# Patient Record
Sex: MALE | Race: WHITE | NOT HISPANIC OR LATINO | Employment: OTHER | URBAN - METROPOLITAN AREA
[De-identification: names, ages, dates, MRNs, and addresses within clinical notes are randomized per-mention and may not be internally consistent; named-entity substitution may affect disease eponyms.]

---

## 2017-10-02 ENCOUNTER — HOSPITAL ENCOUNTER (OUTPATIENT)
Dept: FAMILY MEDICINE CLINIC | Facility: CLINIC | Age: 72
Setting detail: SPECIMEN
Discharge: HOME | End: 2017-10-02
Attending: PREVENTIVE MEDICINE | Admitting: PREVENTIVE MEDICINE

## 2017-10-02 LAB
ALBUMIN SERPL-MCNC: 3.9 G/DL (ref 3.5–4.8)
ALBUMIN/GLOB SERPL: 1.5 {RATIO} (ref 1–1.7)
ALP SERPL-CCNC: 61 IU/L (ref 32–91)
ALT SERPL-CCNC: 25 IU/L (ref 17–63)
ANION GAP SERPL CALC-SCNC: 11.8 MMOL/L (ref 10–20)
AST SERPL-CCNC: 21 IU/L (ref 15–41)
BASOPHILS # BLD AUTO: 0 10*3/UL (ref 0–0.2)
BASOPHILS NFR BLD AUTO: 1 % (ref 0–2)
BILIRUB SERPL-MCNC: 0.7 MG/DL (ref 0.3–1.2)
BUN SERPL-MCNC: 11 MG/DL (ref 8–20)
BUN/CREAT SERPL: 12.2 (ref 6.2–20.3)
CALCIUM SERPL-MCNC: 9.1 MG/DL (ref 8.9–10.3)
CHLORIDE SERPL-SCNC: 105 MMOL/L (ref 101–111)
CHOLEST SERPL-MCNC: 135 MG/DL
CHOLEST/HDLC SERPL: 3.4 {RATIO}
CONV CO2: 26 MMOL/L (ref 22–32)
CONV LDL CHOLESTEROL DIRECT: 79 MG/DL (ref 0–100)
CONV TOTAL PROTEIN: 6.5 G/DL (ref 6.1–7.9)
CREAT UR-MCNC: 0.9 MG/DL (ref 0.7–1.2)
DIFFERENTIAL METHOD BLD: (no result)
EOSINOPHIL # BLD AUTO: 0.2 10*3/UL (ref 0–0.3)
EOSINOPHIL # BLD AUTO: 4 % (ref 0–3)
ERYTHROCYTE [DISTWIDTH] IN BLOOD BY AUTOMATED COUNT: 13 % (ref 11.5–14.5)
GLOBULIN UR ELPH-MCNC: 2.6 G/DL (ref 2.5–3.8)
GLUCOSE SERPL-MCNC: 106 MG/DL (ref 65–99)
HCT VFR BLD AUTO: 41 % (ref 40–54)
HDLC SERPL-MCNC: 40 MG/DL
HGB BLD-MCNC: 14.2 G/DL (ref 14–18)
LDLC/HDLC SERPL: 2 {RATIO}
LIPID INTERPRETATION: NORMAL
LYMPHOCYTES # BLD AUTO: 1.5 10*3/UL (ref 0.8–4.8)
LYMPHOCYTES NFR BLD AUTO: 32 % (ref 18–42)
MCH RBC QN AUTO: 30.5 PG (ref 26–32)
MCHC RBC AUTO-ENTMCNC: 34.6 G/DL (ref 32–36)
MCV RBC AUTO: 88.1 FL (ref 80–94)
MONOCYTES # BLD AUTO: 0.4 10*3/UL (ref 0.1–1.3)
MONOCYTES NFR BLD AUTO: 9 % (ref 2–11)
NEUTROPHILS # BLD AUTO: 2.6 10*3/UL (ref 2.3–8.6)
NEUTROPHILS NFR BLD AUTO: 54 % (ref 50–75)
NRBC BLD AUTO-RTO: 0 /100{WBCS}
NRBC/RBC NFR BLD MANUAL: 0 10*3/UL
PLATELET # BLD AUTO: 159 10*3/UL (ref 150–450)
PMV BLD AUTO: 7 FL (ref 7.4–10.4)
POTASSIUM SERPL-SCNC: 4.8 MMOL/L (ref 3.6–5.1)
RBC # BLD AUTO: 4.65 10*6/UL (ref 4.6–6)
SODIUM SERPL-SCNC: 138 MMOL/L (ref 136–144)
TRIGL SERPL-MCNC: 135 MG/DL
VLDLC SERPL CALC-MCNC: 16.2 MG/DL
WBC # BLD AUTO: 4.7 10*3/UL (ref 4.5–11.5)

## 2017-12-07 ENCOUNTER — HOSPITAL ENCOUNTER (OUTPATIENT)
Dept: OTHER | Facility: HOSPITAL | Age: 72
Setting detail: SPECIMEN
Discharge: HOME | End: 2017-12-07
Attending: INTERNAL MEDICINE | Admitting: INTERNAL MEDICINE

## 2017-12-07 ENCOUNTER — OFFICE (AMBULATORY)
Dept: URBAN - METROPOLITAN AREA CLINIC 64 | Facility: CLINIC | Age: 72
End: 2017-12-07

## 2017-12-07 ENCOUNTER — ON CAMPUS - OUTPATIENT (AMBULATORY)
Dept: URBAN - METROPOLITAN AREA HOSPITAL 2 | Facility: HOSPITAL | Age: 72
End: 2017-12-07

## 2017-12-07 VITALS
RESPIRATION RATE: 15 BRPM | HEART RATE: 79 BPM | HEART RATE: 78 BPM | SYSTOLIC BLOOD PRESSURE: 121 MMHG | DIASTOLIC BLOOD PRESSURE: 78 MMHG | DIASTOLIC BLOOD PRESSURE: 82 MMHG | HEART RATE: 65 BPM | DIASTOLIC BLOOD PRESSURE: 72 MMHG | WEIGHT: 213.2 LBS | OXYGEN SATURATION: 97 % | HEART RATE: 72 BPM | OXYGEN SATURATION: 98 % | HEIGHT: 70 IN | RESPIRATION RATE: 18 BRPM | DIASTOLIC BLOOD PRESSURE: 73 MMHG | RESPIRATION RATE: 20 BRPM | DIASTOLIC BLOOD PRESSURE: 68 MMHG | HEART RATE: 71 BPM | OXYGEN SATURATION: 100 % | SYSTOLIC BLOOD PRESSURE: 134 MMHG | SYSTOLIC BLOOD PRESSURE: 122 MMHG | SYSTOLIC BLOOD PRESSURE: 117 MMHG | DIASTOLIC BLOOD PRESSURE: 87 MMHG | HEART RATE: 68 BPM | TEMPERATURE: 98.1 F | RESPIRATION RATE: 17 BRPM | DIASTOLIC BLOOD PRESSURE: 71 MMHG | HEART RATE: 70 BPM | OXYGEN SATURATION: 96 % | SYSTOLIC BLOOD PRESSURE: 123 MMHG | DIASTOLIC BLOOD PRESSURE: 77 MMHG | DIASTOLIC BLOOD PRESSURE: 75 MMHG | SYSTOLIC BLOOD PRESSURE: 112 MMHG | HEART RATE: 66 BPM | RESPIRATION RATE: 16 BRPM | SYSTOLIC BLOOD PRESSURE: 119 MMHG | OXYGEN SATURATION: 99 %

## 2017-12-07 DIAGNOSIS — K64.1 SECOND DEGREE HEMORRHOIDS: ICD-10-CM

## 2017-12-07 DIAGNOSIS — K57.30 DIVERTICULOSIS OF LARGE INTESTINE WITHOUT PERFORATION OR ABS: ICD-10-CM

## 2017-12-07 DIAGNOSIS — Z86.010 PERSONAL HISTORY OF COLONIC POLYPS: ICD-10-CM

## 2017-12-07 DIAGNOSIS — D12.3 BENIGN NEOPLASM OF TRANSVERSE COLON: ICD-10-CM

## 2017-12-07 LAB
GI HISTOLOGY: A. UNSPECIFIED: (no result)
GI HISTOLOGY: PDF REPORT: (no result)

## 2017-12-07 PROCEDURE — 45385 COLONOSCOPY W/LESION REMOVAL: CPT | Mod: PT | Performed by: INTERNAL MEDICINE

## 2017-12-07 PROCEDURE — 88305 TISSUE EXAM BY PATHOLOGIST: CPT | Mod: 26 | Performed by: INTERNAL MEDICINE

## 2017-12-07 RX ADMIN — PROPOFOL: 10 INJECTION, EMULSION INTRAVENOUS at 08:37

## 2018-02-01 ENCOUNTER — HOSPITAL ENCOUNTER (OUTPATIENT)
Dept: ORTHOPEDIC SURGERY | Facility: CLINIC | Age: 73
Discharge: HOME | End: 2018-02-01
Attending: PODIATRIST | Admitting: PODIATRIST

## 2018-10-29 ENCOUNTER — HOSPITAL ENCOUNTER (OUTPATIENT)
Dept: FAMILY MEDICINE CLINIC | Facility: CLINIC | Age: 73
Setting detail: SPECIMEN
Discharge: HOME | End: 2018-10-29
Attending: PREVENTIVE MEDICINE | Admitting: PREVENTIVE MEDICINE

## 2018-10-29 LAB
ALBUMIN SERPL-MCNC: 3.9 G/DL (ref 3.5–4.8)
ALBUMIN/GLOB SERPL: 1.6 {RATIO} (ref 1–1.7)
ALP SERPL-CCNC: 65 IU/L (ref 32–91)
ALT SERPL-CCNC: 31 IU/L (ref 17–63)
ANION GAP SERPL CALC-SCNC: 13.2 MMOL/L (ref 10–20)
AST SERPL-CCNC: 24 IU/L (ref 15–41)
BASOPHILS # BLD AUTO: 0 10*3/UL (ref 0–0.2)
BASOPHILS NFR BLD AUTO: 1 % (ref 0–2)
BILIRUB SERPL-MCNC: 0.5 MG/DL (ref 0.3–1.2)
BUN SERPL-MCNC: 13 MG/DL (ref 8–20)
BUN/CREAT SERPL: 13 (ref 6.2–20.3)
CALCIUM SERPL-MCNC: 8.9 MG/DL (ref 8.9–10.3)
CHLORIDE SERPL-SCNC: 104 MMOL/L (ref 101–111)
CHOLEST SERPL-MCNC: 124 MG/DL
CHOLEST/HDLC SERPL: 3.3 {RATIO}
CONV CO2: 25 MMOL/L (ref 22–32)
CONV LDL CHOLESTEROL DIRECT: 71 MG/DL (ref 0–100)
CONV TOTAL PROTEIN: 6.4 G/DL (ref 6.1–7.9)
CREAT UR-MCNC: 1 MG/DL (ref 0.7–1.2)
DIFFERENTIAL METHOD BLD: (no result)
EOSINOPHIL # BLD AUTO: 0.2 10*3/UL (ref 0–0.3)
EOSINOPHIL # BLD AUTO: 4 % (ref 0–3)
ERYTHROCYTE [DISTWIDTH] IN BLOOD BY AUTOMATED COUNT: 12.6 % (ref 11.5–14.5)
GLOBULIN UR ELPH-MCNC: 2.5 G/DL (ref 2.5–3.8)
GLUCOSE SERPL-MCNC: 114 MG/DL (ref 65–99)
HCT VFR BLD AUTO: 40.7 % (ref 40–54)
HDLC SERPL-MCNC: 38 MG/DL
HGB BLD-MCNC: 14 G/DL (ref 14–18)
LDLC/HDLC SERPL: 1.9 {RATIO}
LIPID INTERPRETATION: ABNORMAL
LYMPHOCYTES # BLD AUTO: 1.6 10*3/UL (ref 0.8–4.8)
LYMPHOCYTES NFR BLD AUTO: 33 % (ref 18–42)
MAGNESIUM SERPL-MCNC: 1.8 MG/DL (ref 1.8–2.5)
MCH RBC QN AUTO: 30.4 PG (ref 26–32)
MCHC RBC AUTO-ENTMCNC: 34.4 G/DL (ref 32–36)
MCV RBC AUTO: 88.5 FL (ref 80–94)
MONOCYTES # BLD AUTO: 0.4 10*3/UL (ref 0.1–1.3)
MONOCYTES NFR BLD AUTO: 9 % (ref 2–11)
NEUTROPHILS # BLD AUTO: 2.6 10*3/UL (ref 2.3–8.6)
NEUTROPHILS NFR BLD AUTO: 53 % (ref 50–75)
NRBC BLD AUTO-RTO: 0 /100{WBCS}
NRBC/RBC NFR BLD MANUAL: 0 10*3/UL
PLATELET # BLD AUTO: 165 10*3/UL (ref 150–450)
PMV BLD AUTO: 7.1 FL (ref 7.4–10.4)
POTASSIUM SERPL-SCNC: 4.2 MMOL/L (ref 3.6–5.1)
RBC # BLD AUTO: 4.6 10*6/UL (ref 4.6–6)
SODIUM SERPL-SCNC: 138 MMOL/L (ref 136–144)
TRIGL SERPL-MCNC: 154 MG/DL
VLDLC SERPL CALC-MCNC: 15.4 MG/DL
WBC # BLD AUTO: 4.8 10*3/UL (ref 4.5–11.5)

## 2018-10-30 LAB — 25(OH)D3 SERPL-MCNC: 90 NG/ML (ref 30–100)

## 2018-12-18 ENCOUNTER — HOSPITAL ENCOUNTER (OUTPATIENT)
Dept: FAMILY MEDICINE CLINIC | Facility: CLINIC | Age: 73
Setting detail: SPECIMEN
Discharge: HOME | End: 2018-12-18
Attending: PREVENTIVE MEDICINE | Admitting: PREVENTIVE MEDICINE

## 2018-12-18 LAB
ALBUMIN SERPL-MCNC: 3.9 G/DL (ref 3.5–4.8)
ALBUMIN/GLOB SERPL: 1.5 {RATIO} (ref 1–1.7)
ALP SERPL-CCNC: 57 IU/L (ref 32–91)
ALT SERPL-CCNC: 26 IU/L (ref 17–63)
ANION GAP SERPL CALC-SCNC: 14.9 MMOL/L (ref 10–20)
AST SERPL-CCNC: 22 IU/L (ref 15–41)
BILIRUB SERPL-MCNC: 0.7 MG/DL (ref 0.3–1.2)
BUN SERPL-MCNC: 11 MG/DL (ref 8–20)
BUN/CREAT SERPL: 11 (ref 6.2–20.3)
CALCIUM SERPL-MCNC: 8.9 MG/DL (ref 8.9–10.3)
CHLORIDE SERPL-SCNC: 100 MMOL/L (ref 101–111)
CHOLEST SERPL-MCNC: 202 MG/DL
CHOLEST/HDLC SERPL: 5.5 {RATIO}
CONV CO2: 26 MMOL/L (ref 22–32)
CONV LDL CHOLESTEROL DIRECT: 140 MG/DL (ref 0–100)
CONV TOTAL PROTEIN: 6.5 G/DL (ref 6.1–7.9)
CREAT UR-MCNC: 1 MG/DL (ref 0.7–1.2)
GLOBULIN UR ELPH-MCNC: 2.6 G/DL (ref 2.5–3.8)
GLUCOSE SERPL-MCNC: 109 MG/DL (ref 65–99)
HDLC SERPL-MCNC: 37 MG/DL
LDLC/HDLC SERPL: 3.8 {RATIO}
LIPID INTERPRETATION: ABNORMAL
POTASSIUM SERPL-SCNC: 3.9 MMOL/L (ref 3.6–5.1)
SODIUM SERPL-SCNC: 137 MMOL/L (ref 136–144)
TRIGL SERPL-MCNC: 204 MG/DL
VLDLC SERPL CALC-MCNC: 25 MG/DL

## 2019-03-13 ENCOUNTER — HOSPITAL ENCOUNTER (OUTPATIENT)
Dept: FAMILY MEDICINE CLINIC | Facility: CLINIC | Age: 74
Setting detail: SPECIMEN
Discharge: HOME | End: 2019-03-13
Attending: PREVENTIVE MEDICINE | Admitting: PREVENTIVE MEDICINE

## 2019-03-13 LAB
ALBUMIN SERPL-MCNC: 3.9 G/DL (ref 3.5–4.8)
ALBUMIN/GLOB SERPL: 1.4 {RATIO} (ref 1–1.7)
ALP SERPL-CCNC: 58 IU/L (ref 32–91)
ALT SERPL-CCNC: 24 IU/L (ref 17–63)
ANION GAP SERPL CALC-SCNC: 13 MMOL/L (ref 10–20)
AST SERPL-CCNC: 23 IU/L (ref 15–41)
BASOPHILS # BLD AUTO: 0 10*3/UL (ref 0–0.2)
BASOPHILS NFR BLD AUTO: 1 % (ref 0–2)
BILIRUB SERPL-MCNC: 0.8 MG/DL (ref 0.3–1.2)
BUN SERPL-MCNC: 10 MG/DL (ref 8–20)
BUN/CREAT SERPL: 10 (ref 6.2–20.3)
CALCIUM SERPL-MCNC: 8.9 MG/DL (ref 8.9–10.3)
CHLORIDE SERPL-SCNC: 102 MMOL/L (ref 101–111)
CONV CO2: 25 MMOL/L (ref 22–32)
CONV TOTAL PROTEIN: 6.6 G/DL (ref 6.1–7.9)
CREAT UR-MCNC: 1 MG/DL (ref 0.7–1.2)
DIFFERENTIAL METHOD BLD: (no result)
EOSINOPHIL # BLD AUTO: 0.2 10*3/UL (ref 0–0.3)
EOSINOPHIL # BLD AUTO: 3 % (ref 0–3)
ERYTHROCYTE [DISTWIDTH] IN BLOOD BY AUTOMATED COUNT: 12.8 % (ref 11.5–14.5)
GLOBULIN UR ELPH-MCNC: 2.7 G/DL (ref 2.5–3.8)
GLUCOSE SERPL-MCNC: 107 MG/DL (ref 65–99)
HCT VFR BLD AUTO: 41.7 % (ref 40–54)
HGB BLD-MCNC: 14.2 G/DL (ref 14–18)
LYMPHOCYTES # BLD AUTO: 1.6 10*3/UL (ref 0.8–4.8)
LYMPHOCYTES NFR BLD AUTO: 32 % (ref 18–42)
MAGNESIUM SERPL-MCNC: 1.8 MG/DL (ref 1.8–2.5)
MCH RBC QN AUTO: 30.5 PG (ref 26–32)
MCHC RBC AUTO-ENTMCNC: 34.1 G/DL (ref 32–36)
MCV RBC AUTO: 89.4 FL (ref 80–94)
MONOCYTES # BLD AUTO: 0.5 10*3/UL (ref 0.1–1.3)
MONOCYTES NFR BLD AUTO: 10 % (ref 2–11)
NEUTROPHILS # BLD AUTO: 2.6 10*3/UL (ref 2.3–8.6)
NEUTROPHILS NFR BLD AUTO: 54 % (ref 50–75)
NRBC BLD AUTO-RTO: 0 /100{WBCS}
NRBC/RBC NFR BLD MANUAL: 0 10*3/UL
PLATELET # BLD AUTO: 166 10*3/UL (ref 150–450)
PMV BLD AUTO: 6.9 FL (ref 7.4–10.4)
POTASSIUM SERPL-SCNC: 4 MMOL/L (ref 3.6–5.1)
RBC # BLD AUTO: 4.66 10*6/UL (ref 4.6–6)
SODIUM SERPL-SCNC: 136 MMOL/L (ref 136–144)
WBC # BLD AUTO: 4.8 10*3/UL (ref 4.5–11.5)

## 2019-03-20 ENCOUNTER — HOSPITAL ENCOUNTER (OUTPATIENT)
Dept: OTHER | Facility: HOSPITAL | Age: 74
Discharge: HOME | End: 2019-03-20
Attending: PREVENTIVE MEDICINE | Admitting: PREVENTIVE MEDICINE

## 2019-04-02 ENCOUNTER — OFFICE VISIT (OUTPATIENT)
Dept: NEUROSURGERY | Facility: CLINIC | Age: 74
End: 2019-04-02

## 2019-04-02 VITALS
RESPIRATION RATE: 14 BRPM | SYSTOLIC BLOOD PRESSURE: 120 MMHG | DIASTOLIC BLOOD PRESSURE: 70 MMHG | BODY MASS INDEX: 31.78 KG/M2 | WEIGHT: 222 LBS | HEIGHT: 70 IN | HEART RATE: 68 BPM

## 2019-04-02 DIAGNOSIS — E78.5 HYPERLIPIDEMIA, UNSPECIFIED HYPERLIPIDEMIA TYPE: ICD-10-CM

## 2019-04-02 DIAGNOSIS — I10 HYPERTENSION, UNSPECIFIED TYPE: ICD-10-CM

## 2019-04-02 DIAGNOSIS — N40.1 BENIGN PROSTATIC HYPERPLASIA WITH LOWER URINARY TRACT SYMPTOMS, SYMPTOM DETAILS UNSPECIFIED: ICD-10-CM

## 2019-04-02 DIAGNOSIS — M48.062 SPINAL STENOSIS, LUMBAR REGION, WITH NEUROGENIC CLAUDICATION: Primary | ICD-10-CM

## 2019-04-02 DIAGNOSIS — M48.02 SPINAL STENOSIS IN CERVICAL REGION: ICD-10-CM

## 2019-04-02 PROBLEM — N40.0 BPH (BENIGN PROSTATIC HYPERPLASIA): Status: ACTIVE | Noted: 2019-04-02

## 2019-04-02 PROCEDURE — 99203 OFFICE O/P NEW LOW 30 MIN: CPT | Performed by: NEUROLOGICAL SURGERY

## 2019-04-02 RX ORDER — MULTIVITAMIN WITH IRON
250 TABLET ORAL DAILY
COMMUNITY

## 2019-04-02 RX ORDER — CLINDAMYCIN PHOSPHATE 900 MG/50ML
900 INJECTION INTRAVENOUS ONCE
Status: CANCELLED | OUTPATIENT
Start: 2019-04-22 | End: 2019-04-02

## 2019-04-02 RX ORDER — ERGOCALCIFEROL 1.25 MG/1
50000 CAPSULE ORAL WEEKLY
COMMUNITY
End: 2020-05-04

## 2019-04-02 RX ORDER — ZOSTER VACCINE RECOMBINANT, ADJUVANTED 50 MCG/0.5
KIT INTRAMUSCULAR
COMMUNITY
Start: 2019-02-15 | End: 2019-04-12

## 2019-04-02 RX ORDER — ATOVAQUONE AND PROGUANIL HYDROCHLORIDE 250; 100 MG/1; MG/1
1 TABLET, FILM COATED ORAL
COMMUNITY
Start: 2019-01-29 | End: 2019-04-12

## 2019-04-02 RX ORDER — PHENOL 1.4 %
600 AEROSOL, SPRAY (ML) MUCOUS MEMBRANE DAILY
COMMUNITY

## 2019-04-02 RX ORDER — TAMSULOSIN HYDROCHLORIDE 0.4 MG/1
1 CAPSULE ORAL EVERY EVENING
COMMUNITY
Start: 2019-03-08 | End: 2022-06-17

## 2019-04-02 RX ORDER — LISINOPRIL 40 MG/1
40 TABLET ORAL EVERY EVENING
COMMUNITY
Start: 2019-01-25 | End: 2020-02-11

## 2019-04-02 RX ORDER — FINASTERIDE 5 MG/1
5 TABLET, FILM COATED ORAL
COMMUNITY
Start: 2019-02-26 | End: 2022-06-17

## 2019-04-02 RX ORDER — ATORVASTATIN CALCIUM 20 MG/1
20 TABLET, FILM COATED ORAL NIGHTLY
Refills: 3 | COMMUNITY
Start: 2019-03-21 | End: 2020-06-29

## 2019-04-02 RX ORDER — ESCITALOPRAM OXALATE 10 MG/1
10 TABLET ORAL EVERY EVENING
Refills: 3 | COMMUNITY
Start: 2019-03-25 | End: 2020-03-30

## 2019-04-02 RX ORDER — AZITHROMYCIN 500 MG/1
TABLET, FILM COATED ORAL
COMMUNITY
Start: 2019-01-29 | End: 2019-04-12

## 2019-04-02 RX ORDER — DEXAMETHASONE SODIUM PHOSPHATE 4 MG/ML
4 INJECTION, SOLUTION INTRA-ARTICULAR; INTRALESIONAL; INTRAMUSCULAR; INTRAVENOUS; SOFT TISSUE EVERY 6 HOURS
Status: CANCELLED | OUTPATIENT
Start: 2019-04-22 | End: 2019-04-22

## 2019-04-07 ENCOUNTER — RESULTS ENCOUNTER (OUTPATIENT)
Dept: NEUROSURGERY | Facility: CLINIC | Age: 74
End: 2019-04-07

## 2019-04-07 DIAGNOSIS — M48.062 SPINAL STENOSIS, LUMBAR REGION, WITH NEUROGENIC CLAUDICATION: ICD-10-CM

## 2019-04-12 ENCOUNTER — APPOINTMENT (OUTPATIENT)
Dept: PREADMISSION TESTING | Facility: HOSPITAL | Age: 74
End: 2019-04-12

## 2019-04-12 VITALS
TEMPERATURE: 97.4 F | WEIGHT: 227 LBS | SYSTOLIC BLOOD PRESSURE: 142 MMHG | DIASTOLIC BLOOD PRESSURE: 84 MMHG | HEIGHT: 70 IN | RESPIRATION RATE: 18 BRPM | OXYGEN SATURATION: 97 % | BODY MASS INDEX: 32.5 KG/M2 | HEART RATE: 101 BPM

## 2019-04-12 DIAGNOSIS — M48.062 SPINAL STENOSIS, LUMBAR REGION, WITH NEUROGENIC CLAUDICATION: ICD-10-CM

## 2019-04-12 LAB
ANION GAP SERPL CALCULATED.3IONS-SCNC: 12 MMOL/L
BUN BLD-MCNC: 14 MG/DL (ref 8–23)
BUN/CREAT SERPL: 15.9 (ref 7–25)
CALCIUM SPEC-SCNC: 8.8 MG/DL (ref 8.6–10.5)
CHLORIDE SERPL-SCNC: 102 MMOL/L (ref 98–107)
CO2 SERPL-SCNC: 26 MMOL/L (ref 22–29)
CREAT BLD-MCNC: 0.88 MG/DL (ref 0.76–1.27)
DEPRECATED RDW RBC AUTO: 39.5 FL (ref 37–54)
ERYTHROCYTE [DISTWIDTH] IN BLOOD BY AUTOMATED COUNT: 12.1 % (ref 12.3–15.4)
GFR SERPL CREATININE-BSD FRML MDRD: 85 ML/MIN/1.73
GLUCOSE BLD-MCNC: 166 MG/DL (ref 65–99)
HCT VFR BLD AUTO: 40.1 % (ref 37.5–51)
HGB BLD-MCNC: 13.4 G/DL (ref 13–17.7)
MCH RBC QN AUTO: 30 PG (ref 26.6–33)
MCHC RBC AUTO-ENTMCNC: 33.4 G/DL (ref 31.5–35.7)
MCV RBC AUTO: 89.7 FL (ref 79–97)
PLATELET # BLD AUTO: 159 10*3/MM3 (ref 140–450)
PMV BLD AUTO: 8.8 FL (ref 6–12)
POTASSIUM BLD-SCNC: 4.1 MMOL/L (ref 3.5–5.2)
RBC # BLD AUTO: 4.47 10*6/MM3 (ref 4.14–5.8)
SODIUM BLD-SCNC: 140 MMOL/L (ref 136–145)
WBC NRBC COR # BLD: 5.08 10*3/MM3 (ref 3.4–10.8)

## 2019-04-12 PROCEDURE — 93010 ELECTROCARDIOGRAM REPORT: CPT | Performed by: INTERNAL MEDICINE

## 2019-04-12 PROCEDURE — 93005 ELECTROCARDIOGRAM TRACING: CPT

## 2019-04-12 PROCEDURE — 80048 BASIC METABOLIC PNL TOTAL CA: CPT | Performed by: NEUROLOGICAL SURGERY

## 2019-04-12 PROCEDURE — 36415 COLL VENOUS BLD VENIPUNCTURE: CPT | Performed by: NEUROLOGICAL SURGERY

## 2019-04-12 PROCEDURE — 85027 COMPLETE CBC AUTOMATED: CPT | Performed by: NEUROLOGICAL SURGERY

## 2019-04-12 NOTE — DISCHARGE INSTRUCTIONS
Take the following medications the morning of surgery with a small sip of water: NONE    ARRIVE AT 7AM    General Instructions:  • Do not eat solid food after midnight the night before surgery.  • You may drink clear liquids day of surgery but must stop at least one hour before your hospital arrival time.  • It is beneficial for you to have a clear drink that contains carbohydrates the day of surgery.  We suggest a 12 to 20 ounce bottle of Gatorade or Powerade for non-diabetic patients or a 12 to 20 ounce bottle of G2 or Powerade Zero for diabetic patients. (Pediatric patients, are not advised to drink a 12 to 20 ounce carbohydrate drink)    Clear liquids are liquids you can see through.  Nothing red in color.     Plain water                               Sports drinks  Sodas                                   Gelatin (Jell-O)  Fruit juices without pulp such as white grape juice and apple juice  Popsicles that contain no fruit or yogurt  Tea or coffee (no cream or milk added)  Gatorade / Powerade  G2 / Powerade Zero    • Infants may have breast milk up to four hours before surgery.  • Infants drinking formula may drink formula up to six hours before surgery.   • Patients who avoid smoking, chewing tobacco and alcohol for 4 weeks prior to surgery have a reduced risk of post-operative complications.  Quit smoking as many days before surgery as you can.  • Do not smoke, use chewing tobacco or drink alcohol the day of surgery.   • If applicable bring your C-PAP/ BI-PAP machine.  • Bring any papers given to you in the doctor’s office.  • Wear clean comfortable clothes and socks.  • Do not wear contact lenses, false eyelashes or make-up.  Bring a case for your glasses.   • Bring crutches or walker if applicable.  • Remove all piercings.  Leave jewelry and any other valuables at home.  • Hair extensions with metal clips must be removed prior to surgery.  • The Pre-Admission Testing nurse will instruct you to bring  medications if unable to obtain an accurate list in Pre-Admission Testing.        .    Preventing a Surgical Site Infection:  • For 2 to 3 days before surgery, avoid shaving with a razor because the razor can irritate skin and make it easier to develop an infection.    • Any areas of open skin can increase the risk of a post-operative wound infection by allowing bacteria to enter and travel throughout the body.  Notify your surgeon if you have any skin wounds / rashes even if it is not near the expected surgical site.  The area will need assessed to determine if surgery should be delayed until it is healed.  • The night prior to surgery sleep in a clean bed with clean clothing.  Do not allow pets to sleep with you.  • Shower on the morning of surgery using a fresh bar of anti-bacterial soap (such as Dial) and clean washcloth.  Dry with a clean towel and dress in clean clothing.  • Ask your surgeon if you will be receiving antibiotics prior to surgery.  • Make sure you, your family, and all healthcare providers clean their hands with soap and water or an alcohol based hand  before caring for you or your wound.    Day of surgery:  Upon arrival, a Pre-op nurse and Anesthesiologist will review your health history, obtain vital signs, and answer questions you may have.  The only belongings needed at this time will be your home medications and if applicable your C-PAP/BI-PAP machine.  If you are staying overnight your family can leave the rest of your belongings in the car and bring them to your room later.  A Pre-op nurse will start an IV and you may receive medication in preparation for surgery, including something to help you relax.  Your family will be able to see you in the Pre-op area.  While you are in surgery your family should notify the waiting room  if they leave the waiting room area and provide a contact phone number.    Please be aware that surgery does come with discomfort.  We want to  make every effort to control your discomfort so please discuss any uncontrolled symptoms with your nurse.   Your doctor will most likely have prescribed pain medications.      If you are going home after surgery you will receive individualized written care instructions before being discharged.  A responsible adult must drive you to and from the hospital on the day of your surgery and stay with you for 24 hours.    If you are staying overnight following surgery, you will be transported to your hospital room following the recovery period.  [facility] has all private rooms.    You have received a list of surgical assistants for your reference.  If you have any questions please call Pre-Admission Testing at [phone].  Deductibles and co-payments are collected on the day of service. Please be prepared to pay the required co-pay, deductible or deposit on the day of service as defined by your plan.

## 2019-04-22 ENCOUNTER — APPOINTMENT (OUTPATIENT)
Dept: GENERAL RADIOLOGY | Facility: HOSPITAL | Age: 74
End: 2019-04-22

## 2019-04-22 ENCOUNTER — ANESTHESIA (OUTPATIENT)
Dept: PERIOP | Facility: HOSPITAL | Age: 74
End: 2019-04-22

## 2019-04-22 ENCOUNTER — ANESTHESIA EVENT (OUTPATIENT)
Dept: PERIOP | Facility: HOSPITAL | Age: 74
End: 2019-04-22

## 2019-04-22 ENCOUNTER — HOSPITAL ENCOUNTER (OUTPATIENT)
Facility: HOSPITAL | Age: 74
Setting detail: HOSPITAL OUTPATIENT SURGERY
Discharge: HOME | End: 2019-04-22
Attending: NEUROLOGICAL SURGERY | Admitting: NEUROLOGICAL SURGERY

## 2019-04-22 ENCOUNTER — TELEPHONE (OUTPATIENT)
Dept: NEUROSURGERY | Facility: CLINIC | Age: 74
End: 2019-04-22

## 2019-04-22 VITALS
HEART RATE: 86 BPM | BODY MASS INDEX: 31.62 KG/M2 | OXYGEN SATURATION: 94 % | SYSTOLIC BLOOD PRESSURE: 100 MMHG | WEIGHT: 220.9 LBS | TEMPERATURE: 98.1 F | HEIGHT: 70 IN | DIASTOLIC BLOOD PRESSURE: 74 MMHG | RESPIRATION RATE: 16 BRPM

## 2019-04-22 DIAGNOSIS — M48.062 SPINAL STENOSIS, LUMBAR REGION, WITH NEUROGENIC CLAUDICATION: ICD-10-CM

## 2019-04-22 PROCEDURE — 25010000002 PROPOFOL 10 MG/ML EMULSION: Performed by: NURSE ANESTHETIST, CERTIFIED REGISTERED

## 2019-04-22 PROCEDURE — 76000 FLUOROSCOPY <1 HR PHYS/QHP: CPT

## 2019-04-22 PROCEDURE — 25010000002 ONDANSETRON PER 1 MG: Performed by: NURSE ANESTHETIST, CERTIFIED REGISTERED

## 2019-04-22 PROCEDURE — 25010000002 VANCOMYCIN 1 G RECONSTITUTED SOLUTION 1 EACH VIAL: Performed by: NEUROLOGICAL SURGERY

## 2019-04-22 PROCEDURE — 25010000002 MIDAZOLAM PER 1 MG: Performed by: ANESTHESIOLOGY

## 2019-04-22 PROCEDURE — 63047 LAM FACETEC & FORAMOT LUMBAR: CPT | Performed by: NEUROLOGICAL SURGERY

## 2019-04-22 PROCEDURE — 25010000002 SUCCINYLCHOLINE PER 20 MG: Performed by: NURSE ANESTHETIST, CERTIFIED REGISTERED

## 2019-04-22 PROCEDURE — 25010000002 DEXAMETHASONE PER 1 MG: Performed by: NEUROLOGICAL SURGERY

## 2019-04-22 PROCEDURE — 25010000002 DEXAMETHASONE PER 1 MG: Performed by: NURSE ANESTHETIST, CERTIFIED REGISTERED

## 2019-04-22 PROCEDURE — 72020 X-RAY EXAM OF SPINE 1 VIEW: CPT

## 2019-04-22 PROCEDURE — 25010000002 FENTANYL CITRATE (PF) 100 MCG/2ML SOLUTION: Performed by: ANESTHESIOLOGY

## 2019-04-22 PROCEDURE — 25010000002 PHENYLEPHRINE PER 1 ML: Performed by: NURSE ANESTHETIST, CERTIFIED REGISTERED

## 2019-04-22 DEVICE — SEALANT WND FIBRIN TISSEEL VAPOR/HEAT/PREFIL/SYR 10ML: Type: IMPLANTABLE DEVICE | Site: SPINE LUMBAR | Status: FUNCTIONAL

## 2019-04-22 RX ORDER — NALOXONE HCL 0.4 MG/ML
0.2 VIAL (ML) INJECTION AS NEEDED
Status: DISCONTINUED | OUTPATIENT
Start: 2019-04-22 | End: 2019-04-22 | Stop reason: HOSPADM

## 2019-04-22 RX ORDER — SODIUM CHLORIDE 0.9 % (FLUSH) 0.9 %
1-10 SYRINGE (ML) INJECTION AS NEEDED
Status: DISCONTINUED | OUTPATIENT
Start: 2019-04-22 | End: 2019-04-22 | Stop reason: HOSPADM

## 2019-04-22 RX ORDER — DIPHENHYDRAMINE HCL 25 MG
25 CAPSULE ORAL
Status: DISCONTINUED | OUTPATIENT
Start: 2019-04-22 | End: 2019-04-22 | Stop reason: HOSPADM

## 2019-04-22 RX ORDER — DEXAMETHASONE SODIUM PHOSPHATE 10 MG/ML
INJECTION INTRAMUSCULAR; INTRAVENOUS AS NEEDED
Status: DISCONTINUED | OUTPATIENT
Start: 2019-04-22 | End: 2019-04-22 | Stop reason: SURG

## 2019-04-22 RX ORDER — PROMETHAZINE HYDROCHLORIDE 25 MG/1
25 TABLET ORAL ONCE AS NEEDED
Status: DISCONTINUED | OUTPATIENT
Start: 2019-04-22 | End: 2019-04-22 | Stop reason: HOSPADM

## 2019-04-22 RX ORDER — LIDOCAINE HYDROCHLORIDE 10 MG/ML
0.5 INJECTION, SOLUTION EPIDURAL; INFILTRATION; INTRACAUDAL; PERINEURAL ONCE AS NEEDED
Status: DISCONTINUED | OUTPATIENT
Start: 2019-04-22 | End: 2019-04-22 | Stop reason: HOSPADM

## 2019-04-22 RX ORDER — FAMOTIDINE 10 MG/ML
20 INJECTION, SOLUTION INTRAVENOUS ONCE
Status: COMPLETED | OUTPATIENT
Start: 2019-04-22 | End: 2019-04-22

## 2019-04-22 RX ORDER — EPHEDRINE SULFATE 50 MG/ML
INJECTION, SOLUTION INTRAVENOUS AS NEEDED
Status: DISCONTINUED | OUTPATIENT
Start: 2019-04-22 | End: 2019-04-22 | Stop reason: SURG

## 2019-04-22 RX ORDER — SODIUM CHLORIDE, SODIUM LACTATE, POTASSIUM CHLORIDE, CALCIUM CHLORIDE 600; 310; 30; 20 MG/100ML; MG/100ML; MG/100ML; MG/100ML
9 INJECTION, SOLUTION INTRAVENOUS CONTINUOUS
Status: DISCONTINUED | OUTPATIENT
Start: 2019-04-22 | End: 2019-04-22 | Stop reason: HOSPADM

## 2019-04-22 RX ORDER — PROPOFOL 10 MG/ML
VIAL (ML) INTRAVENOUS AS NEEDED
Status: DISCONTINUED | OUTPATIENT
Start: 2019-04-22 | End: 2019-04-22 | Stop reason: SURG

## 2019-04-22 RX ORDER — ROCURONIUM BROMIDE 10 MG/ML
INJECTION, SOLUTION INTRAVENOUS AS NEEDED
Status: DISCONTINUED | OUTPATIENT
Start: 2019-04-22 | End: 2019-04-22 | Stop reason: SURG

## 2019-04-22 RX ORDER — HYDROMORPHONE HYDROCHLORIDE 1 MG/ML
0.5 INJECTION, SOLUTION INTRAMUSCULAR; INTRAVENOUS; SUBCUTANEOUS
Status: DISCONTINUED | OUTPATIENT
Start: 2019-04-22 | End: 2019-04-22 | Stop reason: HOSPADM

## 2019-04-22 RX ORDER — MIDAZOLAM HYDROCHLORIDE 1 MG/ML
2 INJECTION INTRAMUSCULAR; INTRAVENOUS
Status: DISCONTINUED | OUTPATIENT
Start: 2019-04-22 | End: 2019-04-22 | Stop reason: HOSPADM

## 2019-04-22 RX ORDER — PROMETHAZINE HYDROCHLORIDE 25 MG/ML
12.5 INJECTION, SOLUTION INTRAMUSCULAR; INTRAVENOUS ONCE AS NEEDED
Status: DISCONTINUED | OUTPATIENT
Start: 2019-04-22 | End: 2019-04-22 | Stop reason: HOSPADM

## 2019-04-22 RX ORDER — FENTANYL CITRATE 50 UG/ML
50 INJECTION, SOLUTION INTRAMUSCULAR; INTRAVENOUS
Status: DISCONTINUED | OUTPATIENT
Start: 2019-04-22 | End: 2019-04-22 | Stop reason: HOSPADM

## 2019-04-22 RX ORDER — HYDROCODONE BITARTRATE AND ACETAMINOPHEN 7.5; 325 MG/1; MG/1
1 TABLET ORAL ONCE AS NEEDED
Status: COMPLETED | OUTPATIENT
Start: 2019-04-22 | End: 2019-04-22

## 2019-04-22 RX ORDER — SUCCINYLCHOLINE CHLORIDE 20 MG/ML
INJECTION INTRAMUSCULAR; INTRAVENOUS AS NEEDED
Status: DISCONTINUED | OUTPATIENT
Start: 2019-04-22 | End: 2019-04-22 | Stop reason: SURG

## 2019-04-22 RX ORDER — ONDANSETRON 2 MG/ML
4 INJECTION INTRAMUSCULAR; INTRAVENOUS ONCE AS NEEDED
Status: DISCONTINUED | OUTPATIENT
Start: 2019-04-22 | End: 2019-04-22 | Stop reason: HOSPADM

## 2019-04-22 RX ORDER — SODIUM CHLORIDE, SODIUM LACTATE, POTASSIUM CHLORIDE, CALCIUM CHLORIDE 600; 310; 30; 20 MG/100ML; MG/100ML; MG/100ML; MG/100ML
INJECTION, SOLUTION INTRAVENOUS CONTINUOUS PRN
Status: DISCONTINUED | OUTPATIENT
Start: 2019-04-22 | End: 2019-04-22 | Stop reason: SURG

## 2019-04-22 RX ORDER — LIDOCAINE HYDROCHLORIDE 20 MG/ML
INJECTION, SOLUTION INFILTRATION; PERINEURAL AS NEEDED
Status: DISCONTINUED | OUTPATIENT
Start: 2019-04-22 | End: 2019-04-22 | Stop reason: SURG

## 2019-04-22 RX ORDER — MEPERIDINE HYDROCHLORIDE 25 MG/ML
12.5 INJECTION INTRAMUSCULAR; INTRAVENOUS; SUBCUTANEOUS
Status: DISCONTINUED | OUTPATIENT
Start: 2019-04-22 | End: 2019-04-22 | Stop reason: HOSPADM

## 2019-04-22 RX ORDER — DEXAMETHASONE SODIUM PHOSPHATE 4 MG/ML
4 INJECTION, SOLUTION INTRA-ARTICULAR; INTRALESIONAL; INTRAMUSCULAR; INTRAVENOUS; SOFT TISSUE EVERY 6 HOURS
Status: COMPLETED | OUTPATIENT
Start: 2019-04-22 | End: 2019-04-22

## 2019-04-22 RX ORDER — OXYCODONE AND ACETAMINOPHEN 7.5; 325 MG/1; MG/1
1 TABLET ORAL ONCE AS NEEDED
Status: DISCONTINUED | OUTPATIENT
Start: 2019-04-22 | End: 2019-04-22 | Stop reason: HOSPADM

## 2019-04-22 RX ORDER — LIDOCAINE HYDROCHLORIDE 40 MG/ML
SOLUTION TOPICAL AS NEEDED
Status: DISCONTINUED | OUTPATIENT
Start: 2019-04-22 | End: 2019-04-22 | Stop reason: SURG

## 2019-04-22 RX ORDER — MIDAZOLAM HYDROCHLORIDE 1 MG/ML
1 INJECTION INTRAMUSCULAR; INTRAVENOUS
Status: DISCONTINUED | OUTPATIENT
Start: 2019-04-22 | End: 2019-04-22 | Stop reason: HOSPADM

## 2019-04-22 RX ORDER — FLUMAZENIL 0.1 MG/ML
0.2 INJECTION INTRAVENOUS AS NEEDED
Status: DISCONTINUED | OUTPATIENT
Start: 2019-04-22 | End: 2019-04-22 | Stop reason: HOSPADM

## 2019-04-22 RX ORDER — HYDROCODONE BITARTRATE AND ACETAMINOPHEN 5; 325 MG/1; MG/1
1-2 TABLET ORAL EVERY 4 HOURS PRN
Qty: 15 TABLET | Refills: 0 | Status: SHIPPED | OUTPATIENT
Start: 2019-04-22 | End: 2019-05-06

## 2019-04-22 RX ORDER — MAGNESIUM HYDROXIDE 1200 MG/15ML
LIQUID ORAL AS NEEDED
Status: DISCONTINUED | OUTPATIENT
Start: 2019-04-22 | End: 2019-04-22 | Stop reason: HOSPADM

## 2019-04-22 RX ORDER — CLINDAMYCIN PHOSPHATE 900 MG/50ML
900 INJECTION INTRAVENOUS ONCE
Status: COMPLETED | OUTPATIENT
Start: 2019-04-22 | End: 2019-04-22

## 2019-04-22 RX ORDER — ACETAMINOPHEN 325 MG/1
650 TABLET ORAL ONCE AS NEEDED
Status: DISCONTINUED | OUTPATIENT
Start: 2019-04-22 | End: 2019-04-22 | Stop reason: HOSPADM

## 2019-04-22 RX ORDER — EPHEDRINE SULFATE 50 MG/ML
5 INJECTION, SOLUTION INTRAVENOUS ONCE AS NEEDED
Status: DISCONTINUED | OUTPATIENT
Start: 2019-04-22 | End: 2019-04-22 | Stop reason: HOSPADM

## 2019-04-22 RX ORDER — PROMETHAZINE HYDROCHLORIDE 25 MG/1
25 SUPPOSITORY RECTAL ONCE AS NEEDED
Status: DISCONTINUED | OUTPATIENT
Start: 2019-04-22 | End: 2019-04-22 | Stop reason: HOSPADM

## 2019-04-22 RX ORDER — ONDANSETRON 2 MG/ML
INJECTION INTRAMUSCULAR; INTRAVENOUS AS NEEDED
Status: DISCONTINUED | OUTPATIENT
Start: 2019-04-22 | End: 2019-04-22 | Stop reason: SURG

## 2019-04-22 RX ADMIN — LIDOCAINE HYDROCHLORIDE 80 MG: 20 INJECTION, SOLUTION INFILTRATION; PERINEURAL at 09:25

## 2019-04-22 RX ADMIN — Medication 2 MG: at 08:37

## 2019-04-22 RX ADMIN — Medication 2 MG: at 09:22

## 2019-04-22 RX ADMIN — PHENYLEPHRINE HYDROCHLORIDE 100 MCG: 10 INJECTION INTRAVENOUS at 09:42

## 2019-04-22 RX ADMIN — FENTANYL CITRATE 25 MCG: 50 INJECTION INTRAMUSCULAR; INTRAVENOUS at 10:56

## 2019-04-22 RX ADMIN — PHENYLEPHRINE HYDROCHLORIDE 100 MCG: 10 INJECTION INTRAVENOUS at 09:45

## 2019-04-22 RX ADMIN — ONDANSETRON 4 MG: 2 INJECTION INTRAMUSCULAR; INTRAVENOUS at 11:00

## 2019-04-22 RX ADMIN — FENTANYL CITRATE 25 MCG: 50 INJECTION INTRAMUSCULAR; INTRAVENOUS at 10:20

## 2019-04-22 RX ADMIN — SODIUM CHLORIDE, POTASSIUM CHLORIDE, SODIUM LACTATE AND CALCIUM CHLORIDE: 600; 310; 30; 20 INJECTION, SOLUTION INTRAVENOUS at 10:27

## 2019-04-22 RX ADMIN — CLINDAMYCIN PHOSPHATE 900 MG: 900 INJECTION INTRAVENOUS at 09:22

## 2019-04-22 RX ADMIN — EPHEDRINE SULFATE 10 MG: 50 INJECTION INTRAMUSCULAR; INTRAVENOUS; SUBCUTANEOUS at 09:53

## 2019-04-22 RX ADMIN — DEXAMETHASONE SODIUM PHOSPHATE 4 MG: 4 INJECTION, SOLUTION INTRAMUSCULAR; INTRAVENOUS at 08:37

## 2019-04-22 RX ADMIN — PROPOFOL 150 MG: 10 INJECTION, EMULSION INTRAVENOUS at 09:25

## 2019-04-22 RX ADMIN — FENTANYL CITRATE 25 MCG: 50 INJECTION INTRAMUSCULAR; INTRAVENOUS at 10:36

## 2019-04-22 RX ADMIN — PROPOFOL 50 MG: 10 INJECTION, EMULSION INTRAVENOUS at 09:32

## 2019-04-22 RX ADMIN — HYDROCODONE BITARTRATE AND ACETAMINOPHEN 1 TABLET: 7.5; 325 TABLET ORAL at 12:26

## 2019-04-22 RX ADMIN — FENTANYL CITRATE 50 MCG: 50 INJECTION INTRAMUSCULAR; INTRAVENOUS at 11:08

## 2019-04-22 RX ADMIN — LIDOCAINE HYDROCHLORIDE 1 EACH: 40 SOLUTION TOPICAL at 09:27

## 2019-04-22 RX ADMIN — SODIUM CHLORIDE, POTASSIUM CHLORIDE, SODIUM LACTATE AND CALCIUM CHLORIDE 9 ML/HR: 600; 310; 30; 20 INJECTION, SOLUTION INTRAVENOUS at 08:37

## 2019-04-22 RX ADMIN — DEXAMETHASONE SODIUM PHOSPHATE 8 MG: 10 INJECTION INTRAMUSCULAR; INTRAVENOUS at 09:25

## 2019-04-22 RX ADMIN — FENTANYL CITRATE 25 MCG: 50 INJECTION INTRAMUSCULAR; INTRAVENOUS at 10:26

## 2019-04-22 RX ADMIN — SUCCINYLCHOLINE CHLORIDE 150 MG: 20 INJECTION, SOLUTION INTRAMUSCULAR; INTRAVENOUS; PARENTERAL at 09:25

## 2019-04-22 RX ADMIN — PHENYLEPHRINE HYDROCHLORIDE 100 MCG: 10 INJECTION INTRAVENOUS at 09:49

## 2019-04-22 RX ADMIN — SODIUM CHLORIDE, POTASSIUM CHLORIDE, SODIUM LACTATE AND CALCIUM CHLORIDE: 600; 310; 30; 20 INJECTION, SOLUTION INTRAVENOUS at 08:32

## 2019-04-22 RX ADMIN — FAMOTIDINE 20 MG: 10 INJECTION INTRAVENOUS at 08:37

## 2019-04-22 RX ADMIN — VASOPRESSIN 2 UNITS: 20 INJECTION INTRAMUSCULAR; SUBCUTANEOUS at 09:56

## 2019-04-22 RX ADMIN — ROCURONIUM BROMIDE 10 MG: 10 INJECTION INTRAVENOUS at 09:25

## 2019-04-22 RX ADMIN — FENTANYL CITRATE 50 MCG: 50 INJECTION INTRAMUSCULAR; INTRAVENOUS at 09:32

## 2019-04-22 NOTE — ANESTHESIA PROCEDURE NOTES
Airway  Urgency: elective    Date/Time: 4/22/2019 9:27 AM  Airway not difficult    General Information and Staff    Patient location during procedure: OR  Anesthesiologist: Shekhar Washington MD  CRNA: Adrian Diamond CRNA    Indications and Patient Condition  Indications for airway management: airway protection    Preoxygenated: yes  MILS not maintained throughout  Mask difficulty assessment: 1 - vent by mask    Final Airway Details  Final airway type: endotracheal airway      Successful airway: ETT and reinforced tube  Cuffed: yes   Successful intubation technique: direct laryngoscopy  Facilitating devices/methods: Bougie and anterior pressure/BURP  Endotracheal tube insertion site: oral  Blade: Steve  Blade size: 3  ETT size (mm): 7.5  Cormack-Lehane Classification: grade IIb - view of arytenoids or posterior of glottis only  Placement verified by: chest auscultation   Measured from: lips  Number of attempts at approach: 2    Additional Comments  Pre O2, SIAI

## 2019-04-22 NOTE — ANESTHESIA POSTPROCEDURE EVALUATION
Patient: Farheen Gomez    Procedure Summary     Date:  04/22/19 Room / Location:  [facility] OR [address] [place] [address] MAIN OR    Anesthesia Start:  0919 Anesthesia Stop:  1143    Procedure:  left and right  L4-5 lumbar decompression with dura repair (Bilateral Spine Lumbar) Diagnosis:       Spinal stenosis, lumbar region, with neurogenic claudication      (Spinal stenosis, lumbar region, with neurogenic claudication [M48.062])    Surgeon:  Edson Khan MD Provider:  Shekhar Washington MD    Anesthesia Type:  general ASA Status:  2          Anesthesia Type: general  Last vitals  BP   136/72 (04/22/19 1230)   Temp   36.7 °C (98.1 °F) (04/22/19 1139)   Pulse   85 (04/22/19 1230)   Resp   16 (04/22/19 1230)     SpO2   95 % (04/22/19 1230)     Anesthesia Post Evaluation

## 2019-04-22 NOTE — TELEPHONE ENCOUNTER
Justen villa/ [facility] pharmacy [phone]   She  has a question about the  patient's hydrocodone.   She needs to see if the Doctor will allow her to put a recommendation of 10 pills per day per [facility]..     Dr Khan just did surgery on pt today.

## 2019-04-22 NOTE — ANESTHESIA PREPROCEDURE EVALUATION
" Anesthesia Evaluation     Patient summary reviewed and Nursing notes reviewed                Airway   Mallampati: II  Dental      Pulmonary - negative pulmonary ROS   Cardiovascular     Rhythm: regular  Rate: normal    (+) hypertension, hyperlipidemia,       Neuro/Psych  (+) psychiatric history Depression,     GI/Hepatic/Renal/Endo - negative ROS     Musculoskeletal (-) negative ROS    Abdominal    Substance History - negative use     OB/GYN negative ob/gyn ROS         Other                        Anesthesia Plan    ASA 2     general   (Goes by \"Mac\")  intravenous induction   Anesthetic plan, all risks, benefits, and alternatives have been provided, discussed and informed consent has been obtained with: patient and spouse/significant other.      "

## 2019-05-03 NOTE — OP NOTE
LUMBAR 4/5 DECOMPRESSION    Procedure Note    Farheen Gomez  4/22/2019  [id number]    SURGEON  Edson Khan MD    ASSISTANT:    Jeni Erwin CSA  Who was instrumental and invaluable for aiding with access, aspiration of fluid, tissue retraction, closure etc.    INDICATION:    Intractable leg pain    Pre-op Diagnosis:     Spinal stenosis, lumbar region, with neurogenic claudication [M48.062]      Post-Op Diagnosis Codes:     * Spinal stenosis, lumbar region, with neurogenic claudication [M48.062]      PROCEDURE PERFORMED:  Procedure(s):  left and right  L4-5 lumbar decompression with dura repair    Anesthesia: General    Staff:   Circulator: Cecille Moreno RN; Carline Patton RN  Radiology Technologist: Romaine Bonilla RRT  Scrub Person: Harper Mclean  Assistant: Jeni Erwin CSA    Estimated Blood Loss: minimal    Specimens:                * No orders in the log *      Findings: Severe lumbar spinal stenosis secondary to ligamentum flavum hypertrophy and disc bulging.    Complications: None    Details: Positioning/approach: The patient was placed onto the Get table and care was taken to pad all susceptible areas. The lumbar area was prepped and draped in the usual sterile manner. Under fluoroscopic guidance a short K wire was used under fluoroscopic guidance to identify the interspace at L4/5. And then a series of telescoping dilators up to 18 mm were used and then a 18 mm X 7 cm tubular retractor was placed. Once in place a minimal amount soft tissue was removed from the bone with Bovie cautery.    Microscope: The operating microscope was draped sterilely and brought into the field and the rest of the operation performed with micro- dissection technique and micro-illumination.    Lumbar decompression: Using the high-speed drill I then removed the inferior lamina of L for superior lamina of L5.  Reach across midline and also remove lamina and medial facet.  And I removed the  medial aspect of the facet on the the right and left side - about 1/4 of the facet.     I encountered significant hypertrophy of the ligamentum flavum and the facet.  The ligamentum flavum was removed piecemeal with 3 and 4 mm Kerrison rongeurs along with the lamina and medial portions of the facet which significantly freed up the dural tube.    I gently retracted the dural tube and the traversing nerve and identified the offending fragment of disc. I removed this with the pituitary rongeur.  This was off to the right but mostly midline.     I identified the exiting L 4 and the traversing L5 nerve root and made sure that they were completely decompressed.     Prior to decompression they were obviously compressed by the surrounding tissue and the disc herniation and by the end of the procedure I could pass a Arce ball hook both out into the neural foramen alongside the exiting nerve root and along side the traversing nerve root inside the spinal canal without disturbing the nerve roots.    Copious irrigation was used hemostasis was obtained the wound was then closed and appropriate layers skin reapproximated with Dermabond              Edson Khan MD     Date: 5/3/2019  Time: 3:36 PM

## 2019-05-06 ENCOUNTER — OFFICE VISIT (OUTPATIENT)
Dept: NEUROSURGERY | Facility: CLINIC | Age: 74
End: 2019-05-06

## 2019-05-06 VITALS
WEIGHT: 223 LBS | HEIGHT: 70 IN | RESPIRATION RATE: 16 BRPM | BODY MASS INDEX: 31.92 KG/M2 | DIASTOLIC BLOOD PRESSURE: 80 MMHG | TEMPERATURE: 97.1 F | SYSTOLIC BLOOD PRESSURE: 140 MMHG | HEART RATE: 72 BPM

## 2019-05-06 DIAGNOSIS — Z09 POSTOP CHECK: Primary | ICD-10-CM

## 2019-05-06 PROCEDURE — 99024 POSTOP FOLLOW-UP VISIT: CPT | Performed by: NURSE PRACTITIONER

## 2019-05-06 RX ORDER — ZOSTER VACCINE RECOMBINANT, ADJUVANTED 50 MCG/0.5
KIT INTRAMUSCULAR
Refills: 0 | COMMUNITY
Start: 2019-05-03 | End: 2019-07-09

## 2019-05-06 NOTE — PROGRESS NOTES
" HPI:   Farheen Gomez is a 73 y.o. male for follow-up of lumbar spinal stenosis with neurogenic claudication. He is status post L4/5 lumbar decompression on April 22, 2019. He was discharged to home. He has not had postoperative complications. He reports 90% improvement in leg pain. Even his knee pain has resolved. Not much back pain. No pain meds for 1.5 weeks. No wound issues, fever, or chills.     He presents accompanied by his spouse.     Review of Systems   Constitutional: Negative for chills and fever.   Gastrointestinal: Negative for constipation.   Genitourinary: Positive for enuresis (dribble). Negative for difficulty urinating.   Musculoskeletal: Negative for back pain.   Skin: Negative for wound (wound redness, swelling, or drainage).   Neurological: Negative for weakness, numbness and headaches.   Psychiatric/Behavioral: Negative for sleep disturbance.        /80 (BP Location: Right arm, Patient Position: Sitting, Cuff Size: Large Adult)   Pulse 72   Temp 97.1 °F (36.2 °C)   Resp 16   Ht 177.8 cm (70\")   Wt 101 kg (223 lb)   BMI 32.00 kg/m²     Physical Exam   Constitutional: He appears well-developed and well-nourished.   Pulmonary/Chest: Effort normal.   Neurological: He is alert. Gait normal.   Skin: Skin is warm and dry.   Well approximated midline lumbar incision with no redness, drainage, swelling   Psychiatric: He has a normal mood and affect. His behavior is normal. Judgment normal.   Vitals reviewed.    Neurologic Exam     Mental Status   Level of consciousness: alert  Knowledge: good.   Normal comprehension.     Motor Exam   Muscle bulk: normal  Right leg tone: normal  Left leg tone: normal5/5 bilateral lower extremities     Sensory Exam   Right leg light touch: normal  Left leg light touch: normal    Gait, Coordination, and Reflexes     Gait  Gait: normal  We will to heel and toe walk bilaterally       Findings/Results:  No new imaging    Assessment/Plan:  Farheen was seen today " for l4/5 lumbar decompression, 4/22.    Diagnoses and all orders for this visit:    Postop check        Discussion/Summary  Patient presents for first postop visit after 1 level lumbar decompression.  He reports 90% improvement in his leg pain.  Not having much in the way back pain.  Has weaned off all pain medications including over-the-counter pain relievers.  He is very pleased with outcome of his surgery.  His exam is as noted above with no neurologic red flags.  Wound is healing well.  He is been given both verbal and written postoperative instructions today.  We will see him back in 2 months for follow-up.  He will call the meantime with any concerns.    Plan: Return in about 2 months (around 7/6/2019) for Follow-up with Dr. Khan.         Patient Care Team    Patient Care Team:  Penny Hidalgo MD as PCP - General  Penny Hidalgo MD as PCP - Claims Attributed    Lana White, APRN  5/6/2019    Dragon disclaimer:   Much of this encounter note is an electronic transcription/translation of spoken language to printed text. The electronic translation of spoken language may permit erroneous, or at times, nonsensical words or phrases to be inadvertently transcribed; Although I have reviewed the note for such errors, some may still exist.

## 2019-07-09 ENCOUNTER — OFFICE VISIT (OUTPATIENT)
Dept: NEUROSURGERY | Facility: CLINIC | Age: 74
End: 2019-07-09

## 2019-07-09 ENCOUNTER — OFFICE VISIT (OUTPATIENT)
Dept: FAMILY MEDICINE CLINIC | Facility: CLINIC | Age: 74
End: 2019-07-09

## 2019-07-09 VITALS
OXYGEN SATURATION: 96 % | HEART RATE: 71 BPM | RESPIRATION RATE: 16 BRPM | BODY MASS INDEX: 33.65 KG/M2 | TEMPERATURE: 97.6 F | SYSTOLIC BLOOD PRESSURE: 133 MMHG | HEIGHT: 68 IN | WEIGHT: 222 LBS | DIASTOLIC BLOOD PRESSURE: 80 MMHG

## 2019-07-09 VITALS
WEIGHT: 221 LBS | RESPIRATION RATE: 16 BRPM | DIASTOLIC BLOOD PRESSURE: 70 MMHG | HEIGHT: 70 IN | BODY MASS INDEX: 31.64 KG/M2 | SYSTOLIC BLOOD PRESSURE: 122 MMHG | HEART RATE: 72 BPM

## 2019-07-09 DIAGNOSIS — M25.462 PAIN AND SWELLING OF LEFT KNEE: Primary | ICD-10-CM

## 2019-07-09 DIAGNOSIS — M48.02 SPINAL STENOSIS IN CERVICAL REGION: Primary | ICD-10-CM

## 2019-07-09 DIAGNOSIS — M25.562 PAIN AND SWELLING OF LEFT KNEE: Primary | ICD-10-CM

## 2019-07-09 DIAGNOSIS — M48.062 SPINAL STENOSIS, LUMBAR REGION, WITH NEUROGENIC CLAUDICATION: ICD-10-CM

## 2019-07-09 LAB
BASOPHILS # BLD AUTO: 0 10*3/MM3 (ref 0–0.2)
BASOPHILS NFR BLD AUTO: 0.9 % (ref 0–1.5)
DEPRECATED RDW RBC AUTO: 40.7 FL (ref 37–54)
EOSINOPHIL # BLD AUTO: 0.1 10*3/MM3 (ref 0–0.4)
EOSINOPHIL NFR BLD AUTO: 2.2 % (ref 0.3–6.2)
ERYTHROCYTE [DISTWIDTH] IN BLOOD BY AUTOMATED COUNT: 12.9 % (ref 12.3–15.4)
ERYTHROCYTE [SEDIMENTATION RATE] IN BLOOD: 13 MM/HR (ref 0–20)
HCT VFR BLD AUTO: 40.8 % (ref 37.5–51)
HGB BLD-MCNC: 13.6 G/DL (ref 13–17.7)
LYMPHOCYTES # BLD AUTO: 1.7 10*3/MM3 (ref 0.7–3.1)
LYMPHOCYTES NFR BLD AUTO: 32 % (ref 19.6–45.3)
MCH RBC QN AUTO: 30.3 PG (ref 26.6–33)
MCHC RBC AUTO-ENTMCNC: 33.4 G/DL (ref 31.5–35.7)
MCV RBC AUTO: 90.5 FL (ref 79–97)
MONOCYTES # BLD AUTO: 0.5 10*3/MM3 (ref 0.1–0.9)
MONOCYTES NFR BLD AUTO: 9.9 % (ref 5–12)
NEUTROPHILS # BLD AUTO: 2.9 10*3/MM3 (ref 1.7–7)
NEUTROPHILS NFR BLD AUTO: 55 % (ref 42.7–76)
NRBC BLD AUTO-RTO: 0.1 /100 WBC (ref 0–0.2)
PLATELET # BLD AUTO: 171 10*3/MM3 (ref 140–450)
PMV BLD AUTO: 6.9 FL (ref 6–12)
RBC # BLD AUTO: 4.5 10*6/MM3 (ref 4.14–5.8)
URATE SERPL-MCNC: 5 MG/DL (ref 4.8–8.7)
WBC NRBC COR # BLD: 5.4 10*3/MM3 (ref 3.4–10.8)

## 2019-07-09 PROCEDURE — 85025 COMPLETE CBC W/AUTO DIFF WBC: CPT | Performed by: PREVENTIVE MEDICINE

## 2019-07-09 PROCEDURE — 85652 RBC SED RATE AUTOMATED: CPT | Performed by: PREVENTIVE MEDICINE

## 2019-07-09 PROCEDURE — 99213 OFFICE O/P EST LOW 20 MIN: CPT | Performed by: PREVENTIVE MEDICINE

## 2019-07-09 PROCEDURE — 99024 POSTOP FOLLOW-UP VISIT: CPT | Performed by: NEUROLOGICAL SURGERY

## 2019-07-09 PROCEDURE — 84550 ASSAY OF BLOOD/URIC ACID: CPT | Performed by: PREVENTIVE MEDICINE

## 2019-07-09 NOTE — PATIENT INSTRUCTIONS
Heel glides and leg crunches with 10 minutes ice 4X/day.  Cane foir walking.  Followup Ortho asap.  Tylenol 650 up  tpo 4X/day for L knee pain and swelling

## 2019-07-09 NOTE — PROGRESS NOTES
"Subjective   Patient ID: Farheen Gomez is a 73 y.o. male is here today for follow-up of PO2 l4/5 lumbar decomp  4/22.  Pt is accompanied by his wife.    History of Present Illness     He returns to the office today for ongoing follow-up with history of bilateral lumbar decompression with Dr. Khan at L4-5 on April 22, 2019.  Preop, he had pain radiating down both legs.  He now reports complete resolution of leg and low back pain.  More recently he began developing pain in both knees and will discuss getting referred to an orthopedic surgeon with his primary care provider.  He is very pleased with his postoperative status.  He denies any issues with walking, as well as any balance or gait instability.    He presents with his wife.      /70 (BP Location: Left arm, Patient Position: Sitting, Cuff Size: Large Adult)   Pulse 72   Resp 16   Ht 177.8 cm (70\")   Wt 100 kg (221 lb)   BMI 31.71 kg/m²     The following portions of the patient's history were reviewed and updated as appropriate: allergies, current medications, past family history, past medical history, past social history, past surgical history and problem list.    Review of Systems   Genitourinary: Positive for enuresis (dribble). Negative for difficulty urinating.   Musculoskeletal: Negative for back pain.   Neurological: Negative for weakness and numbness.   Psychiatric/Behavioral: Negative for sleep disturbance.       Objective   Physical Exam   Constitutional: He is oriented to person, place, and time. Vital signs are normal. He appears well-developed and well-nourished. He is cooperative.  Non-toxic appearance. He does not have a sickly appearance. He does not appear ill.   Very pleasant well-appearing overweight older gentleman   HENT:   Head: Normocephalic and atraumatic.   Eyes:   Corrective lenses   Neck: Neck supple. No tracheal deviation present.   Pulmonary/Chest: Effort normal.   Musculoskeletal: Normal range of motion. He exhibits " tenderness (Bilateral knee tenderness). He exhibits no deformity.   Moving all extremities well  Strength equal throughout   Neurological: He is alert and oriented to person, place, and time. He has normal strength. He displays no tremor. No sensory deficit. He exhibits normal muscle tone. Coordination and gait normal. GCS eye subscore is 4. GCS verbal subscore is 5. GCS motor subscore is 6.   Gait is stable and upright   Skin: Skin is warm and dry.   Well-healed lumbar incision site   Psychiatric: He has a normal mood and affect. His behavior is normal. Thought content normal.   Vitals reviewed.    Neurologic Exam     Mental Status   Oriented to person, place, and time.     Motor Exam     Strength   Strength 5/5 throughout.       Assessment/Plan   Independent Review of Radiographic Studies:    none  Medical Decision Making:    I confirmed and obtained the above history as recorded by the nurse practitioner acting as a scribe. I performed the above examination and it is documented by the nurse practitioner acting as a scribe.    As noted above the patient has had resolution of his lower extremity discomfort with ambulation.  He has had a totally benign and normal postoperative course.    He and I are both pleased with his progress.  I recommended that he return as needed to the office.  We will always be happy to see him should any other problems develop.    Farheen was seen today for po2 l4/5 lumbar decomp  4/22.    Diagnoses and all orders for this visit:    Spinal stenosis in cervical region - reveresal of lordosis at C3/4 and C4/5, Modic endpalate cahnges at C7/T1    Spinal stenosis - Severe at L4/5, lumbar region, with neurogenic claudication      Return if symptoms worsen or fail to improve.

## 2019-07-09 NOTE — PROGRESS NOTES
"Subjective   Farheen Gomez is a 73 y.o. male presents for   Chief Complaint   Patient presents with   • Knee Pain     bilateral     L knee swelling and pain on and off for 2 months.  Some pain R knee.  No fever, chills gout or other arthritis.  No injury or overuse.  Health Maintenance Due   Topic Date Due   • TDAP/TD VACCINES (1 - Tdap) 11/08/1965   • ZOSTER VACCINE (1 of 2) 11/08/1996   • PNEUMOCOCCAL VACCINES (65+ LOW/MEDIUM RISK) (1 of 2 - PCV13) 11/08/2011   • HEPATITIS C SCREENING  03/28/2019       History of Present Illness     Vitals:    07/09/19 1221 07/09/19 1222   BP: 122/75 133/80   BP Location: Left arm Right arm   Patient Position: Sitting Sitting   Cuff Size: Large Adult Large Adult   Pulse: 71    Resp: 16    Temp: 97.6 °F (36.4 °C)    TempSrc: Oral    SpO2: 96%    Weight: 101 kg (222 lb)    Height: 171.5 cm (67.5\")        Current Outpatient Medications on File Prior to Visit   Medication Sig Dispense Refill   • atorvastatin (LIPITOR) 20 MG tablet Take 20 mg by mouth Every Night.  3   • calcium carbonate (OS-JUSTA) 600 MG tablet Take 600 mg by mouth Daily.     • escitalopram (LEXAPRO) 10 MG tablet Take 10 mg by mouth Daily.  3   • finasteride (PROSCAR) 5 MG tablet Take 5 mg by mouth Daily.     • lisinopril (PRINIVIL,ZESTRIL) 40 MG tablet Take 40 mg by mouth Daily.     • Magnesium 250 MG tablet Take 250 mg by mouth Daily.     • MEGARED OMEGA-3 KRILL OIL PO Take 1 tablet by mouth Daily. held     • Multiple Vitamins-Minerals (PRESERVISION AREDS 2 PO) Take 1 tablet by mouth Daily.     • tamsulosin (FLOMAX) 0.4 MG capsule 24 hr capsule 1 capsule Daily.     • vitamin D (ERGOCALCIFEROL) 29798 units capsule capsule Take 50,000 Units by mouth 1 (One) Time Per Week.     • [DISCONTINUED] SHINGRIX 50 MCG/0.5ML reconstituted suspension   0     No current facility-administered medications on file prior to visit.        The following portions of the patient's history were reviewed and updated as appropriate: " allergies, current medications, past family history, past medical history, past social history, past surgical history and problem list.    Review of Systems   Constitutional: Negative.    HENT: Negative for sinus pressure and sore throat.    Respiratory: Negative for cough.    Gastrointestinal: Negative.  Positive for GERD.   Musculoskeletal: Positive for joint swelling.   Skin: Negative.    Allergic/Immunologic: Positive for environmental allergies.   Neurological: Negative.    Psychiatric/Behavioral: Negative.        Objective   Physical Exam   Constitutional: He is oriented to person, place, and time. He appears well-developed and well-nourished.   HENT:   Head: Normocephalic and atraumatic.   Eyes: Conjunctivae and EOM are normal. Pupils are equal, round, and reactive to light.   Neck: Normal range of motion. Neck supple.   Abdominal: Soft. Bowel sounds are normal.   Musculoskeletal: He exhibits tenderness.   Mild swelling with minimal erythema L knee.  Extension lacks few degrees and flex to 90 degrees.  Stable with guarding.  Ext strong.  thessalay's positive L  MNVI food.  No calf pain or tenderness with Crow's   Neurological: He is alert and oriented to person, place, and time.   Skin: Skin is warm and dry.   Psychiatric: He has a normal mood and affect.   Nursing note and vitals reviewed.    PHQ-9 Total Score: 0    Assessment/Plan   Farheen was seen today for knee pain.    Diagnoses and all orders for this visit:    Pain and swelling of left knee  -     CBC Auto Differential  -     Uric Acid  -     Sedimentation Rate  -     Ambulatory Referral to Orthopedic Surgery        Patient Instructions   Heel glides and leg crunches with 10 minutes ice 4X/day.  Cane foir walking.  Followup Ortho asap.  Tylenol 650 up  tpo 4X/day for L knee pain and swelling

## 2019-08-06 ENCOUNTER — OFFICE VISIT (OUTPATIENT)
Dept: ORTHOPEDIC SURGERY | Facility: CLINIC | Age: 74
End: 2019-08-06

## 2019-08-06 VITALS
SYSTOLIC BLOOD PRESSURE: 143 MMHG | DIASTOLIC BLOOD PRESSURE: 87 MMHG | WEIGHT: 226 LBS | HEIGHT: 68 IN | RESPIRATION RATE: 18 BRPM | OXYGEN SATURATION: 96 % | BODY MASS INDEX: 34.25 KG/M2 | HEART RATE: 68 BPM

## 2019-08-06 DIAGNOSIS — G89.29 CHRONIC PAIN OF LEFT KNEE: ICD-10-CM

## 2019-08-06 DIAGNOSIS — G89.29 CHRONIC PAIN OF RIGHT KNEE: Primary | ICD-10-CM

## 2019-08-06 DIAGNOSIS — M25.562 CHRONIC PAIN OF LEFT KNEE: ICD-10-CM

## 2019-08-06 DIAGNOSIS — M25.561 CHRONIC PAIN OF RIGHT KNEE: Primary | ICD-10-CM

## 2019-08-06 PROCEDURE — 99203 OFFICE O/P NEW LOW 30 MIN: CPT | Performed by: ORTHOPAEDIC SURGERY

## 2019-08-06 PROCEDURE — 20610 DRAIN/INJ JOINT/BURSA W/O US: CPT | Performed by: ORTHOPAEDIC SURGERY

## 2019-08-06 RX ADMIN — METHYLPREDNISOLONE ACETATE 160 MG: 80 INJECTION, SUSPENSION INTRA-ARTICULAR; INTRALESIONAL; INTRAMUSCULAR; SOFT TISSUE at 15:02

## 2019-08-06 RX ADMIN — METHYLPREDNISOLONE ACETATE 160 MG: 80 INJECTION, SUSPENSION INTRA-ARTICULAR; INTRALESIONAL; INTRAMUSCULAR; SOFT TISSUE at 15:01

## 2019-08-06 RX ADMIN — LIDOCAINE HYDROCHLORIDE 2 ML: 10 INJECTION, SOLUTION EPIDURAL; INFILTRATION; INTRACAUDAL; PERINEURAL at 15:01

## 2019-08-06 RX ADMIN — LIDOCAINE HYDROCHLORIDE 2 ML: 10 INJECTION, SOLUTION EPIDURAL; INFILTRATION; INTRACAUDAL; PERINEURAL at 15:02

## 2019-08-06 NOTE — PROGRESS NOTES
NEW VISIT    Patient: Farheen Gomez  ?  YOB: 1945    MRN: [id number]  ?  Chief Complaint   Patient presents with   • Left Knee - Pain   • Right Knee - Pain      ?  HPI: The patient has been having increasing pain and discomfort in both his knees.  He has difficulty going up and down the steps.  He had a partial medial meniscectomy performed through an open procedure on his right knee about 50 years ago.  He states that his knee has never been right since that surgical procedure.  He has a progressive varus deformity.  He has difficulty going up and down the steps but he has difficulty with walking on an inclined surface.  Occasionally the right knee will buckle and give out from underneath him.  Because of increasing right knee pain and disability his left knee has also been bothering him quite significantly.  He has early morning stiffness and significant swelling in his knee.  The patient says that he would like to walk and exercise for improved quality of life but cannot do so because of the pain and discomfort from the knee.  Pain Location: Medial aspect of both knees.  Radiation: none  Quality: dull and aching  Intensity/Severity: moderate  Duration: For the past several years.  Onset quality: gradual   Timing: constant  Aggravating Factors: Going up and down the steps and walking on uneven surfaces.  Alleviating Factors: Using a brace on the knee and anti-inflammatory medication.  Previous Episodes: yes  Associated Symptoms: pain, swelling, decreased ROM  Previous Treatment: Open medial meniscectomy of the right knee several years ago.    This patient is a new patient.  This problem is new to this examiner.      Allergies:   Allergies   Allergen Reactions   • Penicillins Hives     Tongue bled       Medications:   Home Medications:  Current Outpatient Medications on File Prior to Visit   Medication Sig   • atorvastatin (LIPITOR) 20 MG tablet Take 20 mg by mouth Every Night.   • calcium  carbonate (OS-JUSTA) 600 MG tablet Take 600 mg by mouth Daily.   • escitalopram (LEXAPRO) 10 MG tablet Take 10 mg by mouth Daily.   • finasteride (PROSCAR) 5 MG tablet Take 5 mg by mouth Daily.   • lisinopril (PRINIVIL,ZESTRIL) 40 MG tablet Take 40 mg by mouth Daily.   • Magnesium 250 MG tablet Take 250 mg by mouth Daily.   • Multiple Vitamins-Minerals (EYE VITAMINS PO) Take  by mouth.   • tamsulosin (FLOMAX) 0.4 MG capsule 24 hr capsule 1 capsule Daily.   • vitamin D (ERGOCALCIFEROL) 80010 units capsule capsule Take 50,000 Units by mouth 1 (One) Time Per Week.   • MEGARED OMEGA-3 KRILL OIL PO Take 1 tablet by mouth Daily. held   • Multiple Vitamins-Minerals (PRESERVISION AREDS 2 PO) Take 1 tablet by mouth Daily.     No current facility-administered medications on file prior to visit.      Current Medications:  Scheduled Meds:  PRN Meds:.    I have reviewed the patient's medical history in detail and updated the computerized patient record.  Review and summarization of old records include:    Past Medical History:   Diagnosis Date   • BPH (benign prostatic hyperplasia)    • Depression    • [place] (hard of hearing)    • Hyperlipidemia    • Hypertension    • Low back pain      Past Surgical History:   Procedure Laterality Date   • BACK SURGERY     • HAND SURGERY     • KNEE SURGERY      meniscus repair    • LUMBAR DISCECTOMY Bilateral 2019    Procedure: left and right  L4-5 lumbar decompression with dura repair;  Surgeon: Edson Khan MD;  Location: [facility];  Service: Neurosurgery   • ROTATOR CUFF REPAIR       Social History     Occupational History   • Occupation: retired   Tobacco Use   • Smoking status: Former Smoker     Packs/day: 0.25     Years: 5.00     Pack years: 1.25     Last attempt to quit:      Years since quittin.6   • Smokeless tobacco: Never Used   Substance and Sexual Activity   • Alcohol use: No     Frequency: Never   • Drug use: No   • Sexual activity: Yes     Partners: Female     " Birth control/protection: None      Social History     Social History Narrative   • Not on file     Family History   Problem Relation Age of Onset   • Stroke Mother    • Diabetes Father    • Malig Hyperthermia Neg Hx          Review of Systems  Constitutional: Negative for appetite change.   HENT: Negative.    Eyes: Negative.    Respiratory: Negative.    Cardiovascular: Negative.    Gastrointestinal: Negative.    Endocrine: Negative.    Genitourinary: Negative.    Musculoskeletal: See details of exam below.  Skin: Negative.    Allergic/Immunologic: Negative.    Hematological: Negative.    Psychiatric/Behavioral: Negative.         Wt Readings from Last 3 Encounters:   08/06/19 103 kg (226 lb)   07/09/19 101 kg (222 lb)   07/09/19 100 kg (221 lb)     Ht Readings from Last 3 Encounters:   08/06/19 172.7 cm (68\")   07/09/19 171.5 cm (67.5\")   07/09/19 177.8 cm (70\")     Body mass index is 34.36 kg/m².  Facility age limit for growth percentiles is 20 years.  Vitals:    08/06/19 1025   BP: 143/87   Pulse: 68   Resp: 18   SpO2: 96%         Physical Exam  Constitutional: Patient is oriented to person, place, and time. Appears well-developed and well-nourished.   HENT:   Head: Normocephalic and atraumatic.   Eyes: Conjunctivae and EOM are normal. Pupils are equal, round, and reactive to light.   Cardiovascular: Normal rate, regular rhythm, normal heart sounds and intact distal pulses.   Pulmonary/Chest: Effort normal and breath sounds normal.   Musculoskeletal:   See detailed exam below   Neurological: Alert and oriented to person, place, and time. No sensory deficit. Coordination normal.   Skin: Skin is warm and dry. Capillary refill takes less than 2 seconds. No rash noted. No erythema.   Psychiatric: Patient has a normal mood and affect. His behavior is normal. Judgment and thought content normal.   Nursing note and vitals reviewed.      Ortho Exam:   Bilateral knee (varus). Patient has crepitus throughout range of " motion. Positive patellar grind test. Mild effusion. Lachman is negative. Pivot shift is negative. Anterior and posterior drawer signs are negative. Significant joint line tenderness is noted on the medial aspect of the knee. Patient has a varus orientation of the knee. There is fullness and tenderness in the Popliteal fossa. Mild distention of a Popliteal cyst is noted in this location. Range of motion in flexion is from  degrees. Neurovascular status is intact.  Dorsalis pedis and posterior tibial artery pulses are palpable. Common peroneal nerve function is well preserved. Patient's gait is cautious and antalgic. Skin and soft tissues are mildly swollen, consistent with synovitis and effusion. The patient has a significant limp with the first few steps after starting the gait cycle. Getting out of a chair takes a lot of effort due to pain on knee flexion. The clinical findings are more marked on the right side as compared to the left side.  There is a healed medial parapatellar incision on the left knee as well.      Diagnostics:bilateral Knee X-Ray  Indication: X-rays of both knees AP and lateral were obtained for evaluation of medial pain and discomfort  AP, Lateral views  Findings: Advanced degenerative arthritis of both knees with medial joint space narrowing and varus deformity.  Radiographic findings are more marked on the right side as compared to the left side.  no bony lesion  Soft tissues within normal limits  decreased joint spaces  Hardware appropriately positioned not applicable      no prior studies available for comparison.    This patient's x-ray report was graded according to the Kellgren and Dickson classification.  This took into account the joint space narrowing, osteophyte formation, sclerosis of the distal femur/proximal tibia along with deformity of those bones.  The findings were indicative of K L grade 3.    X-RAY was ordered and reviewed by Boby Medina  MD       Assessment:  Farheen was seen today for pain and pain.    Diagnoses and all orders for this visit:    Chronic pain of left knee  -     Cancel: XR Knee 1 or 2 View Left  -     XR Knee 3 View Bilateral    Chronic pain of right knee  -     Cancel: XR Knee 3 View Right  -     XR Knee 3 View Bilateral          Large Joint Arthrocentesis: R knee  Date/Time: 8/6/2019 3:01 PM  Consent given by: patient  Site marked: site marked  Timeout: Immediately prior to procedure a time out was called to verify the correct patient, procedure, equipment, support staff and site/side marked as required   Supporting Documentation  Indications: pain   Procedure Details  Location: knee - R knee  Preparation: Patient was prepped and draped in the usual sterile fashion  Needle size: 25 G  Approach: anteromedial  Medications administered: 160 mg methylPREDNISolone acetate 80 MG/ML; 2 mL lidocaine PF 1% 1 %  Patient tolerance: patient tolerated the procedure well with no immediate complications    Large Joint Arthrocentesis: L knee  Date/Time: 8/6/2019 3:02 PM  Consent given by: patient  Site marked: site marked  Timeout: Immediately prior to procedure a time out was called to verify the correct patient, procedure, equipment, support staff and site/side marked as required   Supporting Documentation  Indications: pain   Procedure Details  Location: knee - L knee  Preparation: Patient was prepped and draped in the usual sterile fashion  Needle size: 25 G  Approach: anteromedial  Medications administered: 160 mg methylPREDNISolone acetate 80 MG/ML; 2 mL lidocaine PF 1% 1 %  Patient tolerance: patient tolerated the procedure well with no immediate complications        ?    Plan  Injected patient's right knee with a steroid from an anteromedial approach.  Injected patient's left knee with a steroid from an anteromedial approach.  Discussed with the patient about Visco supplementation injections.  · Compression/brace to support the need to  prevent it from buckling and giving out.  · Rest, ice, compression, and elevation (RICE) therapy  · Stretching and strengthening exercises of the quads and the hamstrings.  · Falls precaution discussed with the patient.  · There is no doubt in my mind that he is going to need a right total knee arthroplasty based on the progression of his symptoms.  The patient and his wife are headed to [facility] for a safari and he will let me know upon return when he wants to proceed with surgical intervention.  · Distant benefits of this procedure have been discussed with the patient at length.  · OTC Tylenol 500-1000mg by mouth every 6 hours as needed for pain   · Follow up in 3 month(s)    Date of encounter: 08/06/2019   Boby Medina MD

## 2019-08-07 RX ORDER — CELECOXIB 100 MG/1
100 CAPSULE ORAL 2 TIMES DAILY
Qty: 60 CAPSULE | Refills: 0 | Status: SHIPPED | OUTPATIENT
Start: 2019-08-07 | End: 2019-10-16

## 2019-08-27 RX ORDER — METHYLPREDNISOLONE ACETATE 80 MG/ML
160 INJECTION, SUSPENSION INTRA-ARTICULAR; INTRALESIONAL; INTRAMUSCULAR; SOFT TISSUE ONCE
Status: DISCONTINUED | OUTPATIENT
Start: 2019-08-06 | End: 2019-10-23

## 2019-09-05 RX ORDER — LIDOCAINE HYDROCHLORIDE 10 MG/ML
2 INJECTION, SOLUTION EPIDURAL; INFILTRATION; INTRACAUDAL; PERINEURAL
Status: COMPLETED | OUTPATIENT
Start: 2019-08-06 | End: 2019-08-06

## 2019-09-05 RX ORDER — METHYLPREDNISOLONE ACETATE 80 MG/ML
160 INJECTION, SUSPENSION INTRA-ARTICULAR; INTRALESIONAL; INTRAMUSCULAR; SOFT TISSUE
Status: COMPLETED | OUTPATIENT
Start: 2019-08-06 | End: 2019-08-06

## 2019-09-10 RX ORDER — METHYLPREDNISOLONE ACETATE 80 MG/ML
160 INJECTION, SUSPENSION INTRA-ARTICULAR; INTRALESIONAL; INTRAMUSCULAR; SOFT TISSUE
Status: COMPLETED | OUTPATIENT
Start: 2019-08-06 | End: 2019-08-06

## 2019-09-10 RX ORDER — LIDOCAINE HYDROCHLORIDE 10 MG/ML
2 INJECTION, SOLUTION EPIDURAL; INFILTRATION; INTRACAUDAL; PERINEURAL
Status: COMPLETED | OUTPATIENT
Start: 2019-08-06 | End: 2019-08-06

## 2019-10-15 ENCOUNTER — OFFICE VISIT (OUTPATIENT)
Dept: ORTHOPEDIC SURGERY | Facility: CLINIC | Age: 74
End: 2019-10-15

## 2019-10-15 VITALS
SYSTOLIC BLOOD PRESSURE: 144 MMHG | DIASTOLIC BLOOD PRESSURE: 82 MMHG | HEIGHT: 70 IN | WEIGHT: 230 LBS | HEART RATE: 69 BPM | BODY MASS INDEX: 32.93 KG/M2

## 2019-10-15 DIAGNOSIS — M17.11 PRIMARY OSTEOARTHRITIS OF RIGHT KNEE: Primary | ICD-10-CM

## 2019-10-15 DIAGNOSIS — M17.12 PRIMARY OSTEOARTHRITIS OF LEFT KNEE: ICD-10-CM

## 2019-10-15 PROCEDURE — 99214 OFFICE O/P EST MOD 30 MIN: CPT | Performed by: ORTHOPAEDIC SURGERY

## 2019-10-15 RX ORDER — ACETAMINOPHEN 325 MG/1
1000 TABLET ORAL ONCE
Status: CANCELLED | OUTPATIENT
Start: 2019-10-15 | End: 2019-10-15

## 2019-10-15 RX ORDER — OXYCODONE HCL 10 MG/1
10 TABLET, FILM COATED, EXTENDED RELEASE ORAL ONCE
Status: CANCELLED | OUTPATIENT
Start: 2019-10-15 | End: 2019-10-15

## 2019-10-15 RX ORDER — MELOXICAM 7.5 MG/1
15 TABLET ORAL ONCE
Status: CANCELLED | OUTPATIENT
Start: 2019-10-15 | End: 2019-10-15

## 2019-10-15 NOTE — PROGRESS NOTES
FOLLOW UP VISIT    Patient: Farheen Gomez  ?  YOB: 1945    MRN: [id number]  ?  Chief Complaint   Patient presents with   • Left Knee - Follow-up   • Right Knee - Follow-up      ?  HPI: The patient follows up on both his knees hurting him quite significantly.  He has difficulty with ambulation.  He states that he had knee surgery on the right side about 50 years ago and has now progressively had increasing pain and discomfort.  He has a progressive varus deformity of the right knee.  For the past 3 to 4 years even his left knee has been bothering him very significantly.  He loves to travel and was recently in a safari in [place] and states that his knee bothers him during his travels.  He would like to improve his quality of life.  He has tried intra-articular injections which did help him for about 3 to 4 weeks and then the effect was worn off.  He is also used a brace which is somewhat beneficial in managing his symptoms.  The patient states that he would like to stage the knee replacements and start with the right one first and then progressed onto the left knee replacement in 2 to 3 months once he has recovered from this one on the right side.  Pain Location: Medial aspect of both the knees.  Radiation: none  Quality: dull, aching  Intensity/Severity: severe;8/10  Duration: SEVERAL YEARS after knee surgery 50 years ago.  Onset quality: gradual   Timing: constant  Aggravating Factors: Deep flexion and squatting on ground   Alleviating Factors: Using a supportive brace and Mobic for anti-inflammatory purposes.  Previous Episodes: yes  Associated Symptoms: pain, swelling, decreased ROM, decreased strength  Previous Treatment: Anti-inflammatory medication, intra-articular steroid injections and physical therapy.    This patient is an established patient.  This problem is not new to this examiner.      Allergies:   Allergies   Allergen Reactions   • Penicillins Hives     Tongue bled       Medications:    Home Medications:  Current Outpatient Medications on File Prior to Visit   Medication Sig   • atorvastatin (LIPITOR) 20 MG tablet Take 20 mg by mouth Every Night.   • calcium carbonate (OS-JUSTA) 600 MG tablet Take 600 mg by mouth Daily.   • celecoxib (CeleBREX) 100 MG capsule Take 1 capsule by mouth 2 (Two) Times a Day.   • escitalopram (LEXAPRO) 10 MG tablet Take 10 mg by mouth Daily.   • finasteride (PROSCAR) 5 MG tablet Take 5 mg by mouth Daily.   • lisinopril (PRINIVIL,ZESTRIL) 40 MG tablet Take 40 mg by mouth Daily.   • Magnesium 250 MG tablet Take 250 mg by mouth Daily.   • MEGARED OMEGA-3 KRILL OIL PO Take 1 tablet by mouth Daily. held   • Multiple Vitamins-Minerals (EYE VITAMINS PO) Take  by mouth.   • Multiple Vitamins-Minerals (PRESERVISION AREDS 2 PO) Take 1 tablet by mouth Daily.   • tamsulosin (FLOMAX) 0.4 MG capsule 24 hr capsule 1 capsule Daily.   • vitamin D (ERGOCALCIFEROL) 08221 units capsule capsule Take 50,000 Units by mouth 1 (One) Time Per Week.     Current Facility-Administered Medications on File Prior to Visit   Medication   • methylPREDNISolone acetate (DEPO-medrol) injection 160 mg     Current Medications:  Scheduled Meds:    methylPREDNISolone acetate 160 mg Intra-articular Once     PRN Meds:.    I have reviewed the patient's medical history in detail and updated the computerized patient record.  Review and summarization of old records include:    Past Medical History:   Diagnosis Date   • BPH (benign prostatic hyperplasia)    • Depression    • [place] (hard of hearing)    • Hyperlipidemia    • Hypertension    • Low back pain      Past Surgical History:   Procedure Laterality Date   • BACK SURGERY     • HAND SURGERY     • KNEE SURGERY      meniscus repair    • LUMBAR DISCECTOMY Bilateral 4/22/2019    Procedure: left and right  L4-5 lumbar decompression with dura repair;  Surgeon: Edson Khan MD;  Location: [facility] OR;  Service: Neurosurgery   • ROTATOR CUFF REPAIR       Social  "History     Occupational History   • Occupation: retired   Tobacco Use   • Smoking status: Former Smoker     Packs/day: 0.25     Years: 5.00     Pack years: 1.25     Last attempt to quit:      Years since quittin.8   • Smokeless tobacco: Never Used   Substance and Sexual Activity   • Alcohol use: No     Frequency: Never   • Drug use: No   • Sexual activity: Yes     Partners: Female     Birth control/protection: None      Social History     Social History Narrative   • Not on file     Family History   Problem Relation Age of Onset   • Stroke Mother    • Diabetes Father    • Malig Hyperthermia Neg Hx          Review of Systems   Constitutional: Negative.  Negative for fever.   HENT: Negative.    Eyes: Negative.    Respiratory: Negative.    Cardiovascular: Negative.    Gastrointestinal: Negative.    Endocrine: Negative.    Genitourinary: Negative.    Musculoskeletal: Positive for arthralgias, gait problem and joint swelling.   Skin: Negative.  Negative for rash and wound.   Allergic/Immunologic: Negative.    Neurological: Negative for numbness.   Hematological: Negative.    Psychiatric/Behavioral: Negative.           Wt Readings from Last 3 Encounters:   10/15/19 104 kg (230 lb)   19 103 kg (226 lb)   19 101 kg (222 lb)     Ht Readings from Last 3 Encounters:   10/15/19 177.8 cm (70\")   19 172.7 cm (68\")   19 171.5 cm (67.5\")     Body mass index is 33 kg/m².  Facility age limit for growth percentiles is 20 years.  Vitals:    10/15/19 1428   BP: 144/82   Pulse: 69         Physical Exam   Constitutional: Patient is oriented to person, place, and time. Appears well-developed and well-nourished.   HENT:   Head: Normocephalic and atraumatic.   Eyes: Conjunctivae and EOM are normal. Pupils are equal, round, and reactive to light.   Cardiovascular: Normal rate, regular rhythm, normal heart sounds and intact distal pulses.   Pulmonary/Chest: Effort normal and breath sounds normal. "   Musculoskeletal:   See detailed exam below   Neurological: Alert and oriented to person, place, and time. No sensory deficit. Coordination normal.   Skin: Skin is warm and dry. Capillary refill takes less than 2 seconds. No rash noted. No erythema.   Psychiatric: Patient has a normal mood and affect. His behavior is normal. Judgment and thought content normal.   Nursing note and vitals reviewed.      Ortho Exam:   Bilateral knee (varus). Patient has crepitus throughout range of motion. Positive patellar grind test. Mild effusion. Lachman is negative. Pivot shift is negative. Anterior and posterior drawer signs are negative. Significant joint line tenderness is noted on the medial aspect of the knee. Patient has a varus orientation of the knee. There is fullness and tenderness in the Popliteal fossa. Mild distention of a Popliteal cyst is noted in this location. Range of motion in flexion is from 5-110 degrees. Neurovascular status is intact.  Dorsalis pedis and posterior tibial artery pulses are palpable. Common peroneal nerve function is well preserved. Patient's gait is cautious and antalgic. Skin and soft tissues are mildly swollen, consistent with synovitis and effusion. The patient has a significant limp with the first few steps after starting the gait cycle. Getting out of a chair takes a lot of effort due to pain on knee flexion.         Diagnostics: Previously obtained x-rays are reviewed.  There is a varus alignment of both the knees.  The right is more radiographically advanced than the left side.  Osteophyte formation is noted.  The patellofemoral distance is completely obliterated.  The left knee shows similar changes but to a lesser degree.  Suprapatellar fluid accumulation is noted in both the knees.  My recommendation to proceed with right knee replacement is based on both his clinical correlation as well as the findings on the x-rays.       Assessment:  Farheen was seen today for follow-up and  follow-up.    Diagnoses and all orders for this visit:    Primary osteoarthritis of right knee    Primary osteoarthritis of left knee          Procedures  ?    Plan    · Compression/brace to the need to prevent it from buckling and giving out.  · Patient has already tried steroid injections and Visco supplementation and does not want to consider any more temporary measures to help improve his symptoms.  · Patient wishes to proceed with right total knee arthroplasty.  · Recent benefits of surgical intervention discussed with the patient to the extent of 30 minutes.  His wife is also present in this discussion.  Greater than 50% of my time was spent face-to-face with the patient going over the treatment options and the potential complications as well as the rationale for surgical decision-making.  The patient was seen today for preoperative discussion.  The patient has been tried on over-the-counter and prescription NSAID's despite the risks of anti-inflammatory bleeding, peptic ulcers and erosive gastritis with short term benefit only.  Braces have been prescribed for mechanical support.  Patient has been participating in an exercise program specifically targeting joint pain relief with limited benefit. Intraarticular injections have been used periodically with some but not complete relief of pain.  Ambulation aids have also been utilized.      · The details of the surgical procedure were explained including the location of probable incisions and a description of the likely hardware/grafts to be used. The patient understands the likely convalescence after surgery as well as the rehabilitation required.  Also, we have thoroughly discussed with the patient the risks, benefits and alternatives to surgery.  Risks include but are not limited to the risk of infection, joint stiffness, limited range of motion, wound healing problems, scar tissue build up, myocardial infarction, stroke, blood clots (including DVT and/or  pulmonary embolus along with the risk of death) neurologic and/or vascular injury, limb length discrepancy, fracture, dislocation, nonunion, malunion, continued pain and need for further surgery including hardware failure requiring revision.   · Rest, ice, compression, and elevation (RICE) therapy  · Stretching and strengthening exercises of the quads and the hamstrings.  · OTC Tylenol 500-1000mg by mouth every 6 hours as needed for pain   · Follow up in 6 week(s)    Boby Medina MD  10/15/2019

## 2019-10-16 ENCOUNTER — HOSPITAL ENCOUNTER (OUTPATIENT)
Dept: GENERAL RADIOLOGY | Facility: HOSPITAL | Age: 74
Discharge: HOME | End: 2019-10-16
Admitting: ORTHOPAEDIC SURGERY

## 2019-10-16 ENCOUNTER — APPOINTMENT (OUTPATIENT)
Dept: PREADMISSION TESTING | Facility: HOSPITAL | Age: 74
End: 2019-10-16

## 2019-10-16 VITALS
SYSTOLIC BLOOD PRESSURE: 137 MMHG | HEIGHT: 70 IN | HEART RATE: 70 BPM | DIASTOLIC BLOOD PRESSURE: 89 MMHG | BODY MASS INDEX: 32.64 KG/M2 | OXYGEN SATURATION: 96 % | WEIGHT: 228 LBS

## 2019-10-16 DIAGNOSIS — M17.11 PRIMARY OSTEOARTHRITIS OF RIGHT KNEE: ICD-10-CM

## 2019-10-16 LAB
ABO GROUP BLD: NORMAL
ANION GAP SERPL CALCULATED.3IONS-SCNC: 13.3 MMOL/L (ref 5–15)
APTT PPP: 25.3 SECONDS (ref 24–31)
BILIRUB UR QL STRIP: NEGATIVE
BLD GP AB SCN SERPL QL: NEGATIVE
BUN BLD-MCNC: 10 MG/DL (ref 8–23)
BUN/CREAT SERPL: 11.5 (ref 7–25)
CALCIUM SPEC-SCNC: 9 MG/DL (ref 8.6–10.5)
CHLORIDE SERPL-SCNC: 100 MMOL/L (ref 98–107)
CLARITY UR: CLEAR
CO2 SERPL-SCNC: 28 MMOL/L (ref 22–29)
COLOR UR: YELLOW
CREAT BLD-MCNC: 0.87 MG/DL (ref 0.76–1.27)
DEPRECATED RDW RBC AUTO: 43.3 FL (ref 37–54)
ERYTHROCYTE [DISTWIDTH] IN BLOOD BY AUTOMATED COUNT: 13.8 % (ref 12.3–15.4)
GFR SERPL CREATININE-BSD FRML MDRD: 86 ML/MIN/1.73
GLUCOSE BLD-MCNC: 106 MG/DL (ref 65–99)
GLUCOSE UR STRIP-MCNC: NEGATIVE MG/DL
HCT VFR BLD AUTO: 40 % (ref 37.5–51)
HGB BLD-MCNC: 14 G/DL (ref 13–17.7)
HGB UR QL STRIP.AUTO: NEGATIVE
INR PPP: 0.99 (ref 0.9–1.1)
KETONES UR QL STRIP: NEGATIVE
LEUKOCYTE ESTERASE UR QL STRIP.AUTO: NEGATIVE
MCH RBC QN AUTO: 31.3 PG (ref 26.6–33)
MCHC RBC AUTO-ENTMCNC: 35.1 G/DL (ref 31.5–35.7)
MCV RBC AUTO: 89.2 FL (ref 79–97)
NITRITE UR QL STRIP: NEGATIVE
PH UR STRIP.AUTO: 6.5 [PH] (ref 5–8)
PLATELET # BLD AUTO: 159 10*3/MM3 (ref 140–450)
PMV BLD AUTO: 6.5 FL (ref 6–12)
POTASSIUM BLD-SCNC: 4.3 MMOL/L (ref 3.5–5.2)
PROT UR QL STRIP: NEGATIVE
PROTHROMBIN TIME: 10.4 SECONDS (ref 9.6–11.7)
RBC # BLD AUTO: 4.48 10*6/MM3 (ref 4.14–5.8)
RH BLD: POSITIVE
SODIUM BLD-SCNC: 137 MMOL/L (ref 136–145)
SP GR UR STRIP: 1.02 (ref 1–1.03)
T&S EXPIRATION DATE: NORMAL
UROBILINOGEN UR QL STRIP: NORMAL
WBC NRBC COR # BLD: 5.5 10*3/MM3 (ref 3.4–10.8)

## 2019-10-16 PROCEDURE — 80048 BASIC METABOLIC PNL TOTAL CA: CPT | Performed by: ORTHOPAEDIC SURGERY

## 2019-10-16 PROCEDURE — 86901 BLOOD TYPING SEROLOGIC RH(D): CPT | Performed by: ORTHOPAEDIC SURGERY

## 2019-10-16 PROCEDURE — 86900 BLOOD TYPING SEROLOGIC ABO: CPT | Performed by: ORTHOPAEDIC SURGERY

## 2019-10-16 PROCEDURE — 93005 ELECTROCARDIOGRAM TRACING: CPT

## 2019-10-16 PROCEDURE — 71046 X-RAY EXAM CHEST 2 VIEWS: CPT

## 2019-10-16 PROCEDURE — 85027 COMPLETE CBC AUTOMATED: CPT | Performed by: ORTHOPAEDIC SURGERY

## 2019-10-16 PROCEDURE — 86901 BLOOD TYPING SEROLOGIC RH(D): CPT

## 2019-10-16 PROCEDURE — 87081 CULTURE SCREEN ONLY: CPT | Performed by: ORTHOPAEDIC SURGERY

## 2019-10-16 PROCEDURE — 86850 RBC ANTIBODY SCREEN: CPT | Performed by: ORTHOPAEDIC SURGERY

## 2019-10-16 PROCEDURE — 86900 BLOOD TYPING SEROLOGIC ABO: CPT

## 2019-10-16 PROCEDURE — 93010 ELECTROCARDIOGRAM REPORT: CPT | Performed by: INTERNAL MEDICINE

## 2019-10-16 PROCEDURE — 36415 COLL VENOUS BLD VENIPUNCTURE: CPT

## 2019-10-16 PROCEDURE — 85610 PROTHROMBIN TIME: CPT | Performed by: ORTHOPAEDIC SURGERY

## 2019-10-16 PROCEDURE — 81003 URINALYSIS AUTO W/O SCOPE: CPT | Performed by: ORTHOPAEDIC SURGERY

## 2019-10-16 PROCEDURE — 85730 THROMBOPLASTIN TIME PARTIAL: CPT | Performed by: ORTHOPAEDIC SURGERY

## 2019-10-16 ASSESSMENT — KOOS JR
KOOS JR SCORE: 18
KOOS JR SCORE: 42.281

## 2019-10-17 ENCOUNTER — OFFICE VISIT (OUTPATIENT)
Dept: FAMILY MEDICINE CLINIC | Facility: CLINIC | Age: 74
End: 2019-10-17

## 2019-10-17 VITALS
WEIGHT: 227.8 LBS | HEART RATE: 77 BPM | OXYGEN SATURATION: 97 % | SYSTOLIC BLOOD PRESSURE: 128 MMHG | TEMPERATURE: 98 F | RESPIRATION RATE: 16 BRPM | DIASTOLIC BLOOD PRESSURE: 81 MMHG | BODY MASS INDEX: 32.69 KG/M2

## 2019-10-17 DIAGNOSIS — Z11.4 SCREENING FOR HIV (HUMAN IMMUNODEFICIENCY VIRUS): Primary | ICD-10-CM

## 2019-10-17 DIAGNOSIS — Z01.818 PREOPERATIVE EXAMINATION: ICD-10-CM

## 2019-10-17 DIAGNOSIS — Z91.89 ENCOUNTER FOR HEPATITIS C VIRUS SCREENING TEST FOR HIGH RISK PATIENT: ICD-10-CM

## 2019-10-17 DIAGNOSIS — Z11.59 ENCOUNTER FOR HEPATITIS C VIRUS SCREENING TEST FOR HIGH RISK PATIENT: ICD-10-CM
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
## 2019-10-17 PROBLEM — M79.671 FOOT PAIN, RIGHT: Status: ACTIVE | Noted: 2018-02-01

## 2019-10-17 PROBLEM — M19.90 ARTHRITIS: Status: ACTIVE | Noted: 2019-10-17

## 2019-10-17 PROBLEM — E83.42 HYPOMAGNESEMIA: Status: ACTIVE | Noted: 2018-10-30

## 2019-10-17 PROBLEM — M54.2 NECK PAIN: Status: ACTIVE | Noted: 2019-03-11

## 2019-10-17 PROBLEM — M72.2 PLANTAR FASCIITIS, LEFT: Status: ACTIVE | Noted: 2018-02-01

## 2019-10-17 LAB — MRSA SPEC QL CULT: NORMAL

## 2019-10-17 PROCEDURE — 99213 OFFICE O/P EST LOW 20 MIN: CPT | Performed by: PREVENTIVE MEDICINE

## 2019-10-17 NOTE — PROGRESS NOTES
Subjective   Farheen Gomez is a 73 y.o. male presents for   Chief Complaint   Patient presents with   • Surgical Clearance     knee surgery    5 months ago back surgery and no trouble with anesthesis, bleeding or bloodclots    R knee replacement. Weight increased due to recent vacation and no chest pain or SOA    Health Maintenance Due   Topic Date Due   • TDAP/TD VACCINES (1 - Tdap) 08/14/2015   • HEPATITIS C SCREENING  03/28/2019   • INFLUENZA VACCINE  08/01/2019   • AAA SCREEN (ONE-TIME)  10/17/2019       History of Present Illness     Vitals:    10/17/19 1113 10/17/19 1116   BP: 132/85 128/81   BP Location: Right arm Left arm   Patient Position: Sitting Sitting   Cuff Size: Large Adult Large Adult   Pulse: 77    Resp: 16    Temp: 98 °F (36.7 °C)    TempSrc: Oral    SpO2: 97%    Weight: 103 kg (227 lb 12.8 oz)      Body mass index is 32.69 kg/m².    Current Outpatient Medications on File Prior to Visit   Medication Sig Dispense Refill   • atorvastatin (LIPITOR) 20 MG tablet Take 20 mg by mouth Every Night.  3   • escitalopram (LEXAPRO) 10 MG tablet Take 10 mg by mouth Daily.  3   • finasteride (PROSCAR) 5 MG tablet Take 5 mg by mouth Daily.     • lisinopril (PRINIVIL,ZESTRIL) 40 MG tablet Take 40 mg by mouth Daily.     • mupirocin (BACTROBAN) 2 % ointment Apply a pea-sized amount to each nostril twice daily for 5 days prior to surgery. 22 g 0   • tamsulosin (FLOMAX) 0.4 MG capsule 24 hr capsule Take 1 capsule by mouth Daily.     • calcium carbonate (OS-JUSTA) 600 MG tablet Take 600 mg by mouth Daily.     • Magnesium 250 MG tablet Take 250 mg by mouth Daily.     • Multiple Vitamins-Minerals (EYE VITAMINS PO) Take 2 capsules by mouth.     • Multiple Vitamins-Minerals (PRESERVISION AREDS 2 PO) Take 2 tablets by mouth Daily.     • vitamin D (ERGOCALCIFEROL) 41408 units capsule capsule Take 50,000 Units by mouth 1 (One) Time Per Week.       Current Facility-Administered Medications on File Prior to Visit   Medication  Dose Route Frequency Provider Last Rate Last Dose   • methylPREDNISolone acetate (DEPO-medrol) injection 160 mg  160 mg Intra-articular Once Boby Medina MD           The following portions of the patient's history were reviewed and updated as appropriate: allergies, current medications, past family history, past medical history, past social history, past surgical history and problem list.    Review of Systems   Constitutional: Negative.    HENT: Negative.  Negative for sinus pressure and sore throat.    Eyes: Negative.    Respiratory: Negative.  Negative for cough.    Cardiovascular: Negative.    Gastrointestinal: Negative.    Endocrine: Negative.    Genitourinary: Negative.    Musculoskeletal: Positive for arthralgias, gait problem and joint swelling.   Skin: Negative.    Allergic/Immunologic: Positive for environmental allergies.   Hematological: Negative.    Psychiatric/Behavioral: Negative.        Objective   Physical Exam   Constitutional: He is oriented to person, place, and time. He appears well-developed and well-nourished.   HENT:   Head: Normocephalic and atraumatic.   Eyes: Conjunctivae and EOM are normal. Pupils are equal, round, and reactive to light.   Neck: Normal range of motion. Neck supple.   Cardiovascular: Normal rate, regular rhythm, normal heart sounds and intact distal pulses.   Pulmonary/Chest: Effort normal and breath sounds normal.   Abdominal: Soft. Bowel sounds are normal.   Musculoskeletal: Normal range of motion. He exhibits tenderness.   Neurological: He is alert and oriented to person, place, and time.   Skin: Skin is warm and dry.   Psychiatric: He has a normal mood and affect.   Nursing note and vitals reviewed.    PHQ-9 Total Score:      Assessment/Plan   Farheen was seen today for surgical clearance.    Diagnoses and all orders for this visit:    Screening for HIV (human immunodeficiency virus)    Encounter for hepatitis C virus screening test for high risk  patient    Preoperative examination    Other orders  -     Hemoglobin A1c  -     Hepatitis C Antibody  -     [place] aaa screen limited; Future        There are no Patient Instructions on file for this visit.

## 2019-10-17 NOTE — PATIENT INSTRUCTIONS

## 2019-10-22 ENCOUNTER — ANESTHESIA EVENT (OUTPATIENT)
Dept: PERIOP | Facility: HOSPITAL | Age: 74
End: 2019-10-22

## 2019-10-22 ENCOUNTER — TELEPHONE (OUTPATIENT)
Dept: ORTHOPEDIC SURGERY | Facility: CLINIC | Age: 74
End: 2019-10-22

## 2019-10-23 ENCOUNTER — ANESTHESIA (OUTPATIENT)
Dept: PERIOP | Facility: HOSPITAL | Age: 74
End: 2019-10-23

## 2019-10-23 ENCOUNTER — HOSPITAL ENCOUNTER (OUTPATIENT)
Facility: HOSPITAL | Age: 74
Discharge: HOME | End: 2019-10-24
Attending: ORTHOPAEDIC SURGERY | Admitting: ORTHOPAEDIC SURGERY

## 2019-10-23 ENCOUNTER — APPOINTMENT (OUTPATIENT)
Dept: GENERAL RADIOLOGY | Facility: HOSPITAL | Age: 74
End: 2019-10-23

## 2019-10-23 DIAGNOSIS — M17.11 PRIMARY OSTEOARTHRITIS OF RIGHT KNEE: Primary | ICD-10-CM

## 2019-10-23 PROBLEM — I10 HYPERTENSION: Chronic | Status: ACTIVE | Noted: 2019-04-02

## 2019-10-23 PROBLEM — N40.0 BPH (BENIGN PROSTATIC HYPERPLASIA): Chronic | Status: ACTIVE | Noted: 2019-04-02

## 2019-10-23 PROBLEM — E78.5 HYPERLIPIDEMIA: Chronic | Status: ACTIVE | Noted: 2019-04-02

## 2019-10-23 PROCEDURE — A9270 NON-COVERED ITEM OR SERVICE: HCPCS | Performed by: PHYSICIAN ASSISTANT

## 2019-10-23 PROCEDURE — 25010000003 LIDOCAINE 1 % SOLUTION: Performed by: ORTHOPAEDIC SURGERY

## 2019-10-23 PROCEDURE — 25010000003 BUPIVACAINE LIPOSOME 1.3 % SUSPENSION: Performed by: ORTHOPAEDIC SURGERY

## 2019-10-23 PROCEDURE — 25010000002 PROPOFOL 10 MG/ML EMULSION: Performed by: ANESTHESIOLOGIST ASSISTANT

## 2019-10-23 PROCEDURE — 73560 X-RAY EXAM OF KNEE 1 OR 2: CPT

## 2019-10-23 PROCEDURE — 25010000002 ROPIVACAINE PER 1 MG: Performed by: ORTHOPAEDIC SURGERY

## 2019-10-23 PROCEDURE — 25010000002 HYDROMORPHONE PER 4 MG: Performed by: ANESTHESIOLOGIST ASSISTANT

## 2019-10-23 PROCEDURE — S0260 H&P FOR SURGERY: HCPCS | Performed by: ORTHOPAEDIC SURGERY

## 2019-10-23 PROCEDURE — 25010000002 ONDANSETRON PER 1 MG: Performed by: ANESTHESIOLOGIST ASSISTANT

## 2019-10-23 PROCEDURE — C1776 JOINT DEVICE (IMPLANTABLE): HCPCS | Performed by: ORTHOPAEDIC SURGERY

## 2019-10-23 PROCEDURE — G0378 HOSPITAL OBSERVATION PER HR: HCPCS

## 2019-10-23 PROCEDURE — 63710000001 DOCUSATE SODIUM 100 MG CAPSULE: Performed by: PHYSICIAN ASSISTANT

## 2019-10-23 PROCEDURE — 20985 CPTR-ASST DIR MS PX: CPT | Performed by: ORTHOPAEDIC SURGERY

## 2019-10-23 PROCEDURE — 25010000002 ROPIVACAINE PER 1 MG: Performed by: ANESTHESIOLOGY

## 2019-10-23 PROCEDURE — A9270 NON-COVERED ITEM OR SERVICE: HCPCS | Performed by: ORTHOPAEDIC SURGERY

## 2019-10-23 PROCEDURE — 63710000001 ACETAMINOPHEN 500 MG TABLET: Performed by: ORTHOPAEDIC SURGERY

## 2019-10-23 PROCEDURE — 63710000001 MELOXICAM 15 MG TABLET: Performed by: ORTHOPAEDIC SURGERY

## 2019-10-23 PROCEDURE — C1713 ANCHOR/SCREW BN/BN,TIS/BN: HCPCS | Performed by: ORTHOPAEDIC SURGERY

## 2019-10-23 PROCEDURE — 63710000001 GABAPENTIN 300 MG CAPSULE: Performed by: PHYSICIAN ASSISTANT

## 2019-10-23 PROCEDURE — 25010000002 HYDRALAZINE PER 20 MG: Performed by: NURSE PRACTITIONER

## 2019-10-23 PROCEDURE — 63710000001 OXYCODONE 10 MG TABLET EXTENDED-RELEASE 12 HOUR: Performed by: PHYSICIAN ASSISTANT

## 2019-10-23 PROCEDURE — 25010000002 DEXAMETHASONE PER 1 MG: Performed by: ANESTHESIOLOGY

## 2019-10-23 PROCEDURE — 20610 DRAIN/INJ JOINT/BURSA W/O US: CPT | Performed by: ORTHOPAEDIC SURGERY

## 2019-10-23 PROCEDURE — 63710000001 ACETAMINOPHEN 500 MG TABLET: Performed by: PHYSICIAN ASSISTANT

## 2019-10-23 PROCEDURE — 27447 TOTAL KNEE ARTHROPLASTY: CPT | Performed by: PHYSICIAN ASSISTANT

## 2019-10-23 PROCEDURE — 97116 GAIT TRAINING THERAPY: CPT

## 2019-10-23 PROCEDURE — 63710000001 RIVAROXABAN 10 MG TABLET: Performed by: PHYSICIAN ASSISTANT

## 2019-10-23 PROCEDURE — 63710000001 TAMSULOSIN 0.4 MG CAPSULE: Performed by: PHYSICIAN ASSISTANT

## 2019-10-23 PROCEDURE — 63710000001 FINASTERIDE 5 MG TABLET: Performed by: PHYSICIAN ASSISTANT

## 2019-10-23 PROCEDURE — 99213 OFFICE O/P EST LOW 20 MIN: CPT | Performed by: NURSE PRACTITIONER

## 2019-10-23 PROCEDURE — 63710000001 ESCITALOPRAM 10 MG TABLET: Performed by: PHYSICIAN ASSISTANT

## 2019-10-23 PROCEDURE — 25010000002 PROPOFOL 200 MG/20ML EMULSION: Performed by: ANESTHESIOLOGIST ASSISTANT

## 2019-10-23 PROCEDURE — 25010000002 METHYLPREDNISOLONE PER 80 MG: Performed by: ORTHOPAEDIC SURGERY

## 2019-10-23 PROCEDURE — 25010000002 DEXAMETHASONE PER 1 MG: Performed by: ANESTHESIOLOGIST ASSISTANT

## 2019-10-23 PROCEDURE — 25010000002 MIDAZOLAM PER 1 MG: Performed by: ANESTHESIOLOGY

## 2019-10-23 PROCEDURE — 97162 PT EVAL MOD COMPLEX 30 MIN: CPT

## 2019-10-23 PROCEDURE — C9290 INJ, BUPIVACAINE LIPOSOME: HCPCS | Performed by: ORTHOPAEDIC SURGERY

## 2019-10-23 PROCEDURE — 63710000001 ATORVASTATIN 20 MG TABLET: Performed by: PHYSICIAN ASSISTANT

## 2019-10-23 PROCEDURE — 63710000001 MUPIROCIN 2 % OINTMENT 22 G TUBE: Performed by: ORTHOPAEDIC SURGERY

## 2019-10-23 PROCEDURE — 25010000002 FENTANYL CITRATE (PF) 250 MCG/5ML SOLUTION: Performed by: ANESTHESIOLOGIST ASSISTANT

## 2019-10-23 PROCEDURE — 27447 TOTAL KNEE ARTHROPLASTY: CPT | Performed by: ORTHOPAEDIC SURGERY

## 2019-10-23 PROCEDURE — 63710000001 OXYCODONE 10 MG TABLET EXTENDED-RELEASE 12 HOUR: Performed by: ORTHOPAEDIC SURGERY

## 2019-10-23 PROCEDURE — 63710000001 POVIDONE-IODINE 10 % SOLUTION 118 ML BOTTLE: Performed by: ORTHOPAEDIC SURGERY

## 2019-10-23 DEVICE — CAP TOTL KN CMT PREMIUM: Type: IMPLANTABLE DEVICE | Site: KNEE | Status: FUNCTIONAL

## 2019-10-23 DEVICE — COMP FEM PERSONA CR CMT NRW SZ10 RT: Type: IMPLANTABLE DEVICE | Site: KNEE | Status: FUNCTIONAL

## 2019-10-23 DEVICE — PAT KN PERSONA VE CRS/LNK CMT 9.5X38MM: Type: IMPLANTABLE DEVICE | Site: PATELLA | Status: FUNCTIONAL

## 2019-10-23 DEVICE — STEM TIB/KN PERSONA CMT 5D SZF RT: Type: IMPLANTABLE DEVICE | Site: KNEE | Status: FUNCTIONAL

## 2019-10-23 DEVICE — CMT BONE REFOBACIN R W/GENT 1X40: Type: IMPLANTABLE DEVICE | Site: KNEE | Status: FUNCTIONAL

## 2019-10-23 DEVICE — CAP PAT KN VE/TM UPCHRG: Type: IMPLANTABLE DEVICE | Site: KNEE | Status: FUNCTIONAL

## 2019-10-23 DEVICE — CAP BEAR KN VE UPCHRG: Type: IMPLANTABLE DEVICE | Site: KNEE | Status: FUNCTIONAL

## 2019-10-23 DEVICE — IMPLANTABLE DEVICE: Type: IMPLANTABLE DEVICE | Site: KNEE | Status: FUNCTIONAL

## 2019-10-23 DEVICE — EXT STEM FEM/KN PERSONA TPR 14XPLS30MM: Type: IMPLANTABLE DEVICE | Site: KNEE | Status: FUNCTIONAL

## 2019-10-23 RX ORDER — SODIUM CHLORIDE 0.9 % (FLUSH) 0.9 %
3-10 SYRINGE (ML) INJECTION AS NEEDED
Status: DISCONTINUED | OUTPATIENT
Start: 2019-10-23 | End: 2019-10-23 | Stop reason: HOSPADM

## 2019-10-23 RX ORDER — TRAMADOL HYDROCHLORIDE 50 MG/1
50 TABLET ORAL EVERY 6 HOURS PRN
Status: DISCONTINUED | OUTPATIENT
Start: 2019-10-23 | End: 2019-10-24 | Stop reason: HOSPADM

## 2019-10-23 RX ORDER — MELOXICAM 15 MG/1
15 TABLET ORAL ONCE
Status: COMPLETED | OUTPATIENT
Start: 2019-10-23 | End: 2019-10-23

## 2019-10-23 RX ORDER — HYDROMORPHONE HCL 110MG/55ML
0.5 PATIENT CONTROLLED ANALGESIA SYRINGE INTRAVENOUS
Status: DISCONTINUED | OUTPATIENT
Start: 2019-10-23 | End: 2019-10-23 | Stop reason: HOSPADM

## 2019-10-23 RX ORDER — FLUMAZENIL 0.1 MG/ML
0.1 INJECTION INTRAVENOUS AS NEEDED
Status: DISCONTINUED | OUTPATIENT
Start: 2019-10-23 | End: 2019-10-23 | Stop reason: HOSPADM

## 2019-10-23 RX ORDER — BACITRACIN 50000 [IU]/1
INJECTION, POWDER, FOR SOLUTION INTRAMUSCULAR AS NEEDED
Status: DISCONTINUED | OUTPATIENT
Start: 2019-10-23 | End: 2019-10-23 | Stop reason: HOSPADM

## 2019-10-23 RX ORDER — NALOXONE HCL 0.4 MG/ML
0.4 VIAL (ML) INJECTION
Status: DISCONTINUED | OUTPATIENT
Start: 2019-10-23 | End: 2019-10-24 | Stop reason: HOSPADM

## 2019-10-23 RX ORDER — ROPIVACAINE HYDROCHLORIDE 5 MG/ML
INJECTION, SOLUTION EPIDURAL; INFILTRATION; PERINEURAL AS NEEDED
Status: DISCONTINUED | OUTPATIENT
Start: 2019-10-23 | End: 2019-10-23 | Stop reason: HOSPADM

## 2019-10-23 RX ORDER — ESCITALOPRAM OXALATE 10 MG/1
10 TABLET ORAL DAILY
Status: DISCONTINUED | OUTPATIENT
Start: 2019-10-23 | End: 2019-10-24 | Stop reason: HOSPADM

## 2019-10-23 RX ORDER — HYDRALAZINE HYDROCHLORIDE 20 MG/ML
5 INJECTION INTRAMUSCULAR; INTRAVENOUS
Status: DISCONTINUED | OUTPATIENT
Start: 2019-10-23 | End: 2019-10-23 | Stop reason: HOSPADM

## 2019-10-23 RX ORDER — GABAPENTIN 300 MG/1
600 CAPSULE ORAL
Status: COMPLETED | OUTPATIENT
Start: 2019-10-23 | End: 2019-10-23

## 2019-10-23 RX ORDER — PROMETHAZINE HYDROCHLORIDE 25 MG/1
25 TABLET ORAL ONCE AS NEEDED
Status: DISCONTINUED | OUTPATIENT
Start: 2019-10-23 | End: 2019-10-23 | Stop reason: HOSPADM

## 2019-10-23 RX ORDER — FINASTERIDE 5 MG/1
5 TABLET, FILM COATED ORAL DAILY
Status: DISCONTINUED | OUTPATIENT
Start: 2019-10-23 | End: 2019-10-24 | Stop reason: HOSPADM

## 2019-10-23 RX ORDER — HYDROCODONE BITARTRATE AND ACETAMINOPHEN 5; 325 MG/1; MG/1
1 TABLET ORAL ONCE AS NEEDED
Status: DISCONTINUED | OUTPATIENT
Start: 2019-10-23 | End: 2019-10-23 | Stop reason: HOSPADM

## 2019-10-23 RX ORDER — CLINDAMYCIN PHOSPHATE 900 MG/50ML
900 INJECTION, SOLUTION INTRAVENOUS EVERY 8 HOURS
Status: COMPLETED | OUTPATIENT
Start: 2019-10-23 | End: 2019-10-24

## 2019-10-23 RX ORDER — NEOSTIGMINE METHYLSULFATE 5 MG/5 ML
SYRINGE (ML) INTRAVENOUS AS NEEDED
Status: DISCONTINUED | OUTPATIENT
Start: 2019-10-23 | End: 2019-10-23 | Stop reason: SURG

## 2019-10-23 RX ORDER — MELOXICAM 15 MG/1
15 TABLET ORAL DAILY
Status: DISCONTINUED | OUTPATIENT
Start: 2019-10-24 | End: 2019-10-24 | Stop reason: HOSPADM

## 2019-10-23 RX ORDER — CLINDAMYCIN PHOSPHATE 900 MG/50ML
900 INJECTION, SOLUTION INTRAVENOUS ONCE
Status: DISCONTINUED | OUTPATIENT
Start: 2019-10-23 | End: 2019-10-23 | Stop reason: HOSPADM

## 2019-10-23 RX ORDER — PROMETHAZINE HYDROCHLORIDE 25 MG/1
12.5 TABLET ORAL EVERY 6 HOURS PRN
Status: DISCONTINUED | OUTPATIENT
Start: 2019-10-23 | End: 2019-10-24 | Stop reason: HOSPADM

## 2019-10-23 RX ORDER — FENTANYL CITRATE 50 UG/ML
INJECTION, SOLUTION INTRAMUSCULAR; INTRAVENOUS AS NEEDED
Status: DISCONTINUED | OUTPATIENT
Start: 2019-10-23 | End: 2019-10-23 | Stop reason: SURG

## 2019-10-23 RX ORDER — LISINOPRIL 20 MG/1
40 TABLET ORAL DAILY
Status: DISCONTINUED | OUTPATIENT
Start: 2019-10-24 | End: 2019-10-24 | Stop reason: HOSPADM

## 2019-10-23 RX ORDER — DEXAMETHASONE SODIUM PHOSPHATE 4 MG/ML
INJECTION, SOLUTION INTRA-ARTICULAR; INTRALESIONAL; INTRAMUSCULAR; INTRAVENOUS; SOFT TISSUE AS NEEDED
Status: DISCONTINUED | OUTPATIENT
Start: 2019-10-23 | End: 2019-10-23 | Stop reason: SURG

## 2019-10-23 RX ORDER — TAMSULOSIN HYDROCHLORIDE 0.4 MG/1
0.4 CAPSULE ORAL NIGHTLY
Status: DISCONTINUED | OUTPATIENT
Start: 2019-10-23 | End: 2019-10-24 | Stop reason: HOSPADM

## 2019-10-23 RX ORDER — DOCUSATE SODIUM 100 MG/1
100 CAPSULE, LIQUID FILLED ORAL 2 TIMES DAILY
Status: DISCONTINUED | OUTPATIENT
Start: 2019-10-23 | End: 2019-10-24 | Stop reason: HOSPADM

## 2019-10-23 RX ORDER — ATORVASTATIN CALCIUM 20 MG/1
20 TABLET, FILM COATED ORAL NIGHTLY
Status: DISCONTINUED | OUTPATIENT
Start: 2019-10-23 | End: 2019-10-24 | Stop reason: HOSPADM

## 2019-10-23 RX ORDER — DEXAMETHASONE SODIUM PHOSPHATE 4 MG/ML
INJECTION, SOLUTION INTRA-ARTICULAR; INTRALESIONAL; INTRAMUSCULAR; INTRAVENOUS; SOFT TISSUE
Status: COMPLETED | OUTPATIENT
Start: 2019-10-23 | End: 2019-10-23

## 2019-10-23 RX ORDER — ONDANSETRON 2 MG/ML
INJECTION INTRAMUSCULAR; INTRAVENOUS AS NEEDED
Status: DISCONTINUED | OUTPATIENT
Start: 2019-10-23 | End: 2019-10-23 | Stop reason: SURG

## 2019-10-23 RX ORDER — OXYCODONE HYDROCHLORIDE 5 MG/1
5 TABLET ORAL EVERY 4 HOURS PRN
Status: DISCONTINUED | OUTPATIENT
Start: 2019-10-23 | End: 2019-10-24 | Stop reason: HOSPADM

## 2019-10-23 RX ORDER — MIDAZOLAM HYDROCHLORIDE 1 MG/ML
INJECTION INTRAMUSCULAR; INTRAVENOUS
Status: COMPLETED | OUTPATIENT
Start: 2019-10-23 | End: 2019-10-23

## 2019-10-23 RX ORDER — PROMETHAZINE HYDROCHLORIDE 25 MG/ML
6.25 INJECTION, SOLUTION INTRAMUSCULAR; INTRAVENOUS ONCE AS NEEDED
Status: DISCONTINUED | OUTPATIENT
Start: 2019-10-23 | End: 2019-10-23 | Stop reason: HOSPADM

## 2019-10-23 RX ORDER — ROPIVACAINE HYDROCHLORIDE 5 MG/ML
INJECTION, SOLUTION EPIDURAL; INFILTRATION; PERINEURAL
Status: COMPLETED | OUTPATIENT
Start: 2019-10-23 | End: 2019-10-23

## 2019-10-23 RX ORDER — BISACODYL 10 MG
10 SUPPOSITORY, RECTAL RECTAL DAILY PRN
Status: DISCONTINUED | OUTPATIENT
Start: 2019-10-23 | End: 2019-10-24 | Stop reason: HOSPADM

## 2019-10-23 RX ORDER — ONDANSETRON 2 MG/ML
4 INJECTION INTRAMUSCULAR; INTRAVENOUS ONCE AS NEEDED
Status: DISCONTINUED | OUTPATIENT
Start: 2019-10-23 | End: 2019-10-23 | Stop reason: HOSPADM

## 2019-10-23 RX ORDER — LABETALOL HYDROCHLORIDE 5 MG/ML
5 INJECTION, SOLUTION INTRAVENOUS
Status: DISCONTINUED | OUTPATIENT
Start: 2019-10-23 | End: 2019-10-23 | Stop reason: HOSPADM

## 2019-10-23 RX ORDER — ACETAMINOPHEN 650 MG
TABLET, EXTENDED RELEASE ORAL AS NEEDED
Status: DISCONTINUED | OUTPATIENT
Start: 2019-10-23 | End: 2019-10-23 | Stop reason: HOSPADM

## 2019-10-23 RX ORDER — ROCURONIUM BROMIDE 10 MG/ML
INJECTION, SOLUTION INTRAVENOUS AS NEEDED
Status: DISCONTINUED | OUTPATIENT
Start: 2019-10-23 | End: 2019-10-23 | Stop reason: SURG

## 2019-10-23 RX ORDER — ROCURONIUM BROMIDE 10 MG/ML
INJECTION, SOLUTION INTRAVENOUS AS NEEDED
Status: DISCONTINUED | OUTPATIENT
Start: 2019-10-23 | End: 2019-10-23

## 2019-10-23 RX ORDER — DIPHENHYDRAMINE HCL 25 MG
25 TABLET ORAL EVERY 6 HOURS PRN
Status: DISCONTINUED | OUTPATIENT
Start: 2019-10-23 | End: 2019-10-24 | Stop reason: HOSPADM

## 2019-10-23 RX ORDER — SODIUM CHLORIDE 9 MG/ML
9 INJECTION, SOLUTION INTRAVENOUS CONTINUOUS PRN
Status: DISCONTINUED | OUTPATIENT
Start: 2019-10-23 | End: 2019-10-23 | Stop reason: HOSPADM

## 2019-10-23 RX ORDER — SODIUM CHLORIDE 9 MG/ML
100 INJECTION, SOLUTION INTRAVENOUS CONTINUOUS
Status: DISCONTINUED | OUTPATIENT
Start: 2019-10-23 | End: 2019-10-24 | Stop reason: HOSPADM

## 2019-10-23 RX ORDER — MORPHINE SULFATE 4 MG/ML
4 INJECTION, SOLUTION INTRAMUSCULAR; INTRAVENOUS
Status: DISCONTINUED | OUTPATIENT
Start: 2019-10-23 | End: 2019-10-24 | Stop reason: HOSPADM

## 2019-10-23 RX ORDER — GABAPENTIN 300 MG/1
300 CAPSULE ORAL NIGHTLY
Status: DISCONTINUED | OUTPATIENT
Start: 2019-10-23 | End: 2019-10-24 | Stop reason: HOSPADM

## 2019-10-23 RX ORDER — IPRATROPIUM BROMIDE AND ALBUTEROL SULFATE 2.5; .5 MG/3ML; MG/3ML
3 SOLUTION RESPIRATORY (INHALATION) ONCE AS NEEDED
Status: DISCONTINUED | OUTPATIENT
Start: 2019-10-23 | End: 2019-10-23 | Stop reason: HOSPADM

## 2019-10-23 RX ORDER — ACETAMINOPHEN 500 MG
1000 TABLET ORAL EVERY 8 HOURS
Status: DISCONTINUED | OUTPATIENT
Start: 2019-10-23 | End: 2019-10-24 | Stop reason: HOSPADM

## 2019-10-23 RX ORDER — LIDOCAINE HYDROCHLORIDE 10 MG/ML
INJECTION, SOLUTION INFILTRATION; PERINEURAL AS NEEDED
Status: DISCONTINUED | OUTPATIENT
Start: 2019-10-23 | End: 2019-10-23 | Stop reason: HOSPADM

## 2019-10-23 RX ORDER — ONDANSETRON 4 MG/1
4 TABLET, FILM COATED ORAL EVERY 6 HOURS PRN
Status: DISCONTINUED | OUTPATIENT
Start: 2019-10-23 | End: 2019-10-24 | Stop reason: HOSPADM

## 2019-10-23 RX ORDER — HYDRALAZINE HYDROCHLORIDE 20 MG/ML
10 INJECTION INTRAMUSCULAR; INTRAVENOUS EVERY 6 HOURS PRN
Status: DISCONTINUED | OUTPATIENT
Start: 2019-10-23 | End: 2019-10-24 | Stop reason: HOSPADM

## 2019-10-23 RX ORDER — 0.9 % SODIUM CHLORIDE 0.9 %
VIAL (ML) INJECTION AS NEEDED
Status: DISCONTINUED | OUTPATIENT
Start: 2019-10-23 | End: 2019-10-23 | Stop reason: HOSPADM

## 2019-10-23 RX ORDER — GLYCOPYRROLATE 0.2 MG/ML
INJECTION INTRAMUSCULAR; INTRAVENOUS AS NEEDED
Status: DISCONTINUED | OUTPATIENT
Start: 2019-10-23 | End: 2019-10-23 | Stop reason: SURG

## 2019-10-23 RX ORDER — ACETAMINOPHEN 500 MG
1000 TABLET ORAL ONCE
Status: COMPLETED | OUTPATIENT
Start: 2019-10-23 | End: 2019-10-23

## 2019-10-23 RX ORDER — FERROUS SULFATE TAB EC 324 MG (65 MG FE EQUIVALENT) 324 (65 FE) MG
324 TABLET DELAYED RESPONSE ORAL
Status: DISCONTINUED | OUTPATIENT
Start: 2019-10-24 | End: 2019-10-24 | Stop reason: HOSPADM

## 2019-10-23 RX ORDER — METHYLPREDNISOLONE ACETATE 80 MG/ML
INJECTION, SUSPENSION INTRA-ARTICULAR; INTRALESIONAL; INTRAMUSCULAR; SOFT TISSUE AS NEEDED
Status: DISCONTINUED | OUTPATIENT
Start: 2019-10-23 | End: 2019-10-23 | Stop reason: HOSPADM

## 2019-10-23 RX ORDER — LIDOCAINE HYDROCHLORIDE 10 MG/ML
INJECTION, SOLUTION EPIDURAL; INFILTRATION; INTRACAUDAL; PERINEURAL AS NEEDED
Status: DISCONTINUED | OUTPATIENT
Start: 2019-10-23 | End: 2019-10-23 | Stop reason: SURG

## 2019-10-23 RX ORDER — PROPOFOL 10 MG/ML
INJECTION, EMULSION INTRAVENOUS AS NEEDED
Status: DISCONTINUED | OUTPATIENT
Start: 2019-10-23 | End: 2019-10-23 | Stop reason: SURG

## 2019-10-23 RX ORDER — ONDANSETRON 2 MG/ML
4 INJECTION INTRAMUSCULAR; INTRAVENOUS EVERY 6 HOURS PRN
Status: DISCONTINUED | OUTPATIENT
Start: 2019-10-23 | End: 2019-10-24 | Stop reason: HOSPADM

## 2019-10-23 RX ORDER — DIPHENHYDRAMINE HYDROCHLORIDE 50 MG/ML
12.5 INJECTION INTRAMUSCULAR; INTRAVENOUS
Status: DISCONTINUED | OUTPATIENT
Start: 2019-10-23 | End: 2019-10-23 | Stop reason: HOSPADM

## 2019-10-23 RX ORDER — OXYCODONE HCL 10 MG/1
10 TABLET, FILM COATED, EXTENDED RELEASE ORAL EVERY 12 HOURS SCHEDULED
Status: DISCONTINUED | OUTPATIENT
Start: 2019-10-23 | End: 2019-10-24 | Stop reason: HOSPADM

## 2019-10-23 RX ORDER — NALOXONE HCL 0.4 MG/ML
0.4 VIAL (ML) INJECTION AS NEEDED
Status: DISCONTINUED | OUTPATIENT
Start: 2019-10-23 | End: 2019-10-23 | Stop reason: HOSPADM

## 2019-10-23 RX ORDER — PROMETHAZINE HYDROCHLORIDE 25 MG/1
25 SUPPOSITORY RECTAL ONCE AS NEEDED
Status: DISCONTINUED | OUTPATIENT
Start: 2019-10-23 | End: 2019-10-23 | Stop reason: HOSPADM

## 2019-10-23 RX ORDER — SODIUM CHLORIDE 0.9 % (FLUSH) 0.9 %
3 SYRINGE (ML) INJECTION EVERY 12 HOURS SCHEDULED
Status: DISCONTINUED | OUTPATIENT
Start: 2019-10-23 | End: 2019-10-23 | Stop reason: HOSPADM

## 2019-10-23 RX ORDER — MEPERIDINE HYDROCHLORIDE 25 MG/ML
12.5 INJECTION INTRAMUSCULAR; INTRAVENOUS; SUBCUTANEOUS
Status: DISCONTINUED | OUTPATIENT
Start: 2019-10-23 | End: 2019-10-23 | Stop reason: HOSPADM

## 2019-10-23 RX ORDER — DIPHENHYDRAMINE HYDROCHLORIDE 50 MG/ML
25 INJECTION INTRAMUSCULAR; INTRAVENOUS EVERY 6 HOURS PRN
Status: DISCONTINUED | OUTPATIENT
Start: 2019-10-23 | End: 2019-10-24 | Stop reason: HOSPADM

## 2019-10-23 RX ORDER — HYDROMORPHONE HCL 110MG/55ML
PATIENT CONTROLLED ANALGESIA SYRINGE INTRAVENOUS AS NEEDED
Status: DISCONTINUED | OUTPATIENT
Start: 2019-10-23 | End: 2019-10-23 | Stop reason: SURG

## 2019-10-23 RX ORDER — OXYCODONE HCL 10 MG/1
10 TABLET, FILM COATED, EXTENDED RELEASE ORAL ONCE
Status: COMPLETED | OUTPATIENT
Start: 2019-10-23 | End: 2019-10-23

## 2019-10-23 RX ADMIN — DEXAMETHASONE SODIUM PHOSPHATE 4 MG: 4 INJECTION, SOLUTION INTRAMUSCULAR; INTRAVENOUS at 07:30

## 2019-10-23 RX ADMIN — DOCUSATE SODIUM 100 MG: 100 CAPSULE, LIQUID FILLED ORAL at 20:33

## 2019-10-23 RX ADMIN — HYDROMORPHONE HYDROCHLORIDE 0.5 MG: 2 INJECTION INTRAMUSCULAR; INTRAVENOUS; SUBCUTANEOUS at 07:58

## 2019-10-23 RX ADMIN — OXYCODONE HYDROCHLORIDE 10 MG: 10 TABLET, FILM COATED, EXTENDED RELEASE ORAL at 06:06

## 2019-10-23 RX ADMIN — DEXAMETHASONE SODIUM PHOSPHATE 4 MG: 4 INJECTION, SOLUTION INTRAMUSCULAR; INTRAVENOUS at 07:57

## 2019-10-23 RX ADMIN — Medication 3 MG: at 09:44

## 2019-10-23 RX ADMIN — OXYCODONE HYDROCHLORIDE 10 MG: 10 TABLET, FILM COATED, EXTENDED RELEASE ORAL at 20:34

## 2019-10-23 RX ADMIN — CLINDAMYCIN PHOSPHATE 900 MG: 900 INJECTION, SOLUTION INTRAVENOUS at 16:18

## 2019-10-23 RX ADMIN — ROPIVACAINE HYDROCHLORIDE 20 ML: 5 INJECTION, SOLUTION EPIDURAL; INFILTRATION; PERINEURAL at 07:30

## 2019-10-23 RX ADMIN — CLINDAMYCIN PHOSPHATE 900 MG: 900 INJECTION, SOLUTION INTRAVENOUS at 23:02

## 2019-10-23 RX ADMIN — PROPOFOL 200 MG: 10 INJECTION, EMULSION INTRAVENOUS at 07:34

## 2019-10-23 RX ADMIN — MIDAZOLAM 2 MG: 1 INJECTION INTRAMUSCULAR; INTRAVENOUS at 07:30

## 2019-10-23 RX ADMIN — ESCITALOPRAM OXALATE 10 MG: 10 TABLET ORAL at 16:18

## 2019-10-23 RX ADMIN — HYDRALAZINE HYDROCHLORIDE 10 MG: 20 INJECTION INTRAMUSCULAR; INTRAVENOUS at 22:59

## 2019-10-23 RX ADMIN — ACETAMINOPHEN 1000 MG: 500 TABLET, FILM COATED ORAL at 16:18

## 2019-10-23 RX ADMIN — PROPOFOL 100 MCG/KG/MIN: 10 INJECTION, EMULSION INTRAVENOUS at 07:31

## 2019-10-23 RX ADMIN — HYDROMORPHONE HYDROCHLORIDE 0.5 MG: 2 INJECTION, SOLUTION INTRAMUSCULAR; INTRAVENOUS; SUBCUTANEOUS at 10:42

## 2019-10-23 RX ADMIN — FENTANYL CITRATE 50 MCG: 50 INJECTION, SOLUTION INTRAMUSCULAR; INTRAVENOUS at 07:34

## 2019-10-23 RX ADMIN — SODIUM CHLORIDE 9 ML/HR: 900 INJECTION, SOLUTION INTRAVENOUS at 06:41

## 2019-10-23 RX ADMIN — ACETAMINOPHEN 1000 MG: 500 TABLET, FILM COATED ORAL at 23:02

## 2019-10-23 RX ADMIN — TAMSULOSIN HYDROCHLORIDE 0.4 MG: 0.4 CAPSULE ORAL at 20:33

## 2019-10-23 RX ADMIN — LIDOCAINE HYDROCHLORIDE 50 MG: 10 INJECTION, SOLUTION EPIDURAL; INFILTRATION; INTRACAUDAL; PERINEURAL at 07:34

## 2019-10-23 RX ADMIN — GLYCOPYRROLATE 0.6 MG: 0.2 INJECTION, SOLUTION INTRAMUSCULAR; INTRAVENOUS at 09:44

## 2019-10-23 RX ADMIN — ACETAMINOPHEN 1000 MG: 500 TABLET, FILM COATED ORAL at 06:07

## 2019-10-23 RX ADMIN — GABAPENTIN 300 MG: 300 CAPSULE ORAL at 20:33

## 2019-10-23 RX ADMIN — RIVAROXABAN 10 MG: 10 TABLET, FILM COATED ORAL at 20:33

## 2019-10-23 RX ADMIN — HYDROMORPHONE HYDROCHLORIDE 0.5 MG: 2 INJECTION, SOLUTION INTRAMUSCULAR; INTRAVENOUS; SUBCUTANEOUS at 11:39

## 2019-10-23 RX ADMIN — FINASTERIDE 5 MG: 5 TABLET, FILM COATED ORAL at 16:18

## 2019-10-23 RX ADMIN — GABAPENTIN 600 MG: 300 CAPSULE ORAL at 06:06

## 2019-10-23 RX ADMIN — MELOXICAM 15 MG: 15 TABLET ORAL at 06:05

## 2019-10-23 RX ADMIN — ONDANSETRON 4 MG: 2 INJECTION INTRAMUSCULAR; INTRAVENOUS at 09:45

## 2019-10-23 RX ADMIN — HYDRALAZINE HYDROCHLORIDE 10 MG: 20 INJECTION INTRAMUSCULAR; INTRAVENOUS at 16:27

## 2019-10-23 RX ADMIN — ATORVASTATIN CALCIUM 20 MG: 20 TABLET, FILM COATED ORAL at 20:33

## 2019-10-23 RX ADMIN — ROCURONIUM BROMIDE 50 MG: 10 INJECTION, SOLUTION INTRAVENOUS at 07:35

## 2019-10-23 RX ADMIN — MUPIROCIN 1 APPLICATION: 20 OINTMENT TOPICAL at 06:09

## 2019-10-23 NOTE — ANESTHESIA PROCEDURE NOTES
Peripheral Block      Patient location during procedure: OR  Start time: 10/23/2019 6:45 AM  Stop time: 10/23/2019 6:50 AM  Reason for block: at surgeon's request and post-op pain management  Performed by  Anesthesiologist: Avel Martin MD  Assisted by: Finesse Sellers RN  Preanesthetic Checklist  Completed: patient identified, site marked, surgical consent, pre-op evaluation, timeout performed, IV checked, risks and benefits discussed and monitors and equipment checked  Prep:  Pt Position: supine  Sterile barriers:cap, gloves, gown, mask and sterile barriers  Prep: ChloraPrep  Patient monitoring: blood pressure monitoring, continuous pulse oximetry and EKG  Procedure  Sedation:yes  Performed under: local infiltration  Guidance:ultrasound guided  Images:still images obtained, printed/placed on chart    Laterality:right  Block Type:adductor canal block  Injection Technique:single-shot  Needle Type:echogenic    Sedation medications used: midazolam (VERSED) injection, 2 mg  Medications Used: ropivacaine (NAROPIN) 0.5 % injection, 20 mL  dexamethasone (DECADRON) injection, 4 mg  Med admintered at 10/23/2019 7:30 AM

## 2019-10-23 NOTE — ANESTHESIA PROCEDURE NOTES
Airway  Urgency: elective    Date/Time: 10/23/2019 7:43 AM  Difficult airway    General Information and Staff    Patient location during procedure: OR    Indications and Patient Condition  Indications for airway management: airway protection    Preoxygenated: yes  Mask difficulty assessment: 2 - vent by mask + OA or adjuvant +/- NMBA    Final Airway Details  Final airway type: endotracheal airway      Successful airway: ETT  Cuffed: yes   Successful intubation technique: video laryngoscopy  Facilitating devices/methods: intubating stylet  Endotracheal tube insertion site: oral  ETT size (mm): 7.5  Cormack-Lehane Classification: grade I - full view of glottis  Placement verified by: chest auscultation and capnometry   Measured from: lips  ETT/EBT  to lips (cm): 23  Number of attempts at approach: 2  Assessment: lips, teeth, and gum same as pre-op and atraumatic intubation    Additional Comments  1st attempt DL Mac 4, Grade 4 anterior airway.  Mask ventilation adequate.  2nd attempt glidescope Grade 1, ett passed

## 2019-10-23 NOTE — ANESTHESIA PREPROCEDURE EVALUATION
Anesthesia Evaluation     NPO Solid Status: > 8 hours  NPO Liquid Status: > 8 hours           Airway   Mallampati: II  TM distance: >3 FB  No difficulty expected  Dental - normal exam     Pulmonary    Cardiovascular   Exercise tolerance: good (4-7 METS)    Rhythm: regular  Rate: normal    (+) hypertension well controlled, hyperlipidemia,       Neuro/Psych  GI/Hepatic/Renal/Endo    (+)  GERD,  renal disease,     Musculoskeletal     (+) neck pain,   Abdominal    Substance History      OB/GYN          Other   (+) arthritis     ROS/Med Hx Other: Spinal stenosis                   Anesthesia Plan    ASA 3     general with block   (Eras tiva adductor canal block )  intravenous induction   Anesthetic plan, all risks, benefits, and alternatives have been provided, discussed and informed consent has been obtained with: patient.    Plan discussed with [facility]AA.

## 2019-10-23 NOTE — ANESTHESIA POSTPROCEDURE EVALUATION
Patient: Farheen Gomez    Procedure Summary     Date:  10/23/19 Room / Location:  [facility] OR  / [facility] MAIN OR    Anesthesia Start:  0731 Anesthesia Stop:  1016    Procedure:  TOTAL KNEE ARTHROPLASTY, WITH LEFT KNEE INJECTION (Right Knee) Diagnosis:       Primary osteoarthritis of right knee      (Primary osteoarthritis of right knee [M17.11])    Surgeon:  Boby Medina MD Provider:  Avel Martin MD    Anesthesia Type:  general with block ASA Status:  3          Anesthesia Type: general with block  Last vitals  BP   175/93 (10/23/19 1623)   Temp   97.6 °F (36.4 °C) (10/23/19 1434)   Pulse   87 (10/23/19 1434)   Resp   14 (10/23/19 1434)     SpO2   92 % (10/23/19 1434)     Post Anesthesia Care and Evaluation    Patient location during evaluation: PACU  Patient participation: complete - patient participated  Level of consciousness: awake  Pain scale: See nurse's notes for pain score.  Pain management: adequate  Airway patency: patent  Anesthetic complications: No anesthetic complications  PONV Status: none  Cardiovascular status: acceptable  Respiratory status: acceptable  Hydration status: acceptable    Comments: Patient seen and examined postoperatively; vital signs stable; SpO2 greater than or equal to 90%; cardiopulmonary status stable; nausea/vomiting adequately controlled; pain adequately controlled; no apparent anesthesia complications; patient discharged from anesthesia care when discharge criteria were met

## 2019-10-24 ENCOUNTER — TRANSITIONAL CARE MANAGEMENT TELEPHONE ENCOUNTER (OUTPATIENT)
Dept: FAMILY MEDICINE CLINIC | Facility: CLINIC | Age: 74
End: 2019-10-24

## 2019-10-24 VITALS
HEIGHT: 70 IN | RESPIRATION RATE: 14 BRPM | OXYGEN SATURATION: 93 % | DIASTOLIC BLOOD PRESSURE: 45 MMHG | SYSTOLIC BLOOD PRESSURE: 136 MMHG | WEIGHT: 225.75 LBS | BODY MASS INDEX: 32.32 KG/M2 | TEMPERATURE: 98.5 F | HEART RATE: 86 BPM

## 2019-10-24 PROBLEM — M17.11 PRIMARY OSTEOARTHRITIS OF RIGHT KNEE: Status: RESOLVED | Noted: 2019-10-15 | Resolved: 2019-10-24

## 2019-10-24 LAB
ANION GAP SERPL CALCULATED.3IONS-SCNC: 10 MMOL/L (ref 5–15)
BUN BLD-MCNC: 16 MG/DL (ref 8–23)
BUN/CREAT SERPL: 17 (ref 7–25)
CALCIUM SPEC-SCNC: 9 MG/DL (ref 8.6–10.5)
CHLORIDE SERPL-SCNC: 96 MMOL/L (ref 98–107)
CO2 SERPL-SCNC: 24 MMOL/L (ref 22–29)
CREAT BLD-MCNC: 0.94 MG/DL (ref 0.76–1.27)
GFR SERPL CREATININE-BSD FRML MDRD: 79 ML/MIN/1.73
GLUCOSE BLD-MCNC: 180 MG/DL (ref 65–99)
HCT VFR BLD AUTO: 35.6 % (ref 37.5–51)
HGB BLD-MCNC: 12.7 G/DL (ref 13–17.7)
POTASSIUM BLD-SCNC: 4.8 MMOL/L (ref 3.5–5.2)
SODIUM BLD-SCNC: 130 MMOL/L (ref 136–145)

## 2019-10-24 PROCEDURE — G0008 ADMIN INFLUENZA VIRUS VAC: HCPCS | Performed by: ORTHOPAEDIC SURGERY

## 2019-10-24 PROCEDURE — 85014 HEMATOCRIT: CPT | Performed by: PHYSICIAN ASSISTANT

## 2019-10-24 PROCEDURE — A9270 NON-COVERED ITEM OR SERVICE: HCPCS | Performed by: PHYSICIAN ASSISTANT

## 2019-10-24 PROCEDURE — 90686 IIV4 VACC NO PRSV 0.5 ML IM: CPT | Performed by: ORTHOPAEDIC SURGERY

## 2019-10-24 PROCEDURE — 63710000001 CALCIUM-VITAMIN D 500-200 MG-UNIT TABLET: Performed by: PHYSICIAN ASSISTANT

## 2019-10-24 PROCEDURE — 63710000001 ESCITALOPRAM 10 MG TABLET: Performed by: PHYSICIAN ASSISTANT

## 2019-10-24 PROCEDURE — 63710000001 OXYCODONE 5 MG TABLET: Performed by: PHYSICIAN ASSISTANT

## 2019-10-24 PROCEDURE — 63710000001 ACETAMINOPHEN 500 MG TABLET: Performed by: PHYSICIAN ASSISTANT

## 2019-10-24 PROCEDURE — 85018 HEMOGLOBIN: CPT | Performed by: PHYSICIAN ASSISTANT

## 2019-10-24 PROCEDURE — 97110 THERAPEUTIC EXERCISES: CPT

## 2019-10-24 PROCEDURE — 99214 OFFICE O/P EST MOD 30 MIN: CPT | Performed by: INTERNAL MEDICINE

## 2019-10-24 PROCEDURE — 63710000001 POLYETHYLENE GLYCOL PACK: Performed by: PHYSICIAN ASSISTANT

## 2019-10-24 PROCEDURE — G0378 HOSPITAL OBSERVATION PER HR: HCPCS

## 2019-10-24 PROCEDURE — 63710000001 LISINOPRIL 20 MG TABLET: Performed by: PHYSICIAN ASSISTANT

## 2019-10-24 PROCEDURE — 97116 GAIT TRAINING THERAPY: CPT

## 2019-10-24 PROCEDURE — 63710000001 FERROUS SULFATE 324 (65 FE) MG TABLET DELAYED-RELEASE: Performed by: PHYSICIAN ASSISTANT

## 2019-10-24 PROCEDURE — 63710000001 OXYCODONE 10 MG TABLET EXTENDED-RELEASE 12 HOUR: Performed by: PHYSICIAN ASSISTANT

## 2019-10-24 PROCEDURE — 63710000001 MELOXICAM 15 MG TABLET: Performed by: PHYSICIAN ASSISTANT

## 2019-10-24 PROCEDURE — 99024 POSTOP FOLLOW-UP VISIT: CPT | Performed by: ORTHOPAEDIC SURGERY

## 2019-10-24 PROCEDURE — 63710000001 FINASTERIDE 5 MG TABLET: Performed by: PHYSICIAN ASSISTANT

## 2019-10-24 PROCEDURE — 80048 BASIC METABOLIC PNL TOTAL CA: CPT | Performed by: PHYSICIAN ASSISTANT

## 2019-10-24 PROCEDURE — 25010000002 INFLUENZA VAC SPLIT QUAD 0.5 ML SUSPENSION PREFILLED SYRINGE: Performed by: ORTHOPAEDIC SURGERY

## 2019-10-24 PROCEDURE — 63710000001 DOCUSATE SODIUM 100 MG CAPSULE: Performed by: PHYSICIAN ASSISTANT

## 2019-10-24 RX ORDER — GABAPENTIN 300 MG/1
300 CAPSULE ORAL NIGHTLY
Qty: 30 CAPSULE | Refills: 0 | Status: SHIPPED | OUTPATIENT
Start: 2019-10-24 | End: 2020-01-07

## 2019-10-24 RX ORDER — MELOXICAM 7.5 MG/1
7.5 TABLET ORAL DAILY
Qty: 12 TABLET | Refills: 0 | Status: SHIPPED | OUTPATIENT
Start: 2019-10-25 | End: 2019-11-07

## 2019-10-24 RX ORDER — OXYCODONE HYDROCHLORIDE AND ACETAMINOPHEN 5; 325 MG/1; MG/1
TABLET ORAL
Qty: 50 TABLET | Refills: 0 | Status: SHIPPED | OUTPATIENT
Start: 2019-10-24 | End: 2019-10-31 | Stop reason: SDUPTHER

## 2019-10-24 RX ADMIN — LISINOPRIL 40 MG: 20 TABLET ORAL at 08:23

## 2019-10-24 RX ADMIN — POLYETHYLENE GLYCOL 3350 17 G: 17 POWDER, FOR SOLUTION ORAL at 08:21

## 2019-10-24 RX ADMIN — FERROUS SULFATE TAB EC 324 MG (65 MG FE EQUIVALENT) 324 MG: 324 (65 FE) TABLET DELAYED RESPONSE at 08:22

## 2019-10-24 RX ADMIN — DOCUSATE SODIUM 100 MG: 100 CAPSULE, LIQUID FILLED ORAL at 08:22

## 2019-10-24 RX ADMIN — INFLUENZA A VIRUS A/BRISBANE/02/2018 IVR-190 (H1N1) ANTIGEN (PROPIOLACTONE INACTIVATED), INFLUENZA A VIRUS A/KANSAS/14/2017 X-327 (H3N2) ANTIGEN (PROPIOLACTONE INACTIVATED), INFLUENZA B VIRUS B/MARYLAND/15/2016 ANTIGEN (PROPIOLACTONE INACTIVATED), INFLUENZA B VIRUS B/PHUKET/3073/2013 BVR-1B ANTIGEN (PROPIOLACTONE INACTIVATED) 0.5 ML: 15; 15; 15; 15 INJECTION, SUSPENSION INTRAMUSCULAR at 12:22

## 2019-10-24 RX ADMIN — FINASTERIDE 5 MG: 5 TABLET, FILM COATED ORAL at 08:21

## 2019-10-24 RX ADMIN — MELOXICAM 15 MG: 15 TABLET ORAL at 08:22

## 2019-10-24 RX ADMIN — ESCITALOPRAM OXALATE 10 MG: 10 TABLET ORAL at 08:22

## 2019-10-24 RX ADMIN — OYSTER SHELL CALCIUM WITH VITAMIN D 1 TABLET: 500; 200 TABLET, FILM COATED ORAL at 08:22

## 2019-10-24 RX ADMIN — ACETAMINOPHEN 1000 MG: 500 TABLET, FILM COATED ORAL at 08:22

## 2019-10-24 RX ADMIN — OXYCODONE HYDROCHLORIDE 5 MG: 5 TABLET ORAL at 04:07

## 2019-10-24 RX ADMIN — OXYCODONE HYDROCHLORIDE 10 MG: 10 TABLET, FILM COATED, EXTENDED RELEASE ORAL at 08:22

## 2019-10-29 ENCOUNTER — TELEPHONE (OUTPATIENT)
Dept: SURGERY | Facility: HOSPITAL | Age: 74
End: 2019-10-29

## 2019-10-31 ENCOUNTER — OFFICE VISIT (OUTPATIENT)
Dept: ORTHOPEDIC SURGERY | Facility: CLINIC | Age: 74
End: 2019-10-31

## 2019-10-31 VITALS
HEIGHT: 70 IN | SYSTOLIC BLOOD PRESSURE: 122 MMHG | BODY MASS INDEX: 32.96 KG/M2 | HEART RATE: 90 BPM | WEIGHT: 230.2 LBS | DIASTOLIC BLOOD PRESSURE: 72 MMHG

## 2019-10-31 DIAGNOSIS — E66.9 OBESITY (BMI 30-39.9): ICD-10-CM

## 2019-10-31 DIAGNOSIS — Z96.651 HX OF TOTAL KNEE REPLACEMENT, RIGHT: Primary | ICD-10-CM

## 2019-10-31 PROCEDURE — 99024 POSTOP FOLLOW-UP VISIT: CPT | Performed by: PHYSICIAN ASSISTANT

## 2019-10-31 RX ORDER — OXYCODONE HYDROCHLORIDE AND ACETAMINOPHEN 5; 325 MG/1; MG/1
TABLET ORAL
Qty: 40 TABLET | Refills: 0 | Status: SHIPPED | OUTPATIENT
Start: 2019-10-31 | End: 2019-11-07 | Stop reason: SDUPTHER

## 2019-10-31 NOTE — PROGRESS NOTES
ORTHO POSTOP VISIT       Subjective:    HPI:  Farheen Gomez is a 73 y.o. male who presents about 1 week out from having a right total knee replacement.  He reports that he is doing well with mild intermittent discomfort, however he states that he is having a lot of itching in his leg.  He reports he is unable to use the Ace wraps because it intensifies the itching.  He is requesting a refill on his pain medication.    Medications:    Current Outpatient Medications:   •  atorvastatin (LIPITOR) 20 MG tablet, Take 20 mg by mouth Every Night., Disp: , Rfl: 3  •  calcium carbonate (OS-JUSTA) 600 MG tablet, Take 600 mg by mouth Daily., Disp: , Rfl:   •  escitalopram (LEXAPRO) 10 MG tablet, Take 10 mg by mouth Daily., Disp: , Rfl: 3  •  finasteride (PROSCAR) 5 MG tablet, Take 5 mg by mouth Daily., Disp: , Rfl:   •  gabapentin (NEURONTIN) 300 MG capsule, Take 1 capsule by mouth Every Night., Disp: 30 capsule, Rfl: 0  •  lisinopril (PRINIVIL,ZESTRIL) 40 MG tablet, Take 40 mg by mouth Daily., Disp: , Rfl:   •  Magnesium 250 MG tablet, Take 250 mg by mouth Daily., Disp: , Rfl:   •  meloxicam (MOBIC) 7.5 MG tablet, Take 1 tablet by mouth Daily for 12 days., Disp: 12 tablet, Rfl: 0  •  Multiple Vitamins-Minerals (EYE VITAMINS PO), Take 2 capsules by mouth., Disp: , Rfl:   •  Multiple Vitamins-Minerals (PRESERVISION AREDS 2 PO), Take 2 tablets by mouth Daily., Disp: , Rfl:   •  oxyCODONE-acetaminophen (PERCOCET) 5-325 MG per tablet, 1 po q6 hours prn pain, Disp: 40 tablet, Rfl: 0  •  rivaroxaban (XARELTO) 10 MG tablet, Take 1 tablet by mouth Daily With Dinner for 12 days., Disp: 12 tablet, Rfl: 0  •  tamsulosin (FLOMAX) 0.4 MG capsule 24 hr capsule, Take 1 capsule by mouth Daily., Disp: , Rfl:   •  vitamin D (ERGOCALCIFEROL) 52079 units capsule capsule, Take 50,000 Units by mouth 1 (One) Time Per Week., Disp: , Rfl:   Current outpatient and discharge medications have been reconciled for the patient.  Reviewed by: Jaci Hawkins  "ISRAEL Ocasio      Allergies:  Allergies   Allergen Reactions   • Penicillins Hives     Tongue bled          Objective   Objective:    /72   Pulse 90   Ht 177.8 cm (70\")   Wt 104 kg (230 lb 3.2 oz)   BMI 33.03 kg/m²     Physical Examination:  Alert, oriented, obese individual in no acute distress, ambulating with the assistance of a cane  Right lower extremity shows a well-healing surgical incision with no erythema, drainage, or open skin lesions.  There is some blistering on the lower leg.  There is a moderate amount of swelling, negative Homans.  It is grossly well aligned, and the patient is neurovascularly intact distally. The knee is stable to varus and valgus stress, there is no patellar maltracking or crepitus noted, and plantar and dorsiflexion is 5/5. There is mild tenderness to palpation and with range of motion.         Imaging:  xrays to be obtained at next visit            Assessment:  1. Hx of total knee replacement, right    2. Obesity (BMI 30-39.9)       About 1 week out from surgery          Plan:  His incision was cleansed thoroughly with chlorhexidine soap and saline solution today and redressed with a bordered Mepilex silver dressing.  This dressing should be left in place until patient is seen back in 1 week.  He may shower with dressing in place.  I will refill his pain medication, risks and addiction properties were discussed.  He wishes to buy some compression stockings instead of using the Ace wraps.  Aggressive compression, ice, and elevation for the edema and swelling.  We have discussed wound care to the blistering areas.  I will plan to see him back in 1 week with imaging at that time.             ISRAEL Vivas  10/31/19  1:27 PM                      "

## 2019-10-31 NOTE — PATIENT INSTRUCTIONS
Total Knee Replacement, Care After  These instructions give you information about caring for yourself after your procedure. Your doctor may also give you more specific instructions. Call your doctor if you have any problems or questions after your procedure.  Follow these instructions at home:  Medicines  · Take over-the-counter and prescription medicines only as told by your doctor.  · If you were prescribed an antibiotic medicine, take it as told by your doctor. Do not stop taking the antibiotic even if you start to feel better.  · If you were prescribed a blood thinner (anticoagulant), take it as told by your doctor.  Bathing  · Do not take baths, swim, or use a hot tub until your doctor says it is okay. Ask your doctor if you can take showers. You may only be allowed to take sponge baths for bathing.  · If you have a splint or brace that is not waterproof, cover it with a watertight covering when you take a bath or a shower.  · Keep your bandage (dressing) dry until your doctor says it can be taken off.  Incision care and drain care    · Check your cut from surgery (incision) and your drain every day for signs of infection. Check for:  ? More redness, swelling, or pain.  ? More fluid or blood.  ? Warmth.  ? Pus or a bad smell.  · Follow instructions from your doctor about how to take care of your cut from surgery. Make sure you:  ? Wash your hands with soap and water before you change your bandage. If you cannot use soap and water, use hand .  ? Change your bandage as told by your doctor.  ? Leave stitches (sutures), skin glue, or skin tape (adhesive) strips in place. They may need to stay in place for 2 weeks or longer. If tape strips get loose and curl up, you may trim the loose edges. Do not remove tape strips completely unless your doctor says it is okay.  · If you have a drain, follow instructions from your doctor about caring for it. Do not remove the drain tube or any bandages unless your doctor  says it is okay.  Managing pain, stiffness, and swelling         · If directed, put ice on your knee.  ? Put ice in a plastic bag or use the icing device (cold flow pad or cryocuff) that you were given. Follow your doctor's directions about how to use the icing device.  ? Place a towel between your skin and the bag, or between your skin and the device.  ? Leave the ice on for 20 minutes, 2-3 times per day.  · If directed, apply heat to the affected area as often as told by your doctor. Use the heat source that your doctor recommends, such as a moist heat pack or a heating pad.  ? Place a towel between your skin and the heat source.  ? Leave the heat on for 20-30 minutes.  ? Remove the heat if your skin turns bright red. This is especially important if you are unable to feel pain, heat, or cold. You may have a greater risk of getting burned.  · Move your toes often to avoid stiffness and to lessen swelling.  · Raise (elevate) your knee above the level of your heart while you are sitting or lying down.  · Wear elastic knee support for as long as told by your doctor.  Driving  · Do not drive until your doctor says it is okay. Ask your doctor when it is safe to drive if you have a splint or brace on your knee.  · Do not drive or use heavy machinery while taking prescription pain medicine.  · Do not drive for 24 hours if you received a sedative.  Activity  · Do not play contact sports until your doctor says it is okay.  · Avoid high-impact activities, including running, jumping rope, and jumping jacks.  · Avoid sitting for a long time without moving. Get up and move around at least every few hours.  · If physical therapy was prescribed, do exercises as told by your doctor.  · Return to your normal activities as told by your doctor. Ask your doctor what activities are safe for you.  Safety  · Do not use your leg to support your body weight until your doctor says that you can. Use crutches or a walker as told by your  doctor.  General instructions  · Do not have any dental work done for at least 3 months after your surgery. When you do have dental work done, tell your dentist about your joint replacement.  · Do not use any tobacco products, such as cigarettes, chewing tobacco, or e-cigarettes. If you need help quitting, ask your doctor.  · Wear special socks (compression stockings) as told by your doctor.  · If you have been sent home with a knee joint motion machine (continuous passive motion machine), use it as told by your doctor.  · Drink enough fluid to keep your pee (urine) clear or pale yellow.  · If you have been told to lose weight, follow instructions from your doctor about how to do this safely.  · Keep all follow-up visits as told by your doctor. This is important.  Contact a doctor if:  · You have more redness, swelling, or pain around your cut from surgery or your drain.  · You have more fluid or blood coming from your cut from surgery or your drain.  · Your cut from surgery or your drain area feels warm to the touch.  · You have pus or a bad smell coming from your cut from surgery or your drain.  · You have a fever.  · Your cut breaks open after your doctor removes your stitches, skin glue, or skin tape strips.  · Your new joint feels loose.  · You have knee pain that does not go away.  Get help right away if:  · You have a rash.  · You have pain in your calf or thigh.  · You have swelling in your calf or thigh.  · You have shortness of breath.  · You have trouble breathing.  · You have chest pain.  · Your ability to move your knee is getting worse.  This information is not intended to replace advice given to you by your health care provider. Make sure you discuss any questions you have with your health care provider.  Document Released: 03/11/2013 Document Revised: 02/05/2018 Document Reviewed: 11/23/2016  [employer] Interactive Patient Education © 2019 [employer]

## 2019-11-07 ENCOUNTER — HOSPITAL ENCOUNTER (OUTPATIENT)
Dept: CARDIOLOGY | Facility: HOSPITAL | Age: 74
Discharge: HOME | End: 2019-11-07
Admitting: PREVENTIVE MEDICINE

## 2019-11-07 ENCOUNTER — OFFICE VISIT (OUTPATIENT)
Dept: FAMILY MEDICINE CLINIC | Facility: CLINIC | Age: 74
End: 2019-11-07

## 2019-11-07 ENCOUNTER — OFFICE VISIT (OUTPATIENT)
Dept: ORTHOPEDIC SURGERY | Facility: CLINIC | Age: 74
End: 2019-11-07

## 2019-11-07 VITALS
BODY MASS INDEX: 33.55 KG/M2 | DIASTOLIC BLOOD PRESSURE: 79 MMHG | RESPIRATION RATE: 16 BRPM | SYSTOLIC BLOOD PRESSURE: 126 MMHG | OXYGEN SATURATION: 97 % | WEIGHT: 233.8 LBS | TEMPERATURE: 98.5 F | HEART RATE: 90 BPM

## 2019-11-07 VITALS
HEART RATE: 91 BPM | DIASTOLIC BLOOD PRESSURE: 85 MMHG | BODY MASS INDEX: 33.21 KG/M2 | SYSTOLIC BLOOD PRESSURE: 144 MMHG | WEIGHT: 232 LBS | HEIGHT: 70 IN

## 2019-11-07 DIAGNOSIS — R60.0 EDEMA OF RIGHT LOWER EXTREMITY: ICD-10-CM

## 2019-11-07 DIAGNOSIS — Z00.01 ENCOUNTER FOR ANNUAL GENERAL MEDICAL EXAMINATION WITH ABNORMAL FINDINGS IN ADULT: Primary | ICD-10-CM

## 2019-11-07 DIAGNOSIS — E66.9 OBESITY (BMI 30-39.9): ICD-10-CM

## 2019-11-07 DIAGNOSIS — Z96.651 HX OF TOTAL KNEE REPLACEMENT, RIGHT: ICD-10-CM

## 2019-11-07 DIAGNOSIS — Z11.59 ENCOUNTER FOR HEPATITIS C VIRUS SCREENING TEST FOR HIGH RISK PATIENT: ICD-10-CM

## 2019-11-07 DIAGNOSIS — Z11.4 SCREENING FOR HIV (HUMAN IMMUNODEFICIENCY VIRUS): ICD-10-CM

## 2019-11-07 DIAGNOSIS — M79.661 RIGHT CALF PAIN: ICD-10-CM

## 2019-11-07 DIAGNOSIS — Z96.651 HX OF TOTAL KNEE REPLACEMENT, RIGHT: Primary | ICD-10-CM

## 2019-11-07 DIAGNOSIS — Z91.89 ENCOUNTER FOR HEPATITIS C VIRUS SCREENING TEST FOR HIGH RISK PATIENT: ICD-10-CM

## 2019-11-07 PROBLEM — H35.3131 NONEXUDATIVE AGE-RELATED MACULAR DEGENERATION, BILATERAL, EARLY DRY STAGE: Status: ACTIVE | Noted: 2019-11-07

## 2019-11-07 PROBLEM — H01.009 UNSPECIFIED BLEPHARITIS UNSPECIFIED EYE, UNSPECIFIED EYELID: Status: ACTIVE | Noted: 2019-11-07

## 2019-11-07 PROBLEM — H04.129 TEAR FILM INSUFFICIENCY: Status: ACTIVE | Noted: 2019-11-07

## 2019-11-07 PROBLEM — H02.89 OTHER SPECIFIED DISORDERS OF EYELID: Status: ACTIVE | Noted: 2019-11-07

## 2019-11-07 PROBLEM — H43.811 PVD (POSTERIOR VITREOUS DETACHMENT), RIGHT EYE: Status: ACTIVE | Noted: 2019-11-07

## 2019-11-07 PROBLEM — H26.493 PCO (POSTERIOR CAPSULAR OPACIFICATION), BILATERAL: Status: ACTIVE | Noted: 2019-11-07

## 2019-11-07 PROBLEM — Z96.1 PRESENCE OF INTRAOCULAR LENS: Status: ACTIVE | Noted: 2019-11-07

## 2019-11-07 PROBLEM — Z96.1 LENS REPLACED BY OTHER MEANS: Status: ACTIVE | Noted: 2019-11-07

## 2019-11-07 PROBLEM — H35.30 AMD (AGE-RELATED MACULAR DEGENERATION), BILATERAL: Status: ACTIVE | Noted: 2019-11-07

## 2019-11-07 PROBLEM — H04.129 DRY EYE: Status: ACTIVE | Noted: 2019-11-07

## 2019-11-07 PROBLEM — H54.7 UNSPECIFIED VISUAL LOSS: Status: ACTIVE | Noted: 2019-11-07

## 2019-11-07 PROBLEM — H43.813 PVD (POSTERIOR VITREOUS DETACHMENT), BOTH EYES: Status: ACTIVE | Noted: 2019-11-07

## 2019-11-07 PROBLEM — H35.033 HYPERTENSIVE RETINOPATHY OF BOTH EYES: Status: ACTIVE | Noted: 2019-11-07

## 2019-11-07 PROBLEM — H35.3190 NONEXUDATIVE AGE-RELATED MACULAR DEGENERATION: Status: ACTIVE | Noted: 2019-11-07

## 2019-11-07 PROBLEM — H10.45 OTHER CHRONIC ALLERGIC CONJUNCTIVITIS: Status: ACTIVE | Noted: 2019-11-07

## 2019-11-07 LAB
BH CV LOWER VASCULAR LEFT COMMON FEMORAL AUGMENT: NORMAL
BH CV LOWER VASCULAR LEFT COMMON FEMORAL COMPETENT: NORMAL
BH CV LOWER VASCULAR LEFT COMMON FEMORAL COMPRESS: NORMAL
BH CV LOWER VASCULAR LEFT COMMON FEMORAL PHASIC: NORMAL
BH CV LOWER VASCULAR LEFT COMMON FEMORAL SPONT: NORMAL
BH CV LOWER VASCULAR RIGHT COMMON FEMORAL AUGMENT: NORMAL
BH CV LOWER VASCULAR RIGHT COMMON FEMORAL COMPETENT: NORMAL
BH CV LOWER VASCULAR RIGHT COMMON FEMORAL COMPRESS: NORMAL
BH CV LOWER VASCULAR RIGHT COMMON FEMORAL PHASIC: NORMAL
BH CV LOWER VASCULAR RIGHT COMMON FEMORAL SPONT: NORMAL
BH CV LOWER VASCULAR RIGHT DISTAL FEMORAL COMPRESS: NORMAL
BH CV LOWER VASCULAR RIGHT GASTRONEMIUS COMPRESS: NORMAL
BH CV LOWER VASCULAR RIGHT GREATER SAPH AK COMPRESS: NORMAL
BH CV LOWER VASCULAR RIGHT GREATER SAPH BK COMPRESS: NORMAL
BH CV LOWER VASCULAR RIGHT LESSER SAPH COMPRESS: NORMAL
BH CV LOWER VASCULAR RIGHT MID FEMORAL AUGMENT: NORMAL
BH CV LOWER VASCULAR RIGHT MID FEMORAL COMPETENT: NORMAL
BH CV LOWER VASCULAR RIGHT MID FEMORAL COMPRESS: NORMAL
BH CV LOWER VASCULAR RIGHT MID FEMORAL PHASIC: NORMAL
BH CV LOWER VASCULAR RIGHT MID FEMORAL SPONT: NORMAL
BH CV LOWER VASCULAR RIGHT PERONEAL COMPRESS: NORMAL
BH CV LOWER VASCULAR RIGHT POPLITEAL AUGMENT: NORMAL
BH CV LOWER VASCULAR RIGHT POPLITEAL COMPETENT: NORMAL
BH CV LOWER VASCULAR RIGHT POPLITEAL COMPRESS: NORMAL
BH CV LOWER VASCULAR RIGHT POPLITEAL PHASIC: NORMAL
BH CV LOWER VASCULAR RIGHT POPLITEAL SPONT: NORMAL
BH CV LOWER VASCULAR RIGHT POSTERIOR TIBIAL COMPRESS: NORMAL
BH CV LOWER VASCULAR RIGHT PROXIMAL FEMORAL COMPRESS: NORMAL
BH CV LOWER VASCULAR RIGHT SAPHENOFEMORAL JUNCTION AUGMENT: NORMAL
BH CV LOWER VASCULAR RIGHT SAPHENOFEMORAL JUNCTION COMPETENT: NORMAL
BH CV LOWER VASCULAR RIGHT SAPHENOFEMORAL JUNCTION COMPRESS: NORMAL
BH CV LOWER VASCULAR RIGHT SAPHENOFEMORAL JUNCTION PHASIC: NORMAL
BH CV LOWER VASCULAR RIGHT SAPHENOFEMORAL JUNCTION SPONT: NORMAL

## 2019-11-07 PROCEDURE — 99024 POSTOP FOLLOW-UP VISIT: CPT | Performed by: PHYSICIAN ASSISTANT

## 2019-11-07 PROCEDURE — 93971 EXTREMITY STUDY: CPT

## 2019-11-07 PROCEDURE — G0439 PPPS, SUBSEQ VISIT: HCPCS | Performed by: PREVENTIVE MEDICINE

## 2019-11-07 PROCEDURE — 99495 TRANSJ CARE MGMT MOD F2F 14D: CPT | Performed by: PREVENTIVE MEDICINE

## 2019-11-07 RX ORDER — OXYCODONE HYDROCHLORIDE AND ACETAMINOPHEN 5; 325 MG/1; MG/1
TABLET ORAL
Qty: 40 TABLET | Refills: 0 | Status: SHIPPED | OUTPATIENT
Start: 2019-11-07 | End: 2019-11-26 | Stop reason: ALTCHOICE

## 2019-11-07 NOTE — PATIENT INSTRUCTIONS

## 2019-11-07 NOTE — PATIENT INSTRUCTIONS
Total Knee Replacement, Care After  These instructions give you information about caring for yourself after your procedure. Your doctor may also give you more specific instructions. Call your doctor if you have any problems or questions after your procedure.  Follow these instructions at home:  Medicines  · Take over-the-counter and prescription medicines only as told by your doctor.  · If you were prescribed an antibiotic medicine, take it as told by your doctor. Do not stop taking the antibiotic even if you start to feel better.  · If you were prescribed a blood thinner (anticoagulant), take it as told by your doctor.  Bathing  · Do not take baths, swim, or use a hot tub until your doctor says it is okay. Ask your doctor if you can take showers. You may only be allowed to take sponge baths for bathing.  · If you have a splint or brace that is not waterproof, cover it with a watertight covering when you take a bath or a shower.  · Keep your bandage (dressing) dry until your doctor says it can be taken off.  Incision care and drain care    · Check your cut from surgery (incision) and your drain every day for signs of infection. Check for:  ? More redness, swelling, or pain.  ? More fluid or blood.  ? Warmth.  ? Pus or a bad smell.  · Follow instructions from your doctor about how to take care of your cut from surgery. Make sure you:  ? Wash your hands with soap and water before you change your bandage. If you cannot use soap and water, use hand .  ? Change your bandage as told by your doctor.  ? Leave stitches (sutures), skin glue, or skin tape (adhesive) strips in place. They may need to stay in place for 2 weeks or longer. If tape strips get loose and curl up, you may trim the loose edges. Do not remove tape strips completely unless your doctor says it is okay.  · If you have a drain, follow instructions from your doctor about caring for it. Do not remove the drain tube or any bandages unless your doctor  says it is okay.  Managing pain, stiffness, and swelling         · If directed, put ice on your knee.  ? Put ice in a plastic bag or use the icing device (cold flow pad or cryocuff) that you were given. Follow your doctor's directions about how to use the icing device.  ? Place a towel between your skin and the bag, or between your skin and the device.  ? Leave the ice on for 20 minutes, 2-3 times per day.  · If directed, apply heat to the affected area as often as told by your doctor. Use the heat source that your doctor recommends, such as a moist heat pack or a heating pad.  ? Place a towel between your skin and the heat source.  ? Leave the heat on for 20-30 minutes.  ? Remove the heat if your skin turns bright red. This is especially important if you are unable to feel pain, heat, or cold. You may have a greater risk of getting burned.  · Move your toes often to avoid stiffness and to lessen swelling.  · Raise (elevate) your knee above the level of your heart while you are sitting or lying down.  · Wear elastic knee support for as long as told by your doctor.  Driving  · Do not drive until your doctor says it is okay. Ask your doctor when it is safe to drive if you have a splint or brace on your knee.  · Do not drive or use heavy machinery while taking prescription pain medicine.  · Do not drive for 24 hours if you received a sedative.  Activity  · Do not play contact sports until your doctor says it is okay.  · Avoid high-impact activities, including running, jumping rope, and jumping jacks.  · Avoid sitting for a long time without moving. Get up and move around at least every few hours.  · If physical therapy was prescribed, do exercises as told by your doctor.  · Return to your normal activities as told by your doctor. Ask your doctor what activities are safe for you.  Safety  · Do not use your leg to support your body weight until your doctor says that you can. Use crutches or a walker as told by your  doctor.  General instructions  · Do not have any dental work done for at least 3 months after your surgery. When you do have dental work done, tell your dentist about your joint replacement.  · Do not use any tobacco products, such as cigarettes, chewing tobacco, or e-cigarettes. If you need help quitting, ask your doctor.  · Wear special socks (compression stockings) as told by your doctor.  · If you have been sent home with a knee joint motion machine (continuous passive motion machine), use it as told by your doctor.  · Drink enough fluid to keep your pee (urine) clear or pale yellow.  · If you have been told to lose weight, follow instructions from your doctor about how to do this safely.  · Keep all follow-up visits as told by your doctor. This is important.  Contact a doctor if:  · You have more redness, swelling, or pain around your cut from surgery or your drain.  · You have more fluid or blood coming from your cut from surgery or your drain.  · Your cut from surgery or your drain area feels warm to the touch.  · You have pus or a bad smell coming from your cut from surgery or your drain.  · You have a fever.  · Your cut breaks open after your doctor removes your stitches, skin glue, or skin tape strips.  · Your new joint feels loose.  · You have knee pain that does not go away.  Get help right away if:  · You have a rash.  · You have pain in your calf or thigh.  · You have swelling in your calf or thigh.  · You have shortness of breath.  · You have trouble breathing.  · You have chest pain.  · Your ability to move your knee is getting worse.  This information is not intended to replace advice given to you by your health care provider. Make sure you discuss any questions you have with your health care provider.  Document Released: 03/11/2013 Document Revised: 02/05/2018 Document Reviewed: 11/23/2016  [employer] Interactive Patient Education © 2019 [employer]

## 2019-11-07 NOTE — PROGRESS NOTES
Transitional Care Follow Up Visit  Subjective     Farheen Gomez is a 73 y.o. male who presents for a transitional care management visit.    Within 48 business hours after discharge our office contacted him via telephone to coordinate his care and needs.      I reviewed and discussed the details of that call along with the discharge summary, hospital problems, inpatient lab results, inpatient diagnostic studies, and consultation reports with Farheen.  Patient doing well with mobility.  Does have significant R calf ppain.  No fever, hemoptysis or SOA. Some constipation and p[donny foster,ed taking edge off only Seeing surgeon today. Eating and urinating ok     Vitals:    11/07/19 1013 11/07/19 1018   BP: 123/76 126/79   BP Location: Right arm Left arm   Patient Position: Sitting Sitting   Cuff Size: Large Adult Large Adult   Pulse: 90    Resp: 16    Temp: 98.5 °F (36.9 °C)    TempSrc: Oral    SpO2: 97%    Weight: 106 kg (233 lb 12.8 oz)      Body mass index is 33.55 kg/m².    Current outpatient and discharge medications have been reconciled for the patient.    Date of TCM Phone Call 10/24/2019   Hospital B   Discharge Disposition Home or Self Care     Risk for Readmission (LACE) Score: 1 (10/24/2019  6:00 AM)      History of Present Illness   Course During Hospital Stay.  [facility]     The following portions of the patient's history were reviewed and updated as appropriate: allergies, current medications, past family history, past medical history, past social history, past surgical history and problem list.    Review of Systems   Constitutional: Negative.    Eyes: Negative.    Respiratory: Negative.    Cardiovascular: Positive for leg swelling.   Gastrointestinal: Positive for constipation.   Endocrine: Negative.    Genitourinary: Negative.    Musculoskeletal: Positive for arthralgias and myalgias.   Skin: Negative.    Allergic/Immunologic: Negative.    Neurological: Negative.    Hematological: Negative.     Psychiatric/Behavioral: Negative.        Objective   Physical Exam   Constitutional: He is oriented to person, place, and time. He appears well-developed and well-nourished.   HENT:   Head: Normocephalic.   Nose: Nose normal.   Mouth/Throat: Oropharynx is clear and moist.   Eyes: Conjunctivae and EOM are normal. Pupils are equal, round, and reactive to light.   Neck: Neck supple.   Cardiovascular: Normal rate, regular rhythm and normal heart sounds.   Markedly positive Crow's R calf with swelling   Pulmonary/Chest: Effort normal and breath sounds normal.   Abdominal: Soft. Bowel sounds are normal. He exhibits no distension and no mass. There is no tenderness. There is no rebound and no guarding.   Musculoskeletal: He exhibits no edema.   Neurological: He is alert and oriented to person, place, and time.   Skin: Skin is warm and dry. Rash noted.   Psychiatric: He has a normal mood and affect.       Assessment/Plan   Farheen was seen today for medicare wellness-subsequent.    Diagnoses and all orders for this visit:    Encounter for annual general medical examination with abnormal findings in adult    Hx of total knee replacement, right    Screening for HIV (human immunodeficiency virus)    Encounter for hepatitis C virus screening test for high risk patient    Other orders  -     Hepatitis C Antibody  -     HIV-1 & HIV-2 Antibodies  -     [place]aa screen limited; Future        There are no Patient Instructions on file for this visit.

## 2019-11-07 NOTE — PROGRESS NOTES
"    ORTHO POSTOP VISIT       Subjective:    HPI:  Farheen Gomez is a 73 y.o. male who presents about 2 weeks out from having a right total knee replacement.  He reports that he is doing well with mild intermittent discomfort.  He states that he has been using compression stockings and the swelling has been improving.  He reports that when he wakes up in the morning the swelling is completely resolved.  He is requesting a refill on his pain medication.    Medications:    Current Outpatient Medications:   •  atorvastatin (LIPITOR) 20 MG tablet, Take 20 mg by mouth Every Night., Disp: , Rfl: 3  •  calcium carbonate (OS-JUSTA) 600 MG tablet, Take 600 mg by mouth Daily., Disp: , Rfl:   •  escitalopram (LEXAPRO) 10 MG tablet, Take 10 mg by mouth Daily., Disp: , Rfl: 3  •  finasteride (PROSCAR) 5 MG tablet, Take 5 mg by mouth Daily., Disp: , Rfl:   •  gabapentin (NEURONTIN) 300 MG capsule, Take 1 capsule by mouth Every Night., Disp: 30 capsule, Rfl: 0  •  lisinopril (PRINIVIL,ZESTRIL) 40 MG tablet, Take 40 mg by mouth Daily., Disp: , Rfl:   •  Magnesium 250 MG tablet, Take 250 mg by mouth Daily., Disp: , Rfl:   •  Multiple Vitamins-Minerals (EYE VITAMINS PO), Take 2 capsules by mouth., Disp: , Rfl:   •  Multiple Vitamins-Minerals (PRESERVISION AREDS 2 PO), Take 2 tablets by mouth Daily., Disp: , Rfl:   •  oxyCODONE-acetaminophen (PERCOCET) 5-325 MG per tablet, 1 po q6 hours prn pain, Disp: 40 tablet, Rfl: 0  •  tamsulosin (FLOMAX) 0.4 MG capsule 24 hr capsule, Take 1 capsule by mouth Daily., Disp: , Rfl:   •  vitamin D (ERGOCALCIFEROL) 23828 units capsule capsule, Take 50,000 Units by mouth 1 (One) Time Per Week., Disp: , Rfl:   Current outpatient and discharge medications have been reconciled for the patient.  Reviewed by: ISRAEL Vivas      Allergies:  Allergies   Allergen Reactions   • Penicillins Hives     Tongue bled          Objective   Objective:    /85   Pulse 91   Ht 177.8 cm (70\")   Wt 105 kg " (232 lb)   BMI 33.29 kg/m²     Physical Examination:  Alert, oriented, obese individual in no acute distress, ambulating with the assistance of a cane  Right lower extremity shows a well-healing surgical incision with no erythema, drainage, or open skin lesions.  There is some blistering on the lower leg, which has improved significantly.  There is a moderate amount of swelling, negative Homans.  It is grossly well aligned, and the patient is neurovascularly intact distally. The knee is stable to varus and valgus stress, there is no patellar maltracking or crepitus noted, and plantar and dorsiflexion is 5/5. There is mild tenderness to palpation and with range of motion, which is about 2-90.         Imaging:  xrays obtained today  right Knee X-Ray  Indication: 2 weeks postop  AP, Lateral, [place] views  Findings:A well positioned knee replacement without evidence of bony or implant failure. and No fractures or dislocations are appreciated  within normal limits joint spaces  Hardware appropriately positioned yes    yes prior studies available for comparison.    This patient's x-ray report was graded according to the Kellgren and Dickson classification.  This took into account the joint space narrowing, osteophyte formation, sclerosis of the distal femur/proximal tibia along with deformity of those bones.  The findings were indicative of K L grade na.    X-RAY was ordered and reviewed by ISRAEL Vivas            Assessment:  1. Hx of total knee replacement, right    2. Edema of right lower extremity    3. Obesity (BMI 30-39.9)       About 2 weeks out from surgery          Plan:  At this time, we will plan to see the patient back in about 3 weeks. The patient should continue PT, WBAT, and call with any problems.  He will be given an order to start formal physical therapy.  I will refill his pain medication, risks and addiction properties were discussed.  He did have a Doppler ultrasound today, which was  negative.  I have asked him to continue to utilize his compression stockings from the time he wakes in the morning until he goes to bed.  He should also elevate above his heart during the day as needed, as well as use ice.            ISRAEL Vivas  11/07/19  4:16 PM

## 2019-11-07 NOTE — PROGRESS NOTES
The ABCs of the Annual Wellness Visit  Subsequent Medicare Wellness Visit    Chief Complaint   Patient presents with   • Medicare Wellness-subsequent     not fasting        Subjective   History of Present Illness:  Farheen Gomez is a 73 y.o. male who presents for a Subsequent Medicare Wellness Visit.    HEALTH RISK ASSESSMENT    Recent Hospitalizations:  Recently treated at the following:  [facility]     Current Medical Providers:  Patient Care Team:  Penny Hidalgo MD as PCP - General  Cali Barrios MD as PCP - Claims Attributed    Smoking Status:  Social History     Tobacco Use   Smoking Status Former Smoker   • Packs/day: 0.25   • Years: 5.00   • Pack years: 1.25   • Last attempt to quit:    • Years since quittin.8   Smokeless Tobacco Never Used       Alcohol Consumption:  Social History     Substance and Sexual Activity   Alcohol Use No   • Frequency: Never       Depression Screen:   PHQ-2/PHQ-9 Depression Screening 2019   Little interest or pleasure in doing things 0   Feeling down, depressed, or hopeless 0   Total Score 0       Fall Risk Screen:  SEHRRIE Fall Risk Assessment was completed, and patient is at LOW risk for falls.Assessment completed on:10/17/2019    Health Habits and Functional and Cognitive Screening:  Functional & Cognitive Status 2019   Do you have difficulty preparing food and eating? No   Do you have difficulty bathing yourself, getting dressed or grooming yourself? No   Do you have difficulty using the toilet? No   Do you have difficulty moving around from place to place? No   Do you have trouble with steps or getting out of a bed or a chair? No   Current Diet Well Balanced Diet   Dental Exam Up to date   Eye Exam Up to date   Exercise (times per week) 0 times per week   Current Exercise Activities Include None   Do you need help using the phone?  No   Are you deaf or do you have serious difficulty hearing?  No   Do you need help with  transportation? No   Do you need help shopping? No   Do you need help preparing meals?  No   Do you need help with housework?  No   Do you need help with laundry? No   Do you need help taking your medications? No   Do you need help managing money? No   Do you ever drive or ride in a car without wearing a seat belt? No   Have you felt unusual stress, anger or loneliness in the last month? No   Who do you live with? Spouse   If you need help, do you have trouble finding someone available to you? No   Have you been bothered in the last four weeks by sexual problems? No   Do you have difficulty concentrating, remembering or making decisions? No         Does the patient have evidence of cognitive impairment? No    Asprin use counseling:Does not need ASA (and currently is not on it)    Age-appropriate Screening Schedule:  Refer to the list below for future screening recommendations based on patient's age, sex and/or medical conditions. Orders for these recommended tests are listed in the plan section. The patient has been provided with a written plan.    Health Maintenance   Topic Date Due   • TDAP/TD VACCINES (1 - Tdap) 08/13/2025 (Originally 8/14/2015)   • LIPID PANEL  12/18/2019   • COLONOSCOPY  12/07/2027   • INFLUENZA VACCINE  Completed   • PNEUMOCOCCAL VACCINES (65+ LOW/MEDIUM RISK)  Completed   • ZOSTER VACCINE  Completed          The following portions of the patient's history were reviewed and updated as appropriate: allergies, current medications, past family history, past medical history, past social history, past surgical history and problem list.    Outpatient Medications Prior to Visit   Medication Sig Dispense Refill   • atorvastatin (LIPITOR) 20 MG tablet Take 20 mg by mouth Every Night.  3   • calcium carbonate (OS-JUSTA) 600 MG tablet Take 600 mg by mouth Daily.     • escitalopram (LEXAPRO) 10 MG tablet Take 10 mg by mouth Daily.  3   • finasteride (PROSCAR) 5 MG tablet Take 5 mg by mouth Daily.     •  gabapentin (NEURONTIN) 300 MG capsule Take 1 capsule by mouth Every Night. 30 capsule 0   • lisinopril (PRINIVIL,ZESTRIL) 40 MG tablet Take 40 mg by mouth Daily.     • Magnesium 250 MG tablet Take 250 mg by mouth Daily.     • Multiple Vitamins-Minerals (EYE VITAMINS PO) Take 2 capsules by mouth.     • Multiple Vitamins-Minerals (PRESERVISION AREDS 2 PO) Take 2 tablets by mouth Daily.     • oxyCODONE-acetaminophen (PERCOCET) 5-325 MG per tablet 1 po q6 hours prn pain 40 tablet 0   • tamsulosin (FLOMAX) 0.4 MG capsule 24 hr capsule Take 1 capsule by mouth Daily.     • vitamin D (ERGOCALCIFEROL) 54599 units capsule capsule Take 50,000 Units by mouth 1 (One) Time Per Week.     • meloxicam (MOBIC) 7.5 MG tablet Take 1 tablet by mouth Daily for 12 days. 12 tablet 0     No facility-administered medications prior to visit.        Patient Active Problem List   Diagnosis   • Spinal stenosis in cervical region - reveresal of lordosis at C3/4 and C4/5, Modic endpalate cahnges at C7/T1   • Hypertension   • Hyperlipidemia   • BPH (benign prostatic hyperplasia)   • Primary osteoarthritis of left knee   • Anemia   • Anxiety state   • Arthritis   • Body mass index (BMI) of 31.0-31.9 in adult   • Cyst of kidney, acquired   • Dupuytren's contracture   • Dyslipidemia   • Family history of malignant neoplasm of urinary bladder   • Foot pain, right   • Ganglion cyst   • Gastroesophageal reflux disease   • Hand paresthesia   • Hearing problem   • History of poliomyelitis   • Hyperglycemia   • Hypomagnesemia   • Insect bite   • Knee pain   • Low back pain   • Neck pain   • Other hammer toe(s) (acquired), left foot   • Plantar fasciitis, left   • Sleep disorder   • Tachycardia   • Thrombocytopenia (CMS/HCC)   • Vitamin D deficiency   • Hx of total knee replacement, right       Advanced Care Planning:  Patient has an advance directive - a copy has been provided and is visible in patient header    Review of Systems    Compared to one year  ago, the patient feels his physical health is better.  Compared to one year ago, the patient feels his mental health is better.    Reviewed chart for potential of high risk medication in the elderly: yes  Reviewed chart for potential of harmful drug interactions in the elderly:yes    Objective         Vitals:    11/07/19 1013 11/07/19 1018   BP: 123/76 126/79   BP Location: Right arm Left arm   Patient Position: Sitting Sitting   Cuff Size: Large Adult Large Adult   Pulse: 90    Resp: 16    Temp: 98.5 °F (36.9 °C)    TempSrc: Oral    SpO2: 97%    Weight: 106 kg (233 lb 12.8 oz)    PainSc:   5    PainLoc: Knee        Body mass index is 33.55 kg/m².  Discussed the patient's BMI with him. The BMI is above average; BMI management plan is completed.    Physical Exam          Assessment/Plan   Medicare Risks and Personalized Health Plan  CMS Preventative Services Quick Reference  Obesity/Overweight     The above risks/problems have been discussed with the patient.  Pertinent information has been shared with the patient in the After Visit Summary.  Follow up plans and orders are seen below in the Assessment/Plan Section.    Diagnoses and all orders for this visit:    1. Encounter for annual general medical examination with abnormal findings in adult (Primary)    2. Hx of total knee replacement, right    3. Screening for HIV (human immunodeficiency virus)    4. Encounter for hepatitis C virus screening test for high risk patient    Other orders  -     Hepatitis C Antibody  -     HIV-1 & HIV-2 Antibodies  -     [place]aa screen limited; Future      Follow Up:  No Follow-up on file.     An After Visit Summary and PPPS were given to the patient.

## 2019-11-12 ENCOUNTER — TREATMENT (OUTPATIENT)
Dept: PHYSICAL THERAPY | Facility: CLINIC | Age: 74
End: 2019-11-12

## 2019-11-12 DIAGNOSIS — Z96.651 PRESENCE OF RIGHT ARTIFICIAL KNEE JOINT: Primary | ICD-10-CM

## 2019-11-12 PROCEDURE — 97140 MANUAL THERAPY 1/> REGIONS: CPT | Performed by: PHYSICAL THERAPIST

## 2019-11-12 PROCEDURE — 97110 THERAPEUTIC EXERCISES: CPT | Performed by: PHYSICAL THERAPIST

## 2019-11-12 PROCEDURE — 97161 PT EVAL LOW COMPLEX 20 MIN: CPT | Performed by: PHYSICAL THERAPIST

## 2019-11-12 PROCEDURE — G0283 ELEC STIM OTHER THAN WOUND: HCPCS | Performed by: PHYSICAL THERAPIST

## 2019-11-12 NOTE — PROGRESS NOTES
Physical Therapy Initial Evaluation and Plan of Care    Patient: Farheen Gomez   : 1945  Diagnosis/ICD-10 Code:  Presence of right artificial knee joint [Z96.651]  Referring practitioner: ISRAEL Vivas  Date of Initial Visit: 2019  Today's Date: 2019  Patient seen for 1 sessions           Subjective Questionnaire: Oxford knee 56%  TKR right 10/23/19, lumbar spine discectomy 19, complication initially with moderate to severe swelling lower leg with skin breakdown.  Cleared for blood clot by US.  2 wks home health care.  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 Has had bent knee for many years.  Pain meds not helping, poor sleep.  Present HEP is painful to perform.        Subjective Evaluation    History of Present Illness  Date of surgery: 10/23/2019      Patient Occupation: retired Quality of life: good    Pain  Current pain ratin  Quality: dull ache, throbbing, tight and discomfort  Relieving factors: rest, support and change in position  Aggravating factors: ambulation, squatting, prolonged positioning, stairs, movement and standing  Progression: no change    Social Support  Lives with: spouse    Treatments  Previous treatment: home therapy and medication  Discharged from (in last 30 days): home health care  Patient Goals  Patient goals for therapy: decreased edema, increased strength, independence with ADLs/IADLs, decreased pain, improved balance, return to sport/leisure activities and increased motion             Objective       Observations     Additional Observation Details  Well healed scar, edema as anticipated  Post sx, healed skin wounds in lower leg, good color    Palpation     Additional Palpation Details  Good patellar mobility    Passive Range of Motion     Additional Passive Range of Motion Details  Initial extension to (-) 20 degrees but increased to (-) 10 degrees after PT, flex with guarding to 95 degrees today but soft end feel.       Ambulation     Ambulation: Level Surfaces     Additional Level Surfaces Ambulation Details  Gait with straight cane with flexed knee, fair push off with limitations         Assessment & Plan     Assessment  Impairments: abnormal gait, abnormal muscle firing, abnormal or restricted ROM, activity intolerance, impaired physical strength, lacks appropriate home exercise program and pain with function  Assessment details: Post TKR right with limited ROM, strength and gait.  Patient will benefit from PT for use of modalities as needed to improve ROM, decrease pain, active and passive stretching for ROM and closed chain quad strength program.  Treatment will include HEP  Prognosis: good  Functional Limitations: walking, uncomfortable because of pain and standing  Goals  Plan Goals: Initiate ROM and strength program in 1 week    Independent HEP for self management by discharge    IADL to prior level of function by discharge    Hoffman kneeSubjective questionnaire will be to 20% or less by discharge      Plan  Therapy options: will be seen for skilled physical therapy services  Planned modality interventions: electrical stimulation/Russian stimulation, cryotherapy and thermotherapy (hydrocollator packs)  Planned therapy interventions: manual therapy, strengthening, functional ROM exercises, flexibility, gait training, home exercise program, therapeutic activities and joint mobilization  Treatment plan discussed with: patient and family  Plan details: PT 2X weekly up to 20 visits as needed to reach goals        Timed:         Manual Therapy:    15     mins  79291;     Therapeutic Exercise:    15     mins  86677;     Neuromuscular Vanna:    0    mins  41030;    Therapeutic Activity:     0     mins  73285;     Gait Trainin     mins  45999;     Ultrasound:     0     mins  59220;    Ionto                               0    mins   08457    Un-Timed:  Electrical Stimulation:    15     mins  93432 ( );  Dry  Needling     0     mins self-pay  Traction     0     mins 20465  Low Eval     15     Mins  45564  Mod Eval     0     Mins  03281  High Eval                       0     Mins  92346        Timed Treatment:   30   mins   Total Treatment:     60   mins    PT SIGNATURE: Rochelle Carranza, PT   DATE TREATMENT INITIATED: 11/12/2019    Medicare Initial Certification  Certification Period: 2/10/2020  I certify that the therapy services are furnished while this patient is under my care.  The services outlined above are required by this patient, and will be reviewed every 90 days.     PHYSICIAN: Jaci Ocasio PA      DATE:     Please sign and return via fax to [phone].. Thank you, [facility].

## 2019-11-15 ENCOUNTER — TREATMENT (OUTPATIENT)
Dept: PHYSICAL THERAPY | Facility: CLINIC | Age: 74
End: 2019-11-15

## 2019-11-15 DIAGNOSIS — Z96.651 PRESENCE OF RIGHT ARTIFICIAL KNEE JOINT: Primary | ICD-10-CM

## 2019-11-15 PROCEDURE — G0283 ELEC STIM OTHER THAN WOUND: HCPCS | Performed by: PHYSICAL THERAPIST

## 2019-11-15 PROCEDURE — 97110 THERAPEUTIC EXERCISES: CPT | Performed by: PHYSICAL THERAPIST

## 2019-11-15 PROCEDURE — 97140 MANUAL THERAPY 1/> REGIONS: CPT | Performed by: PHYSICAL THERAPIST

## 2019-11-15 NOTE — PROGRESS NOTES
Physical Therapy Daily Progress Note      Patient: Farheen Gomez   : 1945  Diagnosis/ICD-10 Code:  Presence of right artificial knee joint [Z96.651]  Referring practitioner: ISRAEL Vivas  Date of Initial Visit: Type: THERAPY  Noted: 2019  Today's Date: 11/15/2019  Patient seen for 2 sessions         Farheen Gomez reports: he has still been very sore and not sleeping well at all. Pt. State she is willing to do anything at home he can to help improve movement and pain.    Objective   See Exercise, Manual, and Modality Logs for complete treatment.     Assessment/Plan  Pt. tolerates treatment well with no increase in pain or discomfort. Pt. Tolerated addition of hamstring curl with green TB and standing exercises this visit. Pt. Was given Tb and instructive photo for exercise at home.    Progress per Plan of Care           Timed:         Manual Therapy:    10     mins  29125;     Therapeutic Exercise:    18     mins  94543;     Neuromuscular Vanna:        mins  41905;    Therapeutic Activity:          mins  21774;     Gait Training:           mins  69082;     Ultrasound:          mins  81176;    Ionto                                   mins   08036  Self Care                            mins   40880  Canalith Repos                   mins  4209    Un-Timed:  Electrical Stimulation:    15     mins  05913 ( );  Dry Needling          mins self-pay  Traction          mins 83461  Low Eval          Mins  78353  Mod Eval          Mins  15598  High Eval                            Mins  30968    Timed Treatment:   28   mins   Total Treatment:     43   mins    Nona Lemons PTA  Physical Therapist Assistant License #[id number]

## 2019-11-18 ENCOUNTER — TREATMENT (OUTPATIENT)
Dept: PHYSICAL THERAPY | Facility: CLINIC | Age: 74
End: 2019-11-18

## 2019-11-18 DIAGNOSIS — Z96.651 PRESENCE OF RIGHT ARTIFICIAL KNEE JOINT: Primary | ICD-10-CM

## 2019-11-18 PROCEDURE — G0283 ELEC STIM OTHER THAN WOUND: HCPCS | Performed by: PHYSICAL THERAPIST

## 2019-11-18 PROCEDURE — 97110 THERAPEUTIC EXERCISES: CPT | Performed by: PHYSICAL THERAPIST

## 2019-11-18 PROCEDURE — 97140 MANUAL THERAPY 1/> REGIONS: CPT | Performed by: PHYSICAL THERAPIST

## 2019-11-18 NOTE — PROGRESS NOTES
Physical Therapy Daily Progress Note      Patient: Farheen Gomez   : 1945  Diagnosis/ICD-10 Code:  Presence of right artificial knee joint [Z96.651]  Referring practitioner: ISRAEL Vivas  Date of Initial Visit: Type: THERAPY  Noted: 2019  Today's Date: 2019  Patient seen for 3 sessions         Farheen Gomez reports: his stiffness and pain have improved in the knee but both are still significant limitations on walking and sleeping. Pt. States his swelling feels less today as well in the knee.    Objective   See Exercise, Manual, and Modality Logs for complete treatment.     Assessment/Plan   Pt. Tolerance to R knee PROM into flexion improved greatly this visit demonstrating greater ability actively as well. Pt. Extension still lacking ~5 deg from neutral with Pt. Reports of soreness in hamstring following extension stretch. Pt. Tolerated addition of leg press and incline board stretch this visit without reports of pain or discomfort.    Progress per Plan of Care           Timed:         Manual Therapy:    15     mins  49975;     Therapeutic Exercise:    15     mins  48109;     Neuromuscular Vanna:        mins  49802;    Therapeutic Activity:          mins  31844;     Gait Training:           mins  79223;     Ultrasound:          mins  25483;    Ionto                                   mins   68390  Self Care                            mins   40495  Canalith Repos                   mins  4209    Un-Timed:  Electrical Stimulation:    15     mins  96822 ( );  Dry Needling          mins self-pay  Traction          mins 61650  Low Eval          Mins  63092  Mod Eval          Mins  87352  High Eval                            Mins  90679    Timed Treatment:   30   mins   Total Treatment:     45   mins    Nona Lemons PTA  Physical Therapist Assistant License #[id number]

## 2019-11-20 ENCOUNTER — TREATMENT (OUTPATIENT)
Dept: PHYSICAL THERAPY | Facility: CLINIC | Age: 74
End: 2019-11-20

## 2019-11-20 DIAGNOSIS — Z96.651 PRESENCE OF RIGHT ARTIFICIAL KNEE JOINT: Primary | ICD-10-CM

## 2019-11-20 PROCEDURE — 97140 MANUAL THERAPY 1/> REGIONS: CPT | Performed by: PHYSICAL THERAPIST

## 2019-11-20 PROCEDURE — G0283 ELEC STIM OTHER THAN WOUND: HCPCS | Performed by: PHYSICAL THERAPIST

## 2019-11-20 PROCEDURE — 97110 THERAPEUTIC EXERCISES: CPT | Performed by: PHYSICAL THERAPIST

## 2019-11-20 NOTE — PROGRESS NOTES
Physical Therapy Daily Progress Note      Patient: Farheen Gomez   : 1945  Diagnosis/ICD-10 Code:  Presence of right artificial knee joint [Z96.651]  Referring practitioner: ISRAEL Vivas  Date of Initial Visit: Type: THERAPY  Noted: 2019  Today's Date: 2019  Patient seen for 4 sessions         Farheen Gomez reports: he continues to improve but still has discomfort and pain particularly with straightening his knee and sleeping at night.    Objective   See Exercise, Manual, and Modality Logs for complete treatment.     Assessment/Plan   Pt. Is able to bend knee actively today to 90 deg but tolerates PROM into 100 deg with overpressure. Pt. Still exhibits hamstring tightness which is limiting extension to neutral due to discomfort. Pt. Is improving with exercise tolerance and reports less fatigue throughout treatment. Pt. Tolerates step ups today with minimal difficulty reporting some discomfort in the hamstrings.    Progress per Plan of Care           Timed:         Manual Therapy:    10     mins  62395;     Therapeutic Exercise:    19     mins  91287;     Neuromuscular Vanna:        mins  70828;    Therapeutic Activity:          mins  49839;     Gait Training:           mins  93044;     Ultrasound:          mins  56453;    Ionto                                   mins   06683  Self Care                            mins   59447  Canalith Repos                   mins  4209    Un-Timed:  Electrical Stimulation:    15     mins  80986 ( );  Dry Needling          mins self-pay  Traction          mins 43748  Low Eval          Mins  76342  Mod Eval          Mins  34323  High Eval                            Mins  99620    Timed Treatment:   29   mins   Total Treatment:     44   mins    Nona Lemons PTA  Physical Therapist Assistant License #[id number]

## 2019-11-25 ENCOUNTER — TREATMENT (OUTPATIENT)
Dept: PHYSICAL THERAPY | Facility: CLINIC | Age: 74
End: 2019-11-25

## 2019-11-25 DIAGNOSIS — Z96.651 PRESENCE OF RIGHT ARTIFICIAL KNEE JOINT: Primary | ICD-10-CM

## 2019-11-25 PROCEDURE — G0283 ELEC STIM OTHER THAN WOUND: HCPCS | Performed by: PHYSICAL THERAPIST

## 2019-11-25 PROCEDURE — 97140 MANUAL THERAPY 1/> REGIONS: CPT | Performed by: PHYSICAL THERAPIST

## 2019-11-25 PROCEDURE — 97116 GAIT TRAINING THERAPY: CPT | Performed by: PHYSICAL THERAPIST

## 2019-11-25 PROCEDURE — 97110 THERAPEUTIC EXERCISES: CPT | Performed by: PHYSICAL THERAPIST

## 2019-11-25 NOTE — PROGRESS NOTES
Physical Therapy Daily Progress Note        Patient: Farheen Gomez   : 1945  Diagnosis/ICD-10 Code:  Presence of right artificial knee joint [Z96.651]  Referring practitioner: ISRAEL Vivas  Date of Initial Visit: Type: THERAPY  Noted: 2019  Today's Date: 2019  Patient seen for 5 sessions         Farheen Gomez reports: main issue is pain with extension forces      Subjective     Objective   See Exercise, Manual, and Modality Logs for complete treatment.   Added PNF gait and TM for improved extension and push off      Assessment/Plan    Better gait after cues and new activity           Timed:         Manual Therapy:    15     mins  98841;     Therapeutic Exercise:    15     mins  03381;     Neuromuscular Vanna:    0    mins  84196;    Therapeutic Activity:     0     mins  05250;     Gait Training:      15     mins  50610;     Ultrasound:     0     mins  38877;    Ionto                               0    mins   08037  Self Care                       0     mins   17810  Canalith Repos               0    mins  4209    Un-Timed:  Electrical Stimulation:    15     mins  29406 ( );  Dry Needling     0     mins self-pay  Traction     0     mins 48582  Low Eval     0     Mins  59043  Mod Eval     0     Mins  58726  High Eval                       0     Mins  13198    Timed Treatment:   45   mins   Total Treatment:     60   mins    Rochelle Carranza PT  Physical Therapist  [place] license [id number]

## 2019-11-26 ENCOUNTER — OFFICE VISIT (OUTPATIENT)
Dept: ORTHOPEDIC SURGERY | Facility: CLINIC | Age: 74
End: 2019-11-26

## 2019-11-26 VITALS
WEIGHT: 213 LBS | BODY MASS INDEX: 30.49 KG/M2 | SYSTOLIC BLOOD PRESSURE: 114 MMHG | HEART RATE: 96 BPM | HEIGHT: 70 IN | DIASTOLIC BLOOD PRESSURE: 75 MMHG

## 2019-11-26 DIAGNOSIS — E66.9 OBESITY (BMI 30-39.9): ICD-10-CM

## 2019-11-26 DIAGNOSIS — Z96.651 HX OF TOTAL KNEE REPLACEMENT, RIGHT: Primary | ICD-10-CM

## 2019-11-26 PROCEDURE — 99024 POSTOP FOLLOW-UP VISIT: CPT | Performed by: PHYSICIAN ASSISTANT

## 2019-11-26 RX ORDER — HYDROCODONE BITARTRATE AND ACETAMINOPHEN 5; 325 MG/1; MG/1
1 TABLET ORAL EVERY 6 HOURS PRN
Qty: 40 TABLET | Refills: 0 | Status: SHIPPED | OUTPATIENT
Start: 2019-11-26 | End: 2020-01-07

## 2019-11-26 NOTE — PROGRESS NOTES
"    ORTHO POSTOP VISIT       Subjective:    HPI:  Farheen Gomez is a 74 y.o. male who presents about 5 weeks out from having a right total knee replacement.  He reports that he is doing well with mild intermittent discomfort.  He reports that his swelling is much better at this time.  He is requesting a refill on his pain medication.    Medications:    Current Outpatient Medications:   •  atorvastatin (LIPITOR) 20 MG tablet, Take 20 mg by mouth Every Night., Disp: , Rfl: 3  •  calcium carbonate (OS-JUSTA) 600 MG tablet, Take 600 mg by mouth Daily., Disp: , Rfl:   •  escitalopram (LEXAPRO) 10 MG tablet, Take 10 mg by mouth Daily., Disp: , Rfl: 3  •  finasteride (PROSCAR) 5 MG tablet, Take 5 mg by mouth Daily., Disp: , Rfl:   •  gabapentin (NEURONTIN) 300 MG capsule, Take 1 capsule by mouth Every Night., Disp: 30 capsule, Rfl: 0  •  lisinopril (PRINIVIL,ZESTRIL) 40 MG tablet, Take 40 mg by mouth Daily., Disp: , Rfl:   •  Magnesium 250 MG tablet, Take 250 mg by mouth Daily., Disp: , Rfl:   •  Multiple Vitamins-Minerals (EYE VITAMINS PO), Take 2 capsules by mouth., Disp: , Rfl:   •  Multiple Vitamins-Minerals (PRESERVISION AREDS 2 PO), Take 2 tablets by mouth Daily., Disp: , Rfl:   •  tamsulosin (FLOMAX) 0.4 MG capsule 24 hr capsule, Take 1 capsule by mouth Daily., Disp: , Rfl:   •  vitamin D (ERGOCALCIFEROL) 80269 units capsule capsule, Take 50,000 Units by mouth 1 (One) Time Per Week., Disp: , Rfl:   •  HYDROcodone-acetaminophen (NORCO) 5-325 MG per tablet, Take 1 tablet by mouth Every 6 (Six) Hours As Needed for Moderate Pain ., Disp: 40 tablet, Rfl: 0  Current outpatient and discharge medications have been reconciled for the patient.  Reviewed by: ISRAEL Vivas      Allergies:  Allergies   Allergen Reactions   • Penicillins Hives     Tongue bled          Objective   Objective:    /75 (BP Location: Left arm, Patient Position: Sitting, Cuff Size: Adult)   Pulse 96   Ht 177.8 cm (70\")   Wt 96.6 kg (213 " lb)   BMI 30.56 kg/m²     Physical Examination:  Alert, oriented, obese individual in no acute distress, ambulating with the assistance of a cane  Right lower extremity shows a well-healed surgical incision with no erythema, drainage, or open skin lesions. There is a mild amount of swelling in the knee area with effusion.  It is grossly well aligned, and the patient is neurovascularly intact distally. The knee is stable to varus and valgus stress, there is no patellar maltracking or crepitus noted, and plantar and dorsiflexion is 5/5. There is mild tenderness to palpation and with range of motion, which is about 2-120.         Imaging:  no diagnostic testing performed this visit            Assessment:  1. Hx of total knee replacement, right    2. Obesity (BMI 30-39.9)       About 5 weeks out from surgery          Plan:  He should continue physical therapy, as well as compression, ice, and elevation as needed.  We will plan to see him back in 6 weeks for recheck.  I will step him down to hydrocodone at this time, risks and addiction properties were discussed.             ISRAEL Vivas  11/26/19  1:39 PM

## 2019-11-27 ENCOUNTER — TREATMENT (OUTPATIENT)
Dept: PHYSICAL THERAPY | Facility: CLINIC | Age: 74
End: 2019-11-27

## 2019-11-27 PROCEDURE — 97140 MANUAL THERAPY 1/> REGIONS: CPT | Performed by: PHYSICAL THERAPIST

## 2019-11-27 PROCEDURE — 97110 THERAPEUTIC EXERCISES: CPT | Performed by: PHYSICAL THERAPIST

## 2019-11-27 PROCEDURE — G0283 ELEC STIM OTHER THAN WOUND: HCPCS | Performed by: PHYSICAL THERAPIST

## 2019-11-27 NOTE — PROGRESS NOTES
Physical Therapy Daily Progress Note        Patient: Farheen Gomez   : 1945  Diagnosis/ICD-10 Code:  No primary diagnosis found.  Referring practitioner: ISRAEL Vivas  Date of Initial Visit: Type: THERAPY  Noted: 2019  Today's Date: 2019  Patient seen for 6 sessions         Farheen Gomez reports: lateral joint line pain with terminal extension      Subjective     Objective   See Exercise, Manual, and Modality Logs for complete treatment.   Added seated elliptical,      Assessment/Plan    Still issues with extension           Timed:         Manual Therapy:    15     mins  09759;     Therapeutic Exercise:   15   mins  90837;     Neuromuscular Vanna:    0    mins  92881;    Therapeutic Activity:     0     mins  28065;     Gait Trainin     mins  66977;     Ultrasound:     0     mins  68029;    Ionto                               0    mins   87499  Self Care                       0     mins   64612  Canalith Repos               0    mins  4209    Un-Timed:  Electrical Stimulation:    15     mins  57301 ( );  Dry Needling     0     mins self-pay  Traction     0     mins 17629  Low Eval     0     Mins  87755  Mod Eval     0     Mins  68958  High Eval                       0     Mins  98453    Timed Treatment:   30   mins   Total Treatment:     45   mins    Rochelle Carranza PT  Physical Therapist  [place] license [id number]

## 2019-12-02 ENCOUNTER — TREATMENT (OUTPATIENT)
Dept: PHYSICAL THERAPY | Facility: CLINIC | Age: 74
End: 2019-12-02

## 2019-12-02 DIAGNOSIS — M17.11 OSTEOARTHRITIS OF RIGHT KNEE, UNSPECIFIED OSTEOARTHRITIS TYPE: ICD-10-CM

## 2019-12-02 DIAGNOSIS — Z96.651 PRESENCE OF RIGHT ARTIFICIAL KNEE JOINT: Primary | ICD-10-CM

## 2019-12-02 PROCEDURE — 97110 THERAPEUTIC EXERCISES: CPT | Performed by: PHYSICAL THERAPIST

## 2019-12-02 PROCEDURE — 97140 MANUAL THERAPY 1/> REGIONS: CPT | Performed by: PHYSICAL THERAPIST

## 2019-12-02 PROCEDURE — G0283 ELEC STIM OTHER THAN WOUND: HCPCS | Performed by: PHYSICAL THERAPIST

## 2019-12-02 NOTE — PROGRESS NOTES
Physical Therapy Daily Progress Note        Patient: Farheen Gomez   : 1945  Diagnosis/ICD-10 Code:  Presence of right artificial knee joint [Z96.651]  Referring practitioner: ISRAEL Vivas  Date of Initial Visit: Type: THERAPY  Noted: 2019  Today's Date: 2019  Patient seen for 7 sessions         Farheen Gomez reports: still having pain every day.  Feels like it is stiffness from top to bottom of knee.  Bony pain is less, trying to walk on TM 6 min daily and using recumbent bike for partial ROM, exercises frequently during the day      Subjective     Objective   See Exercise, Manual, and Modality Logs for complete treatment.   Discussed with patient timeframes and advised not to worry, symptoms are normal      Assessment/Plan    High pain levels at present           Timed:         Manual Therapy:    15     mins  03480;     Therapeutic Exercise:    15     mins  90253;     Neuromuscular Vanna:    0    mins  80882;    Therapeutic Activity:     0     mins  39999;     Gait Trainin     mins  61119;     Ultrasound:     0     mins  48027;    Ionto                               0    mins   07003  Self Care                       0     mins   63202  Canalith Repos               0    mins  4209    Un-Timed:  Electrical Stimulation:    15     mins  82481 ( );  Dry Needling     0     mins self-pay  Traction     0     mins 11848  Low Eval     0     Mins  39123  Mod Eval     0     Mins  72616  High Eval                       0     Mins  61300    Timed Treatment:   30   mins   Total Treatment:     45   mins    Rochelle Carranza PT  Physical Therapist  [place] license [id number]

## 2019-12-05 ENCOUNTER — TREATMENT (OUTPATIENT)
Dept: PHYSICAL THERAPY | Facility: CLINIC | Age: 74
End: 2019-12-05

## 2019-12-05 DIAGNOSIS — Z96.651 PRESENCE OF RIGHT ARTIFICIAL KNEE JOINT: Primary | ICD-10-CM

## 2019-12-05 DIAGNOSIS — M17.11 OSTEOARTHRITIS OF RIGHT KNEE, UNSPECIFIED OSTEOARTHRITIS TYPE: ICD-10-CM

## 2019-12-05 PROCEDURE — 97140 MANUAL THERAPY 1/> REGIONS: CPT | Performed by: PHYSICAL THERAPIST

## 2019-12-05 PROCEDURE — 97110 THERAPEUTIC EXERCISES: CPT | Performed by: PHYSICAL THERAPIST

## 2019-12-05 PROCEDURE — G0283 ELEC STIM OTHER THAN WOUND: HCPCS | Performed by: PHYSICAL THERAPIST

## 2019-12-05 NOTE — PROGRESS NOTES
Physical Therapy Daily Progress Note      Patient: Farheen Gomez   : 1945  Diagnosis/ICD-10 Code:  Presence of right artificial knee joint [Z96.651]  Referring practitioner: ISRAEL Vivas  Date of Initial Visit: Type: THERAPY  Noted: 2019  Today's Date: 2019  Patient seen for 8 sessions         Farheen Gomez reports: he has been able to walk much better and would like to begin walking without his cane. Pt. States the hamstring is still very stiff and sore with stretching.    Objective   See Exercise, Manual, and Modality Logs for complete treatment.     Assessment/Plan   Pt. Continues to improve ambulation and exercise tolerance and has minimal gait deviation while walking requiring VC's to push off strong with R foot. Pt. Tolerates flexion stretch much better than overpressure into extension at this time due to hamstring pain.    Progress per Plan of Care           Timed:         Manual Therapy:    10     mins  65623;     Therapeutic Exercise:    20     mins  95846;     Neuromuscular Vanna:        mins  57279;    Therapeutic Activity:          mins  79429;     Gait Training:           mins  97159;     Ultrasound:          mins  63376;    Ionto                                   mins   16724  Self Care                            mins   99796  Canalith Repos                   mins  4209    Un-Timed:  Electrical Stimulation:    15     mins  05218 ( );  Dry Needling          mins self-pay  Traction          mins 70391  Low Eval          Mins  59872  Mod Eval          Mins  16372  High Eval                            Mins  57146    Timed Treatment:   30   mins   Total Treatment:     45   mins    Nona Lemons PTA  Physical Therapist Assistant License #[id number]

## 2019-12-06 DIAGNOSIS — M25.561 CHRONIC PAIN OF RIGHT KNEE: ICD-10-CM

## 2019-12-06 DIAGNOSIS — G89.29 CHRONIC PAIN OF LEFT KNEE: ICD-10-CM

## 2019-12-06 DIAGNOSIS — M25.562 CHRONIC PAIN OF LEFT KNEE: ICD-10-CM

## 2019-12-06 DIAGNOSIS — G89.29 CHRONIC PAIN OF RIGHT KNEE: ICD-10-CM

## 2019-12-09 ENCOUNTER — TREATMENT (OUTPATIENT)
Dept: PHYSICAL THERAPY | Facility: CLINIC | Age: 74
End: 2019-12-09

## 2019-12-09 DIAGNOSIS — M17.11 OSTEOARTHRITIS OF RIGHT KNEE, UNSPECIFIED OSTEOARTHRITIS TYPE: ICD-10-CM

## 2019-12-09 DIAGNOSIS — Z96.651 PRESENCE OF RIGHT ARTIFICIAL KNEE JOINT: Primary | ICD-10-CM

## 2019-12-09 PROCEDURE — 97110 THERAPEUTIC EXERCISES: CPT | Performed by: PHYSICAL THERAPIST

## 2019-12-09 PROCEDURE — 97140 MANUAL THERAPY 1/> REGIONS: CPT | Performed by: PHYSICAL THERAPIST

## 2019-12-09 PROCEDURE — G0283 ELEC STIM OTHER THAN WOUND: HCPCS | Performed by: PHYSICAL THERAPIST

## 2019-12-09 NOTE — PROGRESS NOTES
Physical Therapy Daily Progress Note        Patient: Farheen Gomez   : 1945  Diagnosis/ICD-10 Code:  Presence of right artificial knee joint [Z96.651]  Referring practitioner: ISRAEL Vivas  Date of Initial Visit: Type: THERAPY  Noted: 2019  Today's Date: 2019  Patient seen for 9 sessions         Farheen Gomez reports: pleased with progress, feels like getting straighter      Subjective     Objective   See Exercise, Manual, and Modality Logs for complete treatment.   Added Da test flex stretch at end of PT session       Assessment/Plan    Progress per Plan of Care           Timed:         Manual Therapy:    15     mins  65557;     Therapeutic Exercise:    30     mins  45103;     Neuromuscular Vanna:    0    mins  33104;    Therapeutic Activity:     0     mins  37847;     Gait Trainin     mins  71563;     Ultrasound:     0     mins  62486;    Ionto                               0    mins   00121  Self Care                       0     mins   32475  Canalith Repos               0    mins  4209    Un-Timed:  Electrical Stimulation:    15     mins  23994 ( );  Dry Needling     0     mins self-pay  Traction     0     mins 50847  Low Eval     0     Mins  27657  Mod Eval     0     Mins  79007  High Eval                       0     Mins  90266    Timed Treatment:   45   mins   Total Treatment:     60   mins    Rochelle Carranza, PT  Physical Therapist  [place] license [id number]

## 2019-12-10 RX ORDER — CELECOXIB 100 MG/1
CAPSULE ORAL
Qty: 60 CAPSULE | Refills: 0 | Status: SHIPPED | OUTPATIENT
Start: 2019-12-10 | End: 2020-01-07

## 2019-12-12 ENCOUNTER — TREATMENT (OUTPATIENT)
Dept: PHYSICAL THERAPY | Facility: CLINIC | Age: 74
End: 2019-12-12

## 2019-12-12 DIAGNOSIS — Z96.651 PRESENCE OF RIGHT ARTIFICIAL KNEE JOINT: Primary | ICD-10-CM

## 2019-12-12 DIAGNOSIS — M17.11 OSTEOARTHRITIS OF RIGHT KNEE, UNSPECIFIED OSTEOARTHRITIS TYPE: ICD-10-CM

## 2019-12-12 PROCEDURE — G0283 ELEC STIM OTHER THAN WOUND: HCPCS | Performed by: PHYSICAL THERAPIST

## 2019-12-12 PROCEDURE — 97110 THERAPEUTIC EXERCISES: CPT | Performed by: PHYSICAL THERAPIST

## 2019-12-12 PROCEDURE — 97140 MANUAL THERAPY 1/> REGIONS: CPT | Performed by: PHYSICAL THERAPIST

## 2019-12-12 NOTE — PROGRESS NOTES
Physical Therapy Daily Progress Note      Patient: Farheen Gomez   : 1945  Diagnosis/ICD-10 Code:  Presence of right artificial knee joint [Z96.651]  Referring practitioner: ISRAEL Vivas  Date of Initial Visit: Type: THERAPY  Noted: 2019  Today's Date: 2019  Patient seen for 10 sessions         Farheen Gomez reports: he is still having discomfort with bending and and has difficulty getting comfortable at night to go to sleep. Pt. States his hamstring has done much better and has allowed his knee to straighten.    Objective   See Exercise, Manual, and Modality Logs for complete treatment.   Oxford Knee /48 = 54%  AROM R lacking 3 deg from extension and R flexion 97 deg PROM R flexion 103 deg     Assessment/Plan   Pt. continues to benefit form therapy for increased ROM and functional ability Pt. Has pain with flexion beyond 97 deg but tolerates it up to 103 deg. Heat is applied and wrapped to encompass thigh and knee this visit in attempt to loosen tense musculature for greater stretch. Pt. Shows slow progress however progress is noted particularly in Pt. Ability to ambulate without antalgic gait and without and AD.    Goals  Plan Goals: Initiate ROM and strength program in 1 week MET    Independent HEP for self management by discharge Partially MET    IADL to prior level of function by discharge Not MET    [place]az[place] kneeSubjective questionnaire will be to 20% or less by discharge Not MET    Progress per Plan of Care           Timed:         Manual Therapy:    15     mins  12966;     Therapeutic Exercise:    25     mins  24671;     Neuromuscular Vanna:        mins  01588;    Therapeutic Activity:          mins  42418;     Gait Training:           mins  96856;     Ultrasound:          mins  78725;    Ionto                                   mins   68905  Self Care                            mins   23502  Canalith Repos                   mins  4209    Un-Timed:  Electrical Stimulation:     15     mins  50921 ( );  Dry Needling          mins self-pay  Traction          mins 56274  Low Eval          Mins  88684  Mod Eval          Mins  20998  High Eval                            Mins  54028    Timed Treatment:   40   mins   Total Treatment:     55   mins    Nona Lemons PTA  Physical Therapist Assistant License #[id number]

## 2019-12-17 ENCOUNTER — TREATMENT (OUTPATIENT)
Dept: PHYSICAL THERAPY | Facility: CLINIC | Age: 74
End: 2019-12-17

## 2019-12-17 DIAGNOSIS — M17.11 OSTEOARTHRITIS OF RIGHT KNEE, UNSPECIFIED OSTEOARTHRITIS TYPE: ICD-10-CM

## 2019-12-17 DIAGNOSIS — Z96.651 PRESENCE OF RIGHT ARTIFICIAL KNEE JOINT: Primary | ICD-10-CM

## 2019-12-17 PROCEDURE — 97110 THERAPEUTIC EXERCISES: CPT | Performed by: PHYSICAL THERAPIST

## 2019-12-17 PROCEDURE — G0283 ELEC STIM OTHER THAN WOUND: HCPCS | Performed by: PHYSICAL THERAPIST

## 2019-12-17 PROCEDURE — 97140 MANUAL THERAPY 1/> REGIONS: CPT | Performed by: PHYSICAL THERAPIST

## 2019-12-17 NOTE — PROGRESS NOTES
Physical Therapy Daily Progress Note        Patient: Farheen Gomez   : 1945  Diagnosis/ICD-10 Code:  Presence of right artificial knee joint [Z96.651]  Referring practitioner: ISRAEL Vivas  Date of Initial Visit: Type: THERAPY  Noted: 2019  Today's Date: 2019  Patient seen for 11 sessions         Farheen Gomez reports: stiffness greatest issue      Subjective     Objective   See Exercise, Manual, and Modality Logs for complete treatment.  Reinforced swiss ball ROM in Da test position  With contract relax as well as static stretch into extension       Assessment/Plan    Approximately 98 flex in Da test position with soft end feel           Timed:         Manual Therapy:    15     mins  16059;     Therapeutic Exercise:    30     mins  56645;     Neuromuscular Vanna:    0    mins  36734;    Therapeutic Activity:     0     mins  30987;     Gait Trainin     mins  38460;     Ultrasound:     0     mins  84187;    Ionto                               0    mins   12810  Self Care                       0     mins   67134  Canalith Repos               0    mins  4209    Un-Timed:  Electrical Stimulation:    15     mins  45868 ( );  Dry Needling     0     mins self-pay  Traction     0     mins 42947  Low Eval     0     Mins  96649  Mod Eval     0     Mins  00240  High Eval                       0     Mins  73388    Timed Treatment:   45   mins   Total Treatment:     60   mins    Rochelle Carranza PT  Physical Therapist  [place] license [id number]

## 2019-12-19 ENCOUNTER — TREATMENT (OUTPATIENT)
Dept: PHYSICAL THERAPY | Facility: CLINIC | Age: 74
End: 2019-12-19

## 2019-12-19 DIAGNOSIS — M17.11 OSTEOARTHRITIS OF RIGHT KNEE, UNSPECIFIED OSTEOARTHRITIS TYPE: ICD-10-CM

## 2019-12-19 DIAGNOSIS — Z96.651 PRESENCE OF RIGHT ARTIFICIAL KNEE JOINT: Primary | ICD-10-CM

## 2019-12-19 PROCEDURE — G0283 ELEC STIM OTHER THAN WOUND: HCPCS | Performed by: PHYSICAL THERAPIST

## 2019-12-19 PROCEDURE — 97140 MANUAL THERAPY 1/> REGIONS: CPT | Performed by: PHYSICAL THERAPIST

## 2019-12-19 PROCEDURE — 97110 THERAPEUTIC EXERCISES: CPT | Performed by: PHYSICAL THERAPIST

## 2019-12-19 NOTE — PROGRESS NOTES
Physical Therapy Daily Progress Note      Patient: Farheen Gomez   : 1945  Diagnosis/ICD-10 Code:  Presence of right artificial knee joint [Z96.651]  Referring practitioner: ISRAEL Vivas  Date of Initial Visit: Type: THERAPY  Noted: 2019  Today's Date: 2019  Patient seen for 12 sessions         Farheen Gomez reports: Pt. Reports his knee continues to improve and he has had much less pain with activity but he still is occasionally sore.    Objective   See Exercise, Manual, and Modality Logs for complete treatment.   AROM 0-100 deg R knee and PROM 0-110 deg R knee    Assessment/Plan   Pt. Tolerates treatment well and progresses range with activity and has no pain or discomfort with stretch or activity. Pt. Continues to have good motivation to continues towards improvement.    Progress per Plan of Care           Timed:         Manual Therapy:    10     mins  51258;     Therapeutic Exercise:    20     mins  29127;     Neuromuscular Vanna:        mins  07548;    Therapeutic Activity:          mins  37397;     Gait Training:           mins  18233;     Ultrasound:          mins  82106;    Ionto                                   mins   01523  Self Care                            mins   32493  Canalith Repos                   mins  4209    Un-Timed:  Electrical Stimulation:    15     mins  63258 ( );  Dry Needling          mins self-pay  Traction          mins 25969  Low Eval          Mins  39367  Mod Eval          Mins  21267  High Eval                            Mins  11075    Timed Treatment:   30   mins   Total Treatment:     45   mins    Nona Lemons PTA  Physical Therapist Assistant License #[id number]

## 2019-12-31 ENCOUNTER — TREATMENT (OUTPATIENT)
Dept: PHYSICAL THERAPY | Facility: CLINIC | Age: 74
End: 2019-12-31

## 2019-12-31 DIAGNOSIS — Z96.651 PRESENCE OF RIGHT ARTIFICIAL KNEE JOINT: Primary | ICD-10-CM

## 2019-12-31 DIAGNOSIS — M17.11 OSTEOARTHRITIS OF RIGHT KNEE, UNSPECIFIED OSTEOARTHRITIS TYPE: ICD-10-CM

## 2019-12-31 PROCEDURE — 97110 THERAPEUTIC EXERCISES: CPT | Performed by: PHYSICAL THERAPIST

## 2019-12-31 PROCEDURE — 97140 MANUAL THERAPY 1/> REGIONS: CPT | Performed by: PHYSICAL THERAPIST

## 2019-12-31 PROCEDURE — G0283 ELEC STIM OTHER THAN WOUND: HCPCS | Performed by: PHYSICAL THERAPIST

## 2020-01-01 NOTE — PROGRESS NOTES
Physical Therapy Daily Progress Note        Patient: Farheen Gomez   : 1945  Diagnosis/ICD-10 Code:  Presence of right artificial knee joint [Z96.651]  Referring practitioner: ISRAEL Vivas  Date of Initial Visit: Type: THERAPY  Noted: 2019  Today's Date: 2020  Patient seen for 13 sessions         Farheen Gomez reports: very pleased with progress to see MD next week, denies any main issues with ADL's      Subjective     Objective   See Exercise, Manual, and Modality Logs for complete treatment.   Good end feel with PROM, progressing well      Assessment/Plan    Complete visits prior to MD visit with upcoming probable DC to HEP           Timed:         Manual Therapy:    15     mins  37942;     Therapeutic Exercise:    30     mins  37548;     Neuromuscular Vanna:    0    mins  92566;    Therapeutic Activity:     0     mins  48933;     Gait Trainin     mins  29159;     Ultrasound:     0     mins  20229;    Ionto                               0    mins   79416  Self Care                       0     mins   57167  Canalith Repos               0    mins  4209    Un-Timed:  Electrical Stimulation:    15     mins  33126 ( );  Dry Needling     0     mins self-pay  Traction     0     mins 28581  Low Eval     0     Mins  89665  Mod Eval     0     Mins  04124  High Eval                       0     Mins  32302    Timed Treatment:   45   mins   Total Treatment:     60   mins    Rochelle Carranza PT  Physical Therapist  [place] license [id number]

## 2020-01-02 ENCOUNTER — TREATMENT (OUTPATIENT)
Dept: PHYSICAL THERAPY | Facility: CLINIC | Age: 75
End: 2020-01-02

## 2020-01-02 DIAGNOSIS — Z96.651 PRESENCE OF RIGHT ARTIFICIAL KNEE JOINT: Primary | ICD-10-CM

## 2020-01-02 DIAGNOSIS — M17.11 OSTEOARTHRITIS OF RIGHT KNEE, UNSPECIFIED OSTEOARTHRITIS TYPE: ICD-10-CM

## 2020-01-02 PROCEDURE — 97110 THERAPEUTIC EXERCISES: CPT | Performed by: PHYSICAL THERAPIST

## 2020-01-02 PROCEDURE — G0283 ELEC STIM OTHER THAN WOUND: HCPCS | Performed by: PHYSICAL THERAPIST

## 2020-01-02 PROCEDURE — 97140 MANUAL THERAPY 1/> REGIONS: CPT | Performed by: PHYSICAL THERAPIST

## 2020-01-02 NOTE — PROGRESS NOTES
Physical Therapy Daily Progress Note      Patient: Farheen Gomez   : 1945  Diagnosis/ICD-10 Code:  Presence of right artificial knee joint [Z96.651]  Referring practitioner: ISRAEL Vivas  Date of Initial Visit: Type: THERAPY  Noted: 2019  Today's Date: 2020  Patient seen for 14 sessions         Farheen Gomez reports: he has been doing well and has returned to all activities that he could do before he had the knee replaced at this time and they are less painful however he still has fatigue at times.    Objective   See Exercise, Manual, and Modality Logs for complete treatment.   AROM 0-100 deg R knee flexion PROM 0-110 deg R knee flexion  [place] knee 36/48 = 75% ability or 25% disability     Assessment/Plan   Pt. Tolerates all treatment well at this time with minimal discomfort with exercise. Pt. continues to improve flexion with activity and is able to perform functional activities. Pt. Is ready to continue on his own with HEP at this time due to completion of all but one goal and his current abilities to bend and straighten the knee.    Goals  Plan Goals: Initiate ROM and strength program in 1 week MET    Independent HEP for self management by discharge MET    IADL to prior level of function by discharge Partially MET    [place] kneeSubjective questionnaire will be to 20% or less by discharge Not MET     Progress per Plan of Care    Progress per Plan of Care           Timed:         Manual Therapy:    8     mins  43726;     Therapeutic Exercise:    22     mins  89396;     Neuromuscular Vanna:        mins  61829;    Therapeutic Activity:          mins  86924;     Gait Training:           mins  46266;     Ultrasound:          mins  88586;    Ionto                                   mins   73991  Self Care                            mins   44082  Canalith Repos                   mins  4209    Un-Timed:  Electrical Stimulation:    15     mins  13391 ( );  Dry Needling           mins self-pay  Traction          mins 23465  Low Eval          Mins  76355  Mod Eval          Mins  60004  High Eval                            Mins  05841    Timed Treatment:   30   mins   Total Treatment:     45   mins    Nona Lemons PTA  Physical Therapist Assistant License #[id number]

## 2020-01-06 ENCOUNTER — OFFICE VISIT (OUTPATIENT)
Dept: FAMILY MEDICINE CLINIC | Facility: CLINIC | Age: 75
End: 2020-01-06

## 2020-01-06 VITALS
TEMPERATURE: 98.1 F | BODY MASS INDEX: 32.01 KG/M2 | RESPIRATION RATE: 20 BRPM | DIASTOLIC BLOOD PRESSURE: 87 MMHG | OXYGEN SATURATION: 97 % | SYSTOLIC BLOOD PRESSURE: 137 MMHG | HEART RATE: 73 BPM | HEIGHT: 70 IN | WEIGHT: 223.6 LBS

## 2020-01-06 DIAGNOSIS — G47.9 SLEEP DISORDER: ICD-10-CM

## 2020-01-06 DIAGNOSIS — J06.9 UPPER RESPIRATORY TRACT INFECTION, UNSPECIFIED TYPE: Primary | ICD-10-CM

## 2020-01-06 PROCEDURE — 99213 OFFICE O/P EST LOW 20 MIN: CPT | Performed by: PREVENTIVE MEDICINE

## 2020-01-06 RX ORDER — AZITHROMYCIN 250 MG/1
TABLET, FILM COATED ORAL
Qty: 6 TABLET | Refills: 0 | Status: ON HOLD | OUTPATIENT
Start: 2020-01-06 | End: 2020-02-05

## 2020-01-06 NOTE — PROGRESS NOTES
"Subjective   Farheen Gomez is a 74 y.o. male presents for   Chief Complaint   Patient presents with   • URI   Clear nsaal discharge.  Wife hasa been ill. Some sneezing  with pressure into upper teeth.  Difficulty falling and staying asleep since before knee surgery.  Does urinate 4-5 Xper night.  Will get prostate eval and consider procedure or will get other knee replacement    Health Maintenance Due   Topic Date Due   • HEPATITIS C SCREENING  03/28/2019   • LIPID PANEL  12/18/2019       History of Present Illness     Vitals:    01/06/20 1513 01/06/20 1516   BP: 137/84 137/87   BP Location: Right arm Left arm   Patient Position: Sitting Sitting   Cuff Size: Adult Adult   Pulse: 71 73   Resp: 20    Temp: 98.1 °F (36.7 °C)    TempSrc: Oral    SpO2: 97%    Weight: 101 kg (223 lb 9.6 oz)    Height: 177.8 cm (70\")      Body mass index is 32.08 kg/m².    Current Outpatient Medications on File Prior to Visit   Medication Sig Dispense Refill   • atorvastatin (LIPITOR) 20 MG tablet Take 20 mg by mouth Every Night.  3   • calcium carbonate (OS-JUSTA) 600 MG tablet Take 600 mg by mouth Daily.     • escitalopram (LEXAPRO) 10 MG tablet Take 10 mg by mouth Daily.  3   • finasteride (PROSCAR) 5 MG tablet Take 5 mg by mouth Daily.     • lisinopril (PRINIVIL,ZESTRIL) 40 MG tablet Take 40 mg by mouth Daily.     • Magnesium 250 MG tablet Take 250 mg by mouth Daily.     • Multiple Vitamins-Minerals (EYE VITAMINS PO) Take 2 capsules by mouth.     • Multiple Vitamins-Minerals (PRESERVISION AREDS 2 PO) Take 2 tablets by mouth Daily.     • tamsulosin (FLOMAX) 0.4 MG capsule 24 hr capsule Take 1 capsule by mouth Daily.     • vitamin D (ERGOCALCIFEROL) 49978 units capsule capsule Take 50,000 Units by mouth 1 (One) Time Per Week.     • celecoxib (CeleBREX) 100 MG capsule TAKE 1 CAPSULE BY MOUTH TWICE DAILY 60 capsule 0   • gabapentin (NEURONTIN) 300 MG capsule Take 1 capsule by mouth Every Night. 30 capsule 0   • HYDROcodone-acetaminophen " (NORCO) 5-325 MG per tablet Take 1 tablet by mouth Every 6 (Six) Hours As Needed for Moderate Pain . 40 tablet 0     No current facility-administered medications on file prior to visit.        The following portions of the patient's history were reviewed and updated as appropriate: allergies, current medications, past family history, past medical history, past social history, past surgical history and problem list.    Review of Systems   Constitutional: Negative.    HENT: Positive for congestion, postnasal drip, sinus pressure and sneezing. Negative for sore throat.    Eyes: Negative.    Respiratory: Negative.  Negative for cough.    Cardiovascular: Negative.    Gastrointestinal: Negative.    Endocrine: Negative.    Genitourinary: Positive for nocturia.   Musculoskeletal: Positive for arthralgias.   Skin: Negative.    Allergic/Immunologic: Positive for environmental allergies.   Neurological: Negative.    Hematological: Negative.    Psychiatric/Behavioral: Positive for sleep disturbance.       Objective   Physical Exam   Constitutional: He is oriented to person, place, and time. He appears well-developed and well-nourished.   HENT:   Head: Normocephalic and atraumatic.   Nasal mucosa redness with clear drainage.   Eyes: Pupils are equal, round, and reactive to light. Conjunctivae and EOM are normal.   Neck: Normal range of motion. Neck supple.   Cardiovascular: Normal rate, regular rhythm, normal heart sounds and intact distal pulses.   Pulmonary/Chest: Effort normal and breath sounds normal.   Abdominal: Soft. Bowel sounds are normal.   Musculoskeletal: Normal range of motion.   Neurological: He is alert and oriented to person, place, and time.   Skin: Skin is warm and dry.   Psychiatric: He has a normal mood and affect.   Nursing note and vitals reviewed.    PHQ-9 Total Score:      Assessment/Plan   Farheen was seen today for uri.    Diagnoses and all orders for this visit:    Upper respiratory tract infection,  unspecified type    Sleep disorder  -     Ambulatory Referral to Sleep Medicine    Other orders  -     azithromycin (ZITHROMAX Z-SHERIE) 250 MG tablet; Take 2 tablets the first day, then 1 tablet daily for 4 days.        There are no Patient Instructions on file for this visit.

## 2020-01-07 ENCOUNTER — OFFICE VISIT (OUTPATIENT)
Dept: ORTHOPEDIC SURGERY | Facility: CLINIC | Age: 75
End: 2020-01-07

## 2020-01-07 VITALS
WEIGHT: 221 LBS | SYSTOLIC BLOOD PRESSURE: 149 MMHG | DIASTOLIC BLOOD PRESSURE: 87 MMHG | HEART RATE: 82 BPM | HEIGHT: 70 IN | BODY MASS INDEX: 31.64 KG/M2

## 2020-01-07 DIAGNOSIS — M17.12 PRIMARY OSTEOARTHRITIS OF LEFT KNEE: ICD-10-CM

## 2020-01-07 DIAGNOSIS — E66.9 OBESITY (BMI 30-39.9): ICD-10-CM

## 2020-01-07 DIAGNOSIS — Z96.651 HX OF TOTAL KNEE REPLACEMENT, RIGHT: Primary | ICD-10-CM

## 2020-01-07 PROCEDURE — 99214 OFFICE O/P EST MOD 30 MIN: CPT | Performed by: PHYSICIAN ASSISTANT

## 2020-01-07 RX ORDER — OXYCODONE HCL 10 MG/1
10 TABLET, FILM COATED, EXTENDED RELEASE ORAL ONCE
Status: CANCELLED | OUTPATIENT
Start: 2020-01-07 | End: 2020-01-07

## 2020-01-07 RX ORDER — MELOXICAM 7.5 MG/1
15 TABLET ORAL ONCE
Status: CANCELLED | OUTPATIENT
Start: 2020-01-07 | End: 2020-01-07

## 2020-01-07 RX ORDER — ACETAMINOPHEN 325 MG/1
1000 TABLET ORAL ONCE
Status: CANCELLED | OUTPATIENT
Start: 2020-01-07 | End: 2020-01-07

## 2020-01-23 NOTE — PROGRESS NOTES
ORTHO FOLLOW UP       Subjective:    HPI:   Farheen Gomez is a 74 y.o. male who presents about 10 weeks out from having a right total knee replacement.  He reports that he is doing very well with little to no pain in the knee area, however he feels like he is being hindered by his left knee.  He would like to proceed with knee replacement on the left knee in the next several months.  He has failed conservative measures with oral medications and injections.  He continues to have substantial daily pain that is affecting his daily activities, such as dressing, bathing, and extracurricular activities, as well as his therapy with his right knee.  From previous office visit with Dr. Medina:  The patient follows up on both his knees hurting him quite significantly.  He has difficulty with ambulation.  He states that he had knee surgery on the right side about 50 years ago and has now progressively had increasing pain and discomfort.  He has a progressive varus deformity of the right knee.  For the past 3 to 4 years even his left knee has been bothering him very significantly.  He loves to travel and was recently in a safari in [place] and states that his knee bothers him during his travels.  He would like to improve his quality of life.  He has tried intra-articular injections which did help him for about 3 to 4 weeks and then the effect was worn off.  He is also used a brace which is somewhat beneficial in managing his symptoms.  The patient states that he would like to stage the knee replacements and start with the right one first and then progressed onto the left knee replacement in 2 to 3 months once he has recovered from this one on the right side.  Pain Location: Medial aspect of both the knees.  Radiation: none  Quality: dull, aching  Intensity/Severity: severe;8/10  Duration: SEVERAL YEARS after knee surgery 50 years ago.  Onset quality: gradual   Timing: constant  Aggravating Factors: Deep flexion and  squatting on ground   Alleviating Factors: Using a supportive brace and Mobic for anti-inflammatory purposes.  Previous Episodes: yes  Associated Symptoms: pain, swelling, decreased ROM, decreased strength  Previous Treatment: Anti-inflammatory medication, intra-articular steroid injections and physical therapy.    Past Medical History:   Diagnosis Date   • Arthritis    • BPH (benign prostatic hyperplasia)    • Cataract    • Depression    • [place] (hard of hearing)    • Hyperlipidemia    • Hypertension    • Low back pain        Past Surgical History:   Procedure Laterality Date   • BACK SURGERY     • HAND SURGERY     • KNEE SURGERY      meniscus repair    • LUMBAR DISCECTOMY Bilateral 2019    Procedure: left and right  L4-5 lumbar decompression with dura repair;  Surgeon: Edson Khan MD;  Location: [facility] OR;  Service: Neurosurgery   • ROTATOR CUFF REPAIR     • TOTAL KNEE ARTHROPLASTY Right 10/23/2019    Procedure: TOTAL KNEE ARTHROPLASTY, WITH LEFT KNEE INJECTION;  Surgeon: Boby Medina MD;  Location: [facility] OR;  Service: Orthopedics       Social History     Occupational History   • Occupation: retired   Tobacco Use   • Smoking status: Former Smoker     Packs/day: 0.25     Years: 5.00     Pack years: 1.25     Last attempt to quit:      Years since quittin.0   • Smokeless tobacco: Never Used   Substance and Sexual Activity   • Alcohol use: No     Frequency: Never   • Drug use: No   • Sexual activity: Yes     Partners: Female     Birth control/protection: None        Medications:    Current Outpatient Medications:   •  atorvastatin (LIPITOR) 20 MG tablet, Take 20 mg by mouth Every Night., Disp: , Rfl: 3  •  azithromycin (ZITHROMAX Z-SHERIE) 250 MG tablet, Take 2 tablets the first day, then 1 tablet daily for 4 days., Disp: 6 tablet, Rfl: 0  •  calcium carbonate (OS-JUSTA) 600 MG tablet, Take 600 mg by mouth Daily., Disp: , Rfl:   •  escitalopram (LEXAPRO) 10 MG tablet, Take 10 mg by mouth  "Daily., Disp: , Rfl: 3  •  finasteride (PROSCAR) 5 MG tablet, Take 5 mg by mouth Daily., Disp: , Rfl:   •  lisinopril (PRINIVIL,ZESTRIL) 40 MG tablet, Take 40 mg by mouth Daily., Disp: , Rfl:   •  Magnesium 250 MG tablet, Take 250 mg by mouth Daily., Disp: , Rfl:   •  Multiple Vitamins-Minerals (EYE VITAMINS PO), Take 2 capsules by mouth., Disp: , Rfl:   •  Multiple Vitamins-Minerals (PRESERVISION AREDS 2 PO), Take 2 tablets by mouth Daily., Disp: , Rfl:   •  tamsulosin (FLOMAX) 0.4 MG capsule 24 hr capsule, Take 1 capsule by mouth Daily., Disp: , Rfl:   •  vitamin D (ERGOCALCIFEROL) 87643 units capsule capsule, Take 50,000 Units by mouth 1 (One) Time Per Week., Disp: , Rfl:   •  mupirocin (BACTROBAN) 2 % ointment, Apply a pea-sized amount to each nostril twice daily for 5 days prior to surgery., Disp: 22 g, Rfl: 0    Allergies:  Allergies   Allergen Reactions   • Penicillins Hives     Tongue bled       Review of Systems:  Gen -no fever, chills , sweats, headache   Eyes - no irritation or discharge   ENT -  no ear pain , runny nose , sore throat , difficulty swallowing   Resp - no cough , congestion , excessive expectoration   CVS - no chest pain , palpitations.   Abd - no pain , nausea , vomiting , diarrhea   Skin - no rash , lesions.   Neuro - no dizziness    Please see HPI for any other pertinent positives.  All other systems were reviewed and are negative.       Objective   Objective:    /87 (BP Location: Left arm, Patient Position: Sitting, Cuff Size: Adult)   Pulse 82   Ht 177.8 cm (70\")   Wt 100 kg (221 lb)   BMI 31.71 kg/m²     Physical Examination:  Obese individual in no acute distress, patient is alert and cooperative with the exam, appears to have normal mood and affect with a normal attention span and concentration, ambulating unassisted  normocephalic, atraumatic, extraocular movements intact, conjunctiva and sclera are clear without nystagmus, normal canals with grossly normal hearing, no " nasal deformity, discharge, inflammation, or lesions, no mouth deformity or lesions  no lymphadenopathy or thyromegaly  normal respiratory effort  abdomen is nontender, without guarding or rebound and nondistended  Right lower extremity shows a well-healed surgical incision with no erythema, drainage, or open skin lesions. There is a minimal amount of swelling in the knee area.  It is grossly well aligned, and the patient is neurovascularly intact distally. The knee is stable to varus and valgus stress, there is no patellar maltracking or crepitus noted, and plantar and dorsiflexion is 5/5. There is no tenderness to palpation or with range of motion, which is about 0-120.  Left lower extremity shows no erythema, rashes, or open skin lesions. There is a minimal amount of swelling. There is a very slight varus malalignment, and the patient is neurovascularly intact distally. The knee is stable to varus and valgus stress, there is no patellar maltracking or crepitus noted, and plantar and dorsiflexion is 5/5. There is mild tenderness to palpation over the medial joint line and with range of motion, which is about 0-115.  pulses normal in all 4 extremities, no clubbing, cyanosis, or edema noted  cranial nerves II-XII grossly intact with no focal defects  skin intact without lesions or rashes visible             Imaging:  no diagnostic testing performed this visit   Per Dr. Armenta's previous note:  Diagnostics: Previously obtained x-rays are reviewed.  There is a varus alignment of both the knees.  The right is more radiographically advanced than the left side.  Osteophyte formation is noted.  The patellofemoral distance is completely obliterated.  The left knee shows similar changes but to a lesser degree.  Suprapatellar fluid accumulation is noted in both the knees.  My recommendation to proceed with right knee replacement is based on both his clinical correlation as well as the findings on the x-rays.             Assessment:  1. Hx of total knee replacement, right    2. Primary osteoarthritis of left knee    3. Obesity (BMI 30-39.9)       10 weeks status post right total knee replacement          Plan:  For his right knee, we will plan to see him back at his one-year anniversary.  , GI, and dental antibiotic prophylaxis discussed.  He should call with any questions or concerns.  For his left knee, he is exhausted conservative measures and would benefit from a total knee replacement.  · Compression/brace to the need to prevent it from buckling and giving out.  · Patient has already tried steroid injections and Visco supplementation and does not want to consider any more temporary measures to help improve his symptoms.  · Patient wishes to proceed with left total knee arthroplasty.  · Recent benefits of surgical intervention discussed with the patient to the extent of 30 minutes.  His wife is also present in this discussion.  Greater than 50% of my time was spent face-to-face with the patient going over the treatment options and the potential complications as well as the rationale for surgical decision-making.  · The patient was seen today for preoperative discussion.  The patient has been tried on over-the-counter and prescription NSAID's despite the risks of anti-inflammatory bleeding, peptic ulcers and erosive gastritis with short term benefit only.  Braces have been prescribed for mechanical support.  Patient has been participating in an exercise program specifically targeting joint pain relief with limited benefit. Intraarticular injections have been used periodically with some but not complete relief of pain.  Ambulation aids have also been utilized.    ·    · The details of the surgical procedure were explained including the location of probable incisions and a description of the likely hardware/grafts to be used. The patient understands the likely convalescence after surgery as well as the rehabilitation required.   Also, we have thoroughly discussed with the patient the risks, benefits and alternatives to surgery.  Risks include but are not limited to the risk of infection, joint stiffness, limited range of motion, wound healing problems, scar tissue build up, myocardial infarction, stroke, blood clots (including DVT and/or pulmonary embolus along with the risk of death) neurologic and/or vascular injury, limb length discrepancy, fracture, dislocation, nonunion, malunion, continued pain and need for further surgery including hardware failure requiring revision.  No guarantees were given or implied.  · Rest, ice, compression, and elevation (RICE) therapy  · Stretching and strengthening exercises of the quads and the hamstrings.  · OTC Tylenol 500-1000mg by mouth every 6 hours as needed for pain              ISRAEL Vivas  01/23/20  11:33 AM

## 2020-01-29 ENCOUNTER — HOSPITAL ENCOUNTER (OUTPATIENT)
Dept: GENERAL RADIOLOGY | Facility: HOSPITAL | Age: 75
Discharge: HOME | End: 2020-01-29
Admitting: PHYSICIAN ASSISTANT

## 2020-01-29 ENCOUNTER — APPOINTMENT (OUTPATIENT)
Dept: PREADMISSION TESTING | Facility: HOSPITAL | Age: 75
End: 2020-01-29

## 2020-01-29 VITALS
HEIGHT: 70 IN | BODY MASS INDEX: 32.1 KG/M2 | OXYGEN SATURATION: 97 % | DIASTOLIC BLOOD PRESSURE: 82 MMHG | HEART RATE: 73 BPM | SYSTOLIC BLOOD PRESSURE: 126 MMHG | WEIGHT: 224.25 LBS

## 2020-01-29 DIAGNOSIS — M17.12 PRIMARY OSTEOARTHRITIS OF LEFT KNEE: ICD-10-CM

## 2020-01-29 DIAGNOSIS — E66.9 OBESITY (BMI 30-39.9): ICD-10-CM

## 2020-01-29 DIAGNOSIS — Z01.818 PRE-OP TESTING: Primary | ICD-10-CM

## 2020-01-29 LAB
ABO GROUP BLD: NORMAL
ANION GAP SERPL CALCULATED.3IONS-SCNC: 9 MMOL/L (ref 5–15)
APTT PPP: 24.5 SECONDS (ref 24–31)
BASOPHILS # BLD AUTO: 0 10*3/MM3 (ref 0–0.2)
BASOPHILS NFR BLD AUTO: 0.5 % (ref 0–1.5)
BILIRUB UR QL STRIP: NEGATIVE
BLD GP AB SCN SERPL QL: NEGATIVE
BUN BLD-MCNC: 13 MG/DL (ref 8–23)
BUN/CREAT SERPL: 15.9 (ref 7–25)
CALCIUM SPEC-SCNC: 9 MG/DL (ref 8.6–10.5)
CHLORIDE SERPL-SCNC: 103 MMOL/L (ref 98–107)
CLARITY UR: CLEAR
CO2 SERPL-SCNC: 29 MMOL/L (ref 22–29)
COLOR UR: YELLOW
CREAT BLD-MCNC: 0.82 MG/DL (ref 0.76–1.27)
DEPRECATED RDW RBC AUTO: 40.7 FL (ref 37–54)
EOSINOPHIL # BLD AUTO: 0.1 10*3/MM3 (ref 0–0.4)
EOSINOPHIL NFR BLD AUTO: 2.4 % (ref 0.3–6.2)
ERYTHROCYTE [DISTWIDTH] IN BLOOD BY AUTOMATED COUNT: 13.2 % (ref 12.3–15.4)
GFR SERPL CREATININE-BSD FRML MDRD: 92 ML/MIN/1.73
GLUCOSE BLD-MCNC: 105 MG/DL (ref 65–99)
GLUCOSE UR STRIP-MCNC: NEGATIVE MG/DL
HBA1C MFR BLD: 5.5 % (ref 3.5–5.6)
HCT VFR BLD AUTO: 40.2 % (ref 37.5–51)
HGB BLD-MCNC: 13.9 G/DL (ref 13–17.7)
HGB UR QL STRIP.AUTO: NEGATIVE
INR PPP: 0.92 (ref 0.9–1.1)
KETONES UR QL STRIP: NEGATIVE
LEUKOCYTE ESTERASE UR QL STRIP.AUTO: NEGATIVE
LYMPHOCYTES # BLD AUTO: 1.5 10*3/MM3 (ref 0.7–3.1)
LYMPHOCYTES NFR BLD AUTO: 32.7 % (ref 19.6–45.3)
MCH RBC QN AUTO: 30.6 PG (ref 26.6–33)
MCHC RBC AUTO-ENTMCNC: 34.7 G/DL (ref 31.5–35.7)
MCV RBC AUTO: 88.2 FL (ref 79–97)
MONOCYTES # BLD AUTO: 0.3 10*3/MM3 (ref 0.1–0.9)
MONOCYTES NFR BLD AUTO: 7.8 % (ref 5–12)
MRSA DNA SPEC QL NAA+PROBE: NORMAL
NEUTROPHILS # BLD AUTO: 2.6 10*3/MM3 (ref 1.7–7)
NEUTROPHILS NFR BLD AUTO: 56.6 % (ref 42.7–76)
NITRITE UR QL STRIP: NEGATIVE
NRBC BLD AUTO-RTO: 0.1 /100 WBC (ref 0–0.2)
PH UR STRIP.AUTO: 6 [PH] (ref 5–8)
PLATELET # BLD AUTO: 175 10*3/MM3 (ref 140–450)
PMV BLD AUTO: 6.3 FL (ref 6–12)
POTASSIUM BLD-SCNC: 4.5 MMOL/L (ref 3.5–5.2)
PROT UR QL STRIP: NEGATIVE
PROTHROMBIN TIME: 9.8 SECONDS (ref 9.6–11.7)
RBC # BLD AUTO: 4.55 10*6/MM3 (ref 4.14–5.8)
RH BLD: POSITIVE
SODIUM BLD-SCNC: 141 MMOL/L (ref 136–145)
SP GR UR STRIP: 1.02 (ref 1–1.03)
T&S EXPIRATION DATE: NORMAL
UROBILINOGEN UR QL STRIP: NORMAL
WBC NRBC COR # BLD: 4.5 10*3/MM3 (ref 3.4–10.8)

## 2020-01-29 PROCEDURE — 36415 COLL VENOUS BLD VENIPUNCTURE: CPT

## 2020-01-29 PROCEDURE — 85025 COMPLETE CBC W/AUTO DIFF WBC: CPT | Performed by: PHYSICIAN ASSISTANT

## 2020-01-29 PROCEDURE — 93005 ELECTROCARDIOGRAM TRACING: CPT

## 2020-01-29 PROCEDURE — 87641 MR-STAPH DNA AMP PROBE: CPT | Performed by: PHYSICIAN ASSISTANT

## 2020-01-29 PROCEDURE — 81003 URINALYSIS AUTO W/O SCOPE: CPT | Performed by: PHYSICIAN ASSISTANT

## 2020-01-29 PROCEDURE — 85730 THROMBOPLASTIN TIME PARTIAL: CPT | Performed by: PHYSICIAN ASSISTANT

## 2020-01-29 PROCEDURE — 80048 BASIC METABOLIC PNL TOTAL CA: CPT | Performed by: PHYSICIAN ASSISTANT

## 2020-01-29 PROCEDURE — 86850 RBC ANTIBODY SCREEN: CPT | Performed by: PHYSICIAN ASSISTANT

## 2020-01-29 PROCEDURE — 85610 PROTHROMBIN TIME: CPT | Performed by: PHYSICIAN ASSISTANT

## 2020-01-29 PROCEDURE — 83036 HEMOGLOBIN GLYCOSYLATED A1C: CPT | Performed by: PHYSICIAN ASSISTANT

## 2020-01-29 PROCEDURE — 71046 X-RAY EXAM CHEST 2 VIEWS: CPT

## 2020-01-29 PROCEDURE — 86900 BLOOD TYPING SEROLOGIC ABO: CPT | Performed by: PHYSICIAN ASSISTANT

## 2020-01-29 PROCEDURE — 86901 BLOOD TYPING SEROLOGIC RH(D): CPT | Performed by: PHYSICIAN ASSISTANT

## 2020-01-30 PROCEDURE — 93010 ELECTROCARDIOGRAM REPORT: CPT | Performed by: INTERNAL MEDICINE

## 2020-02-04 ENCOUNTER — ANESTHESIA EVENT (OUTPATIENT)
Dept: PERIOP | Facility: HOSPITAL | Age: 75
End: 2020-02-04

## 2020-02-05 ENCOUNTER — APPOINTMENT (OUTPATIENT)
Dept: GENERAL RADIOLOGY | Facility: HOSPITAL | Age: 75
End: 2020-02-05

## 2020-02-05 ENCOUNTER — HOSPITAL ENCOUNTER (OUTPATIENT)
Facility: HOSPITAL | Age: 75
Discharge: HOME | End: 2020-02-06
Attending: ORTHOPAEDIC SURGERY | Admitting: ORTHOPAEDIC SURGERY

## 2020-02-05 ENCOUNTER — ANESTHESIA (OUTPATIENT)
Dept: PERIOP | Facility: HOSPITAL | Age: 75
End: 2020-02-05

## 2020-02-05 DIAGNOSIS — M17.12 PRIMARY OSTEOARTHRITIS OF LEFT KNEE: ICD-10-CM

## 2020-02-05 DIAGNOSIS — E66.9 OBESITY (BMI 30-39.9): ICD-10-CM
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
## 2020-02-05 PROBLEM — R60.0 EDEMA OF RIGHT LOWER EXTREMITY: Status: RESOLVED | Noted: 2019-11-07 | Resolved: 2020-02-05

## 2020-02-05 PROBLEM — E83.42 HYPOMAGNESEMIA: Status: RESOLVED | Noted: 2018-10-30 | Resolved: 2020-02-05

## 2020-02-05 PROBLEM — M19.90 ARTHRITIS: Chronic | Status: ACTIVE | Noted: 2019-10-17

## 2020-02-05 PROCEDURE — C1776 JOINT DEVICE (IMPLANTABLE): HCPCS | Performed by: ORTHOPAEDIC SURGERY

## 2020-02-05 PROCEDURE — A9270 NON-COVERED ITEM OR SERVICE: HCPCS | Performed by: PHYSICIAN ASSISTANT

## 2020-02-05 PROCEDURE — C1713 ANCHOR/SCREW BN/BN,TIS/BN: HCPCS | Performed by: ORTHOPAEDIC SURGERY

## 2020-02-05 PROCEDURE — 25010000002 PROPOFOL 10 MG/ML EMULSION: Performed by: ANESTHESIOLOGY

## 2020-02-05 PROCEDURE — 63710000001 TAMSULOSIN 0.4 MG CAPSULE: Performed by: PHYSICIAN ASSISTANT

## 2020-02-05 PROCEDURE — 25010000003 BUPIVACAINE LIPOSOME 1.3 % SUSPENSION: Performed by: ORTHOPAEDIC SURGERY

## 2020-02-05 PROCEDURE — 25010000002 HYDROMORPHONE PER 4 MG: Performed by: ANESTHESIOLOGY

## 2020-02-05 PROCEDURE — 25010000002 FENTANYL CITRATE (PF) 100 MCG/2ML SOLUTION: Performed by: ANESTHESIOLOGY

## 2020-02-05 PROCEDURE — G0378 HOSPITAL OBSERVATION PER HR: HCPCS

## 2020-02-05 PROCEDURE — 25010000002 DEXAMETHASONE PER 1 MG: Performed by: ANESTHESIOLOGY

## 2020-02-05 PROCEDURE — 63710000001 ACETAMINOPHEN 500 MG TABLET: Performed by: PHYSICIAN ASSISTANT

## 2020-02-05 PROCEDURE — 63710000001 FINASTERIDE 5 MG TABLET: Performed by: PHYSICIAN ASSISTANT

## 2020-02-05 PROCEDURE — 63710000001 RIVAROXABAN 10 MG TABLET: Performed by: PHYSICIAN ASSISTANT

## 2020-02-05 PROCEDURE — S0260 H&P FOR SURGERY: HCPCS | Performed by: ORTHOPAEDIC SURGERY

## 2020-02-05 PROCEDURE — 63710000001 ATORVASTATIN 20 MG TABLET: Performed by: PHYSICIAN ASSISTANT

## 2020-02-05 PROCEDURE — 25010000002 ONDANSETRON PER 1 MG: Performed by: ANESTHESIOLOGY

## 2020-02-05 PROCEDURE — 99219 PR INITIAL OBSERVATION CARE/DAY 50 MINUTES: CPT | Performed by: INTERNAL MEDICINE

## 2020-02-05 PROCEDURE — 63710000001 POVIDONE-IODINE 10 % SOLUTION 118 ML BOTTLE: Performed by: ORTHOPAEDIC SURGERY

## 2020-02-05 PROCEDURE — 63710000001 POLYETHYLENE GLYCOL PACK: Performed by: PHYSICIAN ASSISTANT

## 2020-02-05 PROCEDURE — 20985 CPTR-ASST DIR MS PX: CPT | Performed by: ORTHOPAEDIC SURGERY

## 2020-02-05 PROCEDURE — 63710000001 OXYCODONE 10 MG TABLET EXTENDED-RELEASE 12 HOUR: Performed by: PHYSICIAN ASSISTANT

## 2020-02-05 PROCEDURE — 63710000001 GABAPENTIN 300 MG CAPSULE: Performed by: PHYSICIAN ASSISTANT

## 2020-02-05 PROCEDURE — 63710000001 ESCITALOPRAM 10 MG TABLET: Performed by: PHYSICIAN ASSISTANT

## 2020-02-05 PROCEDURE — 73560 X-RAY EXAM OF KNEE 1 OR 2: CPT

## 2020-02-05 PROCEDURE — 25010000002 ROPIVACAINE PER 1 MG: Performed by: ANESTHESIOLOGY

## 2020-02-05 PROCEDURE — 25010000002 MIDAZOLAM PER 1 MG: Performed by: ANESTHESIOLOGY

## 2020-02-05 PROCEDURE — A9270 NON-COVERED ITEM OR SERVICE: HCPCS | Performed by: ORTHOPAEDIC SURGERY

## 2020-02-05 PROCEDURE — 63710000001 DOCUSATE SODIUM 100 MG CAPSULE: Performed by: PHYSICIAN ASSISTANT

## 2020-02-05 PROCEDURE — 63710000001 MELOXICAM 15 MG TABLET: Performed by: PHYSICIAN ASSISTANT

## 2020-02-05 PROCEDURE — 63710000001 MUPIROCIN 2 % OINTMENT 22 G TUBE: Performed by: PHYSICIAN ASSISTANT

## 2020-02-05 PROCEDURE — 25010000002 ROPIVACAINE PER 1 MG: Performed by: ORTHOPAEDIC SURGERY

## 2020-02-05 PROCEDURE — 27447 TOTAL KNEE ARTHROPLASTY: CPT | Performed by: ORTHOPAEDIC SURGERY

## 2020-02-05 PROCEDURE — C9290 INJ, BUPIVACAINE LIPOSOME: HCPCS | Performed by: ORTHOPAEDIC SURGERY

## 2020-02-05 PROCEDURE — 97162 PT EVAL MOD COMPLEX 30 MIN: CPT

## 2020-02-05 DEVICE — CMT BONE REFOBACIN R W/GENT 1X40: Type: IMPLANTABLE DEVICE | Status: FUNCTIONAL

## 2020-02-05 DEVICE — STEM TIB/KN PERSONA CMT 5D SZF LT: Type: IMPLANTABLE DEVICE | Site: KNEE | Status: FUNCTIONAL

## 2020-02-05 DEVICE — EXT STEM FEM/KN PERSONA TPR 14XPLS30MM: Type: IMPLANTABLE DEVICE | Site: KNEE | Status: FUNCTIONAL

## 2020-02-05 DEVICE — CAP BEAR KN VE UPCHRG: Type: IMPLANTABLE DEVICE | Site: KNEE | Status: FUNCTIONAL

## 2020-02-05 DEVICE — CAP TOTL KN CMT PREMIUM: Type: IMPLANTABLE DEVICE | Site: KNEE | Status: FUNCTIONAL

## 2020-02-05 DEVICE — IMPLANTABLE DEVICE: Type: IMPLANTABLE DEVICE | Site: KNEE | Status: FUNCTIONAL

## 2020-02-05 DEVICE — COMP FEM/KN PERSONA CR CMT COCR STD SZ10 LT: Type: IMPLANTABLE DEVICE | Site: KNEE | Status: FUNCTIONAL

## 2020-02-05 RX ORDER — KETAMINE HYDROCHLORIDE 10 MG/ML
INJECTION INTRAMUSCULAR; INTRAVENOUS AS NEEDED
Status: DISCONTINUED | OUTPATIENT
Start: 2020-02-05 | End: 2020-02-05 | Stop reason: SURG

## 2020-02-05 RX ORDER — MELOXICAM 15 MG/1
15 TABLET ORAL ONCE
Status: COMPLETED | OUTPATIENT
Start: 2020-02-05 | End: 2020-02-05

## 2020-02-05 RX ORDER — ONDANSETRON 2 MG/ML
4 INJECTION INTRAMUSCULAR; INTRAVENOUS EVERY 6 HOURS PRN
Status: DISCONTINUED | OUTPATIENT
Start: 2020-02-05 | End: 2020-02-06 | Stop reason: HOSPADM

## 2020-02-05 RX ORDER — SODIUM CHLORIDE, SODIUM LACTATE, POTASSIUM CHLORIDE, CALCIUM CHLORIDE 600; 310; 30; 20 MG/100ML; MG/100ML; MG/100ML; MG/100ML
9 INJECTION, SOLUTION INTRAVENOUS CONTINUOUS PRN
Status: DISCONTINUED | OUTPATIENT
Start: 2020-02-05 | End: 2020-02-05 | Stop reason: HOSPADM

## 2020-02-05 RX ORDER — NALOXONE HCL 0.4 MG/ML
0.4 VIAL (ML) INJECTION
Status: DISCONTINUED | OUTPATIENT
Start: 2020-02-05 | End: 2020-02-06 | Stop reason: HOSPADM

## 2020-02-05 RX ORDER — GABAPENTIN 300 MG/1
300 CAPSULE ORAL NIGHTLY
Status: DISCONTINUED | OUTPATIENT
Start: 2020-02-05 | End: 2020-02-06 | Stop reason: HOSPADM

## 2020-02-05 RX ORDER — SODIUM CHLORIDE 9 MG/ML
100 INJECTION, SOLUTION INTRAVENOUS CONTINUOUS
Status: DISCONTINUED | OUTPATIENT
Start: 2020-02-05 | End: 2020-02-06 | Stop reason: HOSPADM

## 2020-02-05 RX ORDER — ESCITALOPRAM OXALATE 10 MG/1
10 TABLET ORAL EVERY EVENING
Status: DISCONTINUED | OUTPATIENT
Start: 2020-02-05 | End: 2020-02-06 | Stop reason: HOSPADM

## 2020-02-05 RX ORDER — ONDANSETRON 2 MG/ML
INJECTION INTRAMUSCULAR; INTRAVENOUS AS NEEDED
Status: DISCONTINUED | OUTPATIENT
Start: 2020-02-05 | End: 2020-02-05 | Stop reason: SURG

## 2020-02-05 RX ORDER — BISACODYL 10 MG
10 SUPPOSITORY, RECTAL RECTAL DAILY PRN
Status: DISCONTINUED | OUTPATIENT
Start: 2020-02-05 | End: 2020-02-06 | Stop reason: HOSPADM

## 2020-02-05 RX ORDER — GABAPENTIN 300 MG/1
600 CAPSULE ORAL
Status: COMPLETED | OUTPATIENT
Start: 2020-02-05 | End: 2020-02-05

## 2020-02-05 RX ORDER — PROPOFOL 10 MG/ML
VIAL (ML) INTRAVENOUS AS NEEDED
Status: DISCONTINUED | OUTPATIENT
Start: 2020-02-05 | End: 2020-02-05 | Stop reason: SURG

## 2020-02-05 RX ORDER — ROPIVACAINE HYDROCHLORIDE 5 MG/ML
INJECTION, SOLUTION EPIDURAL; INFILTRATION; PERINEURAL
Status: COMPLETED | OUTPATIENT
Start: 2020-02-05 | End: 2020-02-05

## 2020-02-05 RX ORDER — TRANEXAMIC ACID 100 MG/ML
INJECTION, SOLUTION INTRAVENOUS AS NEEDED
Status: DISCONTINUED | OUTPATIENT
Start: 2020-02-05 | End: 2020-02-05

## 2020-02-05 RX ORDER — CLINDAMYCIN PHOSPHATE 900 MG/50ML
900 INJECTION, SOLUTION INTRAVENOUS EVERY 8 HOURS
Status: COMPLETED | OUTPATIENT
Start: 2020-02-05 | End: 2020-02-06

## 2020-02-05 RX ORDER — MORPHINE SULFATE 4 MG/ML
4 INJECTION, SOLUTION INTRAMUSCULAR; INTRAVENOUS
Status: DISCONTINUED | OUTPATIENT
Start: 2020-02-05 | End: 2020-02-06 | Stop reason: HOSPADM

## 2020-02-05 RX ORDER — LISINOPRIL 20 MG/1
40 TABLET ORAL EVERY EVENING
Status: DISCONTINUED | OUTPATIENT
Start: 2020-02-06 | End: 2020-02-06 | Stop reason: HOSPADM

## 2020-02-05 RX ORDER — OXYCODONE HYDROCHLORIDE 5 MG/1
5 TABLET ORAL EVERY 4 HOURS PRN
Status: DISCONTINUED | OUTPATIENT
Start: 2020-02-05 | End: 2020-02-06 | Stop reason: HOSPADM

## 2020-02-05 RX ORDER — FENTANYL CITRATE 50 UG/ML
INJECTION, SOLUTION INTRAMUSCULAR; INTRAVENOUS
Status: COMPLETED | OUTPATIENT
Start: 2020-02-05 | End: 2020-02-05

## 2020-02-05 RX ORDER — MELOXICAM 15 MG/1
15 TABLET ORAL DAILY
Status: DISCONTINUED | OUTPATIENT
Start: 2020-02-06 | End: 2020-02-06 | Stop reason: HOSPADM

## 2020-02-05 RX ORDER — MIDAZOLAM HYDROCHLORIDE 1 MG/ML
INJECTION INTRAMUSCULAR; INTRAVENOUS
Status: COMPLETED | OUTPATIENT
Start: 2020-02-05 | End: 2020-02-05

## 2020-02-05 RX ORDER — TAMSULOSIN HYDROCHLORIDE 0.4 MG/1
0.4 CAPSULE ORAL EVERY EVENING
Status: DISCONTINUED | OUTPATIENT
Start: 2020-02-05 | End: 2020-02-06 | Stop reason: HOSPADM

## 2020-02-05 RX ORDER — 0.9 % SODIUM CHLORIDE 0.9 %
VIAL (ML) INJECTION AS NEEDED
Status: DISCONTINUED | OUTPATIENT
Start: 2020-02-05 | End: 2020-02-05 | Stop reason: HOSPADM

## 2020-02-05 RX ORDER — LABETALOL HYDROCHLORIDE 5 MG/ML
INJECTION, SOLUTION INTRAVENOUS AS NEEDED
Status: DISCONTINUED | OUTPATIENT
Start: 2020-02-05 | End: 2020-02-05 | Stop reason: SURG

## 2020-02-05 RX ORDER — LIDOCAINE HYDROCHLORIDE 10 MG/ML
0.5 INJECTION, SOLUTION EPIDURAL; INFILTRATION; INTRACAUDAL; PERINEURAL ONCE AS NEEDED
Status: DISCONTINUED | OUTPATIENT
Start: 2020-02-05 | End: 2020-02-05 | Stop reason: HOSPADM

## 2020-02-05 RX ORDER — OXYCODONE HCL 10 MG/1
10 TABLET, FILM COATED, EXTENDED RELEASE ORAL ONCE
Status: COMPLETED | OUTPATIENT
Start: 2020-02-05 | End: 2020-02-05

## 2020-02-05 RX ORDER — ACETAMINOPHEN 500 MG
1000 TABLET ORAL ONCE
Status: COMPLETED | OUTPATIENT
Start: 2020-02-05 | End: 2020-02-05

## 2020-02-05 RX ORDER — SODIUM CHLORIDE 0.9 % (FLUSH) 0.9 %
3 SYRINGE (ML) INJECTION EVERY 12 HOURS SCHEDULED
Status: DISCONTINUED | OUTPATIENT
Start: 2020-02-05 | End: 2020-02-05 | Stop reason: HOSPADM

## 2020-02-05 RX ORDER — SODIUM CHLORIDE 0.9 % (FLUSH) 0.9 %
3-10 SYRINGE (ML) INJECTION AS NEEDED
Status: DISCONTINUED | OUTPATIENT
Start: 2020-02-05 | End: 2020-02-05 | Stop reason: HOSPADM

## 2020-02-05 RX ORDER — ONDANSETRON 4 MG/1
4 TABLET, FILM COATED ORAL EVERY 6 HOURS PRN
Status: DISCONTINUED | OUTPATIENT
Start: 2020-02-05 | End: 2020-02-06 | Stop reason: HOSPADM

## 2020-02-05 RX ORDER — OXYCODONE HCL 10 MG/1
10 TABLET, FILM COATED, EXTENDED RELEASE ORAL EVERY 12 HOURS SCHEDULED
Status: DISCONTINUED | OUTPATIENT
Start: 2020-02-05 | End: 2020-02-06 | Stop reason: HOSPADM

## 2020-02-05 RX ORDER — DOCUSATE SODIUM 100 MG/1
100 CAPSULE, LIQUID FILLED ORAL 2 TIMES DAILY
Status: DISCONTINUED | OUTPATIENT
Start: 2020-02-05 | End: 2020-02-06 | Stop reason: HOSPADM

## 2020-02-05 RX ORDER — FINASTERIDE 5 MG/1
5 TABLET, FILM COATED ORAL DAILY
Status: DISCONTINUED | OUTPATIENT
Start: 2020-02-05 | End: 2020-02-06 | Stop reason: HOSPADM

## 2020-02-05 RX ORDER — ROPIVACAINE HYDROCHLORIDE 5 MG/ML
INJECTION, SOLUTION EPIDURAL; INFILTRATION; PERINEURAL AS NEEDED
Status: DISCONTINUED | OUTPATIENT
Start: 2020-02-05 | End: 2020-02-05 | Stop reason: HOSPADM

## 2020-02-05 RX ORDER — DEXAMETHASONE SODIUM PHOSPHATE 4 MG/ML
INJECTION, SOLUTION INTRA-ARTICULAR; INTRALESIONAL; INTRAMUSCULAR; INTRAVENOUS; SOFT TISSUE
Status: COMPLETED | OUTPATIENT
Start: 2020-02-05 | End: 2020-02-05

## 2020-02-05 RX ORDER — ONDANSETRON 2 MG/ML
4 INJECTION INTRAMUSCULAR; INTRAVENOUS ONCE AS NEEDED
Status: DISCONTINUED | OUTPATIENT
Start: 2020-02-05 | End: 2020-02-05 | Stop reason: HOSPADM

## 2020-02-05 RX ORDER — ACETAMINOPHEN 650 MG
TABLET, EXTENDED RELEASE ORAL AS NEEDED
Status: DISCONTINUED | OUTPATIENT
Start: 2020-02-05 | End: 2020-02-05 | Stop reason: HOSPADM

## 2020-02-05 RX ORDER — CLINDAMYCIN PHOSPHATE 900 MG/50ML
900 INJECTION, SOLUTION INTRAVENOUS ONCE
Status: COMPLETED | OUTPATIENT
Start: 2020-02-05 | End: 2020-02-05

## 2020-02-05 RX ORDER — TRAMADOL HYDROCHLORIDE 50 MG/1
50 TABLET ORAL EVERY 6 HOURS PRN
Status: DISCONTINUED | OUTPATIENT
Start: 2020-02-05 | End: 2020-02-06 | Stop reason: HOSPADM

## 2020-02-05 RX ORDER — FERROUS SULFATE TAB EC 324 MG (65 MG FE EQUIVALENT) 324 (65 FE) MG
324 TABLET DELAYED RESPONSE ORAL
Status: DISCONTINUED | OUTPATIENT
Start: 2020-02-06 | End: 2020-02-06 | Stop reason: HOSPADM

## 2020-02-05 RX ORDER — ACETAMINOPHEN 500 MG
1000 TABLET ORAL EVERY 8 HOURS
Status: DISCONTINUED | OUTPATIENT
Start: 2020-02-05 | End: 2020-02-06 | Stop reason: HOSPADM

## 2020-02-05 RX ORDER — DIPHENHYDRAMINE HYDROCHLORIDE 50 MG/ML
25 INJECTION INTRAMUSCULAR; INTRAVENOUS EVERY 6 HOURS PRN
Status: DISCONTINUED | OUTPATIENT
Start: 2020-02-05 | End: 2020-02-06 | Stop reason: HOSPADM

## 2020-02-05 RX ORDER — HYDROMORPHONE HCL 110MG/55ML
0.25 PATIENT CONTROLLED ANALGESIA SYRINGE INTRAVENOUS
Status: DISCONTINUED | OUTPATIENT
Start: 2020-02-05 | End: 2020-02-05 | Stop reason: HOSPADM

## 2020-02-05 RX ORDER — PROMETHAZINE HYDROCHLORIDE 25 MG/1
12.5 TABLET ORAL EVERY 6 HOURS PRN
Status: DISCONTINUED | OUTPATIENT
Start: 2020-02-05 | End: 2020-02-06 | Stop reason: HOSPADM

## 2020-02-05 RX ORDER — DIPHENHYDRAMINE HCL 25 MG
25 TABLET ORAL EVERY 6 HOURS PRN
Status: DISCONTINUED | OUTPATIENT
Start: 2020-02-05 | End: 2020-02-06 | Stop reason: HOSPADM

## 2020-02-05 RX ORDER — ATORVASTATIN CALCIUM 20 MG/1
20 TABLET, FILM COATED ORAL NIGHTLY
Status: DISCONTINUED | OUTPATIENT
Start: 2020-02-05 | End: 2020-02-06 | Stop reason: HOSPADM

## 2020-02-05 RX ADMIN — POLYETHYLENE GLYCOL 3350 17 G: 17 POWDER, FOR SOLUTION ORAL at 17:21

## 2020-02-05 RX ADMIN — HYDROMORPHONE HYDROCHLORIDE 0.25 MG: 2 INJECTION INTRAMUSCULAR; INTRAVENOUS; SUBCUTANEOUS at 13:02

## 2020-02-05 RX ADMIN — TAMSULOSIN HYDROCHLORIDE 0.4 MG: 0.4 CAPSULE ORAL at 17:21

## 2020-02-05 RX ADMIN — FENTANYL CITRATE 100 MCG: 50 INJECTION, SOLUTION INTRAMUSCULAR; INTRAVENOUS at 09:55

## 2020-02-05 RX ADMIN — ONDANSETRON 4 MG: 2 INJECTION INTRAMUSCULAR; INTRAVENOUS at 10:51

## 2020-02-05 RX ADMIN — ESCITALOPRAM OXALATE 10 MG: 10 TABLET ORAL at 17:21

## 2020-02-05 RX ADMIN — PROPOFOL 70 MG: 10 INJECTION, EMULSION INTRAVENOUS at 09:59

## 2020-02-05 RX ADMIN — SODIUM CHLORIDE, SODIUM LACTATE, POTASSIUM CHLORIDE, AND CALCIUM CHLORIDE 9 ML/HR: 600; 310; 30; 20 INJECTION, SOLUTION INTRAVENOUS at 08:02

## 2020-02-05 RX ADMIN — FENTANYL CITRATE 50 MCG: 50 INJECTION, SOLUTION INTRAMUSCULAR; INTRAVENOUS at 08:16

## 2020-02-05 RX ADMIN — OXYCODONE HYDROCHLORIDE 10 MG: 10 TABLET, FILM COATED, EXTENDED RELEASE ORAL at 20:57

## 2020-02-05 RX ADMIN — DEXAMETHASONE SODIUM PHOSPHATE 4 MG: 4 INJECTION, SOLUTION INTRAMUSCULAR; INTRAVENOUS at 08:16

## 2020-02-05 RX ADMIN — CLINDAMYCIN PHOSPHATE 900 MG: 900 INJECTION, SOLUTION INTRAVENOUS at 17:21

## 2020-02-05 RX ADMIN — PROPOFOL 180 MG: 10 INJECTION, EMULSION INTRAVENOUS at 09:25

## 2020-02-05 RX ADMIN — ACETAMINOPHEN 1000 MG: 500 TABLET, FILM COATED ORAL at 07:43

## 2020-02-05 RX ADMIN — LABETALOL 20 MG/4 ML (5 MG/ML) INTRAVENOUS SYRINGE 5 MG: at 10:12

## 2020-02-05 RX ADMIN — MUPIROCIN 1 APPLICATION: 20 OINTMENT TOPICAL at 07:44

## 2020-02-05 RX ADMIN — ROPIVACAINE HYDROCHLORIDE 25 ML: 5 INJECTION, SOLUTION EPIDURAL; INFILTRATION; PERINEURAL at 08:16

## 2020-02-05 RX ADMIN — ACETAMINOPHEN 1000 MG: 500 TABLET, FILM COATED ORAL at 17:21

## 2020-02-05 RX ADMIN — RIVAROXABAN 10 MG: 10 TABLET, FILM COATED ORAL at 17:21

## 2020-02-05 RX ADMIN — FINASTERIDE 5 MG: 5 TABLET, FILM COATED ORAL at 17:21

## 2020-02-05 RX ADMIN — MELOXICAM 15 MG: 15 TABLET ORAL at 07:43

## 2020-02-05 RX ADMIN — Medication 10 ML: at 08:02

## 2020-02-05 RX ADMIN — HYDROMORPHONE HYDROCHLORIDE 1 MG: 2 INJECTION INTRAMUSCULAR; INTRAVENOUS; SUBCUTANEOUS at 10:15

## 2020-02-05 RX ADMIN — SODIUM CHLORIDE 100 ML/HR: 900 INJECTION, SOLUTION INTRAVENOUS at 17:21

## 2020-02-05 RX ADMIN — HYDROMORPHONE HYDROCHLORIDE 0.25 MG: 2 INJECTION INTRAMUSCULAR; INTRAVENOUS; SUBCUTANEOUS at 12:00

## 2020-02-05 RX ADMIN — FENTANYL CITRATE 50 MCG: 50 INJECTION, SOLUTION INTRAMUSCULAR; INTRAVENOUS at 10:07

## 2020-02-05 RX ADMIN — KETAMINE HYDROCHLORIDE 20 MG: 10 INJECTION INTRAMUSCULAR; INTRAVENOUS at 09:59

## 2020-02-05 RX ADMIN — GABAPENTIN 300 MG: 300 CAPSULE ORAL at 20:57

## 2020-02-05 RX ADMIN — PROPOFOL 100 MCG/KG/MIN: 10 INJECTION, EMULSION INTRAVENOUS at 09:28

## 2020-02-05 RX ADMIN — OXYCODONE HYDROCHLORIDE 10 MG: 10 TABLET, FILM COATED, EXTENDED RELEASE ORAL at 07:43

## 2020-02-05 RX ADMIN — MIDAZOLAM 2 MG: 1 INJECTION INTRAMUSCULAR; INTRAVENOUS at 08:16

## 2020-02-05 RX ADMIN — CLINDAMYCIN PHOSPHATE 900 MG: 900 INJECTION, SOLUTION INTRAVENOUS at 09:43

## 2020-02-05 RX ADMIN — ATORVASTATIN CALCIUM 20 MG: 20 TABLET, FILM COATED ORAL at 20:57

## 2020-02-05 RX ADMIN — HYDROMORPHONE HYDROCHLORIDE 0.25 MG: 2 INJECTION INTRAMUSCULAR; INTRAVENOUS; SUBCUTANEOUS at 12:35

## 2020-02-05 RX ADMIN — GABAPENTIN 600 MG: 300 CAPSULE ORAL at 07:43

## 2020-02-05 RX ADMIN — DOCUSATE SODIUM 100 MG: 100 CAPSULE, LIQUID FILLED ORAL at 20:57

## 2020-02-05 RX ADMIN — TRANEXAMIC ACID 1000 MG: 1 INJECTION, SOLUTION INTRAVENOUS at 09:41

## 2020-02-05 NOTE — PLAN OF CARE
Problem: Patient Care Overview  Goal: Plan of Care Review  Outcome: Ongoing (interventions implemented as appropriate)  Flowsheets (Taken 2/5/2020 3393)  Outcome Summary: 73 y/o M POD 0 L TKA per Dr. Medina. Pt with R TKA 3 months prior. Pt reports only complication was swelling- had to have knee above heart for multiple hours a day to control swelling. Patient is indepedent and did not use RW after previous TKA-- progressed directly to [facility]. Patient was limited this date by affects of anesthesia-- lethargy and postural instability. Patient was able to transfer to recliner chair with mod A. Pt falling asleep sitting EOB and in standing. Recommending OPPT at [facility].

## 2020-02-05 NOTE — THERAPY EVALUATION
Patient Name: Farheen Gomez  : 1945    MRN: [id number]                              Today's Date: 2020       Admit Date: 2020    Visit Dx:     ICD-10-CM ICD-9-CM   1. Primary osteoarthritis of left knee M17.12 715.16   2. Obesity (BMI 30-39.9) E66.9 278.00     Patient Active Problem List   Diagnosis   • Spinal stenosis in cervical region - reveresal of lordosis at C3/4 and C4/5, Modic endpalate cahnges at C7/T1   • Hypertension   • BPH (benign prostatic hyperplasia)   • Primary osteoarthritis of left knee   • Anemia   • Depression   • Arthritis   • Obesity (BMI 30-39.9)   • Cyst of kidney, acquired   • Dupuytren's contracture   • Dyslipidemia   • Family history of malignant neoplasm of urinary bladder   • Foot pain, right   • Ganglion cyst   • Gastroesophageal reflux disease   • Hand paresthesia   • Hearing problem   • History of poliomyelitis   • Low back pain   • Neck pain   • Other hammer toe(s) (acquired), left foot   • Plantar fasciitis, left   • Sleep disorder   • Tachycardia   • Thrombocytopenia (CMS/HCC)   • Vitamin D deficiency   • Hx of total knee replacement, right   • Amblyopia   • Dry eye   • Hypertensive retinopathy of both eyes   • Lens replaced by other means   • AMD (age-related macular degeneration), bilateral   • Macular degeneration (senile) of retina   • Nonexudative age-related macular degeneration   • Nonexudative age-related macular degeneration, bilateral, early dry stage   • Other chronic allergic conjunctivitis   • Other specified disorders of eyelid   • Unspecified blepharitis unspecified eye, unspecified eyelid   • PCO (posterior capsular opacification), bilateral   • Presence of intraocular lens   • PVD (posterior vitreous detachment), both eyes   • Regular astigmatism   • Senile nuclear sclerosis   • Tear film insufficiency     Past Medical History:   Diagnosis Date   • Arthritis    • BPH (benign prostatic hyperplasia)    • Cataract    • Depression    • [place] (hard of  hearing)     wears hearing aides   • Hyperlipidemia    • Hypertension    • Macular degeneration     very minor     Past Surgical History:   Procedure Laterality Date   • BACK SURGERY     • HAND SURGERY Bilateral     carpal tunnel repair   • KNEE SURGERY      meniscus repair    • LUMBAR DISCECTOMY Bilateral 4/22/2019    Procedure: left and right  L4-5 lumbar decompression with dura repair;  Surgeon: Edson Khan MD;  Location: [facility] MAIN OR;  Service: Neurosurgery   • ROTATOR CUFF REPAIR Right    • TOTAL KNEE ARTHROPLASTY Right 10/23/2019    Procedure: TOTAL KNEE ARTHROPLASTY, WITH LEFT KNEE INJECTION;  Surgeon: Boby Medina MD;  Location: [facility] MAIN OR;  Service: Orthopedics     General Information     Row Name 02/05/20 1716          PT Evaluation Time/Intention    Document Type  evaluation  -     Mode of Treatment  physical therapy  -     Row Name 02/05/20 1716          General Information    Patient Profile Reviewed?  yes  -SS     Prior Level of Function  independent:  -SS     Row Name 02/05/20 1716          Relationship/Environment    Lives With  spouse  -     Row Name 02/05/20 1716          Resource/Environmental Concerns    Current Living Arrangements  home/apartment/condo  -     Row Name 02/05/20 1716          Home Main Entrance    Number of Stairs, Main Entrance  three  -     Row Name 02/05/20 1716          Cognitive Assessment/Intervention- PT/OT    Orientation Status (Cognition)  oriented x 4  -SS     Row Name 02/05/20 1716          Safety Issues, Functional Mobility    Impairments Affecting Function (Mobility)  postural/trunk control  -       User Key  (r) = Recorded By, (t) = Taken By, (c) = Cosigned By    Initials Name Provider Type    SS Radha Sorto PT Physical Therapist        Mobility     Row Name 02/05/20 1720          Bed Mobility Assessment/Treatment    Bed Mobility Assessment/Treatment  supine-sit  -SS     Supine-Sit [place] (Bed Mobility)  supervision;contact  guard  -     Comment (Bed Mobility)  patient lethargic prior to initiating mobility and upon sitting   -     Row Name 02/05/20 1720          Transfer Assessment/Treatment    Comment (Transfers)  posterior lean and lethargy in standing however BP WNL  -     Row Name 02/05/20 1720          Bed-Chair Transfer    Bed-Chair [place] (Transfers)  moderate assist (50% patient effort)  -     Assistive Device (Bed-Chair Transfers)  walker, front-wheeled  -     Row Name 02/05/20 1720          Sit-Stand Transfer    Sit-Stand [place] (Transfers)  moderate assist (50% patient effort)  -     Assistive Device (Sit-Stand Transfers)  walker, front-wheeled  -     Row Name 02/05/20 1720          Gait/Stairs Assessment/Training    Comment (Gait/Stairs)  not appropriate for ambulation due to lethargy and postural instability  -[facility] Name 02/05/20 1720          Mobility Assessment/Intervention    Extremity Weight-bearing Status  left lower extremity  -     Left Lower Extremity (Weight-bearing Status)  weight-bearing as tolerated (WBAT)  -       User Key  (r) = Recorded By, (t) = Taken By, (c) = Cosigned By    Initials Name Provider Type     Radha Sorto, PT Physical Therapist        Obj/Interventions     [facility] Name 02/05/20 1724          General ROM    GENERAL ROM COMMENTS  L knee ROM 5-90 otherwise WFL  -[facility] Name 02/05/20 1724          MMT (Manual Muscle Testing)    General MMT Comments  >3/5 grossly  -[facility] Name 02/05/20 1724          Therapeutic Exercise    Comment (Therapeutic Exercise)  ankle pumps, LAQ  -[facility] Name 02/05/20 1724          Static Sitting Balance    Level of [place] (Unsupported Sitting, Static Balance)  contact guard assist;minimal assist, 75% patient effort  -     Comment (Unsupported Sitting, Static Balance)  dues to lethargy  -     Row Name 02/05/20 1724          Static Standing Balance    Level of [place] (Supported Standing, Static Balance)   moderate assist, 50 to 74% patient effort  -SS     Time Able to Maintain Position (Supported Standing, Static Balance)  15 to 30 seconds  -SS     Assistive Device Utilized (Supported Standing, Static Balance)  walker, rolling  -     Row Name 02/05/20 1724          Sensory Assessment/Intervention    Sensory General Assessment  no sensation deficits identified  -       User Key  (r) = Recorded By, (t) = Taken By, (c) = Cosigned By    Initials Name Provider Type    SS Radha Sorto L, PT Physical Therapist        Goals/Plan     Row Name 02/05/20 1731          Bed Mobility Goal 1 (PT)    Activity/Assistive Device (Bed Mobility Goal 1, PT)  bed mobility activities, all  -SS     [place] Level/Cues Needed (Bed Mobility Goal 1, PT)  conditional independence  -SS     Time Frame (Bed Mobility Goal 1, PT)  long term goal (LTG);2 weeks  -[facility] Name 02/05/20 1731          Transfer Goal 1 (PT)    Activity/Assistive Device (Transfer Goal 1, PT)  transfers, all  -SS     [place] Level/Cues Needed (Transfer Goal 1, PT)  conditional independence  -SS     Time Frame (Transfer Goal 1, PT)  long term goal (LTG);2 weeks  -[facility] Name 02/05/20 1731          Gait Training Goal 1 (PT)    Activity/Assistive Device (Gait Training Goal 1, PT)  gait (walking locomotion)  -SS     [place] Level (Gait Training Goal 1, PT)  conditional independence  -SS     Distance (Gait Goal 1, PT)  150  -SS     Time Frame (Gait Training Goal 1, PT)  long term goal (LTG);2 weeks  -[facility] Name 02/05/20 1731          Stairs Goal 1 (PT)    Activity/Assistive Device (Stairs Goal 1, PT)  stairs, all skills  -SS     [place] Level/Cues Needed (Stairs Goal 1, PT)  conditional independence  -SS     Number of Stairs (Stairs Goal 1, PT)  3  -SS     Time Frame (Stairs Goal 1, PT)  long term goal (LTG);2 weeks  -       User Key  (r) = Recorded By, (t) = Taken By, (c) = Cosigned By    Initials Name Provider Type    SS Radha Sorto  L, PT Physical Therapist        Clinical Impression     Row Name 02/05/20 1726          Pain Assessment    Additional Documentation  Pain Scale: FACES Pre/Post-Treatment (Group)  -     Row Name 02/05/20 1726          Pain Scale: FACES Pre/Post-Treatment    Pain: FACES Scale, Pretreatment  2-->hurts little bit  -SS     Pain: FACES Scale, Post-Treatment  2-->hurts little bit  -     Row Name 02/05/20 1726          Plan of Care Review    Plan of Care Reviewed With  patient  -SS     Outcome Summary  73 y/o M POD 0 L TKA per Dr. Medina. Pt with R TKA 3 months prior. Pt reports only complication was swelling- had to have knee above heart for multiple hours a day to control swelling. Patient is indepedent and did not use RW after previous TKA-- progressed directly to [facility]. Patient was limited this date by affects of anesthesia-- lethargy and postural instability. Patient was able to transfer to recliner chair with mod A. Pt falling asleep sitting EOB and in standing. Recommending OPPT at [facility].   -     Row Name 02/05/20 1726          Physical Therapy Clinical Impression    Criteria for Skilled Interventions Met (PT Clinical Impression)  yes;treatment indicated  -SS     Rehab Potential (PT Clinical Summary)  good, to achieve stated therapy goals  -SS     Predicted Duration of Therapy (PT)  until d/c  -     Row Name 02/05/20 1726          Positioning and Restraints    Pre-Treatment Position  in bed  -SS     Post Treatment Position  chair  -SS       User Key  (r) = Recorded By, (t) = Taken By, (c) = Cosigned By    Initials Name Provider Type    Radha Ziegler, PT Physical Therapist        Outcome Measures    No documentation.         PT Recommendation and Plan  Planned Therapy Interventions (PT Eval): balance training, bed mobility training, gait training, home exercise program, patient/family education, strengthening, transfer training, stair training, stretching  Outcome Summary/Treatment Plan  (PT)  Anticipated Discharge Disposition (PT): home with OP services, home with assist  Plan of Care Reviewed With: patient  Outcome Summary: 73 y/o M POD 0 L TKA per Dr. Medina. Pt with R TKA 3 months prior. Pt reports only complication was swelling- had to have knee above heart for multiple hours a day to control swelling. Patient is indepedent and did not use RW after previous TKA-- progressed directly to [facility]. Patient was limited this date by affects of anesthesia-- lethargy and postural instability. Patient was able to transfer to recliner chair with mod A. Pt falling asleep sitting EOB and in standing. Recommending OPPT at [facility].      Time Calculation:   PT Charges     Row Name 02/05/20 1734             Time Calculation    Start Time  1543  -      Stop Time  1612  -      Time Calculation (min)  29 min  -      PT Received On  02/05/20  -      PT - Next Appointment  02/06/20  -      PT Goal Re-Cert Due Date  02/19/20  -         Time Calculation- PT    Total Timed Code Minutes- PT  0 minute(s)  -        User Key  (r) = Recorded By, (t) = Taken By, (c) = Cosigned By    Initials Name Provider Type    SS Radha Sorto, PT Physical Therapist        Therapy Charges for Today     Code Description Service Date Service Provider Modifiers Qty    57416718502 HC PT EVAL MOD COMPLEXITY 3 2/5/2020 Radha Sorto PT GP 1               Radha Sorto PT  2/5/2020

## 2020-02-05 NOTE — PROGRESS NOTES
PA Student Note     Primary Care : Penny Hidalgo MD    CHIEF COMPLAINT: Status post left total knee arthroplasty    HISTORY OF PRESENT ILLNESS:    Mr. Farheen Gmoez is a 73 yo male admitted to the surgical inpatient unit at [facility] after left total knee arthroplasty which took place today (2/5). He has a past medical history of osteoarthritis, depression, BPH, hearing loss, hyperlipidemia, hypertension, and macular degeneration. He has a past surgical history significant for right knee total arthroplasty completed with no complications in Oct. 2019.     He is visited at bedside today, with his wife present. He endorses mild pain in his left knee, but is resting comfortably and denies chest pain, shortness of breath, abdominal pain, nausea, or vomiting. He is mildly lethargic but is able to answer questions. He denies any problems and all questions were answered at bedside.     Past Medical History:   Diagnosis Date   • Arthritis    • BPH (benign prostatic hyperplasia)    • Cataract    • Depression    • [place] (hard of hearing)     wears hearing aides   • Hyperlipidemia    • Hypertension    • Macular degeneration     very minor       Past Surgical History:   Procedure Laterality Date   • BACK SURGERY     • HAND SURGERY Bilateral     carpal tunnel repair   • KNEE SURGERY      meniscus repair    • LUMBAR DISCECTOMY Bilateral 4/22/2019    Procedure: left and right  L4-5 lumbar decompression with dura repair;  Surgeon: Edson Khan MD;  Location: [facility];  Service: Neurosurgery   • ROTATOR CUFF REPAIR Right    • TOTAL KNEE ARTHROPLASTY Right 10/23/2019    Procedure: TOTAL KNEE ARTHROPLASTY, WITH LEFT KNEE INJECTION;  Surgeon: Boby Medina MD;  Location: [facility] OR;  Service: Orthopedics       Family History   Problem Relation Age of Onset   • Stroke Mother    • Diabetes Father    • No Known Problems Sister    • No Known Problems Brother    • Malig Hyperthermia Neg Hx        Social  History     Tobacco Use   • Smoking status: Former Smoker     Packs/day: 0.25     Years: 5.00     Pack years: 1.25     Last attempt to quit: 1995     Years since quittin.1   • Smokeless tobacco: Never Used   Substance Use Topics   • Alcohol use: No     Frequency: Never   • Drug use: No       Medications Prior to Admission   Medication Sig Dispense Refill Last Dose   • atorvastatin (LIPITOR) 20 MG tablet Take 20 mg by mouth Every Night.  3 Past Week at Unknown time   • calcium carbonate (OS-JUSTA) 600 MG tablet Take 600 mg by mouth Daily. Stop today for surgery   Past Week at Unknown time   • escitalopram (LEXAPRO) 10 MG tablet Take 10 mg by mouth Every Evening.  3 2020 at Unknown time   • finasteride (PROSCAR) 5 MG tablet Take 5 mg by mouth every night at bedtime.   2020 at Unknown time   • lisinopril (PRINIVIL,ZESTRIL) 40 MG tablet Take 40 mg by mouth Every Evening. Do not take 24 hours prior to surgery   Past Week at Unknown time   • Magnesium 250 MG tablet Take 250 mg by mouth Daily. Stop today for surgery   Past Week at Unknown time   • Multiple Vitamins-Minerals (EYE VITAMINS PO) Take 2 capsules by mouth. Stop today for surgery   Past Week at Unknown time   • Multiple Vitamins-Minerals (PRESERVISION AREDS 2 PO) Take 2 tablets by mouth Daily. Stop today for surgery   Past Week at Unknown time   • tamsulosin (FLOMAX) 0.4 MG capsule 24 hr capsule Take 1 capsule by mouth Every Evening.   2020 at Unknown time   • vitamin D (ERGOCALCIFEROL) 67037 units capsule capsule Take 50,000 Units by mouth 1 (One) Time Per Week. Stop today for sugery   Past Week at Unknown time       Allergies:  Penicillins    Immunization History   Administered Date(s) Administered   • FLUARIX/FLUZONE/AFLURIA/FLULAVAL QUAD 10/24/2019   • Fluzone High Dose =>65 Years ([employer] ONLY) 10/04/2016, 10/05/2017   • Hepatitis A 2018, 2018, 2019   • Hepatitis B 2018, 2018, 2019   • Pneumococcal Conjugate  13-Valent (PCV13) 10/08/2015   • Pneumococcal Polysaccharide (PPSV23) 10/01/2014   • Shingrix 02/15/2019, 08/03/2019   • Td 08/13/2015   • Zostavax 09/03/2014       REVIEW OF SYSTEMS:   Review of Systems   Constitution: Positive for malaise/fatigue.        Some confusion and lethargy  Oriented to person and place   Cardiovascular: Negative for chest pain and palpitations.   Respiratory: Negative for cough and shortness of breath.    Musculoskeletal: Positive for joint pain (mild left knee pain).   Gastrointestinal: Negative for abdominal pain, diarrhea, nausea and vomiting.   Neurological: Negative for headaches.       Vital Signs  Temp:  [97.3 °F (36.3 °C)-98.6 °F (37 °C)] 97.3 °F (36.3 °C)  Heart Rate:  [] 101  Resp:  [9-21] 12  BP: (111-154)/(66-90) 131/83         Results Review:      Lab Results (most recent)     None          Imaging Results (Most Recent)     Procedure Component Value Units Date/Time    XR Knee 1 or 2 View Left [[id number]] Collected:  02/05/20 1354     Updated:  02/05/20 1357    Narrative:       DATE OF EXAM:  2/5/2020 12:13 PM     PROCEDURE:  XR KNEE 1 OR 2 VW LEFT-     INDICATIONS:  Post-Op Knee Arthoplasty; M17.12-Unilateral primary osteoarthritis, left  knee; E66.9-Obesity, unspecified     COMPARISON:  August 6, 2019     TECHNIQUE:      One to two radiologic views of left knee were obtained.     FINDINGS:  Postoperative changes of left total knee arthroplasty are present. The  prosthesis appears in appropriate position and alignment and the bony  elements intact        Impression:       Satisfactory postop appearance following left total knee arthroplasty     Electronically Signed By-Orlando Mcconnell On:2/5/2020 1:55 PM  This report was finalized on [id number] by  Orlando Mcconnell, .            ECG/EMG Results (most recent)     None        Physical Exam   Constitutional: He is well-developed, well-nourished, and in no distress.   HENT:   Head: Normocephalic and atraumatic.   Eyes:  Conjunctivae are normal.   Neck: Neck supple.   Cardiovascular: Normal rate, regular rhythm, normal heart sounds and intact distal pulses. Exam reveals no gallop and no friction rub.   No murmur heard.  Pulmonary/Chest: Effort normal and breath sounds normal. He has no wheezes. He has no rales. He exhibits no tenderness.   Abdominal: Soft. Bowel sounds are normal. There is no tenderness. There is no guarding.   Musculoskeletal:   No distal edema bilaterally   Lymphadenopathy:     He has no cervical adenopathy.   Skin: Skin is warm and dry.         Assessment/Plan     Active Hospital Problems    Diagnosis  POA   • **Primary osteoarthritis of left knee [M17.12]  Yes   • BPH (benign prostatic hyperplasia) [N40.0]  Yes   • Hypertension [I10]  Yes   • Obesity (BMI 30-39.9) [E66.9]  Yes   • Vitamin D deficiency [E55.9]  Yes   • Dyslipidemia [E78.5]  Yes   • Depression [F32.9]  Yes      Resolved Hospital Problems   No resolved problems to display.     1. Primary osteoarthritis of left knee s/p total knee arthroplasty  Patient is stable and pain is well controlled. No significant swelling of left leg distal to surgical site and intact pedal pulses bilaterally.   --PT consult placed  --Continue pain control as needed per surgery recommendations    2. Hypertension  Blood pressure well-controlled. Continue to monitor.  --Continue lisinopril     3. Dyslipidemia  Most recent lipid panel Dec. 2018. At that point, not well-controlled. Follow-up with PCP.   --Continue atorvastatin    4. BPH  Stable, with no urinary complaints.   --Continue finasteride  --Continue tamsulosin    5. Depression  Unable to assess due to post-op status of the patient. Follow-up with PCP.  --Continue escitalopram     6. Vitamin D Deficiency   Within normal range in October 2018. Follow-up with PCP.  --Continue vitamin D supplement

## 2020-02-05 NOTE — PLAN OF CARE
Patient is up to the chair doing well with ambulation.  Problem: Patient Care Overview  Goal: Plan of Care Review  2/5/2020 1800 by Nat Gaytan LPN  Outcome: Ongoing (interventions implemented as appropriate)  2/5/2020 1759 by Nat Gaytan LPN  Outcome: Ongoing (interventions implemented as appropriate)  Goal: Individualization and Mutuality  2/5/2020 1800 by Nat Gaytan LPN  Outcome: Ongoing (interventions implemented as appropriate)  2/5/2020 1759 by Nat Gaytan LPN  Outcome: Ongoing (interventions implemented as appropriate)  Goal: Discharge Needs Assessment  2/5/2020 1800 by Nat Gaytan LPN  Outcome: Ongoing (interventions implemented as appropriate)  2/5/2020 1759 by Nat Gaytan LPN  Outcome: Ongoing (interventions implemented as appropriate)  Goal: Interprofessional Rounds/Family Conf  2/5/2020 1800 by Nat Gaytan LPN  Outcome: Ongoing (interventions implemented as appropriate)  2/5/2020 1759 by Nat Gaytan LPN  Outcome: Ongoing (interventions implemented as appropriate)     Problem: Knee Arthroplasty (Total, Partial) (Adult)  Goal: Signs and Symptoms of Listed Potential Problems Will be Absent, Minimized or Managed (Knee Arthroplasty)  Description  Signs and symptoms of listed potential problems will be absent, minimized or managed by discharge/transition of care (reference Knee Arthroplasty (Total, Partial) (Adult) CPG).  2/5/2020 1800 by Nat Gaytan LPN  Outcome: Ongoing (interventions implemented as appropriate)  2/5/2020 1759 by Nat Gaytan LPN  Outcome: Ongoing (interventions implemented as appropriate)  Goal: Anesthesia/Sedation Recovery  2/5/2020 1800 by Nat aGytan LPN  Outcome: Ongoing (interventions implemented as appropriate)  2/5/2020 1759 by Nat Gaytan LPN  Outcome: Ongoing (interventions implemented as appropriate)     Problem: Pain, Acute (Adult)  Goal: Identify Related Risk Factors and Signs and Symptoms  Description  Related risk  factors and signs and symptoms are identified upon initiation of Human Response Clinical Practice Guideline (CPG).  2/5/2020 1800 by Nat Gaytan LPN  Outcome: Ongoing (interventions implemented as appropriate)  2/5/2020 1759 by Nat Gaytan LPN  Outcome: Ongoing (interventions implemented as appropriate)  Goal: Acceptable Pain Control/Comfort Level  Description  Patient will demonstrate the desired outcomes by discharge/transition of care.  2/5/2020 1800 by Nat Gaytan LPN  Outcome: Ongoing (interventions implemented as appropriate)  2/5/2020 1759 by Nat Gaytan LPN  Outcome: Ongoing (interventions implemented as appropriate)

## 2020-02-05 NOTE — ANESTHESIA POSTPROCEDURE EVALUATION
Patient: Farheen Gomez    Procedure Summary     Date:  02/05/20 Room / Location:  [facility] OR 12 / [facility] MAIN OR    Anesthesia Start:  0921 Anesthesia Stop:  1116    Procedure:  TOTAL KNEE ARTHROPLASTY (Left Knee) Diagnosis:       Primary osteoarthritis of left knee      Obesity (BMI 30-39.9)      (Primary osteoarthritis of left knee [M17.12])      (Obesity (BMI 30-39.9) [E66.9])    Surgeon:  Boby Medina MD Provider:  Mars Wade MD    Anesthesia Type:  general with block ASA Status:  2          Anesthesia Type: general with block    Vitals  Vitals Value Taken Time   /74 2/5/2020  1:48 PM   Temp 98.6 °F (37 °C) 2/5/2020  1:48 PM   Pulse 101 2/5/2020  1:49 PM   Resp 10 2/5/2020  1:48 PM   SpO2 100 % 2/5/2020  1:49 PM   Vitals shown include unvalidated device data.        Post Anesthesia Care and Evaluation    Patient location during evaluation: PACU  Patient participation: complete - patient participated  Level of consciousness: awake, responsive to physical stimuli and responsive to verbal stimuli  Pain scale: See nurse's notes for pain score.  Pain management: adequate  Airway patency: patent  Anesthetic complications: No anesthetic complications  PONV Status: none  Cardiovascular status: acceptable  Respiratory status: acceptable and nasal airway  Hydration status: acceptable    Comments: Patient seen and examined postoperatively; vital signs stable; SpO2 greater than or equal to 90%; cardiopulmonary status stable; nausea/vomiting adequately controlled; pain adequately controlled; no apparent anesthesia complications; patient discharged from anesthesia care when discharge criteria were met

## 2020-02-05 NOTE — ANESTHESIA PREPROCEDURE EVALUATION
Anesthesia Evaluation     Patient summary reviewed and Nursing notes reviewed   NPO Solid Status: > 8 hours  NPO Liquid Status: > 6 hours           Airway   Mallampati: II  TM distance: >3 FB  Neck ROM: full  No difficulty expected and Anterior  Dental - normal exam     Pulmonary - negative pulmonary ROS and normal exam   Cardiovascular - normal exam    (+) hypertension, hyperlipidemia,       Neuro/Psych- negative ROS  GI/Hepatic/Renal/Endo    (+) obesity,  GERD well controlled,  renal disease,     Musculoskeletal     (+) neck pain,   Abdominal  - normal exam    Bowel sounds: normal.   Substance History - negative use     OB/GYN negative ob/gyn ROS         Other   arthritis,          Phys Exam Other: Glidescope needed for previous intubation per chart review              Anesthesia Plan    ASA 2     general with block   (ERAS protocol.  PO meds ordered per surgeon.  Adductor canal block preop)  intravenous induction     Anesthetic plan, all risks, benefits, and alternatives have been provided, discussed and informed consent has been obtained with: patient.

## 2020-02-05 NOTE — PLAN OF CARE
Patient is up to the chair and doing well.  Problem: Patient Care Overview  Goal: Plan of Care Review  Outcome: Ongoing (interventions implemented as appropriate)  Goal: Individualization and Mutuality  Outcome: Ongoing (interventions implemented as appropriate)  Goal: Discharge Needs Assessment  Outcome: Ongoing (interventions implemented as appropriate)  Goal: Interprofessional Rounds/Family Conf  Outcome: Ongoing (interventions implemented as appropriate)     Problem: Knee Arthroplasty (Total, Partial) (Adult)  Goal: Signs and Symptoms of Listed Potential Problems Will be Absent, Minimized or Managed (Knee Arthroplasty)  Description  Signs and symptoms of listed potential problems will be absent, minimized or managed by discharge/transition of care (reference Knee Arthroplasty (Total, Partial) (Adult) CPG).  Outcome: Ongoing (interventions implemented as appropriate)  Goal: Anesthesia/Sedation Recovery  Outcome: Ongoing (interventions implemented as appropriate)     Problem: Pain, Acute (Adult)  Goal: Identify Related Risk Factors and Signs and Symptoms  Description  Related risk factors and signs and symptoms are identified upon initiation of Human Response Clinical Practice Guideline (CPG).  Outcome: Ongoing (interventions implemented as appropriate)  Goal: Acceptable Pain Control/Comfort Level  Description  Patient will demonstrate the desired outcomes by discharge/transition of care.  Outcome: Ongoing (interventions implemented as appropriate)

## 2020-02-05 NOTE — ANESTHESIA PROCEDURE NOTES
Peripheral Block    Pre-sedation assessment completed: 2/5/2020 8:10 AM    Patient reassessed immediately prior to procedure    Patient location during procedure: pre-op  Start time: 2/5/2020 8:10 AM  Reason for block: at surgeon's request and post-op pain management  Performed by  Anesthesiologist: Mars Wade MD  Preanesthetic Checklist  Completed: patient identified, site marked, surgical consent, pre-op evaluation, timeout performed, IV checked, risks and benefits discussed and monitors and equipment checked  Prep:  Pt Position: supine  Sterile barriers:cap, gloves and mask  Prep: ChloraPrep  Patient monitoring: blood pressure monitoring, continuous pulse oximetry and EKG  Procedure  Sedation:yes    Guidance:ultrasound guided  ULTRASOUND INTERPRETATION. Using ultrasound guidance a 21 G gauge needle was placed in close proximity to the nerve, at which point, under ultrasound guidance anesthetic was injected in the area of the nerve and spread of the anesthesia was seen on ultrasound in close proximity thereto.  There were no abnormalities seen on ultrasound; a digital image was taken; and the patient tolerated the procedure with no complications. Images:still images obtained, printed/placed on chart    Laterality:left  Block Type:adductor canal block  Injection Technique:single-shot  Needle Type:echogenic  Needle Gauge:21 G    Sedation medications used: midazolam (VERSED) injection, 2 mg  fentaNYL citrate (PF) (SUBLIMAZE) injection, 50 mcg  Medications Used: dexamethasone (DECADRON) injection, 4 mg  ropivacaine (NAROPIN) 0.5 % injection, 25 mL  Med admintered at 2/5/2020 8:16 AM      Post Assessment  Injection Assessment: negative aspiration for heme, no paresthesia on injection and incremental injection  Patient Tolerance:comfortable throughout block  Complications:no

## 2020-02-05 NOTE — ANESTHESIA POSTPROCEDURE EVALUATION
Patient: Farheen Gomez    Procedure Summary     Date:  02/05/20 Room / Location:  [facility] OR 12 / [facility] MAIN OR    Anesthesia Start:  0921 Anesthesia Stop:  1116    Procedure:  TOTAL KNEE ARTHROPLASTY (Left Knee) Diagnosis:       Primary osteoarthritis of left knee      Obesity (BMI 30-39.9)      (Primary osteoarthritis of left knee [M17.12])      (Obesity (BMI 30-39.9) [E66.9])    Surgeon:  Boby Medina MD Provider:  Mars Wade MD    Anesthesia Type:  general with block ASA Status:  2          Anesthesia Type: general with block    Vitals  Vitals Value Taken Time   /75 2/5/2020 11:23 AM   Temp 97.6 °F (36.4 °C) 2/5/2020 11:16 AM   Pulse 85 2/5/2020 11:25 AM   Resp 11 2/5/2020 11:24 AM   SpO2 94 % 2/5/2020 11:25 AM   Vitals shown include unvalidated device data.        Post Anesthesia Care and Evaluation    Patient location during evaluation: PACU  Patient participation: complete - patient participated  Level of consciousness: awake  Pain scale: See nurse's notes for pain score.  Pain management: adequate  Airway patency: patent  Anesthetic complications: No anesthetic complications  PONV Status: none  Cardiovascular status: acceptable  Respiratory status: acceptable  Hydration status: acceptable    Comments: Patient seen and examined postoperatively; vital signs stable; SpO2 greater than or equal to 90%; cardiopulmonary status stable; nausea/vomiting adequately controlled; pain adequately controlled; no apparent anesthesia complications; patient discharged from anesthesia care when discharge criteria were met

## 2020-02-05 NOTE — CONSULTS
[facility] Medicine Services      Patient Name: Farheen Gomez  : 1945  MRN: [id number]  Primary Care Physician: Penny Hidalgo MD  Date of admission: 2020    Patient Care Team:  Penny Hidalgo MD as PCP - General  Cali Barrios MD as PCP - Claims Attributed          Subjective   History Present Illness     Chief Complaint: left knee pain      Mr. Gomez is a 74 y.o. male with a history of osteoarthritis, HTN, HLD, BPH, and depression who presented to [facility] on 2020 and underwent a left total knee arthroplasty by Dr. Medina. The patient was admitted to the surgical inpatient unit postoperatively and we were consulted for medical management.     The patient is lying in bed in his room. He is drowsy, but easy to arouse. He states he feels confused and very tired, but is oriented to person and place. He reports mild left knee pain. He denies any other complaints. His wife is at the bedside.        Review of Systems   Constitution: Positive for malaise/fatigue.   Musculoskeletal: Positive for joint pain.        Left knee   Psychiatric/Behavioral: Positive for altered mental status.   All other systems reviewed and are negative.      Personal History     Past Medical History:   Past Medical History:   Diagnosis Date   • Arthritis    • BPH (benign prostatic hyperplasia)    • Cataract    • Depression    • [place] (hard of hearing)     wears hearing aides   • Hyperlipidemia    • Hypertension    • Macular degeneration     very minor       Surgical History:      Past Surgical History:   Procedure Laterality Date   • BACK SURGERY     • HAND SURGERY Bilateral     carpal tunnel repair   • KNEE SURGERY      meniscus repair    • LUMBAR DISCECTOMY Bilateral 2019    Procedure: left and right  L4-5 lumbar decompression with dura repair;  Surgeon: Edson Khan MD;  Location: [facility];  Service: Neurosurgery   • ROTATOR CUFF REPAIR Right    •  TOTAL KNEE ARTHROPLASTY Right 10/23/2019    Procedure: TOTAL KNEE ARTHROPLASTY, WITH LEFT KNEE INJECTION;  Surgeon: Boby Medina MD;  Location: [facility] OR;  Service: Orthopedics       Family History: family history includes Diabetes in his father; No Known Problems in his brother and sister; Stroke in his mother. Otherwise pertinent FHx was reviewed and unremarkable.     Social History:  reports that he quit smoking about 25 years ago. He has a 1.25 pack-year smoking history. He has never used smokeless tobacco. He reports that he does not drink alcohol or use drugs.      Medications:  Prior to Admission medications    Medication Sig Start Date End Date Taking? Authorizing Provider   atorvastatin (LIPITOR) 20 MG tablet Take 20 mg by mouth Every Night. 3/21/19  Yes Bubba Merchant MD   calcium carbonate (OS-JUSTA) 600 MG tablet Take 600 mg by mouth Daily. Stop today for surgery   Yes Bubba Merchant MD   escitalopram (LEXAPRO) 10 MG tablet Take 10 mg by mouth Every Evening. 3/25/19  Yes Bubba Merchant MD   finasteride (PROSCAR) 5 MG tablet Take 5 mg by mouth every night at bedtime. 2/26/19  Yes Bubba Merchant MD   lisinopril (PRINIVIL,ZESTRIL) 40 MG tablet Take 40 mg by mouth Every Evening. Do not take 24 hours prior to surgery 1/25/19  Yes Bubba Merchant MD   Magnesium 250 MG tablet Take 250 mg by mouth Daily. Stop today for surgery   Yes Bubba Merchant MD   Multiple Vitamins-Minerals (EYE VITAMINS PO) Take 2 capsules by mouth. Stop today for surgery   Yes Bubba Merchant MD   Multiple Vitamins-Minerals (PRESERVISION AREDS 2 PO) Take 2 tablets by mouth Daily. Stop today for surgery   Yes Bubba Merchant MD   tamsulosin (FLOMAX) 0.4 MG capsule 24 hr capsule Take 1 capsule by mouth Every Evening. 3/8/19  Yes Bubba Merchant MD   vitamin D (ERGOCALCIFEROL) 36377 units capsule capsule Take 50,000 Units by mouth 1 (One) Time Per Week. Stop today for sugery    Yes Provider, MD Bubba   mupirocin (BACTROBAN) 2 % ointment Apply a pea-sized amount to each nostril twice daily for 5 days prior to surgery. 1/7/20 2/5/20 Yes Jaci Ocasio PA   azithromycin (ZITHROMAX Z-SHERIE) 250 MG tablet Take 2 tablets the first day, then 1 tablet daily for 4 days. 1/6/20 2/5/20  Penny Hidalgo MD       Allergies:    Allergies   Allergen Reactions   • Penicillins Hives     Tongue bled       Objective   Objective     Vital Signs  Temp:  [97.3 °F (36.3 °C)-98.6 °F (37 °C)] 97.3 °F (36.3 °C)  Heart Rate:  [] 101  Resp:  [9-21] 12  BP: (111-154)/(66-90) 131/83  SpO2:  [92 %-100 %] 97 %  on  Flow (L/min):  [2-6] 3;   Device (Oxygen Therapy): nasal cannula  Body mass index is 31.66 kg/m².    Physical Exam   Constitutional: He appears well-developed.   obese   HENT:   Head: Normocephalic and atraumatic.   Mouth/Throat: Oropharynx is clear and moist.   Eyes: Pupils are equal, round, and reactive to light. Conjunctivae and EOM are normal.   Neck: Normal range of motion. Neck supple.   Cardiovascular: Normal rate, regular rhythm, normal heart sounds and intact distal pulses.   Pulmonary/Chest: Effort normal and breath sounds normal.   Abdominal: Soft. He exhibits no distension. Bowel sounds are decreased. There is no tenderness.   Musculoskeletal: He exhibits no edema.   LLE limited ROM s/p TKR   Neurological:   Drowsy, easy to arouse, oriented x 2   Skin: Skin is warm and dry. Capillary refill takes less than 2 seconds.   Left knee surgical incision covered with dry dressing   Psychiatric: He has a normal mood and affect. His behavior is normal.   Vitals reviewed.      Results Review:  I have personally reviewed most recent lab results, microbiology results and radiology images and interpretations and agree with findings.              Invalid input(s):  ALKPHOS  Estimated Creatinine Clearance: 95.1 mL/min (by C-G formula based on SCr of 0.82 mg/dL).  Brief Urine Lab Results  (Last  result in the past 365 days)      Color   Clarity   Blood   Leuk Est   Nitrite   Protein   CREAT   Urine HCG        01/29/20 1236 Yellow Clear Negative Negative Negative Negative               Microbiology Results (last 10 days)     Procedure Component Value - Date/Time    MRSA Screen, PCR - Swab, Nares [[id number]]  (Normal) Collected:  01/29/20 1319    Lab Status:  Final result Specimen:  Swab from Nares Updated:  01/29/20 1440     MRSA PCR No MRSA Detected          ECG/EMG Results (most recent)     None          Results for orders placed in visit on 11/07/19   Duplex Venous Lower Extremity - Right CAR    Narrative · Normal right lower extremity venous duplex scan.               Xr Chest 2 Vw    Result Date: 1/29/2020  No acute chest findings.  Electronically Signed By-Dr. Elizabeth Encarnacion MD On:1/29/2020 12:55 PM This report was finalized on [id number] by Dr. Elizabeth Encarnacion MD.    Xr Knee 1 Or 2 View Left    Result Date: 2/5/2020  Satisfactory postop appearance following left total knee arthroplasty  Electronically Signed By-Orlando Mcconnell On:2/5/2020 1:55 PM This report was finalized on [id number] by  Orlando Mcconnell, .        Estimated Creatinine Clearance: 95.1 mL/min (by C-G formula based on SCr of 0.82 mg/dL).    Assessment/Plan   Assessment/Plan       Active Hospital Problems    Diagnosis  POA   • **Primary osteoarthritis of left knee [M17.12]  Yes     Priority: High   • BPH (benign prostatic hyperplasia) [N40.0]  Yes   • Hypertension [I10]  Yes   • Obesity (BMI 30-39.9) [E66.9]  Yes   • Vitamin D deficiency [E55.9]  Yes   • Dyslipidemia [E78.5]  Yes   • Depression [F32.9]  Yes      Resolved Hospital Problems   No resolved problems to display.         Primary osteoarthritis of left knee  -status post left total knee arthroplasty (02/05/2020)  -post-op care per orthopedic surgery  -PRN analgesia per surgery  -fall risk precautions  -PT/OT to eval and treat  -CM consulted for DC planning    Essential  Hypertension, chronic, controlled  -continue lisinopril   -monitor BP    BPH (benign prostatic hyperplasia)  -continue Proscar and Flomax    Depression  -continue Lexapro     Obesity (BMI 30-39.9)  -BMI 31.66  -encourage lifestyle modifications     Dyslipidemia  -continue statin    Vitamin D deficiency  -on weekly supplement      VTE Prophylaxis - SCDs.    CODE STATUS:    There are no questions and answers to display.       Disposition:  defer      Electronically signed by ALYSSA Murray, 02/05/20, 3:13 PM.  [facility] Hospitalist Team    I have reviewed the notes, assessments, and/or procedures performed by Elly, I concur with her/his documentation of Farheen Gomez.    Patient personally seen and examined.  Patient is a 71-year-old gentleman postoperatively for elective knee replacement and we are managing for medical problems.  I have reviewed the chart and we will continue current medication as prescribed also monitor labs as well as monitor vital signs and hemoglobin.  Patient will be co-managed with orthopedic service at this time during this hospital stay.

## 2020-02-05 NOTE — OP NOTE
TOTAL KNEE ARTHROPLASTY OPERATIVE     PATIENT NAME:Farheen Gomez     YOB: 1945     ATTENDING PHYSICIAN: Boby Medina MD     DATE OF PROCEDURE: 2/5/2020     PREOPERATIVE DIAGNOSIS: Severe degenerative osteoarthritis, left knee.    POSTOPERATIVE DIAGNOSIS: Post-Op Diagnosis Codes:     * Primary osteoarthritis of left knee [M17.12]     * Obesity (BMI 30-39.9) [E66.9]    PRINCIPAL DIAGNOSIS: Severe degenerative osteoarthritis left knee with a varus deformity.    PROCEDURE: Left total knee arthroplasty.    SURGEON:  Boby Medina M.D.    ASSISTANT: Vinay Seaman,  assist.    ANESTHESIOLOGIST: Dr. Mars Wade MD.    ANESTHESIA: Adductor canal block for postop pain control followed by general anesthesia.    POSITION: Supine on the operating table.    DRAINS: None    COMPLICATIONS: None    ESTIMATED BLOOD LOSS: 100ml    IMPLANT USED: Latrell Persona total knee replacement system, #10  posterior cruciate retaining femoral component, number F tibia with a 30 mm intramedullary stem, 10 mm thick ultraconstrained, UC, vitamin E impregnated polyethylene insert, #38 patella anchored into position using Refobicin antibiotic impregnated bone cement.     SPECIMENS: * No orders in the log *        INDICATION: The patient has been having very significant pain and discomfort in the knee.  We had scheduled the patient for total knee replacement after all forms of conservative nonoperative care had failed to provide adequate relief of symptoms.  Patient understood all the risks and benefits which were discussed in great detail including the possibility of death, infection, myocardial infarction, DVT, pulmonary embolism, stiffness of the knee, wound infection and breakdown, possibility of revision surgery, neurovascular compromise, pneumonia, pulmonary embolism      DETAILS OF PROCEDURE: Surgical timeout was called. Operative extremity was correctly identified in the operating room suite. Extremity was prepped and  draped in a standard fashion. Patient was administered antibiotics per the SCIP protocol.  Patient was placed under appropriate anesthetic.      Patient was administered IV antibiotics per SCIP protocol and hospital policy. The patient’s operative extremity was correctly identified in the operating room suite. The patient was placed under appropriate anesthesia. Tourniquet was applied. The leg was prepped and draped in a standard fashion.  An anterior approach was performed and a quad sparing, subvastus approach was carried out. The patellofemoral ligament was resected. Medial soft tissue release was carried out on the medial face of the tibia to balance the soft tissues to the MCL. Tibial Osteophytes were resected. Severe bone-on-bone appearance was noted. Synovectomy was carried out. The Synovium was thickened and hypertrophic. The PCL and MCL were carefully protected throughout the entire procedure. The distal femur was mapped. A 10 mm thick cut was made off the distal femur. A measuring guide was used and #10 posterior cruciate retaining femoral component jig was found to be the best fit. Anterior, posterior and chamfer cuts were created. Care was taken to prevent a distal femoral notch. The proximal tibia was then mapped with navigation. 4 mm of the bone was resected off the medial tibial plateau.  A number F tibia was found to be the best fit sitting nicely on the cortical margins of the proximal tibial metaphysis.  A trial reduction was carried out. Rotation and orientation of the components were carefully marked. Flexion and extension gaps were checked and found to be symmetric. The stability of the components was checked in full extension, mid- flexion and full flexion.The patella was everted, it was measured and cut. Patellar Tracking was excellent.  A #38 patella was found to be the best fit with good native bone coverage.  A Lateral release was not deemed necessary. Femoral lug holes were drilled. The  Proximal Tibia was die punched. Patellar anchor holes were drilled.    The cancellous bone was lavaged with a Pulse lavage  and dried. Cement was mixed on the back table. Components were cemented into position sequentially, starting with the number F tibial component and going to the #10, standard, posterior cruciate retaining femur and then the #38 patella. The excess amounts of bone cement were removed from the bone-metal interface. Trial reduction was carried out once the cement was fully cured. A 10 mm X three tibial polyethylene insert was then locked into position on the tibial tray. Wound was lavaged with Bacitracin irrigating solution. Tourniquet was deflated, hemostasis achieved. An analgesic cocktail was injected intra-articularly into the joint capsule and ligamentous insertions on bone for post op pain relief. The arthrotomy was repaired in 60 degrees of flexion. Subcutaneous sutures were applied. Occlusive dressing was applied. No complications were encountered. Sponge count and needle count were correct. The patient was reversed from anesthesia and taken from the operating room to the recovery room in stable condition. I discussed the satisfactory performance of this procedure with the patient’s family and answered all questions for them.       Boby Medina MD  2/5/2020  11:06 AM

## 2020-02-05 NOTE — ANESTHESIA PROCEDURE NOTES
Airway  Urgency: elective    Date/Time: 2/5/2020 9:33 AM  Airway not difficult    General Information and Staff    Patient location during procedure: OR    Indications and Patient Condition  Indications for airway management: airway protection    Preoxygenated: yes  Mask difficulty assessment: 0 - not attempted    Final Airway Details  Final airway type: supraglottic airway      Successful airway: LMA  Size 4    Number of attempts at approach: 1  Assessment: lips, teeth, and gum same as pre-op and atraumatic intubation    Additional Comments  Soft bite block

## 2020-02-05 NOTE — H&P
History & Physical       Patient: Farheen Gomez    Date of Admission: 2/5/2020  5:15 AM    YOB: 1945    Medical Record Number: [id number]    Attending Physician: Boby Medina MD        Chief Complaints: Primary osteoarthritis of left knee [M17.12]  Obesity (BMI 30-39.9) [E66.9]  Primary osteoarthritis of left knee [M17.12]      History of Present Illness: 74 y.o. male presents with Primary osteoarthritis of left knee [M17.12]  Obesity (BMI 30-39.9) [E66.9]  Primary osteoarthritis of left knee [M17.12]. Onset of symptoms was gradual and slowly and progressively worse.  Symptoms are associated with pain and difficulty with ambulation with a progressive varus deformity.  Symptoms are aggravated by going up and down the steps and walking on an incline surface..   Symptoms improve with using a brace and walking with a cane.. Patient is now being admitted to the services of Boby Medina MD for further evaluation and treatment.        Allergies   Allergen Reactions   • Penicillins Hives     Tongue bled         Home Medications:  Medications Prior to Admission   Medication Sig Dispense Refill Last Dose   • atorvastatin (LIPITOR) 20 MG tablet Take 20 mg by mouth Every Night.  3 Taking   • azithromycin (ZITHROMAX Z-SHERIE) 250 MG tablet Take 2 tablets the first day, then 1 tablet daily for 4 days. 6 tablet 0 Taking   • calcium carbonate (OS-JUSTA) 600 MG tablet Take 600 mg by mouth Daily. Stop today for surgery   Taking   • escitalopram (LEXAPRO) 10 MG tablet Take 10 mg by mouth Every Evening.  3 Taking   • finasteride (PROSCAR) 5 MG tablet Take 5 mg by mouth every night at bedtime.   Taking   • lisinopril (PRINIVIL,ZESTRIL) 40 MG tablet Take 40 mg by mouth Every Evening. Do not take 24 hours prior to surgery   Taking   • Magnesium 250 MG tablet Take 250 mg by mouth Daily. Stop today for surgery   Taking   • Multiple Vitamins-Minerals (EYE VITAMINS PO) Take 2 capsules by mouth. Stop today for surgery   Taking    • Multiple Vitamins-Minerals (PRESERVISION AREDS 2 PO) Take 2 tablets by mouth Daily. Stop today for surgery   Taking   • mupirocin (BACTROBAN) 2 % ointment Apply a pea-sized amount to each nostril twice daily for 5 days prior to surgery. 22 g 0    • tamsulosin (FLOMAX) 0.4 MG capsule 24 hr capsule Take 1 capsule by mouth Every Evening.   Taking   • vitamin D (ERGOCALCIFEROL) 02177 units capsule capsule Take 50,000 Units by mouth 1 (One) Time Per Week. Stop today for sugery   Taking       Current Medications:  Scheduled Meds:  Continuous Infusions:  No current facility-administered medications for this encounter.   PRN Meds:.       Past Medical History:   Diagnosis Date   • Arthritis    • BPH (benign prostatic hyperplasia)    • Cataract    • Depression    • [place] (hard of hearing)     wears hearing aides   • Hyperlipidemia    • Hypertension    • Macular degeneration     very minor        Past Surgical History:   Procedure Laterality Date   • BACK SURGERY     • HAND SURGERY Bilateral     carpal tunnel repair   • KNEE SURGERY      meniscus repair    • LUMBAR DISCECTOMY Bilateral 2019    Procedure: left and right  L4-5 lumbar decompression with dura repair;  Surgeon: Edson Khan MD;  Location: [facility] OR;  Service: Neurosurgery   • ROTATOR CUFF REPAIR Right    • TOTAL KNEE ARTHROPLASTY Right 10/23/2019    Procedure: TOTAL KNEE ARTHROPLASTY, WITH LEFT KNEE INJECTION;  Surgeon: Boby Medina MD;  Location: [facility];  Service: Orthopedics        Social History     Occupational History   • Occupation: retired   Tobacco Use   • Smoking status: Former Smoker     Packs/day: 0.25     Years: 5.00     Pack years: 1.25     Last attempt to quit:      Years since quittin.1   • Smokeless tobacco: Never Used   Substance and Sexual Activity   • Alcohol use: No     Frequency: Never   • Drug use: No   • Sexual activity: Defer     Partners: Female     Birth control/protection: None    Social History      Social History Narrative   • Not on file        Family History   Problem Relation Age of Onset   • Stroke Mother    • Diabetes Father    • No Known Problems Sister    • No Known Problems Brother    • Malig Hyperthermia Neg Hx          Review of Systems:   HEENT: Patient denies any headaches, vision changes, change in hearing, or tinnitus, Patient denies any rhinorrhea,epistaxis, sinus pain, mouth or dental problems, sore throat or hoarseness, or dysphagia  Pulmonary: Patient denies any cough, congestion, SOA, or wheezing  Cardiovascular: Patient denies any chest pain, dyspnea, palpitations, weakness, intolerance of exercise, varicosities, swelling of extremities, known murmur  Gastrointestinal:  Patient denies nausea, vomiting, diarrhea, constipation, loss  of appetite, change in appetite, dysphagia, gas, heartburn, melena, change in bowel habits, use of laxatives or other drugs to alter the function of the gastrointestinal tract.  Genital/Urinary: Patient denies dysuria, change in color of urine, change in frequency of urination, pain with urgency, incontinence, retention, or nocturia.  Musculoskeletal: Patient denies increased warmth; redness; or swelling of joints; limitation of function; deformity; crepitation: pain in a joint or an extremity, the neck, or the back, especially with movement.  Neurological: Patient denies dizziness, tremor, ataxia, difficulty in speaking, change in speech, paresthesia, loss of sensation, seizures, syncope, changes in memory.  Endocrine system: Patient denies tremors, palpitations, intolerance of heat or cold, polyuria, polydipsia, polyphagia, diaphoresis, exophthalmos, or goiter.  Psychological: Patient denies thoughts/plans or harming self or other; depression,  insomnia, night terrors, alicia, memory loss, disorientation.  Skin: Patient denies any bruising, rashes, discoloration, pruritus, wounds, ulcers, decubiti, changes in the hair or nails  Hematopoietic: Patient denies  history of spontaneous or excessive bleeding, epistaxis, hematuria, melena, fatigue, enlarged or tender lymph nodes, pallor, history of anemia.    Physical Exam: 74 y.o. male  There were no vitals filed for this visit.    General Appearance:          Alert, cooperative, in no acute distress                                                 Head:    Normocephalic, without obvious abnormality, atraumatic   Eyes:            Lids and lashes normal, conjunctivae and sclerae normal, no   icterus, no pallor, corneas clear, PERRLA   Ears:    Ears appear intact with no abnormalities noted   Throat:   No oral lesions, no thrush, oral mucosa moist   Neck:   No adenopathy, supple, trachea midline, no thyromegaly, no   carotid bruit, no JVD   Back:     No kyphosis present, no scoliosis present, no skin lesions,      erythema or scars, no tenderness to percussion or                   palpation,   range of motion normal   Lungs:     Clear to auscultation,respirations regular, even and                  unlabored    Heart:    Regular rhythm and normal rate, normal S1 and S2, no            murmur, no gallop, no rub, no click   Chest Wall:    No abnormalities observed   Abdomen:     Normal bowel sounds, no masses, no organomegaly, soft        non-tender, non-distended, no guarding, no rebound                tenderness   Rectal:     Deferred   Extremities:   Tenderness over medial aspect of the left knee. Moves all extremities well, no edema,   no cyanosis, no redness   Pulses:   Pulses palpable and equal bilaterally   Skin:   No bleeding, bruising or rash   Lymph nodes:   No palpable adenopathy   Neurologic:   Cranial nerves 2 - 12 grossly intact, sensation intact, DTR       present and equal bilaterally      Left knee. Patient has crepitus throughout range of motion. Positive patellar grind test. Mild effusion. Lachman is negative. Pivot shift is negative. Anterior and posterior drawer signs are negative. Significant joint line  tenderness is noted on the medial aspect of the knee. Patient has a varus orientation of the knee. There is fullness and tenderness in the Popliteal fossa. Mild distention of a Popliteal cyst is noted in this location. Range of motion in flexion is from 0-110 degrees. Neurovascular status is intact.  Dorsalis pedis and posterior tibial artery pulses are palpable. Common peroneal nerve function is well preserved. Patient's gait is cautious and antalgic. Skin and soft tissues are mildly swollen, consistent with synovitis and effusion. The patient has a significant limp with the first few steps after starting the gait cycle. Getting out of a chair takes a lot of effort due to pain on knee flexion.     Diagnostic Tests:  No visits with results within 2 Day(s) from this visit.   Latest known visit with results is:   Appointment on 01/29/2020   Component Date Value Ref Range Status   • Color, UA 01/29/2020 Yellow  Yellow, Straw Final   • Appearance, UA 01/29/2020 Clear  Clear Final   • pH, UA 01/29/2020 6.0  5.0 - 8.0 Final   • Specific Gravity, UA 01/29/2020 1.024  1.005 - 1.030 Final   • Glucose, UA 01/29/2020 Negative  Negative Final   • Ketones, UA 01/29/2020 Negative  Negative Final   • Bilirubin, UA 01/29/2020 Negative  Negative Final   • Blood, UA 01/29/2020 Negative  Negative Final   • Protein, UA 01/29/2020 Negative  Negative Final   • Leuk Esterase, UA 01/29/2020 Negative  Negative Final   • Nitrite, UA 01/29/2020 Negative  Negative Final   • Urobilinogen, UA 01/29/2020 0.2 E.U./dL  0.2 - 1.0 E.U./dL Final   • ABO Type 01/29/2020 O   Final   • RH type 01/29/2020 Positive   Final   • Antibody Screen 01/29/2020 Negative   Final   • T&S Expiration Date 01/29/2020 2/7/2020 11:59:00 PM   Final   • PTT 01/29/2020 24.5  24.0 - 31.0 seconds Final   • Protime 01/29/2020 9.8  9.6 - 11.7 Seconds Final   • INR 01/29/2020 0.92  0.90 - 1.10 Final   • Hemoglobin A1C 01/29/2020 5.5  3.5 - 5.6 % Final   • Glucose 01/29/2020 105*  65 - 99 mg/dL Final   • BUN 01/29/2020 13  8 - 23 mg/dL Final   • Creatinine 01/29/2020 0.82  0.76 - 1.27 mg/dL Final   • Sodium 01/29/2020 141  136 - 145 mmol/L Final   • Potassium 01/29/2020 4.5  3.5 - 5.2 mmol/L Final   • Chloride 01/29/2020 103  98 - 107 mmol/L Final   • CO2 01/29/2020 29.0  22.0 - 29.0 mmol/L Final   • Calcium 01/29/2020 9.0  8.6 - 10.5 mg/dL Final   • eGFR Non African Amer 01/29/2020 92  >60 mL/min/1.73 Final   • BUN/Creatinine Ratio 01/29/2020 15.9  7.0 - 25.0 Final   • Anion Gap 01/29/2020 9.0  5.0 - 15.0 mmol/L Final   • WBC 01/29/2020 4.50  3.40 - 10.80 10*3/mm3 Final   • RBC 01/29/2020 4.55  4.14 - 5.80 10*6/mm3 Final   • Hemoglobin 01/29/2020 13.9  13.0 - 17.7 g/dL Final   • Hematocrit 01/29/2020 40.2  37.5 - 51.0 % Final   • MCV 01/29/2020 88.2  79.0 - 97.0 fL Final   • MCH 01/29/2020 30.6  26.6 - 33.0 pg Final   • MCHC 01/29/2020 34.7  31.5 - 35.7 g/dL Final   • RDW 01/29/2020 13.2  12.3 - 15.4 % Final   • RDW-SD 01/29/2020 40.7  37.0 - 54.0 fl Final   • MPV 01/29/2020 6.3  6.0 - 12.0 fL Final   • Platelets 01/29/2020 175  140 - 450 10*3/mm3 Final   • Neutrophil % 01/29/2020 56.6  42.7 - 76.0 % Final   • Lymphocyte % 01/29/2020 32.7  19.6 - 45.3 % Final   • Monocyte % 01/29/2020 7.8  5.0 - 12.0 % Final   • Eosinophil % 01/29/2020 2.4  0.3 - 6.2 % Final   • Basophil % 01/29/2020 0.5  0.0 - 1.5 % Final   • Neutrophils, Absolute 01/29/2020 2.60  1.70 - 7.00 10*3/mm3 Final   • Lymphocytes, Absolute 01/29/2020 1.50  0.70 - 3.10 10*3/mm3 Final   • Monocytes, Absolute 01/29/2020 0.30  0.10 - 0.90 10*3/mm3 Final   • Eosinophils, Absolute 01/29/2020 0.10  0.00 - 0.40 10*3/mm3 Final   • Basophils, Absolute 01/29/2020 0.00  0.00 - 0.20 10*3/mm3 Final   • nRBC 01/29/2020 0.1  0.0 - 0.2 /100 WBC Final   • MRSA PCR 01/29/2020 No MRSA Detected  No MRSA Detected Final     No results found.      Assessment:  Patient Active Problem List   Diagnosis   • Spinal stenosis in cervical region - reveresal of  lordosis at C3/4 and C4/5, Modic endpalate cahnges at C7/T1   • Hypertension   • Hyperlipidemia   • BPH (benign prostatic hyperplasia)   • Primary osteoarthritis of left knee   • Anemia   • Anxiety state   • Arthritis   • Obesity (BMI 30-39.9)   • Cyst of kidney, acquired   • Dupuytren's contracture   • Dyslipidemia   • Family history of malignant neoplasm of urinary bladder   • Foot pain, right   • Ganglion cyst   • Gastroesophageal reflux disease   • Hand paresthesia   • Hearing problem   • History of poliomyelitis   • Hyperglycemia   • Hypomagnesemia   • Insect bite   • Knee pain   • Low back pain   • Neck pain   • Other hammer toe(s) (acquired), left foot   • Plantar fasciitis, left   • Sleep disorder   • Tachycardia   • Thrombocytopenia (CMS/HCC)   • Vitamin D deficiency   • Hx of total knee replacement, right   • Amblyopia   • Unspecified visual loss   • Dry eye   • Hypertensive retinopathy of both eyes   • Lens replaced by other means   • AMD (age-related macular degeneration), bilateral   • Macular degeneration (senile) of retina   • Nonexudative age-related macular degeneration   • Nonexudative age-related macular degeneration, bilateral, early dry stage   • Other chronic allergic conjunctivitis   • Other specified disorders of eyelid   • Unspecified blepharitis unspecified eye, unspecified eyelid   • PCO (posterior capsular opacification), bilateral   • Presence of intraocular lens   • PVD (posterior vitreous detachment), both eyes   • PVD (posterior vitreous detachment), right eye   • Regular astigmatism   • Senile nuclear sclerosis   • Tear film insufficiency   • Edema of right lower extremity         Plan:  The patient voiced understanding of the risks, benefits, and alternative forms of treatment that were discussed and the patient consents to proceed with left total knee replacement.   The patient was seen today for preoperative discussion.  The patient has been tried on over-the-counter and  prescription NSAID's despite the risks of anti-inflammatory bleeding, peptic ulcers and erosive gastritis with short term benefit only.  Braces have been prescribed for mechanical support.  Patient has been participating in an exercise program specifically targeting joint pain relief with limited benefit. Intraarticular injections have been used periodically with some but not complete relief of pain.  Ambulation aids have also been utilized.      The details of the surgical procedure were explained including the location of probable incisions and a description of the likely hardware/grafts to be used. The patient understands the likely convalescence after surgery as well as the rehabilitation required.  Also, we have thoroughly discussed with the patient the risks, benefits and alternatives to surgery.  Risks include but are not limited to the risk of infection, joint stiffness, limited range of motion, wound healing problems, scar tissue build up, myocardial infarction, stroke, blood clots (including DVT and/or pulmonary embolus along with the risk of death) neurologic and/or vascular injury, limb length discrepancy, fracture, dislocation, nonunion, malunion, continued pain and need for further surgery including hardware failure requiring revision.     Discharge Plan: tomorrow to home and home health      Date: 2/5/2020    Boby Medina MD      DICTATED UTILIZING DRAGON DICTATION

## 2020-02-06 ENCOUNTER — TRANSITIONAL CARE MANAGEMENT TELEPHONE ENCOUNTER (OUTPATIENT)
Dept: FAMILY MEDICINE CLINIC | Facility: CLINIC | Age: 75
End: 2020-02-06

## 2020-02-06 VITALS
BODY MASS INDEX: 31.59 KG/M2 | DIASTOLIC BLOOD PRESSURE: 70 MMHG | TEMPERATURE: 97.4 F | WEIGHT: 220.68 LBS | SYSTOLIC BLOOD PRESSURE: 125 MMHG | OXYGEN SATURATION: 96 % | RESPIRATION RATE: 14 BRPM | HEIGHT: 70 IN | HEART RATE: 87 BPM

## 2020-02-06 PROBLEM — M17.12 PRIMARY OSTEOARTHRITIS OF LEFT KNEE: Status: RESOLVED | Noted: 2019-10-15 | Resolved: 2020-02-06

## 2020-02-06 LAB
ANION GAP SERPL CALCULATED.3IONS-SCNC: 9 MMOL/L (ref 5–15)
BUN BLD-MCNC: 17 MG/DL (ref 8–23)
BUN/CREAT SERPL: 18.3 (ref 7–25)
CALCIUM SPEC-SCNC: 8.2 MG/DL (ref 8.6–10.5)
CHLORIDE SERPL-SCNC: 100 MMOL/L (ref 98–107)
CO2 SERPL-SCNC: 25 MMOL/L (ref 22–29)
CREAT BLD-MCNC: 0.93 MG/DL (ref 0.76–1.27)
GFR SERPL CREATININE-BSD FRML MDRD: 80 ML/MIN/1.73
GLUCOSE BLD-MCNC: 134 MG/DL (ref 65–99)
HCT VFR BLD AUTO: 31.8 % (ref 37.5–51)
HGB BLD-MCNC: 10.8 G/DL (ref 13–17.7)
POTASSIUM BLD-SCNC: 4.6 MMOL/L (ref 3.5–5.2)
SODIUM BLD-SCNC: 134 MMOL/L (ref 136–145)

## 2020-02-06 PROCEDURE — 63710000001 FINASTERIDE 5 MG TABLET: Performed by: PHYSICIAN ASSISTANT

## 2020-02-06 PROCEDURE — A9270 NON-COVERED ITEM OR SERVICE: HCPCS | Performed by: PHYSICIAN ASSISTANT

## 2020-02-06 PROCEDURE — 97530 THERAPEUTIC ACTIVITIES: CPT

## 2020-02-06 PROCEDURE — 63710000001 OXYCODONE 10 MG TABLET EXTENDED-RELEASE 12 HOUR: Performed by: PHYSICIAN ASSISTANT

## 2020-02-06 PROCEDURE — 63710000001 CALCIUM-VITAMIN D 500-200 MG-UNIT TABLET: Performed by: PHYSICIAN ASSISTANT

## 2020-02-06 PROCEDURE — 97116 GAIT TRAINING THERAPY: CPT

## 2020-02-06 PROCEDURE — 85018 HEMOGLOBIN: CPT | Performed by: PHYSICIAN ASSISTANT

## 2020-02-06 PROCEDURE — 80048 BASIC METABOLIC PNL TOTAL CA: CPT | Performed by: PHYSICIAN ASSISTANT

## 2020-02-06 PROCEDURE — 63710000001 ACETAMINOPHEN 500 MG TABLET: Performed by: PHYSICIAN ASSISTANT

## 2020-02-06 PROCEDURE — 63710000001 FERROUS SULFATE 324 (65 FE) MG TABLET DELAYED-RELEASE: Performed by: PHYSICIAN ASSISTANT

## 2020-02-06 PROCEDURE — 97110 THERAPEUTIC EXERCISES: CPT

## 2020-02-06 PROCEDURE — 63710000001 DOCUSATE SODIUM 100 MG CAPSULE: Performed by: PHYSICIAN ASSISTANT

## 2020-02-06 PROCEDURE — 85014 HEMATOCRIT: CPT | Performed by: PHYSICIAN ASSISTANT

## 2020-02-06 PROCEDURE — G0378 HOSPITAL OBSERVATION PER HR: HCPCS

## 2020-02-06 PROCEDURE — 63710000001 MELOXICAM 15 MG TABLET: Performed by: PHYSICIAN ASSISTANT

## 2020-02-06 PROCEDURE — 63710000001 POLYETHYLENE GLYCOL PACK: Performed by: PHYSICIAN ASSISTANT

## 2020-02-06 PROCEDURE — 99024 POSTOP FOLLOW-UP VISIT: CPT | Performed by: PHYSICIAN ASSISTANT

## 2020-02-06 RX ORDER — GABAPENTIN 300 MG/1
300 CAPSULE ORAL NIGHTLY
Qty: 30 CAPSULE | Refills: 0 | Status: SHIPPED | OUTPATIENT
Start: 2020-02-06 | End: 2020-04-07

## 2020-02-06 RX ORDER — OXYCODONE HYDROCHLORIDE AND ACETAMINOPHEN 5; 325 MG/1; MG/1
TABLET ORAL
Qty: 50 TABLET | Refills: 0 | Status: SHIPPED | OUTPATIENT
Start: 2020-02-06 | End: 2020-02-13 | Stop reason: SDUPTHER

## 2020-02-06 RX ORDER — MELOXICAM 7.5 MG/1
7.5 TABLET ORAL DAILY
Qty: 12 TABLET | Refills: 0 | Status: SHIPPED | OUTPATIENT
Start: 2020-02-07 | End: 2020-02-19

## 2020-02-06 RX ORDER — FERROUS SULFATE TAB EC 324 MG (65 MG FE EQUIVALENT) 324 (65 FE) MG
324 TABLET DELAYED RESPONSE ORAL
Qty: 14 TABLET | Refills: 0 | Status: SHIPPED | OUTPATIENT
Start: 2020-02-07 | End: 2020-02-21

## 2020-02-06 RX ADMIN — CLINDAMYCIN PHOSPHATE 900 MG: 900 INJECTION, SOLUTION INTRAVENOUS at 03:10

## 2020-02-06 RX ADMIN — DOCUSATE SODIUM 100 MG: 100 CAPSULE, LIQUID FILLED ORAL at 09:44

## 2020-02-06 RX ADMIN — OYSTER SHELL CALCIUM WITH VITAMIN D 1 TABLET: 500; 200 TABLET, FILM COATED ORAL at 09:44

## 2020-02-06 RX ADMIN — OXYCODONE HYDROCHLORIDE 10 MG: 10 TABLET, FILM COATED, EXTENDED RELEASE ORAL at 09:44

## 2020-02-06 RX ADMIN — POLYETHYLENE GLYCOL 3350 17 G: 17 POWDER, FOR SOLUTION ORAL at 09:44

## 2020-02-06 RX ADMIN — ACETAMINOPHEN 1000 MG: 500 TABLET, FILM COATED ORAL at 09:43

## 2020-02-06 RX ADMIN — FERROUS SULFATE TAB EC 324 MG (65 MG FE EQUIVALENT) 324 MG: 324 (65 FE) TABLET DELAYED RESPONSE at 09:44

## 2020-02-06 RX ADMIN — MELOXICAM 15 MG: 15 TABLET ORAL at 09:44

## 2020-02-06 RX ADMIN — FINASTERIDE 5 MG: 5 TABLET, FILM COATED ORAL at 09:44

## 2020-02-06 NOTE — PROGRESS NOTES
Discharge Planning Assessment  [facility]     Patient Name: Farheen Gomez  MRN: [id number]  Today's Date: 2/6/2020    Admit Date: 2/5/2020    Discharge Needs Assessment     Row Name 02/06/20 1334       Living Environment    Lives With  spouse    Current Living Arrangements  home/apartment/condo    Able to Return to Prior Arrangements  yes       Resource/Environmental Concerns    Resource/Environmental Concerns  none       Transition Planning    Patient/Family Anticipates Transition to  home with family    Patient/Family Anticipated Services at Transition  outpatient care    Transportation Anticipated  family or friend will provide       Discharge Needs Assessment    Equipment Currently Used at Home  walker, rolling    Provided post acute provider list?  -- received in PAT    Discharge Coordination/Progress  Home with Outpatient therapy from [facility] outpatient therapy in [place]. ABDIRASHID spoke with Laureen, patient is accepted.         Discharge Plan     Row Name 02/06/20 5659       Plan    Plan  Home with Outpatient therapy from [facility] outpatient therapy in [place]. ABDIRASHID spoke with Laureen, patient is accepted.     Patient/Family in Agreement with Plan  yes    Plan Comments  Patient has been indpendent. Denies any needs at this time. Barriers to discharge: None.         herapy      Service Provider Request Status Selected Services Address Phone Number Fax Number    [facility] [place] [facility] Outpatient Rehabilitation [address], [place] [address] [phone]      Community Resources      Coordination has not been started for this encounter.        Expected Discharge Date and Time     Expected Discharge Date Expected Discharge Time    Feb 6, 2020         Demographic Summary     Row Name 02/06/20 1332       General Information    Admission Type  observation    Arrived From  home    Referral Source  other (see comments)    Reason for Consult  discharge planning    Preferred  Language  English     Used During This Interaction  no       Contact Information    Permission Granted to Share Info With          Functional Status     Row Name 02/06/20 1332       Functional Status    Usual Activity Tolerance  good    Current Activity Tolerance  moderate       Functional Status, IADL    Medications  independent    Meal Preparation  independent    Housekeeping  independent    Laundry  independent    Shopping  independent       Mental Status    General Appearance WDL  WDL       Mental Status Summary    Recent Changes in Mental Status/Cognitive Functioning  no changes        Psychosocial     Row Name 02/06/20 1333       Values/Beliefs    Spiritual, Cultural Beliefs, Rastafari Practices, Values that Affect Care  no       Behavior WDL    Behavior WDL  WDL       Emotion Mood WDL    Emotion/Mood/Affect WDL  WDL       Speech WDL    Speech WDL  WDL       Perceptual State WDL    Perceptual State WDL  WDL       Thought Process WDL    Thought Process WDL  WDL       Intellectual Performance WDL    Intellectual Performance WDL  WDL    Level of Consciousness  Alert       Coping/Stress    Major Change/Loss/Stressor  medical condition/diagnosis    Patient Personal Strengths  able to adapt    Sources of Support  spouse    Reaction to Health Status  accepting;adjusting    Understanding of Condition and Treatment  adequate understanding of medical condition       C-SSRS (Screen-Recent) Past Month    Q1 Wished to be Dead (Past Month)  no    Q2 Suicidal Thoughts (Past Month)  no    Q6 Suicide Behavior (Lifetime)  no              Anna Nae[place] [address]   [phone]  office  [phone]  fax  [email or web address]  [email or web address]

## 2020-02-06 NOTE — DISCHARGE PLACEMENT REQUEST
"EraFarheen perez (74 y.o. Male)     Date of Birth Social Security Number Address Home Phone MRN    1945  [id number] [address] [phone] [id number]    Holiness Marital Status          Restoration        Admission Date Admission Type Admitting Provider Attending Provider Department, Room/Bed    2/5/20 Elective Boby Medina MD Mehta, Sanjiv, MD [facility] SURGICAL INPATIENT, 4105/1    Discharge Date Discharge Disposition Discharge Destination                       Attending Provider:  Boby Medina MD    Allergies:  Penicillins    Isolation:  None   Infection:  None   Code Status:  CPR    Ht:  177.8 cm (70\")   Wt:  100 kg (220 lb 10.9 oz)    Admission Cmt:  None   Principal Problem:  Primary osteoarthritis of left knee [M17.12]                 Active Insurance as of 2/5/2020     Primary Coverage     Payor Plan Insurance Group Employer/Plan Group    [employer] MEDICARE REPLACEMENT [facility] MEDICARE REPLACEMENT [id number]     Payor Plan Address Payor Plan Phone Number Payor Plan Fax Number Effective Dates    [address] [address]   1/1/2019 - None Entered    [facility] [id number]       Subscriber Name Subscriber Birth Date Member ID       FARHEEN GOMEZ 1945 [id number]                 Emergency Contacts      (Rel.) Home Phone Work Phone Mobile Phone    Pippa Gomez (Spouse) -- -- [phone]            Emergency Contact Information     Name Relation Home Work Mobile    Pippa Gomez Spouse   [phone]          Insurance Information                [employer] MED[employer] REPLACEMENT/[facility] MEDICARE REPLACEMENT Phone:     Subscriber: Farheen Gomez Subscriber#: [id number]    Group#: [id number] Precert#:           Problem List           Codes Noted - Resolved       Hospital    * (Principal) Primary osteoarthritis of left knee ICD-10-CM: M17.12  ICD-9-CM: 715.16 10/15/2019 - Present    Hypertension (Chronic) " ICD-10-CM: I10  ICD-9-CM: 401.9 4/2/2019 - Present    BPH (benign prostatic hyperplasia) (Chronic) ICD-10-CM: N40.0  ICD-9-CM: 600.00 4/2/2019 - Present    Obesity (BMI 30-39.9) (Chronic) ICD-10-CM: E66.9  ICD-9-CM: 278.00 10/5/2017 - Present    Vitamin D deficiency (Chronic) ICD-10-CM: E55.9  ICD-9-CM: 268.9 9/17/2013 - Present    Dyslipidemia (Chronic) ICD-10-CM: E78.5  ICD-9-CM: 272.4 9/12/2013 - Present    Depression (Chronic) ICD-10-CM: F32.9  ICD-9-CM: 311 8/14/2012 - Present       Non-Hospital    Dry eye ICD-10-CM: H04.129  ICD-9-CM: 375.15 11/7/2019 - Present    Hypertensive retinopathy of both eyes ICD-10-CM: H35.033  ICD-9-CM: 362.11 11/7/2019 - Present    Lens replaced by other means ICD-10-CM: Z96.1  ICD-9-CM: V43.1 11/7/2019 - Present    AMD (age-related macular degeneration), bilateral ICD-10-CM: H35.30  ICD-9-CM: 362.50 11/7/2019 - Present    Nonexudative age-related macular degeneration ICD-10-CM: H35.3190  ICD-9-CM: 362.51 11/7/2019 - Present    Nonexudative age-related macular degeneration, bilateral, early dry stage ICD-10-CM: H35.3131  ICD-9-CM: 362.51 11/7/2019 - Present    Other chronic allergic conjunctivitis ICD-10-CM: H10.45  ICD-9-CM: 372.14 11/7/2019 - Present    Other specified disorders of eyelid ICD-10-CM: H02.89  ICD-9-CM: 374.89 11/7/2019 - Present    Unspecified blepharitis unspecified eye, unspecified eyelid ICD-10-CM: H01.009  ICD-9-CM: 373.00 11/7/2019 - Present    PCO (posterior capsular opacification), bilateral ICD-10-CM: H26.493  ICD-9-CM: 366.50 11/7/2019 - Present    Presence of intraocular lens ICD-10-CM: Z96.1  ICD-9-CM: V43.1 11/7/2019 - Present    PVD (posterior vitreous detachment), both eyes ICD-10-CM: H43.813  ICD-9-CM: 379.21 11/7/2019 - Present    Tear film insufficiency ICD-10-CM: H04.129  ICD-9-CM: 375.15 11/7/2019 - Present    Hx of total knee replacement, right ICD-10-CM: Z96.651  ICD-9-CM: V43.65 10/31/2019 - Present    Arthritis (Chronic) ICD-10-CM:  M19.90  ICD-9-CM: 716.90 10/17/2019 - Present    Spinal stenosis in cervical region - reveresal of lordosis at C3/4 and C4/5, Modic endpalate cahnges at C7/T1 ICD-10-CM: M48.02  ICD-9-CM: 723.0 4/2/2019 - Present    Neck pain ICD-10-CM: M54.2  ICD-9-CM: 723.1 3/11/2019 - Present    Foot pain, right ICD-10-CM: M79.671  ICD-9-CM: 729.5 2/1/2018 - Present    Plantar fasciitis, left ICD-10-CM: M72.2  ICD-9-CM: 728.71 2/1/2018 - Present    Ganglion cyst ICD-10-CM: M67.40  ICD-9-CM: 727.43 4/20/2016 - Present    Other hammer toe(s) (acquired), left foot ICD-10-CM: M20.42  ICD-9-CM: 735.4 4/20/2016 - Present    Gastroesophageal reflux disease ICD-10-CM: K21.9  ICD-9-CM: 530.81 7/6/2015 - Present    History of poliomyelitis ICD-10-CM: Z86.12  ICD-9-CM: V12.02 5/26/2015 - Present    Anemia ICD-10-CM: D64.9  ICD-9-CM: 285.9 10/27/2014 - Present    Cyst of kidney, acquired ICD-10-CM: N28.1  ICD-9-CM: 593.2 10/27/2014 - Present    Thrombocytopenia (CMS/HCC) ICD-10-CM: D69.6  ICD-9-CM: 287.5 10/27/2014 - Present    Sleep disorder ICD-10-CM: G47.9  ICD-9-CM: 780.50 9/18/2014 - Present    Low back pain ICD-10-CM: M54.5  ICD-9-CM: 724.2 9/9/2014 - Present    Dupuytren's contracture ICD-10-CM: M72.0  ICD-9-CM: 728.6 9/10/2013 - Present    Hand paresthesia ICD-10-CM: R20.2  ICD-9-CM: 782.0 9/10/2013 - Present    Amblyopia ICD-10-CM: H53.009  ICD-9-CM: 368.00 10/9/2012 - Present    Macular degeneration (senile) of retina ICD-10-CM: H35.30  ICD-9-CM: 362.50 10/9/2012 - Present    Regular astigmatism ICD-10-CM: H52.229  ICD-9-CM: 367.21 10/9/2012 - Present    Senile nuclear sclerosis ICD-10-CM: H25.10  ICD-9-CM: 366.16 10/9/2012 - Present    Family history of malignant neoplasm of urinary bladder ICD-10-CM: Z80.52  ICD-9-CM: V16.52 8/14/2012 - Present    Hearing problem ICD-10-CM: H91.90  ICD-9-CM: V41.2 8/14/2012 - Present    Tachycardia ICD-10-CM: R00.0  ICD-9-CM: 785.0 8/14/2012 - Present             History & Physical      Medina,  MD Boby at 02/05/20 0601          History & Physical       Patient: Farheen Gomez    Date of Admission: 2/5/2020  5:15 AM    YOB: 1945    Medical Record Number: [id number]    Attending Physician: Boby Medina MD        Chief Complaints: Primary osteoarthritis of left knee [M17.12]  Obesity (BMI 30-39.9) [E66.9]  Primary osteoarthritis of left knee [M17.12]      History of Present Illness: 74 y.o. male presents with Primary osteoarthritis of left knee [M17.12]  Obesity (BMI 30-39.9) [E66.9]  Primary osteoarthritis of left knee [M17.12]. Onset of symptoms was gradual and slowly and progressively worse.  Symptoms are associated with pain and difficulty with ambulation with a progressive varus deformity.  Symptoms are aggravated by going up and down the steps and walking on an incline surface..   Symptoms improve with using a brace and walking with a cane.. Patient is now being admitted to the services of Boby Medina MD for further evaluation and treatment.        Allergies   Allergen Reactions   • Penicillins Hives     Tongue bled         Home Medications:  Medications Prior to Admission   Medication Sig Dispense Refill Last Dose   • atorvastatin (LIPITOR) 20 MG tablet Take 20 mg by mouth Every Night.  3 Taking   • azithromycin (ZITHROMAX Z-SHERIE) 250 MG tablet Take 2 tablets the first day, then 1 tablet daily for 4 days. 6 tablet 0 Taking   • calcium carbonate (OS-JUSTA) 600 MG tablet Take 600 mg by mouth Daily. Stop today for surgery   Taking   • escitalopram (LEXAPRO) 10 MG tablet Take 10 mg by mouth Every Evening.  3 Taking   • finasteride (PROSCAR) 5 MG tablet Take 5 mg by mouth every night at bedtime.   Taking   • lisinopril (PRINIVIL,ZESTRIL) 40 MG tablet Take 40 mg by mouth Every Evening. Do not take 24 hours prior to surgery   Taking   • Magnesium 250 MG tablet Take 250 mg by mouth Daily. Stop today for surgery   Taking   • Multiple Vitamins-Minerals (EYE VITAMINS PO) Take 2 capsules by  mouth. Stop today for surgery   Taking   • Multiple Vitamins-Minerals (PRESERVISION AREDS 2 PO) Take 2 tablets by mouth Daily. Stop today for surgery   Taking   • mupirocin (BACTROBAN) 2 % ointment Apply a pea-sized amount to each nostril twice daily for 5 days prior to surgery. 22 g 0    • tamsulosin (FLOMAX) 0.4 MG capsule 24 hr capsule Take 1 capsule by mouth Every Evening.   Taking   • vitamin D (ERGOCALCIFEROL) 23405 units capsule capsule Take 50,000 Units by mouth 1 (One) Time Per Week. Stop today for sugery   Taking       Current Medications:  Scheduled Meds:  Continuous Infusions:  No current facility-administered medications for this encounter.   PRN Meds:.       Past Medical History:   Diagnosis Date   • Arthritis    • BPH (benign prostatic hyperplasia)    • Cataract    • Depression    • [place] (hard of hearing)     wears hearing aides   • Hyperlipidemia    • Hypertension    • Macular degeneration     very minor        Past Surgical History:   Procedure Laterality Date   • BACK SURGERY     • HAND SURGERY Bilateral     carpal tunnel repair   • KNEE SURGERY      meniscus repair    • LUMBAR DISCECTOMY Bilateral 2019    Procedure: left and right  L4-5 lumbar decompression with dura repair;  Surgeon: Edson Khan MD;  Location: [facility];  Service: Neurosurgery   • ROTATOR CUFF REPAIR Right    • TOTAL KNEE ARTHROPLASTY Right 10/23/2019    Procedure: TOTAL KNEE ARTHROPLASTY, WITH LEFT KNEE INJECTION;  Surgeon: Boby Medina MD;  [facility]: [facility];  Service: Orthopedics        Social History     Occupational History   • Occupation: retired   Tobacco Use   • Smoking status: Former Smoker     Packs/day: 0.25     Years: 5.00     Pack years: 1.25     Last attempt to quit:      Years since quittin.1   • Smokeless tobacco: Never Used   Substance and Sexual Activity   • Alcohol use: No     Frequency: Never   • Drug use: No   • Sexual activity: Defer     Partners: Female     Birth  control/protection: None    Social History     Social History Narrative   • Not on file        Family History   Problem Relation Age of Onset   • Stroke Mother    • Diabetes Father    • No Known Problems Sister    • No Known Problems Brother    • Malig Hyperthermia Neg Hx          Review of Systems:   HEENT: Patient denies any headaches, vision changes, change in hearing, or tinnitus, Patient denies any rhinorrhea,epistaxis, sinus pain, mouth or dental problems, sore throat or hoarseness, or dysphagia  Pulmonary: Patient denies any cough, congestion, SOA, or wheezing  Cardiovascular: Patient denies any chest pain, dyspnea, palpitations, weakness, intolerance of exercise, varicosities, swelling of extremities, known murmur  Gastrointestinal:  Patient denies nausea, vomiting, diarrhea, constipation, loss  of appetite, change in appetite, dysphagia, gas, heartburn, melena, change in bowel habits, use of laxatives or other drugs to alter the function of the gastrointestinal tract.  Genital/Urinary: Patient denies dysuria, change in color of urine, change in frequency of urination, pain with urgency, incontinence, retention, or nocturia.  Musculoskeletal: Patient denies increased warmth; redness; or swelling of joints; limitation of function; deformity; crepitation: pain in a joint or an extremity, the neck, or the back, especially with movement.  Neurological: Patient denies dizziness, tremor, ataxia, difficulty in speaking, change in speech, paresthesia, loss of sensation, seizures, syncope, changes in memory.  Endocrine system: Patient denies tremors, palpitations, intolerance of heat or cold, polyuria, polydipsia, polyphagia, diaphoresis, exophthalmos, or goiter.  Psychological: Patient denies thoughts/plans or harming self or other; depression,  insomnia, night terrors, alicia, memory loss, disorientation.  Skin: Patient denies any bruising, rashes, discoloration, pruritus, wounds, ulcers, decubiti, changes in the  hair or nails  Hematopoietic: Patient denies history of spontaneous or excessive bleeding, epistaxis, hematuria, melena, fatigue, enlarged or tender lymph nodes, pallor, history of anemia.    Physical Exam: 74 y.o. male  There were no vitals filed for this visit.    General Appearance:          Alert, cooperative, in no acute distress                                                 Head:    Normocephalic, without obvious abnormality, atraumatic   Eyes:            Lids and lashes normal, conjunctivae and sclerae normal, no   icterus, no pallor, corneas clear, PERRLA   Ears:    Ears appear intact with no abnormalities noted   Throat:   No oral lesions, no thrush, oral mucosa moist   Neck:   No adenopathy, supple, trachea midline, no thyromegaly, no   carotid bruit, no JVD   Back:     No kyphosis present, no scoliosis present, no skin lesions,      erythema or scars, no tenderness to percussion or                   palpation,   range of motion normal   Lungs:     Clear to auscultation,respirations regular, even and                  unlabored    Heart:    Regular rhythm and normal rate, normal S1 and S2, no            murmur, no gallop, no rub, no click   Chest Wall:    No abnormalities observed   Abdomen:     Normal bowel sounds, no masses, no organomegaly, soft        non-tender, non-distended, no guarding, no rebound                tenderness   Rectal:     Deferred   Extremities:   Tenderness over medial aspect of the left knee. Moves all extremities well, no edema,   no cyanosis, no redness   Pulses:   Pulses palpable and equal bilaterally   Skin:   No bleeding, bruising or rash   Lymph nodes:   No palpable adenopathy   Neurologic:   Cranial nerves 2 - 12 grossly intact, sensation intact, DTR       present and equal bilaterally      Left knee. Patient has crepitus throughout range of motion. Positive patellar grind test. Mild effusion. Lachman is negative. Pivot shift is negative. Anterior and posterior drawer signs  are negative. Significant joint line tenderness is noted on the medial aspect of the knee. Patient has a varus orientation of the knee. There is fullness and tenderness in the Popliteal fossa. Mild distention of a Popliteal cyst is noted in this location. Range of motion in flexion is from 0-110 degrees. Neurovascular status is intact.  Dorsalis pedis and posterior tibial artery pulses are palpable. Common peroneal nerve function is well preserved. Patient's gait is cautious and antalgic. Skin and soft tissues are mildly swollen, consistent with synovitis and effusion. The patient has a significant limp with the first few steps after starting the gait cycle. Getting out of a chair takes a lot of effort due to pain on knee flexion.     Diagnostic Tests:  No visits with results within 2 Day(s) from this visit.   Latest known visit with results is:   Appointment on 01/29/2020   Component Date Value Ref Range Status   • Color, UA 01/29/2020 Yellow  Yellow, Straw Final   • Appearance, UA 01/29/2020 Clear  Clear Final   • pH, UA 01/29/2020 6.0  5.0 - 8.0 Final   • Specific Gravity, UA 01/29/2020 1.024  1.005 - 1.030 Final   • Glucose, UA 01/29/2020 Negative  Negative Final   • Ketones, UA 01/29/2020 Negative  Negative Final   • Bilirubin, UA 01/29/2020 Negative  Negative Final   • Blood, UA 01/29/2020 Negative  Negative Final   • Protein, UA 01/29/2020 Negative  Negative Final   • Leuk Esterase, UA 01/29/2020 Negative  Negative Final   • Nitrite, UA 01/29/2020 Negative  Negative Final   • Urobilinogen, UA 01/29/2020 0.2 E.U./dL  0.2 - 1.0 E.U./dL Final   • ABO Type 01/29/2020 O   Final   • RH type 01/29/2020 Positive   Final   • Antibody Screen 01/29/2020 Negative   Final   • T&S Expiration Date 01/29/2020 2/7/2020 11:59:00 PM   Final   • PTT 01/29/2020 24.5  24.0 - 31.0 seconds Final   • Protime 01/29/2020 9.8  9.6 - 11.7 Seconds Final   • INR 01/29/2020 0.92  0.90 - 1.10 Final   • Hemoglobin A1C 01/29/2020 5.5  3.5 -  5.6 % Final   • Glucose 01/29/2020 105* 65 - 99 mg/dL Final   • BUN 01/29/2020 13  8 - 23 mg/dL Final   • Creatinine 01/29/2020 0.82  0.76 - 1.27 mg/dL Final   • Sodium 01/29/2020 141  136 - 145 mmol/L Final   • Potassium 01/29/2020 4.5  3.5 - 5.2 mmol/L Final   • Chloride 01/29/2020 103  98 - 107 mmol/L Final   • CO2 01/29/2020 29.0  22.0 - 29.0 mmol/L Final   • Calcium 01/29/2020 9.0  8.6 - 10.5 mg/dL Final   • eGFR Non African Amer 01/29/2020 92  >60 mL/min/1.73 Final   • BUN/Creatinine Ratio 01/29/2020 15.9  7.0 - 25.0 Final   • Anion Gap 01/29/2020 9.0  5.0 - 15.0 mmol/L Final   • WBC 01/29/2020 4.50  3.40 - 10.80 10*3/mm3 Final   • RBC 01/29/2020 4.55  4.14 - 5.80 10*6/mm3 Final   • Hemoglobin 01/29/2020 13.9  13.0 - 17.7 g/dL Final   • Hematocrit 01/29/2020 40.2  37.5 - 51.0 % Final   • MCV 01/29/2020 88.2  79.0 - 97.0 fL Final   • MCH 01/29/2020 30.6  26.6 - 33.0 pg Final   • MCHC 01/29/2020 34.7  31.5 - 35.7 g/dL Final   • RDW 01/29/2020 13.2  12.3 - 15.4 % Final   • RDW-SD 01/29/2020 40.7  37.0 - 54.0 fl Final   • MPV 01/29/2020 6.3  6.0 - 12.0 fL Final   • Platelets 01/29/2020 175  140 - 450 10*3/mm3 Final   • Neutrophil % 01/29/2020 56.6  42.7 - 76.0 % Final   • Lymphocyte % 01/29/2020 32.7  19.6 - 45.3 % Final   • Monocyte % 01/29/2020 7.8  5.0 - 12.0 % Final   • Eosinophil % 01/29/2020 2.4  0.3 - 6.2 % Final   • Basophil % 01/29/2020 0.5  0.0 - 1.5 % Final   • Neutrophils, Absolute 01/29/2020 2.60  1.70 - 7.00 10*3/mm3 Final   • Lymphocytes, Absolute 01/29/2020 1.50  0.70 - 3.10 10*3/mm3 Final   • Monocytes, Absolute 01/29/2020 0.30  0.10 - 0.90 10*3/mm3 Final   • Eosinophils, Absolute 01/29/2020 0.10  0.00 - 0.40 10*3/mm3 Final   • Basophils, Absolute 01/29/2020 0.00  0.00 - 0.20 10*3/mm3 Final   • nRBC 01/29/2020 0.1  0.0 - 0.2 /100 WBC Final   • MRSA PCR 01/29/2020 No MRSA Detected  No MRSA Detected Final     No results found.      Assessment:  Patient Active Problem List   Diagnosis   • Spinal  stenosis in cervical region - reveresal of lordosis at C3/4 and C4/5, Modic endpalate cahnges at C7/T1   • Hypertension   • Hyperlipidemia   • BPH (benign prostatic hyperplasia)   • Primary osteoarthritis of left knee   • Anemia   • Anxiety state   • Arthritis   • Obesity (BMI 30-39.9)   • Cyst of kidney, acquired   • Dupuytren's contracture   • Dyslipidemia   • Family history of malignant neoplasm of urinary bladder   • Foot pain, right   • Ganglion cyst   • Gastroesophageal reflux disease   • Hand paresthesia   • Hearing problem   • History of poliomyelitis   • Hyperglycemia   • Hypomagnesemia   • Insect bite   • Knee pain   • Low back pain   • Neck pain   • Other hammer toe(s) (acquired), left foot   • Plantar fasciitis, left   • Sleep disorder   • Tachycardia   • Thrombocytopenia (CMS/HCC)   • Vitamin D deficiency   • Hx of total knee replacement, right   • Amblyopia   • Unspecified visual loss   • Dry eye   • Hypertensive retinopathy of both eyes   • Lens replaced by other means   • AMD (age-related macular degeneration), bilateral   • Macular degeneration (senile) of retina   • Nonexudative age-related macular degeneration   • Nonexudative age-related macular degeneration, bilateral, early dry stage   • Other chronic allergic conjunctivitis   • Other specified disorders of eyelid   • Unspecified blepharitis unspecified eye, unspecified eyelid   • PCO (posterior capsular opacification), bilateral   • Presence of intraocular lens   • PVD (posterior vitreous detachment), both eyes   • PVD (posterior vitreous detachment), right eye   • Regular astigmatism   • Senile nuclear sclerosis   • Tear film insufficiency   • Edema of right lower extremity         Plan:  The patient voiced understanding of the risks, benefits, and alternative forms of treatment that were discussed and the patient consents to proceed with left total knee replacement.   The patient was seen today for preoperative discussion.  The patient has  been tried on over-the-counter and prescription NSAID's despite the risks of anti-inflammatory bleeding, peptic ulcers and erosive gastritis with short term benefit only.  Braces have been prescribed for mechanical support.  Patient has been participating in an exercise program specifically targeting joint pain relief with limited benefit. Intraarticular injections have been used periodically with some but not complete relief of pain.  Ambulation aids have also been utilized.      The details of the surgical procedure were explained including the location of probable incisions and a description of the likely hardware/grafts to be used. The patient understands the likely convalescence after surgery as well as the rehabilitation required.  Also, we have thoroughly discussed with the patient the risks, benefits and alternatives to surgery.  Risks include but are not limited to the risk of infection, joint stiffness, limited range of motion, wound healing problems, scar tissue build up, myocardial infarction, stroke, blood clots (including DVT and/or pulmonary embolus along with the risk of death) neurologic and/or vascular injury, limb length discrepancy, fracture, dislocation, nonunion, malunion, continued pain and need for further surgery including hardware failure requiring revision.     Discharge Plan: tomorrow to home and home health      Date: 2/5/2020    Boby Medina MD      DICTATED UTILIZING DRAGON DICTATION    Electronically signed by Boby Medina MD at 02/05/20 0602       Referral Orders (last 24 hours) (24h ago, onward)     Start     Ordered    02/06/20 0000  Ambulatory Referral to Physical Therapy Evaluate and treat; Left     Question Answer Comment   Specialty needed: Evaluate and treat    Gait Training: Left        02/06/20 1034

## 2020-02-06 NOTE — THERAPY TREATMENT NOTE
Patient Name: Farheen Gomez  : 1945    MRN: [id number]                              Today's Date: 2020       Admit Date: 2020    Visit Dx:     ICD-10-CM ICD-9-CM   1. Primary osteoarthritis of left knee M17.12 715.16   2. Obesity (BMI 30-39.9) E66.9 278.00     Patient Active Problem List   Diagnosis   • Spinal stenosis in cervical region - reveresal of lordosis at C3/4 and C4/5, Modic endpalate cahnges at C7/T1   • Hypertension   • BPH (benign prostatic hyperplasia)   • Primary osteoarthritis of left knee   • Anemia   • Depression   • Arthritis   • Obesity (BMI 30-39.9)   • Cyst of kidney, acquired   • Dupuytren's contracture   • Dyslipidemia   • Family history of malignant neoplasm of urinary bladder   • Foot pain, right   • Ganglion cyst   • Gastroesophageal reflux disease   • Hand paresthesia   • Hearing problem   • History of poliomyelitis   • Low back pain   • Neck pain   • Other hammer toe(s) (acquired), left foot   • Plantar fasciitis, left   • Sleep disorder   • Tachycardia   • Thrombocytopenia (CMS/HCC)   • Vitamin D deficiency   • Hx of total knee replacement, right   • Amblyopia   • Dry eye   • Hypertensive retinopathy of both eyes   • Lens replaced by other means   • AMD (age-related macular degeneration), bilateral   • Macular degeneration (senile) of retina   • Nonexudative age-related macular degeneration   • Nonexudative age-related macular degeneration, bilateral, early dry stage   • Other chronic allergic conjunctivitis   • Other specified disorders of eyelid   • Unspecified blepharitis unspecified eye, unspecified eyelid   • PCO (posterior capsular opacification), bilateral   • Presence of intraocular lens   • PVD (posterior vitreous detachment), both eyes   • Regular astigmatism   • Senile nuclear sclerosis   • Tear film insufficiency     Past Medical History:   Diagnosis Date   • Arthritis    • BPH (benign prostatic hyperplasia)    • Cataract    • Depression    • [place] (hard of  hearing)     wears hearing aides   • Hyperlipidemia    • Hypertension    • Macular degeneration     very minor     Past Surgical History:   Procedure Laterality Date   • BACK SURGERY     • HAND SURGERY Bilateral     carpal tunnel repair   • KNEE SURGERY      meniscus repair    • LUMBAR DISCECTOMY Bilateral 4/22/2019    Procedure: left and right  L4-5 lumbar decompression with dura repair;  Surgeon: Edson Khan MD;  Location: [facility] MAIN OR;  Service: Neurosurgery   • ROTATOR CUFF REPAIR Right    • TOTAL KNEE ARTHROPLASTY Right 10/23/2019    Procedure: TOTAL KNEE ARTHROPLASTY, WITH LEFT KNEE INJECTION;  Surgeon: Boby Medina MD;  Location: [facility] MAIN OR;  Service: Orthopedics     General Information     Row Name 02/06/20 0856          PT Evaluation Time/Intention    Document Type  therapy note (daily note)  -SC     Mode of Treatment  individual therapy;physical therapy  -SC     Row Name 02/06/20 0856          Cognitive Assessment/Intervention- PT/OT    Orientation Status (Cognition)  oriented x 4  -SC     Cognitive Assessment/Intervention Comment  pleasant and motivated  -SC       User Key  (r) = Recorded By, (t) = Taken By, (c) = Cosigned By    Initials Name Provider Type    SC Radha Gamboa PTA Physical Therapy Assistant        Mobility     Row Name 02/06/20 0856          Bed Mobility Assessment/Treatment    Bed Mobility Assessment/Treatment  bed mobility (all) activities  -SC     [place] Level (Bed Mobility)  independent  -SC     Supine-Sit [place] (Bed Mobility)  independent  -SC     Row Name 02/06/20 0856          Bed-Chair Transfer    Bed-Chair [place] (Transfers)  conditional independence  -SC     Assistive Device (Bed-Chair Transfers)  walker, front-wheeled  -SC     Row Name 02/06/20 0856          Sit-Stand Transfer    Sit-Stand [place] (Transfers)  conditional independence  -SC     Assistive Device (Sit-Stand Transfers)  walker, front-wheeled  -SC     Row Name 02/06/20 0856           Gait/Stairs Assessment/Training    Gait/Stairs Assessment/Training  gait/ambulation independence;gait/ambulation assistive device  -SC     [place] Level (Gait)  conditional independence  -SC     Assistive Device (Gait)  walker, front-wheeled  -SC     Distance in Feet (Gait)  130'  -SC     Pattern (Gait)  step-through  -SC     Comment (Gait/Stairs)  reciprocal with good left heel strike  -[facility] Name 02/06/20 0856          Mobility Assessment/Intervention    Extremity Weight-bearing Status  left lower extremity  -SC     Left Lower Extremity (Weight-bearing Status)  weight-bearing as tolerated (WBAT)  -SC       User Key  (r) = Recorded By, (t) = Taken By, (c) = Cosigned By    Initials Name Provider Type    SC Radha Gamboa, PTA Physical Therapy Assistant        Obj/Interventions     [facility] Name 02/06/20 0858          General ROM    GENERAL ROM COMMENTS  left knee rom:  5 degrees from full extension to 97 degrees flexion  -SC     Row Name 02/06/20 0858          Therapeutic Exercise    Lower Extremity (Therapeutic Exercise)  SLR (straight leg raise), left;quad sets, left;LAQ (long arc quad), left;heel slides, left  -SC     Lower Extremity Range of Motion (Therapeutic Exercise)  hip abduction/adduction, left;ankle dorsiflexion/plantar flexion, left  -SC     Exercise Type (Therapeutic Exercise)  AROM (active range of motion)  -SC     Position (Therapeutic Exercise)  supine;seated  -SC     Sets/Reps (Therapeutic Exercise)  1/10 to 15 reps  -SC     Expected Outcome (Therapeutic Exercise)  facilitate normal movement patterns;improve functional stability  -SC     Comment (Therapeutic Exercise)  TKA protocol  -SC     Row Name 02/06/20 0858          Static Sitting Balance    Level of [place] (Unsupported Sitting, Static Balance)  independent  -[facility] Name 02/06/20 0858          Dynamic Sitting Balance    Level of [place], Reaches Outside Midline (Sitting, Dynamic Balance)  independent  -SC      Row Name 02/06/20 0858          Static Standing Balance    Level of [place] (Supported Standing, Static Balance)  conditional independence  -SC     Assistive Device Utilized (Supported Standing, Static Balance)  walker, rolling  -SC     Row Name 02/06/20 0858          Dynamic Standing Balance    Level of [place], Reaches Outside Midline (Standing, Dynamic Balance)  conditional independence  -SC     Assistive Device Utilized (Supported Standing, Dynamic Balance)  walker, rolling  -SC       User Key  (r) = Recorded By, (t) = Taken By, (c) = Cosigned By    Initials Name Provider Type    Radha Henley, PTA Physical Therapy Assistant        Goals/Plan    No documentation.       Clinical Impression     [facility] Name 02/06/20 0901          Pain Assessment    Additional Documentation  Pain Scale: Numbers Pre/Post-Treatment (Group)  -SC     Row Name 02/06/20 0901          Pain Scale: Numbers Pre/Post-Treatment    Pain Scale: Numbers, Pretreatment  1/10  -SC     Pain Scale: Numbers, Post-Treatment  2/10  -SC     Pain Location - Side  Left  -SC     Pain Location  knee  -SC     Pain Intervention(s)  Rest;Elevated;Cold pack  -SC     Row Name 02/06/20 0901          Pain Scale: FACES Pre/Post-Treatment    Pre/Post Treatment Pain Comment  pain is managable this morning  -[facility] Name 02/06/20 0901          Physical Therapy Clinical Impression    Rehab Potential (PT Clinical Summary)  good, to achieve stated therapy goals  -SC     Row Name 02/06/20 0901          Vital Signs    O2 Delivery Pre Treatment  room air  -SC     O2 Delivery Intra Treatment  room air  -SC     O2 Delivery Post Treatment  room air  -SC     Recovery Time  all vitals stable  -SC     Row Name 02/06/20 0901          Positioning and Restraints    Pre-Treatment Position  in bed  -SC     Post Treatment Position  chair  -SC     In Chair  notified nsg;reclined  -SC       User Key  (r) = Recorded By, (t) = Taken By, (c) = Cosigned By    Initials Name  Provider Type    Radha Henley PTA Physical Therapy Assistant        Outcome Measures    No documentation.         PT Recommendation and Plan     Outcome Summary/Treatment Plan (PT)  Anticipated Discharge Disposition (PT): home with OP services, home with assist  Plan of Care Reviewed With: patient  Progress: improving  Outcome Summary: pt is much more awake and very motivated.  pt demonstrates good understanding of HEP.  pt is independent with transfers, and has very good gait quality.  pt had his other right knee replace 3 months ago and understands the rehab process.  pt is safe to return home with is wife.  Pt will follow up with OP PT.     Time Calculation:   PT Charges     Row Name 02/06/20 0906             Time Calculation    Start Time  0805  -SC      Stop Time  0835  -SC      Time Calculation (min)  30 min  -SC      PT Received On  02/06/20  -SC      PT - Next Appointment  02/06/20  -SC         Time Calculation- PT    Total Timed Code Minutes- PT  30 minute(s)  -SC         Timed Charges    20412 - PT Therapeutic Exercise Minutes  12  -SC      59258 - Gait Training Minutes   10  -SC      06560 - PT Therapeutic Activity Minutes  8  -SC        User Key  (r) = Recorded By, (t) = Taken By, (c) = Cosigned By    Initials Name Provider Type    Radha Henley PTA Physical Therapy Assistant        Therapy Charges for Today     Code Description Service Date Service Provider Modifiers Qty    66476365363 HC GAIT TRAINING EA 15 MIN 2/6/2020 Radha Gamboa PTA GP 1    78316848977 HC PT THERAPEUTIC ACT EA 15 MIN 2/6/2020 Radha Gamboa PTA GP 1    07465803101 HC PT THER PROC EA 15 MIN 2/6/2020 Radha Gamboa PTA GP 1               Radha Gamboa PTA  2/6/2020

## 2020-02-06 NOTE — DISCHARGE SUMMARY
DISCHARGE SUMMARY      Date of Discharge:  2/6/2020    Discharge Diagnosis: Primary osteoarthritis of left knee    Presenting Problem/History of Present Illness  Active Hospital Problems    Diagnosis  POA   • BPH (benign prostatic hyperplasia) [N40.0]  Yes   • Hypertension [I10]  Yes   • Obesity (BMI 30-39.9) [E66.9]  Yes   • Vitamin D deficiency [E55.9]  Yes   • Dyslipidemia [E78.5]  Yes   • Depression [F32.9]  Yes      Resolved Hospital Problems    Diagnosis Date Resolved POA   • **Primary osteoarthritis of left knee [M17.12] 02/06/2020 Yes          Hospital Course  Farheen Gomez is a 74 y.o. male who has a longstanding history of left knee pain.  Preoperative imaging did reveal changes of DJD in the area of the left knee.  After exhausting conservative measures and reviewing the risks benefits of surgery, he elected to proceed with a left total knee replacement.  He underwent that procedure on 2/5/2020 and was delivered to the recovery room in stable condition.  Upon meeting their transfer criteria, he was transferred to the surgical inpatient floor in stable condition.  He did receive perioperative antibiotics and DVT prophylaxis.  He was evaluated by the physical therapy team and did receive their services throughout his stay.  His hospital course was unremarkable.  Upon discharge, he was ambulating with the assistance of a walker, tolerating an oral diet, his pain was controlled with oral medication.  Prior to discharge, all his questions concerns were addressed.    Procedures Performed    Procedure(s):  TOTAL KNEE ARTHROPLASTY  -------------------       Consults:   Consults     Date and Time Order Name Status Description    2/5/2020 1448 Inpatient Consult to Hospitalist Completed           Pertinent Test Results: labs: Reviewed and radiology: X-Ray: Reviewed    Condition on Discharge: To home in stable condition    Vital Signs  Temp:  [97.3 °F (36.3 °C)-98.6 °F (37 °C)] 97.4 °F (36.3 °C)  Heart Rate:   [] 87  Resp:  [9-18] 14  BP: (103-134)/(64-84) 125/70    Physical Exam:   General Appearance alert, pleasant, appears stated age, interactive, cooperative and overweight  Extremities Left lower extremity is grossly well aligned and perfused, sensation intact, dressing intact, plantar and dorsiflexion intact  Neurologic Mental Status orientated to person, place, time and situation, Cranial Nerves cranial nerves 2 - 12 grossly intact as examined    Discharge Medications     Discharge Medications      New Medications      Instructions Start Date   ferrous sulfate 324 (65 Fe) MG tablet delayed-release EC tablet   324 mg, Oral, Daily With Breakfast   Start Date:  February 7, 2020     gabapentin 300 MG capsule  Commonly known as:  NEURONTIN   300 mg, Oral, Nightly      meloxicam 7.5 MG tablet  Commonly known as:  MOBIC   7.5 mg, Oral, Daily   Start Date:  February 7, 2020     oxyCODONE-acetaminophen 5-325 MG per tablet  Commonly known as:  PERCOCET   1-2 po q4 hours prn pain      rivaroxaban 10 MG tablet  Commonly known as:  XARELTO   10 mg, Oral, Daily With Dinner         Continue These Medications      Instructions Start Date   atorvastatin 20 MG tablet  Commonly known as:  LIPITOR   20 mg, Oral, Nightly      calcium carbonate 600 MG tablet  Commonly known as:  OS-JUSTA   600 mg, Oral, Daily, Stop today for surgery      escitalopram 10 MG tablet  Commonly known as:  LEXAPRO   10 mg, Oral, Every Evening      finasteride 5 MG tablet  Commonly known as:  PROSCAR   5 mg, Oral, Every Night at Bedtime      lisinopril 40 MG tablet  Commonly known as:  PRINIVIL,ZESTRIL   40 mg, Oral, Every Evening, Do not take 24 hours prior to surgery      Magnesium 250 MG tablet   250 mg, Oral, Daily, Stop today for surgery      PRESERVISION AREDS 2 PO   2 tablets, Oral, Daily, Stop today for surgery      EYE VITAMINS PO   2 capsules, Oral, Stop today for surgery      tamsulosin 0.4 MG capsule 24 hr capsule  Commonly known as:  FLOMAX   1  capsule, Oral, Every Evening      vitamin D 1.25 MG (91298 UT) capsule capsule  Commonly known as:  ERGOCALCIFEROL   50,000 Units, Oral, Weekly, Stop today for sugery             Activity at Discharge:   Activity Instructions     Discharge Activity      Total Knee Replacement Discharge Instructions:    I. ACTIVITIES:  1. Exercises:  Complete exercise program as taught by the hospital physical therapist 2 times per day  Exercise program will be advanced by the physical therapist  During the day be up ambulating every 2 hours (while awake) for short distances  Complete the ankle pump exercises at least 10 times per hour (while awake)  Elevate legs when in bed and for at least 30 minutes during the day.Use cold packs 20-30 minutes approximately 5 times per day. This should be done before and after completing your exercises and at any time you are experiencing pain/ stiffness in your operative extremity.      2. Activities of Daily Living:  No tub baths, hot tubs, or swimming pools for 4 weeks  May shower with waterproof dressing in place as long as it is intact.  Do not scrub or rub the incision. Let water run over dressing and pat dry.     II. Restrictions  Continue precautions as taught at the hospital  Your surgeon will discuss with you when you will be able to drive again, typically it is when you have enough strength in your leg to step hard on the brake and are off the pain medication.  Weight bearing is as tolerated  First week stay inside on even terrain. May go up and down stairs one stair at a time utilizing the hand rail once cleared by physical therapy to do so.  After one week, you may venture outside (if cleared to do so by physical therapist).    III. Precautions:  Everyone that comes near you should wash their hands  No elective dental, genital-urinary, or colon procedures or surgical procedures for 12 weeks after surgery unless absolutely necessary.   If dental work or surgical procedure is deemed  absolutely necessary, you will need to contact your surgeon as you will need to take antibiotics 1 hour prior to any dental work (including teeth cleanings).  Please discuss with your surgeon prophylactic antibiotics as the length of time this intervention will be necessary for you varies with each patient's health history and situation.  Avoid sick people. If you must be around someone who is ill, they should wear a mask.  Avoid visits to the Emergency Room or Urgent Care unless you are having a life threatening event.   Stockings/ace wraps are to be placed on in the morning and removed at night. Monitor the stockings to ensure that any swelling is not causing the stockings to become too tight. In this case, remove stockings immediately.    IV. INCISION CARE:  Wash your hands often.  Notify office if dressing becomes dislodged or drainage noted. Change the dressing as directed by your physician.   No creams or ointments to the incision  Do not touch or pick at the incision  Check incision every day and notify surgeon immediately if any of the following signs or symptoms are noted:  Increase in redness  Increase in swelling around the incision/dressing and of the entire extremity  Increase in pain  Drainage oozing touching the edges of the dressing  Pulling apart of the edges of the incision  Increase in overall body temperature (greater than 100.5 degrees)  Your surgeon will instruct you regarding suture or staple removal    V. Medications:   1. Anticoagulants: You will be discharged on an anticoagulant. This is a prophylactic medication that helps prevent blood clots during your post-operative period. The type and length of dosage varies based on your individual needs, procedure performed, and surgeon's preference.  While taking the anticoagulant, you should avoid taking any additional aspirin, ibuprofen (Advil or Motrin), Aleve (Naprosyn) or other non-steroidal anti-inflammatory medications, unless prescribed by  your surgeon.   Notify surgeon immediately if any leidy bleeding is noted in the urine, stool, emesis, or from the nose or the incision. Blood in the stool will often appear as black rather than red. Blood in urine may appear as pink. Blood in emesis may appear as brown/black like coffee grounds.  You will need to apply pressure for longer periods of time to any cuts or abrasions to stop bleeding  Avoid alcohol while taking anticoagulants    2. Stool Softeners: You will be at greater risk of constipation after surgery due to being less mobile and the pain medications.   Take stool softeners as instructed by your surgeon while on pain medications. Over the counter Colace 100 mg 1-2 capsules twice daily.   If stools become too loose or too frequent, please decreases the dosage or stop the stool softener.  If constipation occurs despite use of stool softeners, you are to continue the stool softeners and add a laxative (Milk of Magnesia 1 ounce daily as needed)  Drink plenty of fluids, and eat fruits and vegetables during your recovery time    3. Pain Medications utilized after surgery are narcotics and the law requires that the following information be given to all patients that are prescribed narcotics:  CLASSIFICATION: Pain medications are called Opioids and are narcotics  LEGALITIES: It is illegal to share narcotics with others and to drive within 24 hours of taking narcotics  POTENTIAL SIDE EFFECTS: Potential side effects of opioids include: nausea, vomiting, itching, dizziness, drowsiness, dry mouth, constipation, and difficulty urinating.  POTENTIAL ADVERSE EFFECTS:   Opioid tolerance can develop with use of pain medications and this simply means that it requires more and more of the medication to control pain; however, this is seen more in patients that use opioids for longer periods of time.  Opioid dependence can develop with use of Opioids and this simply means that to stop the medication can cause withdrawal  symptoms; however, this is seen with patients that use Opioids for longer periods of time.  Opioid addiction can develop with use of Opioids and the incidence of this is very unlikely in patients who take the medications as ordered and stop the medications as instructed.  Opioid overdose can be dangerous, but is unlikely when the medication is taken as ordered and stopped when ordered. It is important not to mix opioids with alcohol or with and type of sedative such as Benadryl as this can lead to over sedation and respiratory difficulty.  DOSAGE:   Pain medications will need to be taken consistently for the first week to decrease pain and promote adequate pain relief and participation in physical therapy.  After the initial surgical pain begins to resolve, you may begin to decrease the pain medication. By the end of 6 weeks, you should be off of pain medications.  Refills will not be given by the office during evening hours, on weekends, or after 6 weeks post-op.  To seek refills on pain medications during the initial 6 week post-operative period, you must call the office 48 hours in advance to request the refill. The office will then notify you when to  the prescription. DO NOT wait until you are out of the medication to request a refill.    V. FOLLOW-UP VISITS:  You will need to follow up in the office with Dr. Boby Medina/ Jaic Ocasio PA-C in 1 week. Please call this number [phone] to schedule this appointment.  You will need to follow up with your primary care physician in 4 weeks.  If you have any concerns or suspected complications prior to your follow up visit, please call your surgeons office. Do not wait until your appointment time if you suspect complications. These will need to be addressed in the office promptly.          Follow-up Appointments  Future Appointments   Date Time Provider Department Center   2/13/2020  1:30 PM Jaci Ocasio PA [facility] None     Additional  Instructions for the Follow-ups that You Need to Schedule     Ambulatory Referral to Physical Therapy Evaluate and treat; Left   As directed      Specialty needed:  Evaluate and treat    Gait Training:  Left         Discharge Follow-up with Specialty: Orthopedics; 1 Week   As directed      Specialty:  Orthopedics    Follow Up:  1 Week    Follow Up Details:  Return to the office to see Dr. Boby Medina/ Jaci Ocasio PA-C               Test Results Pending at Discharge       ISRAEL Vivas  02/06/20  12:18 PM    I certify that this patient is under my care and that I had a face to face encounter that meets the physician face to face encounter requirements.    The encounter occurred on: 2/6/2020 12:18 PM    Based on the findings of the above encounter, I certify that this patient is confined to the home and needs intermittent skilled nursing care, physical therapy and/or speech therapy.  The patient is under my care, and I have initiated the establishment of the plan of care.    This patient will be followed by a physician who will, periodically, review the plan of care. The findings from this face to face encounter have been communicated with the patient's community based physician who will be assuming this patient's home health plan of care.    I also have provided the agency additional information to support the patient's homebound status and need for skilled care. (Examples of this information could include physician progress notes, discharge summaries, history and physical forms, operative reports, referral order, etc.)    Boby Medina MD

## 2020-02-06 NOTE — PLAN OF CARE
Problem: Patient Care Overview  Goal: Plan of Care Review  Outcome: Ongoing (interventions implemented as appropriate)  Flowsheets (Taken 2/6/2020 0903)  Progress: improving  Plan of Care Reviewed With: patient  Outcome Summary: pt is much more awake and very motivated.  pt demonstrates good understanding of HEP.  pt is independent with transfers, and has very good gait quality.  pt had his other right knee replace 3 months ago and understands the rehab process.  pt is safe to return home with is wife.  Pt will follow up with OP PT.

## 2020-02-07 NOTE — PROGRESS NOTES
Case Management Discharge Note      Final Note: Home with Outpatient therapy from [facility] outpatient therapy in [place]. CM spoke with Laureen, patient is accepted.     Provided post acute provider list?: (received in [facility])          Therapy      Service Provider Request Status Selected Services Address Phone Number Fax Number    [facility] PHYSICAL THERAPY [place] [facility] [address] [place] [address] [phone]             Final Discharge Disposition Code: 01 - home or self-care

## 2020-02-10 ENCOUNTER — TREATMENT (OUTPATIENT)
Dept: PHYSICAL THERAPY | Facility: CLINIC | Age: 75
End: 2020-02-10

## 2020-02-10 DIAGNOSIS — M25.562 ACUTE PAIN OF LEFT KNEE: Primary | ICD-10-CM

## 2020-02-10 DIAGNOSIS — Z96.653 HISTORY OF BILATERAL KNEE ARTHROPLASTY: ICD-10-CM

## 2020-02-10 PROCEDURE — 97161 PT EVAL LOW COMPLEX 20 MIN: CPT | Performed by: PHYSICAL THERAPIST

## 2020-02-10 PROCEDURE — 97140 MANUAL THERAPY 1/> REGIONS: CPT | Performed by: PHYSICAL THERAPIST

## 2020-02-10 PROCEDURE — 97110 THERAPEUTIC EXERCISES: CPT | Performed by: PHYSICAL THERAPIST

## 2020-02-10 NOTE — PROGRESS NOTES
Physical Therapy Initial Evaluation and Plan of Care    Patient: Farheen Gomez   : 1945  Diagnosis/ICD-10 Code:  Acute pain of left knee [M25.562]  Referring practitioner: Boby Medina MD  Date of Initial Visit: 2/10/2020  Today's Date: 2/10/2020  Patient seen for 1 sessions             Subjective Evaluation    History of Present Illness  Mechanism of injury: Patient is a 74 y.o. WM who returns to PT after L TKA on 20.  He reports this one is doing much better than his R TKA last fall.  He has minimal swelling and is wearing compression pants.  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  He uses ice regularly and has theraband and therapy ball as well as recumbent bike at home for HEP.      Quality of life: excellent    Pain  Current pain ratin  At best pain ratin  At worst pain ratin  Location: L knee    Treatments  Previous treatment: physical therapy  Current treatment: medication and physical therapy  Discharged from (in last 30 days): inpatient hospitalization  Patient Goals  Patient goals for therapy: decreased pain, improved balance, increased motion, increased strength and return to sport/leisure activities             Objective Oxford knee score indicates 52% impairment with limitations in standing, walking, squatting, sleeping.  AROM knee flexion = 95 deg L, 110 deg R; extension = -10 deg L, -5 deg R.  Able to perform independent SLR L but with extension lag.  Reviewed importance of maintaining early knee extension and instructed in heel prop.  Patient ambulates with cane in clinic with no LOB noted.  Tolerates ambulation 100' in clinic.  Tolerated 6.5 min on Nustep.  Dressing in place, he returns to MD  for dressing change.  Wearing compression pants to decrease swelling.  Good quad  contraction.  Patient elects to perform ice at home.      Assessment & Plan     Assessment  Impairments: abnormal gait, abnormal muscle tone, abnormal or restricted ROM, activity intolerance, impaired balance, impaired physical strength, lacks appropriate home exercise program and pain with function  Prognosis: good  Functional Limitations: walking, uncomfortable because of pain and standing  Goals  Plan Goals: Patient independent with HEP in 2 weeks  Tolerate 10 min Nustep in 2 weeks  Increase knee flexion to allow full revolution on bike in 2 weeks  Able to return to driving in 2 weeks  Able to ambulate independently without AD and minimal antalgia in 2 weeks  Improve function as evidenced by Oxford knee score of 20% or less by discharge (52% impairment initially)  [facility]  Perform 10 reps of repeated sit to stand without arms in 30 seconds by discharge       Plan  Therapy options: will be seen for skilled physical therapy services  Other planned modality interventions: physical modalities as needed  Planned therapy interventions: balance/weight-bearing training, flexibility, functional ROM exercises, gait training, home exercise program, joint mobilization, manual therapy, neuromuscular re-education, postural training, strengthening, stretching, therapeutic activities and transfer training  Treatment plan discussed with: patient  Plan details: Plan to continue PT 2x's per week up to 12 visits if needed        Timed:         Manual Therapy:    15     mins  21674;     Therapeutic Exercise:    15     mins  25859;     Neuromuscular Vanna:        mins  35141;    Therapeutic Activity:          mins  67429;     Gait Training:           mins  94005;     Ultrasound:          mins  33290;    Ionto                                   mins   76546  Self-care  ____ mins 17896    Un-Timed:  Electrical Stimulation:         mins  36514 ( );  Dry Needling          mins self-pay  Traction           mins 65603  Low Eval     15     Mins  90708  Mod Eval          Mins  33986  High Eval                            Mins  84339  Canal repositioning _____ mins  11399        Timed Treatment:   30   mins   Total Treatment:     45   mins    PT SIGNATURE: Grant MADDOX Sprigler, PT   DATE TREATMENT INITIATED: 2/10/2020    Initial Certification  Certification Period: 5/10/2020  I certify that the therapy services are furnished while this patient is under my care.  The services outlined above are required by this patient, and will be reviewed every 90 days.     PHYSICIAN: Boby Medina, MD  _________________________________________________________________________________________________________    DATE: __________________________________________________________________________________________________________________________________________    Please sign and return via fax to  [phone] Thank you, [facility] Physical Therapy.

## 2020-02-11 ENCOUNTER — TELEPHONE (OUTPATIENT)
Dept: SURGERY | Facility: HOSPITAL | Age: 75
End: 2020-02-11

## 2020-02-11 RX ORDER — LISINOPRIL 40 MG/1
TABLET ORAL
Qty: 90 TABLET | Refills: 0 | Status: SHIPPED | OUTPATIENT
Start: 2020-02-11 | End: 2020-05-19

## 2020-02-11 NOTE — TELEPHONE ENCOUNTER
Doing excellent!  Knee is pretty good - pain not too bad.  Swelling is down pretty good.  Using ice a lot and keeping leg elevated when resting.  ROM 10-95* with OP PT.  No needs at this time.

## 2020-02-13 ENCOUNTER — TREATMENT (OUTPATIENT)
Dept: PHYSICAL THERAPY | Facility: CLINIC | Age: 75
End: 2020-02-13

## 2020-02-13 ENCOUNTER — OFFICE VISIT (OUTPATIENT)
Dept: ORTHOPEDIC SURGERY | Facility: CLINIC | Age: 75
End: 2020-02-13

## 2020-02-13 VITALS
BODY MASS INDEX: 31.92 KG/M2 | HEART RATE: 98 BPM | WEIGHT: 223 LBS | HEIGHT: 70 IN | DIASTOLIC BLOOD PRESSURE: 83 MMHG | SYSTOLIC BLOOD PRESSURE: 154 MMHG

## 2020-02-13 DIAGNOSIS — Z96.652 HX OF TOTAL KNEE REPLACEMENT, LEFT: Primary | ICD-10-CM

## 2020-02-13 DIAGNOSIS — Z96.653 HISTORY OF BILATERAL KNEE ARTHROPLASTY: ICD-10-CM

## 2020-02-13 DIAGNOSIS — M25.562 ACUTE PAIN OF LEFT KNEE: Primary | ICD-10-CM

## 2020-02-13 DIAGNOSIS — E66.9 OBESITY (BMI 30-39.9): ICD-10-CM

## 2020-02-13 PROCEDURE — 97110 THERAPEUTIC EXERCISES: CPT | Performed by: PHYSICAL THERAPIST

## 2020-02-13 PROCEDURE — 99024 POSTOP FOLLOW-UP VISIT: CPT | Performed by: PHYSICIAN ASSISTANT

## 2020-02-13 RX ORDER — OXYCODONE HYDROCHLORIDE AND ACETAMINOPHEN 5; 325 MG/1; MG/1
TABLET ORAL
Qty: 40 TABLET | Refills: 0 | Status: SHIPPED | OUTPATIENT
Start: 2020-02-13 | End: 2020-02-20 | Stop reason: SDUPTHER

## 2020-02-13 NOTE — PATIENT INSTRUCTIONS
Total Knee Replacement, Care After  This sheet gives you information about how to care for yourself after your procedure. Your doctor may also give you more specific instructions. If you have problems or questions, contact your doctor.  What can I expect after the procedure?  After the procedure, it is common to have:  · Pain.  · Swelling.  · A small amount of blood coming from your cut from surgery (incision).  · Clear fluid coming from your cut from surgery.  · Limited movement of your knee.  Follow these instructions at home:  Medicines  · Take over-the-counter and prescription medicines only as told by your doctor.  · If you were prescribed a blood thinner (anticoagulant), take it as told by your doctor.  · Ask your doctor if the medicine prescribed to you:  ? Requires you to avoid driving or using heavy machinery.  ? Can cause trouble pooping (constipation). You may need to take steps to prevent or treat trouble pooping:  § Drink enough fluid to keep your pee (urine) pale yellow.  § Take over-the-counter or prescription medicines.  § Eat foods that are high in fiber. These include beans, whole grains, and fresh fruits and vegetables.  § Limit foods that are high in fat and sugar. These include fried or sweet foods.  Bathing  · Do not take baths, swim, or use a hot tub until your doctor approves. Ask your doctor if you may take showers. You may only be allowed to take sponge baths.  · Keep your bandage (dressing) dry until your doctor says it can be taken off.  Incision care and drain care    · Follow instructions from your doctor about how to take care of your cut from surgery. Make sure you:  ? Wash your hands with soap and water before and after you change your bandage. If you cannot use soap and water, use hand .  ? Change your bandage as told by your doctor.  ? Leave stitches (sutures), skin glue, or skin tape (adhesive) strips in place. They may need to stay in place for 2 weeks or longer. If tape  strips get loose and curl up, you may trim the loose edges. Do not remove tape strips completely unless your doctor says it is okay.  · Check your cut from surgery and your drain site every day for signs of infection. Check for:  ? More redness, swelling, or pain.  ? More fluid or blood.  ? Warmth.  ? Pus or a bad smell.  · If you have a drain, follow instructions from your doctor about caring for it.  Managing pain, stiffness, and swelling         · If told, put ice on your knee.  ? Put ice in a plastic bag or use the icing device (cold flow pad or cryocuff) that you were given. Follow your doctor's directions about how to use the icing device.  ? Place a towel between your skin and the bag or between your skin and the icing device.  ? Leave the ice on for 20 minutes, 2-3 times per day.  · If told, put heat on your knee before you exercise. Use the heat source that your doctor recommends, such as a moist heat pack or a heating pad.  ? Place a towel between your skin and the heat source.  ? Leave the heat on for 20-30 minutes.  ? Remove the heat if your skin turns bright red. This is very important if you are unable to feel pain, heat, or cold. You may have a greater risk of getting burned.  · Move your toes often.  · Raise (elevate) your knee above the level of your heart while you are sitting or lying down.  ? Use several pillows to keep your leg straight.  ? Do not put a pillow just under the knee. If the knee is bent for a long time, this may make the knee stiff.  · Wear elastic knee support as told by your doctor.  Activity  · Rest as told by your doctor.  · Do not sit for a long time without moving. Get up to take short walks every 1-2 hours. This is important. Ask for help if you feel weak or unsteady.  · Ask your doctor what activities are safe for you.  · Avoid activities that put stress on your knees. These include running, jumping rope, and jumping jacks.  · Do not play contact sports until your doctor  says it is okay.  · Do exercises as told by your physical therapist.  · If you have been sent home with a knee joint motion machine (continuous passive motion machine), use it as told by your doctor.  Safety    · Do not use your leg to support your body weight until your doctor says that you can. Use crutches or a walker as told by your doctor.  · Do not drive until your doctor says it is okay. Ask your doctor when it is safe to drive.  General instructions  · Do not use any products that contain nicotine or tobacco, such as cigarettes, e-cigarettes, and chewing tobacco. These can delay healing. If you need help quitting, ask your doctor.  · Wear special socks (compression stockings) as told by your doctor.  · Tell your doctor if you plan to have dental work. Also, tell your dentist about your knee replacement.  · Keep all follow-up visits as told by your doctor. This is important.  Contact a doctor if:  · You have more redness, swelling, or pain around your cut from surgery or your drain.  · You have more fluid or blood coming from your cut from surgery or your drain.  · You have pus or a bad smell coming from your cut from surgery or your drain.  · Your cut from surgery or your drain area feels warm to the touch.  · You have a fever.  · Your cut breaks open.  · You have knee pain that does not go away.  · The movement of your knee is getting worse.  · Your new joint feels loose.  Get help right away if you have:  · Pain in your calf or thigh.  · Swelling in your calf or thigh.  · Shortness of breath.  · Trouble breathing.  · Chest pain.  Summary  · After the procedure, it is common to have pain and swelling, blood or fluid coming from your cut from surgery, and trouble moving your knee.  · Follow instructions from your doctor about how to take care of your cut from surgery.  · Use crutches or a walker as told by your doctor.  · If you were prescribed a blood thinner, take it as told by your doctor.  · Keep all  follow-up visits as told by your doctor. This is important.  This information is not intended to replace advice given to you by your health care provider. Make sure you discuss any questions you have with your health care provider.  Document Released: 03/11/2013 Document Revised: 08/01/2019 Document Reviewed: 08/01/2019  [employer] Interactive Patient Education © 2019 [employer]

## 2020-02-13 NOTE — PROGRESS NOTES
ORTHO POSTOP VISIT       Subjective:    HPI:  Farheen Gomez is a 74 y.o. male who presents about 1 week out from having a left total knee replacement.  He reports that he is doing well with mild intermittent discomfort.  He is requesting a refill on his pain medication.  He is already in outpatient physical therapy.    Medications:    Current Outpatient Medications:   •  atorvastatin (LIPITOR) 20 MG tablet, Take 20 mg by mouth Every Night., Disp: , Rfl: 3  •  calcium carbonate (OS-JUSTA) 600 MG tablet, Take 600 mg by mouth Daily. Stop today for surgery, Disp: , Rfl:   •  escitalopram (LEXAPRO) 10 MG tablet, Take 10 mg by mouth Every Evening., Disp: , Rfl: 3  •  ferrous sulfate 324 (65 Fe) MG tablet delayed-release EC tablet, Take 1 tablet by mouth Daily With Breakfast for 14 days., Disp: 14 tablet, Rfl: 0  •  finasteride (PROSCAR) 5 MG tablet, Take 5 mg by mouth every night at bedtime., Disp: , Rfl:   •  gabapentin (NEURONTIN) 300 MG capsule, Take 1 capsule by mouth Every Night., Disp: 30 capsule, Rfl: 0  •  lisinopril (PRINIVIL,ZESTRIL) 40 MG tablet, TAKE 1 TABLET BY MOUTH ONCE DAILY, Disp: 90 tablet, Rfl: 0  •  Magnesium 250 MG tablet, Take 250 mg by mouth Daily. Stop today for surgery, Disp: , Rfl:   •  meloxicam (MOBIC) 7.5 MG tablet, Take 1 tablet by mouth Daily for 12 days., Disp: 12 tablet, Rfl: 0  •  Multiple Vitamins-Minerals (EYE VITAMINS PO), Take 2 capsules by mouth. Stop today for surgery, Disp: , Rfl:   •  Multiple Vitamins-Minerals (PRESERVISION AREDS 2 PO), Take 2 tablets by mouth Daily. Stop today for surgery, Disp: , Rfl:   •  oxyCODONE-acetaminophen (PERCOCET) 5-325 MG per tablet, 1-2 po q6 hours prn pain, Disp: 40 tablet, Rfl: 0  •  rivaroxaban (XARELTO) 10 MG tablet, Take 1 tablet by mouth Daily With Dinner for 12 days. Indications: Post Surgical - Knee, Prophylaxis of Venous Thromboembolism, Disp: 12 tablet, Rfl: 0  •  tamsulosin (FLOMAX) 0.4 MG capsule 24 hr capsule, Take 1 capsule by  "mouth Every Evening., Disp: , Rfl:   •  vitamin D (ERGOCALCIFEROL) 73300 units capsule capsule, Take 50,000 Units by mouth 1 (One) Time Per Week. Stop today for wanda, Disp: , Rfl:   Current outpatient and discharge medications have been reconciled for the patient.  Reviewed by: ISRAEL Vivas      Allergies:  Allergies   Allergen Reactions   • Penicillins Hives     Tongue bled          Objective   Objective:    /83 (BP Location: Left arm, Patient Position: Sitting, Cuff Size: Large Adult)   Pulse 98   Ht 177.8 cm (70\")   Wt 101 kg (223 lb)   BMI 32.00 kg/m²     Physical Examination:  Alert, oriented, obese individual in no acute distress, ambulating with the assistance of a cane  Left lower extremity shows a well-healing surgical incision with no erythema, drainage, or open skin lesions.  There are varying degrees of ecchymosis present in the thigh area.  There is a minimal amount of swelling. It is grossly well aligned, and the patient is neurovascularly intact distally. The knee is stable to varus and valgus stress, there is no patellar maltracking or crepitus noted, and plantar and dorsiflexion is 5/5. There is mild tenderness to palpation and with range of motion.         Imaging:  xrays to be obtained at next visit            Assessment:  1. Hx of total knee replacement, left    2. Obesity (BMI 30-39.9)       About 1 week out from surgery          Plan:  His incision was cleansed thoroughly with chlorhexidine soap and saline solution and redressed with a bordered Mepilex silver dressing.  This dressing should be left in place until the patient is seen back in 1 week.  He may shower with the dressing in place.  Continue ice, elevation, and compression.  Continue DVT prophylaxis.  Continue outpatient physical therapy.  I will refill his pain medication at this time, risks and addiction properties were discussed.  We will plan to see him back in 1 week with imaging at that time.             Jaci" ISRAEL Garcia  02/13/20  1:47 PM

## 2020-02-13 NOTE — PROGRESS NOTES
Physical Therapy Daily Progress Note      Patient: Farheen Gomez   : 1945  Diagnosis/ICD-10 Code:  Acute pain of left knee [M25.562]  Referring practitioner: Boby Medina MD  Date of Initial Visit: Type: THERAPY  Noted: 2/10/2020  Today's Date: 2020  Patient seen for 2 sessions         Farheen Gomez reports: his pain and mobility has been greatly improved with this operation as compare dot his first knee replacment and is very happy with the results already in the first week post op. Pt. States he does have discomfort when sleeping at night and sleeps fair throughout the night.    Objective   See Exercise, Manual, and Modality Logs for complete treatment.     Assessment/Plan   Pt. Is performing exceptionally well for week post op and continues to improve Pt. Already tolerates neutral extension and PROM into knee flexion of 95 deg for L knee. Pt. Performs all exercise without pain however does report fatigue following exercise.    Progress per Plan of Care           Timed:         Manual Therapy:    3     mins  32352;     Therapeutic Exercise:    26     mins  92032;     Neuromuscular Vanna:        mins  16988;    Therapeutic Activity:          mins  37790;     Gait Training:           mins  75279;     Ultrasound:          mins  00074;    Ionto                                   mins   21430  Self Care                            mins   19730  Canalith Repos                   mins  4209    Un-Timed:  Electrical Stimulation:         mins  63111 ( );  Dry Needling          mins self-pay  Traction          mins 21412  Low Eval          Mins  56712  Mod Eval          Mins  05330  High Eval                            Mins  04703    Timed Treatment:   29   mins   Total Treatment:     29   mins    Nona Lemons PTA  Physical Therapist Assistant License #[id number]

## 2020-02-14 ENCOUNTER — OFFICE VISIT (OUTPATIENT)
Dept: FAMILY MEDICINE CLINIC | Facility: CLINIC | Age: 75
End: 2020-02-14

## 2020-02-14 VITALS
HEART RATE: 90 BPM | RESPIRATION RATE: 16 BRPM | HEIGHT: 70 IN | TEMPERATURE: 98.1 F | BODY MASS INDEX: 31.5 KG/M2 | WEIGHT: 220 LBS | SYSTOLIC BLOOD PRESSURE: 138 MMHG | OXYGEN SATURATION: 97 % | DIASTOLIC BLOOD PRESSURE: 90 MMHG

## 2020-02-14 DIAGNOSIS — Z96.652 HX OF TOTAL KNEE REPLACEMENT, LEFT: Primary | ICD-10-CM

## 2020-02-14 DIAGNOSIS — E78.5 DYSLIPIDEMIA: Chronic | ICD-10-CM

## 2020-02-14 PROCEDURE — 99495 TRANSJ CARE MGMT MOD F2F 14D: CPT | Performed by: PREVENTIVE MEDICINE

## 2020-02-14 NOTE — PROGRESS NOTES
"Transitional Care Follow Up Visit  Subjective     Farheen Gomez is a 74 y.o. male who presents for a transitional care management visit.    Within 48 business hours after discharge our office contacted him via telephone to coordinate his care and needs.      I reviewed and discussed the details of that call along with the discharge summary, hospital problems, inpatient lab results, inpatient diagnostic studies, and consultation reports with Farheen.     Vitals:    02/14/20 1257 02/14/20 1302   BP: 157/96 138/90   BP Location: Right arm Left arm   Patient Position: Sitting Sitting   Cuff Size: Large Adult Large Adult   Pulse: 88 90   Resp: 16    Temp: 98.1 °F (36.7 °C)    TempSrc: Oral    SpO2: 97%    Weight: 99.8 kg (220 lb)    Height: 177.8 cm (70\")      Body mass index is 31.57 kg/m².    Current outpatient and discharge medications have been reconciled for the patient.    Date of TCM Phone Call 10/24/2019 2/7/2020 2/10/2020   [facility]   Date of Admission - 2/5/2020 2/5/2020   Date of Discharge - 2/6/2020 2/6/2020   Discharge Disposition Home or Self Care - Home or Self Care     Risk for Readmission (LACE) Score: 1 (2/6/2020  6:01 AM)      History of Present Illness   [facility]     The following portions of the patient's history were reviewed and updated as appropriate: allergies, current medications, past family history, past medical history, past social history, past surgical history and problem list.    Review of Systems   Constitutional: Negative.    Eyes: Negative.    Respiratory: Negative.    Cardiovascular: Negative.    Gastrointestinal: Negative.    Endocrine: Negative.    Genitourinary: Negative.    Musculoskeletal: Positive for arthralgias, gait problem, joint swelling and myalgias.   Skin: Negative.    Allergic/Immunologic: Negative.    Hematological: Negative.    Psychiatric/Behavioral: Negative.        Objective   Physical " Exam   Constitutional: He is oriented to person, place, and time. He appears well-developed and well-nourished.   HENT:   Head: Normocephalic.   Mouth/Throat: Oropharynx is clear and moist.   Eyes: Pupils are equal, round, and reactive to light. EOM are normal.   Neck: Neck supple.   Cardiovascular: Normal rate, regular rhythm and normal heart sounds.   Pulmonary/Chest: Effort normal and breath sounds normal.   Abdominal: Soft.   Musculoskeletal:   Mild bruising remains with moderate L knee swelling.  Extension lacks few degress.  Calf nontender and mild antalgia   Neurological: He is alert and oriented to person, place, and time.   Skin: Skin is warm.   Psychiatric: He has a normal mood and affect.   Vitals reviewed.      Assessment/Plan   Farheen was seen today for follow-up.    Diagnoses and all orders for this visit:    Hx of total knee replacement, left  Comments:  Doing home PT and able to almost fully extend.  No fever or chills.  Pain so has been noting increase pulse and blood pressure-will call if persist    Dyslipidemia  Comments:  lab  Orders:  -     Lipid Panel; Future        Patient Instructions   Call if BP greater than 160/100 and pulse remains over 110

## 2020-02-17 ENCOUNTER — TREATMENT (OUTPATIENT)
Dept: PHYSICAL THERAPY | Facility: CLINIC | Age: 75
End: 2020-02-17

## 2020-02-17 DIAGNOSIS — M25.562 ACUTE PAIN OF LEFT KNEE: Primary | ICD-10-CM

## 2020-02-17 DIAGNOSIS — Z96.653 HISTORY OF BILATERAL KNEE ARTHROPLASTY: ICD-10-CM

## 2020-02-17 PROCEDURE — 97140 MANUAL THERAPY 1/> REGIONS: CPT | Performed by: PHYSICAL THERAPIST

## 2020-02-17 PROCEDURE — 97110 THERAPEUTIC EXERCISES: CPT | Performed by: PHYSICAL THERAPIST

## 2020-02-17 NOTE — PROGRESS NOTES
Physical Therapy Daily Progress Note        Patient: Farheen Gomez   : 1945  Diagnosis/ICD-10 Code:  Acute pain of left knee [M25.562]  Referring practitioner: Boby Medina MD  Date of Initial Visit: Type: THERAPY  Noted: 2/10/2020  Today's Date: 2020  Patient seen for 3 sessions         Farheen Gomez reports: very pleased with progress, feels like this knee is going much easier than first      Subjective     Objective   See Exercise, Manual, and Modality Logs for complete treatment.   Added TB hams      Assessment/Plan    Soft end feel flex to 110 degrees           Timed:         Manual Therapy:    15     mins  47581;     Therapeutic Exercise:   30   mins  64323;     Neuromuscular Vanna:    0    mins  97989;    Therapeutic Activity:     0     mins  44256;     Gait Trainin     mins  47444;     Ultrasound:     0     mins  92200;    Ionto                               0    mins   78635  Self Care                       0     mins   52202  Canalith Repos               0    mins  4209    Un-Timed:  Electrical Stimulation:    0     mins  57590 (MC );  Dry Needling     0     mins self-pay  Traction     0     mins 07116  Low Eval     0     Mins  71494  Mod Eval     0     Mins  37477  High Eval                       0     Mins  45485    Timed Treatment:   45   mins   Total Treatment:     45   mins    Rochelle Carranza PT  Physical Therapist  [place] license [id number]

## 2020-02-20 ENCOUNTER — OFFICE VISIT (OUTPATIENT)
Dept: ORTHOPEDIC SURGERY | Facility: CLINIC | Age: 75
End: 2020-02-20

## 2020-02-20 VITALS
HEART RATE: 116 BPM | HEIGHT: 70 IN | WEIGHT: 218 LBS | SYSTOLIC BLOOD PRESSURE: 144 MMHG | DIASTOLIC BLOOD PRESSURE: 80 MMHG | BODY MASS INDEX: 31.21 KG/M2

## 2020-02-20 DIAGNOSIS — Z96.652 HX OF TOTAL KNEE REPLACEMENT, LEFT: Primary | ICD-10-CM

## 2020-02-20 DIAGNOSIS — E66.9 OBESITY (BMI 30-39.9): ICD-10-CM

## 2020-02-20 PROCEDURE — 99024 POSTOP FOLLOW-UP VISIT: CPT | Performed by: PHYSICIAN ASSISTANT

## 2020-02-20 RX ORDER — OXYCODONE HYDROCHLORIDE AND ACETAMINOPHEN 5; 325 MG/1; MG/1
TABLET ORAL
Qty: 40 TABLET | Refills: 0 | Status: SHIPPED | OUTPATIENT
Start: 2020-02-20 | End: 2020-03-02 | Stop reason: ALTCHOICE

## 2020-02-20 NOTE — PROGRESS NOTES
ORTHO POSTOP VISIT       Subjective:    HPI:  Farheen Gomez is a 74 y.o. male who presents about 2 weeks out from having a left total knee replacement.  He reports that he is doing well with mild intermittent discomfort.  He is requesting a refill on his pain medication.  He is already in outpatient physical therapy.    Medications:    Current Outpatient Medications:   •  atorvastatin (LIPITOR) 20 MG tablet, Take 20 mg by mouth Every Night., Disp: , Rfl: 3  •  calcium carbonate (OS-JUSTA) 600 MG tablet, Take 600 mg by mouth Daily. Stop today for surgery, Disp: , Rfl:   •  escitalopram (LEXAPRO) 10 MG tablet, Take 10 mg by mouth Every Evening., Disp: , Rfl: 3  •  ferrous sulfate 324 (65 Fe) MG tablet delayed-release EC tablet, Take 1 tablet by mouth Daily With Breakfast for 14 days., Disp: 14 tablet, Rfl: 0  •  finasteride (PROSCAR) 5 MG tablet, Take 5 mg by mouth every night at bedtime., Disp: , Rfl:   •  gabapentin (NEURONTIN) 300 MG capsule, Take 1 capsule by mouth Every Night., Disp: 30 capsule, Rfl: 0  •  lisinopril (PRINIVIL,ZESTRIL) 40 MG tablet, TAKE 1 TABLET BY MOUTH ONCE DAILY, Disp: 90 tablet, Rfl: 0  •  Magnesium 250 MG tablet, Take 250 mg by mouth Daily. Stop today for surgery, Disp: , Rfl:   •  Multiple Vitamins-Minerals (EYE VITAMINS PO), Take 2 capsules by mouth. Stop today for surgery, Disp: , Rfl:   •  Multiple Vitamins-Minerals (PRESERVISION AREDS 2 PO), Take 2 tablets by mouth Daily. Stop today for surgery, Disp: , Rfl:   •  oxyCODONE-acetaminophen (PERCOCET) 5-325 MG per tablet, 1-2 po q6 hours prn pain, Disp: 40 tablet, Rfl: 0  •  tamsulosin (FLOMAX) 0.4 MG capsule 24 hr capsule, Take 1 capsule by mouth Every Evening., Disp: , Rfl:   •  vitamin D (ERGOCALCIFEROL) 81240 units capsule capsule, Take 50,000 Units by mouth 1 (One) Time Per Week. Stop today for sugery, Disp: , Rfl:   Current outpatient and discharge medications have been reconciled for the patient.  Reviewed by: Jaci Ocasio,  "PA      Allergies:  Allergies   Allergen Reactions   • Penicillins Hives     Tongue bled          Objective   Objective:    /80 (BP Location: Left arm, Patient Position: Sitting, Cuff Size: Large Adult)   Pulse 116   Ht 177.8 cm (70\")   Wt 98.9 kg (218 lb)   BMI 31.28 kg/m²     Physical Examination:  Alert, oriented, obese individual in no acute distress, ambulating with the assistance of a cane  Left lower extremity shows a well-healing surgical incision with no erythema, drainage, or open skin lesions.  There are varying degrees of ecchymosis present in the thigh area.  There is a minimal to mild amount of swelling in the knee area. It is grossly well aligned, and the patient is neurovascularly intact distally. The knee is stable to varus and valgus stress, there is no patellar maltracking or crepitus noted, and plantar and dorsiflexion is 5/5. There is mild tenderness to palpation and with range of motion, which is about 2-90.         Imaging:  xrays obtained today   left Knee X-Ray  Indication: 2 weeks status post left total knee replacement  AP, Lateral, [place] views  Findings:A well positioned knee replacement without evidence of bony or implant failure. and No fractures or dislocations are appreciated  within normal limits joint spaces  Hardware appropriately positioned yes    yes prior studies available for comparison.    This patient's x-ray report was graded according to the Kellgren and Dickson classification.  This took into account the joint space narrowing, osteophyte formation, sclerosis of the distal femur/proximal tibia along with deformity of those bones.  The findings were indicative of K L grade na.    X-RAY was ordered and reviewed by ISRAEL Vivas            Assessment:  1. Hx of total knee replacement, left    2. Obesity (BMI 30-39.9)       About 2 weeks out from surgery          Plan:  At this time, we will plan to see the patient back in about 6 weeks. The patient should " continue PT, WBAT, and call with any problems.  He is already in outpatient physical therapy.  I will refill his pain medication at this time, risks and addiction properties were discussed.               ISRAEL Vivas  02/20/20  9:07 AM

## 2020-02-20 NOTE — PATIENT INSTRUCTIONS
Total Knee Replacement, Care After  This sheet gives you information about how to care for yourself after your procedure. Your doctor may also give you more specific instructions. If you have problems or questions, contact your doctor.  What can I expect after the procedure?  After the procedure, it is common to have:  · Pain.  · Swelling.  · A small amount of blood coming from your cut from surgery (incision).  · Clear fluid coming from your cut from surgery.  · Limited movement of your knee.  Follow these instructions at home:  Medicines  · Take over-the-counter and prescription medicines only as told by your doctor.  · If you were prescribed a blood thinner (anticoagulant), take it as told by your doctor.  · Ask your doctor if the medicine prescribed to you:  ? Requires you to avoid driving or using heavy machinery.  ? Can cause trouble pooping (constipation). You may need to take steps to prevent or treat trouble pooping:  § Drink enough fluid to keep your pee (urine) pale yellow.  § Take over-the-counter or prescription medicines.  § Eat foods that are high in fiber. These include beans, whole grains, and fresh fruits and vegetables.  § Limit foods that are high in fat and sugar. These include fried or sweet foods.  Bathing  · Do not take baths, swim, or use a hot tub until your doctor approves. Ask your doctor if you may take showers. You may only be allowed to take sponge baths.  · Keep your bandage (dressing) dry until your doctor says it can be taken off.  Incision care and drain care    · Follow instructions from your doctor about how to take care of your cut from surgery. Make sure you:  ? Wash your hands with soap and water before and after you change your bandage. If you cannot use soap and water, use hand .  ? Change your bandage as told by your doctor.  ? Leave stitches (sutures), skin glue, or skin tape (adhesive) strips in place. They may need to stay in place for 2 weeks or longer. If tape  strips get loose and curl up, you may trim the loose edges. Do not remove tape strips completely unless your doctor says it is okay.  · Check your cut from surgery and your drain site every day for signs of infection. Check for:  ? More redness, swelling, or pain.  ? More fluid or blood.  ? Warmth.  ? Pus or a bad smell.  · If you have a drain, follow instructions from your doctor about caring for it.  Managing pain, stiffness, and swelling         · If told, put ice on your knee.  ? Put ice in a plastic bag or use the icing device (cold flow pad or cryocuff) that you were given. Follow your doctor's directions about how to use the icing device.  ? Place a towel between your skin and the bag or between your skin and the icing device.  ? Leave the ice on for 20 minutes, 2-3 times per day.  · If told, put heat on your knee before you exercise. Use the heat source that your doctor recommends, such as a moist heat pack or a heating pad.  ? Place a towel between your skin and the heat source.  ? Leave the heat on for 20-30 minutes.  ? Remove the heat if your skin turns bright red. This is very important if you are unable to feel pain, heat, or cold. You may have a greater risk of getting burned.  · Move your toes often.  · Raise (elevate) your knee above the level of your heart while you are sitting or lying down.  ? Use several pillows to keep your leg straight.  ? Do not put a pillow just under the knee. If the knee is bent for a long time, this may make the knee stiff.  · Wear elastic knee support as told by your doctor.  Activity  · Rest as told by your doctor.  · Do not sit for a long time without moving. Get up to take short walks every 1-2 hours. This is important. Ask for help if you feel weak or unsteady.  · Ask your doctor what activities are safe for you.  · Avoid activities that put stress on your knees. These include running, jumping rope, and jumping jacks.  · Do not play contact sports until your doctor  says it is okay.  · Do exercises as told by your physical therapist.  · If you have been sent home with a knee joint motion machine (continuous passive motion machine), use it as told by your doctor.  Safety    · Do not use your leg to support your body weight until your doctor says that you can. Use crutches or a walker as told by your doctor.  · Do not drive until your doctor says it is okay. Ask your doctor when it is safe to drive.  General instructions  · Do not use any products that contain nicotine or tobacco, such as cigarettes, e-cigarettes, and chewing tobacco. These can delay healing. If you need help quitting, ask your doctor.  · Wear special socks (compression stockings) as told by your doctor.  · Tell your doctor if you plan to have dental work. Also, tell your dentist about your knee replacement.  · Keep all follow-up visits as told by your doctor. This is important.  Contact a doctor if:  · You have more redness, swelling, or pain around your cut from surgery or your drain.  · You have more fluid or blood coming from your cut from surgery or your drain.  · You have pus or a bad smell coming from your cut from surgery or your drain.  · Your cut from surgery or your drain area feels warm to the touch.  · You have a fever.  · Your cut breaks open.  · You have knee pain that does not go away.  · The movement of your knee is getting worse.  · Your new joint feels loose.  Get help right away if you have:  · Pain in your calf or thigh.  · Swelling in your calf or thigh.  · Shortness of breath.  · Trouble breathing.  · Chest pain.  Summary  · After the procedure, it is common to have pain and swelling, blood or fluid coming from your cut from surgery, and trouble moving your knee.  · Follow instructions from your doctor about how to take care of your cut from surgery.  · Use crutches or a walker as told by your doctor.  · If you were prescribed a blood thinner, take it as told by your doctor.  · Keep all  follow-up visits as told by your doctor. This is important.  This information is not intended to replace advice given to you by your health care provider. Make sure you discuss any questions you have with your health care provider.  Document Released: 03/11/2013 Document Revised: 01/10/2020 Document Reviewed: 08/01/2019  Elsevier Interactive Patient Education © 2020 [employer]

## 2020-02-21 ENCOUNTER — TREATMENT (OUTPATIENT)
Dept: PHYSICAL THERAPY | Facility: CLINIC | Age: 75
End: 2020-02-21

## 2020-02-21 DIAGNOSIS — M25.562 ACUTE PAIN OF LEFT KNEE: Primary | ICD-10-CM

## 2020-02-21 DIAGNOSIS — Z96.653 HISTORY OF BILATERAL KNEE ARTHROPLASTY: ICD-10-CM

## 2020-02-21 PROCEDURE — 97110 THERAPEUTIC EXERCISES: CPT | Performed by: PHYSICAL THERAPIST

## 2020-02-21 PROCEDURE — 97530 THERAPEUTIC ACTIVITIES: CPT | Performed by: PHYSICAL THERAPIST

## 2020-02-21 NOTE — PROGRESS NOTES
Physical Therapy Daily Progress Note    Patient: Farheen Gomez   : 1945  Diagnosis/ICD-10 Code:  Acute pain of left knee [M25.562]  Referring practitioner: Boby Medina MD  Date of Initial Visit: Type: THERAPY  Noted: 2/10/2020  Today's Date: 2020  Patient seen for 4 sessions             Subjective Patient continues to do well, follow up with MD went well, dressing was removed.  He continues to have morning stiffness and mild swelling but he is consistent with ice and elevation at home frequently throughout the day.  Getting it straight is still a challenge.    Objective   See Exercise, Manual, and Modality Logs for complete treatment. Added sit to stand on cushion, SLS (5 sec L, 21 sec R today), partial revolution on recumbent bike.  Incision healing nicely with no signs of infection.  Added patellar mobilization with heel prop.  Flexion AAROM between  deg.      Assessment/Plan  Patient independent with HEP in 2 weeks - MET  Tolerate 10 min Nustep in 2 weeks - NOT MET  Increase knee flexion to allow full revolution on bike in 2 weeks - NOT MET  Able to return to driving in 2 weeks - NOT MET  Able to ambulate independently without AD and minimal antalgia in 2 weeks - NOT MET  Improve function as evidenced by Oxford knee score of 20% or less by discharge (52% impairment initially) - NOT MET  Able to return to woodworking by discharge - NOT MET  Perform 10 reps of repeated sit to stand without arms in 30 seconds by discharge  - NOT MET    Progress per Plan of Care up to 12 visits if needed           Timed:         Manual Therapy:    5     mins  11267;     Therapeutic Exercise:    10     mins  40370;     Neuromuscular Vanna:        mins  03317;    Therapeutic Activity:     10     mins  26546;     Gait Training:           mins  14272;     Ultrasound:          mins  31865;    Ionto                                   mins   61438  Self Care                            mins    95259      Un-Timed:  Electrical Stimulation:         mins  05808 ( );  Dry Needling          mins self-pay  Traction          mins 85540  Low Eval          Mins  29696  Mod Eval          Mins  66099  High Eval                            Mins  02732  Canalith Repos                   mins  62666    Timed Treatment:   25   mins   Total Treatment:     25   mins    Robin A Sprigler, PT  Physical Therapist

## 2020-02-25 ENCOUNTER — CLINICAL SUPPORT (OUTPATIENT)
Dept: FAMILY MEDICINE CLINIC | Facility: CLINIC | Age: 75
End: 2020-02-25

## 2020-02-25 DIAGNOSIS — Z91.89 ENCOUNTER FOR HEPATITIS C VIRUS SCREENING TEST FOR HIGH RISK PATIENT: ICD-10-CM

## 2020-02-25 DIAGNOSIS — Z11.59 ENCOUNTER FOR HEPATITIS C VIRUS SCREENING TEST FOR HIGH RISK PATIENT: ICD-10-CM

## 2020-02-25 DIAGNOSIS — E78.5 DYSLIPIDEMIA: Chronic | ICD-10-CM

## 2020-02-25 DIAGNOSIS — Z11.4 SCREENING FOR HIV (HUMAN IMMUNODEFICIENCY VIRUS): ICD-10-CM

## 2020-02-25 LAB
CHOLEST SERPL-MCNC: 113 MG/DL (ref 0–200)
HCV AB SER DONR QL: NORMAL
HDLC SERPL-MCNC: 36 MG/DL (ref 40–60)
HIV1+2 AB SER QL: NORMAL
LDLC SERPL CALC-MCNC: 50 MG/DL (ref 0–100)
LDLC/HDLC SERPL: 1.38 {RATIO}
TRIGL SERPL-MCNC: 137 MG/DL (ref 0–150)
VLDLC SERPL-MCNC: 27.4 MG/DL (ref 5–40)

## 2020-02-25 PROCEDURE — 80061 LIPID PANEL: CPT | Performed by: PREVENTIVE MEDICINE

## 2020-02-25 PROCEDURE — 36415 COLL VENOUS BLD VENIPUNCTURE: CPT | Performed by: PREVENTIVE MEDICINE

## 2020-02-25 PROCEDURE — G0432 EIA HIV-1/HIV-2 SCREEN: HCPCS | Performed by: PREVENTIVE MEDICINE

## 2020-02-25 PROCEDURE — 86803 HEPATITIS C AB TEST: CPT | Performed by: PREVENTIVE MEDICINE

## 2020-02-26 ENCOUNTER — TELEPHONE (OUTPATIENT)
Dept: FAMILY MEDICINE CLINIC | Facility: CLINIC | Age: 75
End: 2020-02-26

## 2020-02-26 ENCOUNTER — TREATMENT (OUTPATIENT)
Dept: PHYSICAL THERAPY | Facility: CLINIC | Age: 75
End: 2020-02-26

## 2020-02-26 DIAGNOSIS — Z96.653 HISTORY OF BILATERAL KNEE ARTHROPLASTY: ICD-10-CM

## 2020-02-26 DIAGNOSIS — M25.562 ACUTE PAIN OF LEFT KNEE: Primary | ICD-10-CM

## 2020-02-26 PROCEDURE — 97140 MANUAL THERAPY 1/> REGIONS: CPT | Performed by: PHYSICAL THERAPIST

## 2020-02-26 PROCEDURE — 97110 THERAPEUTIC EXERCISES: CPT | Performed by: PHYSICAL THERAPIST

## 2020-02-26 NOTE — TELEPHONE ENCOUNTER
Lisinopril is close to controlling blood pressure-were these readings during time when was recovering from Knee replacement.  If yes, can moniter a month or so longer or can add second agent.  Any headache or chest pain?

## 2020-02-26 NOTE — PROGRESS NOTES
Physical Therapy Daily Progress Note        Patient: Farheen Gomez   : 1945  Diagnosis/ICD-10 Code:  Acute pain of left knee [M25.562]  Referring practitioner: Boby Medina MD  Date of Initial Visit: Type: THERAPY  Noted: 2/10/2020  Today's Date: 2020  Patient seen for 5 sessions         Farheen Gomez reports: having some mild aching in back side of knee today.       Subjective     Objective   See Exercise, Manual, and Modality Logs for complete treatment. Added right sidelying left IT band stretch      Assessment/Plan    Progress per Plan of Care           Manual Therapy:    10   mins  22096;  Therapeutic Exercise:    20     mins  40932;     Neuromuscular Vanna:        mins  28137;    Therapeutic Activity:          mins  26652;     Gait Training:           mins  03752;     Ultrasound:          mins  44096;    Electrical Stimulation:         mins  04827 ( );  Dry Needling          mins self-pay    Timed Treatment:   30   mins   Total Treatment:     40   mins    Martin Leung PTA  Physical Therapist

## 2020-02-27 NOTE — TELEPHONE ENCOUNTER
He is not having headaches or chest pain and this was taking during the time of recovering from knee replacement and he is still trying to recover. He is going to continue to monitor and bring in a month

## 2020-02-28 ENCOUNTER — TREATMENT (OUTPATIENT)
Dept: PHYSICAL THERAPY | Facility: CLINIC | Age: 75
End: 2020-02-28

## 2020-02-28 DIAGNOSIS — Z96.653 HISTORY OF BILATERAL KNEE ARTHROPLASTY: ICD-10-CM

## 2020-02-28 DIAGNOSIS — M25.562 ACUTE PAIN OF LEFT KNEE: Primary | ICD-10-CM

## 2020-02-28 PROCEDURE — 97140 MANUAL THERAPY 1/> REGIONS: CPT | Performed by: PHYSICAL THERAPIST

## 2020-02-28 PROCEDURE — 97110 THERAPEUTIC EXERCISES: CPT | Performed by: PHYSICAL THERAPIST

## 2020-02-28 PROCEDURE — G0283 ELEC STIM OTHER THAN WOUND: HCPCS | Performed by: PHYSICAL THERAPIST

## 2020-02-28 NOTE — PROGRESS NOTES
"     Physical Therapy Daily Progress Note    Patient: Farheen Gomez   : 1945  Diagnosis/ICD-10 Code:  Acute pain of left knee [M25.562]  Referring practitioner: Boby Medina MD  Date of Initial Visit: Type: THERAPY  Noted: 2/10/2020  Today's Date: 2020  Patient seen for 6 sessions             Subjective Patient reports a bad night last night, unable to sleep due to pain.    Objective   See Exercise, Manual, and Modality Logs for complete treatment. Able to tolerate 10 min on Nustep with LEs only.  Still unable to make full revolution on bicycle.  Still using cane but balance improving and will likely wean soon. Added 4\" lateral step up on L, he reports he built a step for HEP for last TKA.   Able to perform repeated sit to stand x 10 in 30 sec but still needs 2\" cushion.  Extension improving but still uncomfortable to stretch.  IT band tender especially at joint line.  Added IFC with elevation, MH under and ice anteriorly after treatment for pain.      Assessment/Plan  Patient independent with HEP in 2 weeks - MET  Tolerate 10 min Nustep in 2 weeks - MET  Increase knee flexion to allow full revolution on bike in 2 weeks - NOT MET  Able to return to driving in 2 weeks - NOT MET  Able to ambulate independently without AD and minimal antalgia in 2 weeks - NOT MET  Improve function as evidenced by Oxford knee score of 20% or less by discharge (52% impairment initially) - NOT MET  Able to return to woodworking by discharge - NOT MET  Perform 10 reps of repeated sit to stand without arms in 30 seconds by discharge  - NOT MET    Progress per Plan of Care up to 12 visits if needed           Timed:         Manual Therapy:    10     mins  44532;     Therapeutic Exercise:    20     mins  47362;     Neuromuscular Vanna:        mins  60490;    Therapeutic Activity:          mins  41008;     Gait Training:           mins  32370;     Ultrasound:          mins  79826;    Ionto                                   mins  "  02809  Self Care                            mins   19839      Un-Timed:  Electrical Stimulation:    15     mins  33722 ( );  Dry Needling          mins self-pay  Traction          mins 65145  Low Eval          Mins  01962  Mod Eval          Mins  38781  High Eval                            Mins  08934  Canalith Repos                   mins  67237    Timed Treatment:   30   mins   Total Treatment:     45   mins    Robin A Sprigler, PT  Physical Therapist     normal...

## 2020-03-02 ENCOUNTER — TREATMENT (OUTPATIENT)
Dept: PHYSICAL THERAPY | Facility: CLINIC | Age: 75
End: 2020-03-02

## 2020-03-02 DIAGNOSIS — M25.562 ACUTE PAIN OF LEFT KNEE: Primary | ICD-10-CM

## 2020-03-02 DIAGNOSIS — Z96.652 HX OF TOTAL KNEE REPLACEMENT, LEFT: ICD-10-CM

## 2020-03-02 DIAGNOSIS — Z96.653 HISTORY OF BILATERAL KNEE ARTHROPLASTY: ICD-10-CM

## 2020-03-02 PROCEDURE — 97140 MANUAL THERAPY 1/> REGIONS: CPT | Performed by: PHYSICAL THERAPIST

## 2020-03-02 PROCEDURE — G0283 ELEC STIM OTHER THAN WOUND: HCPCS | Performed by: PHYSICAL THERAPIST

## 2020-03-02 PROCEDURE — 97110 THERAPEUTIC EXERCISES: CPT | Performed by: PHYSICAL THERAPIST

## 2020-03-02 RX ORDER — HYDROCODONE BITARTRATE AND ACETAMINOPHEN 5; 325 MG/1; MG/1
TABLET ORAL
Qty: 40 TABLET | Refills: 0 | Status: SHIPPED | OUTPATIENT
Start: 2020-03-02 | End: 2020-04-07

## 2020-03-02 RX ORDER — OXYCODONE HYDROCHLORIDE AND ACETAMINOPHEN 5; 325 MG/1; MG/1
TABLET ORAL
Qty: 40 TABLET | Refills: 0 | Status: CANCELLED | OUTPATIENT
Start: 2020-03-02

## 2020-03-02 NOTE — PROGRESS NOTES
Physical Therapy Daily Progress Note        Patient: Farheen Gomez   : 1945  Diagnosis/ICD-10 Code:  Acute pain of left knee [M25.562]  Referring practitioner: Boby Medina MD  Date of Initial Visit: Type: THERAPY  Noted: 2/10/2020  Today's Date: 3/2/2020  Patient seen for 7 sessions         Farheen Gomez reports: post/lat knee soreness, slow to change with good compliance to HEP      Subjective     Objective   See Exercise, Manual, and Modality Logs for complete treatment.   Aggressive massage and stretch to post/lat hams, restricting ROM into flex and ext    Assessment/Plan    Continue aggressive program, reinforcement of HEP           Timed:         Manual Therapy:    15     mins  96091;     Therapeutic Exercise:    15     mins  25950;     Neuromuscular Vanna:    0    mins  18491;    Therapeutic Activity:     0     mins  11236;     Gait Trainin     mins  11352;     Ultrasound:     0     mins  21741;    Ionto                               0    mins   82839  Self Care                       0     mins   27953  Canalith Repos               0    mins  4209    Un-Timed:  Electrical Stimulation:    15     mins  83940 ( );  Dry Needling     0     mins self-pay  Traction     0     mins 01914  Low Eval     0     Mins  37699  Mod Eval     0     Mins  50510  High Eval                       0     Mins  01020    Timed Treatment:   30   mins   Total Treatment:     45   mins    Rohcelle Carranza PT  Physical Therapist  [place] license [id number]

## 2020-03-04 ENCOUNTER — TREATMENT (OUTPATIENT)
Dept: PHYSICAL THERAPY | Facility: CLINIC | Age: 75
End: 2020-03-04

## 2020-03-04 DIAGNOSIS — M25.562 ACUTE PAIN OF LEFT KNEE: Primary | ICD-10-CM

## 2020-03-04 DIAGNOSIS — Z96.653 HISTORY OF BILATERAL KNEE ARTHROPLASTY: ICD-10-CM

## 2020-03-04 PROCEDURE — 97110 THERAPEUTIC EXERCISES: CPT | Performed by: PHYSICAL THERAPIST

## 2020-03-04 PROCEDURE — G0283 ELEC STIM OTHER THAN WOUND: HCPCS | Performed by: PHYSICAL THERAPIST

## 2020-03-04 PROCEDURE — 97140 MANUAL THERAPY 1/> REGIONS: CPT | Performed by: PHYSICAL THERAPIST

## 2020-03-05 NOTE — PROGRESS NOTES
Physical Therapy Daily Progress Note        Patient: Farheen Gomez   : 1945  Diagnosis/ICD-10 Code:  Acute pain of left knee [M25.562]  Referring practitioner: Boby Medina MD  Date of Initial Visit: Type: THERAPY  Noted: 2/10/2020  Today's Date: 3/5/2020  Patient seen for 8 sessions         Farheen Gomez reports: still moderate discomfort post/lat knee, poor mobiltiy      Subjective     Objective   See Exercise, Manual, and Modality Logs for complete treatment.   Aggressive deep tissue massage and stretch to hams      Assessment/Plan    Try ext pulley device next           Timed:         Manual Therapy:    15      mins  04558;     Therapeutic Exercise:    30     mins  13991;     Neuromuscular Vanna:    0    mins  05313;    Therapeutic Activity:     0     mins  12709;     Gait Trainin     mins  45730;     Ultrasound:     0     mins  35481;    Ionto                               0    mins   45579  Self Care                       0     mins   21557  Canalith Repos               0    mins  4209    Un-Timed:  Electrical Stimulation:    15     mins  04717 ( );  Dry Needling     0     mins self-pay  Traction     0     mins 21367  Low Eval     0     Mins  74622  Mod Eval     0     Mins  52217  High Eval                       0     Mins  43407    Timed Treatment:   45   mins   Total Treatment:     60   mins    Rochelle Carranza PT  Physical Therapist  [place] license [id number]

## 2020-03-09 ENCOUNTER — TREATMENT (OUTPATIENT)
Dept: PHYSICAL THERAPY | Facility: CLINIC | Age: 75
End: 2020-03-09

## 2020-03-09 PROCEDURE — G0283 ELEC STIM OTHER THAN WOUND: HCPCS | Performed by: PHYSICAL THERAPIST

## 2020-03-09 PROCEDURE — 97110 THERAPEUTIC EXERCISES: CPT | Performed by: PHYSICAL THERAPIST

## 2020-03-09 PROCEDURE — 97140 MANUAL THERAPY 1/> REGIONS: CPT | Performed by: PHYSICAL THERAPIST

## 2020-03-09 NOTE — PROGRESS NOTES
Physical Therapy Daily Progress Note/visit 10 recheck        Patient: Farheen Gomez   : 1945  Diagnosis/ICD-10 Code:  No primary diagnosis found.  Referring practitioner: Boby Medina MD  Date of Initial Visit: Type: THERAPY  Noted: 2/10/2020  Today's Date: 3/9/2020  Patient seen for 9 sessions         Farheen Gomez reports: noticing some days with less pain      Subjective     Objective   See Exercise, Manual, and Modality Logs for complete treatment.   Added ext pulley stretch  [place] knee 48% disability  Plan Goals: Patient independent with HEP in 2 weeks-MET  Tolerate 10 min Nustep in 2 weeks-MET  Increase knee flexion to allow full revolution on bike in 2 weeks-MET  Able to return to driving in 2 weeks-MET  Able to ambulate independently without AD and minimal antalgia in 2 weeks-MET  Improve function as evidenced by Oxford knee score of 20% or less by discharge (52% impairment initially)-not met 48% at present  Able to return to woodworking by discharge-partially met  Perform 10 reps of repeated sit to stand without arms in 30 seconds by discharge -MET      Assessment/Plan    Less post/lat pain this date           Timed:         Manual Therapy:    15     mins  94346;     Therapeutic Exercise:    30     mins  99147;     Neuromuscular Vanna:    0    mins  57631;    Therapeutic Activity:     0     mins  25283;     Gait Trainin     mins  37100;     Ultrasound:     0     mins  71518;    Ionto                               0    mins   09302  Self Care                       0     mins   43983  Canalith Repos               0    mins  4209    Un-Timed:  Electrical Stimulation:    15     mins  70988 ( );  Dry Needling     0     mins self-pay  Traction     0     mins 84250  Low Eval     0     Mins  32672  Mod Eval     0     Mins  59880  High Eval                       0     Mins  91776    Timed Treatment:   45   mins   Total Treatment:     60   mins    Rochelle Carranza, PT  Physical  Therapist  [place] license [id number]

## 2020-03-11 ENCOUNTER — TREATMENT (OUTPATIENT)
Dept: PHYSICAL THERAPY | Facility: CLINIC | Age: 75
End: 2020-03-11

## 2020-03-11 DIAGNOSIS — M25.562 ACUTE PAIN OF LEFT KNEE: Primary | ICD-10-CM

## 2020-03-11 DIAGNOSIS — Z96.653 HISTORY OF BILATERAL KNEE ARTHROPLASTY: ICD-10-CM

## 2020-03-11 PROCEDURE — G0283 ELEC STIM OTHER THAN WOUND: HCPCS | Performed by: PHYSICAL THERAPIST

## 2020-03-11 PROCEDURE — 97110 THERAPEUTIC EXERCISES: CPT | Performed by: PHYSICAL THERAPIST

## 2020-03-11 PROCEDURE — 97140 MANUAL THERAPY 1/> REGIONS: CPT | Performed by: PHYSICAL THERAPIST

## 2020-03-11 NOTE — PROGRESS NOTES
Physical Therapy Daily Progress Note        Patient: Farheen Gomez   : 1945  Diagnosis/ICD-10 Code:  Acute pain of left knee [M25.562]  Referring practitioner: Boby Medina MD  Date of Initial Visit: Type: THERAPY  Noted: 2/10/2020  Today's Date: 3/11/2020  Patient seen for 10 sessions         Farheen Gomez reports: pleased less pain and improved mobility      Subjective     Objective   See Exercise, Manual, and Modality Logs for complete treatment.   Easier end range flex with softer feel       Assessment/Plan    Making good gains           Timed:         Manual Therapy:    15     mins  47894;     Therapeutic Exercise:    15     mins  94787;     Neuromuscular Vanna:    0    mins  21256;    Therapeutic Activity:     0     mins  44373;     Gait Trainin     mins  71678;     Ultrasound:     0     mins  33557;    Ionto                               0    mins   60810  Self Care                       0     mins   52844  Canalith Repos               0    mins  4209    Un-Timed:  Electrical Stimulation:    15     mins  23533 (MC );  Dry Needling     0     mins self-pay  Traction     0     mins 03498  Low Eval     0     Mins  44942  Mod Eval     0     Mins  45354  High Eval                       0     Mins  30314    Timed Treatment:   30   mins   Total Treatment:     45   mins    Rochelle Carranza PT  Physical Therapist  [place] license [id number]

## 2020-03-16 ENCOUNTER — TREATMENT (OUTPATIENT)
Dept: PHYSICAL THERAPY | Facility: CLINIC | Age: 75
End: 2020-03-16

## 2020-03-16 DIAGNOSIS — Z96.653 HISTORY OF BILATERAL KNEE ARTHROPLASTY: ICD-10-CM

## 2020-03-16 DIAGNOSIS — M25.562 ACUTE PAIN OF LEFT KNEE: Primary | ICD-10-CM

## 2020-03-16 PROCEDURE — 97140 MANUAL THERAPY 1/> REGIONS: CPT | Performed by: PHYSICAL THERAPIST

## 2020-03-16 PROCEDURE — 97110 THERAPEUTIC EXERCISES: CPT | Performed by: PHYSICAL THERAPIST

## 2020-03-16 PROCEDURE — G0283 ELEC STIM OTHER THAN WOUND: HCPCS | Performed by: PHYSICAL THERAPIST

## 2020-03-16 NOTE — PROGRESS NOTES
Physical Therapy Daily Progress Note        Patient: Farheen Gomez   : 1945  Diagnosis/ICD-10 Code:  Acute pain of left knee [M25.562]  Referring practitioner: Boby Medina MD  Date of Initial Visit: Type: THERAPY  Noted: 2/10/2020  Today's Date: 3/16/2020  Patient seen for 11 sessions         Farheen Gomez reports: pleased less pain and improving mobility      Subjective     Objective   See Exercise, Manual, and Modality Logs for complete treatment.   Much more freedom of movement, softer end feel flexion with less pain      Assessment/Plan    Continue as per POC           Timed:         Manual Therapy:    15     mins  51777;     Therapeutic Exercise:    15     mins  20419;     Neuromuscular Vanna:    0    mins  87583;    Therapeutic Activity:     0     mins  53921;     Gait Trainin     mins  72287;     Ultrasound:     0     mins  92845;    Ionto                               0    mins   72384  Self Care                       0     mins   73217  Canalith Repos               0    mins  4209    Un-Timed:  Electrical Stimulation:    15     mins  89854 ( );  Dry Needling     0     mins self-pay  Traction     0     mins 64064  Low Eval     0     Mins  30786  Mod Eval     0     Mins  01051  High Eval                       0     Mins  13692    Timed Treatment:   30   mins   Total Treatment:     45   mins    Rochelle Carranza PT  Physical Therapist  [place] license [id number]

## 2020-03-18 ENCOUNTER — TREATMENT (OUTPATIENT)
Dept: PHYSICAL THERAPY | Facility: CLINIC | Age: 75
End: 2020-03-18

## 2020-03-18 DIAGNOSIS — M25.562 ACUTE PAIN OF LEFT KNEE: Primary | ICD-10-CM

## 2020-03-18 DIAGNOSIS — Z96.653 HISTORY OF BILATERAL KNEE ARTHROPLASTY: ICD-10-CM

## 2020-03-18 PROCEDURE — G0283 ELEC STIM OTHER THAN WOUND: HCPCS | Performed by: PHYSICAL THERAPIST

## 2020-03-18 PROCEDURE — 97140 MANUAL THERAPY 1/> REGIONS: CPT | Performed by: PHYSICAL THERAPIST

## 2020-03-18 PROCEDURE — 97110 THERAPEUTIC EXERCISES: CPT | Performed by: PHYSICAL THERAPIST

## 2020-03-18 NOTE — PROGRESS NOTES
Physical Therapy Daily Progress Note        Patient: Farheen Gomez   : 1945  Diagnosis/ICD-10 Code:  Acute pain of left knee [M25.562]  Referring practitioner: Boby Medina MD  Date of Initial Visit: Type: THERAPY  Noted: 2/10/2020  Today's Date: 3/18/2020  Patient seen for 12 sessions         Farheen Gomez reports: very pleased with progress, rode bike full revolution and was able to ride 20 min but sore today in a good way      Subjective     Objective   See Exercise, Manual, and Modality Logs for complete treatment.   Softer end feel near 110 degrees flex    Assessment/Plan    Suggest decrease to 1X/week for a couple weeks           Timed:         Manual Therapy:    15     mins  08206;     Therapeutic Exercise:    15     mins  56945;     Neuromuscular Vanna:    0    mins  20041;    Therapeutic Activity:     0     mins  47728;     Gait Trainin     mins  14218;     Ultrasound:     0     mins  81784;    Ionto                               0    mins   99560  Self Care                       0     mins   88459  Canalith Repos               0    mins  4209    Un-Timed:  Electrical Stimulation:    15     mins  03492 ( );  Dry Needling     0     mins self-pay  Traction     0     mins 80871  Low Eval     0     Mins  26008  Mod Eval     0     Mins  59767  High Eval                       0     Mins  58886    Timed Treatment:   30   mins   Total Treatment:     45   mins    Rochelle Carranza PT  Physical Therapist  [place] license [id number]

## 2020-03-26 ENCOUNTER — DOCUMENTATION (OUTPATIENT)
Dept: PHYSICAL THERAPY | Facility: CLINIC | Age: 75
End: 2020-03-26

## 2020-03-26 NOTE — PROGRESS NOTES
Called pt to inform that PT appts to be cancelled due to COVID-19 concerns. Instructed pt to continue with HEP and to call with any questions. Plan to hold PT at this time until further notice per [employer] policy.

## 2020-03-30 RX ORDER — ESCITALOPRAM OXALATE 10 MG/1
TABLET ORAL
Qty: 90 TABLET | Refills: 0 | Status: SHIPPED | OUTPATIENT
Start: 2020-03-30 | End: 2020-07-22

## 2020-04-02 RX ORDER — ESCITALOPRAM OXALATE 10 MG/1
TABLET ORAL
Qty: 90 TABLET | Refills: 0 | OUTPATIENT
Start: 2020-04-02

## 2020-04-07 ENCOUNTER — OFFICE VISIT (OUTPATIENT)
Dept: ORTHOPEDIC SURGERY | Facility: CLINIC | Age: 75
End: 2020-04-07

## 2020-04-07 VITALS
SYSTOLIC BLOOD PRESSURE: 106 MMHG | DIASTOLIC BLOOD PRESSURE: 73 MMHG | HEART RATE: 89 BPM | HEIGHT: 70 IN | WEIGHT: 212.4 LBS | BODY MASS INDEX: 30.41 KG/M2

## 2020-04-07 DIAGNOSIS — Z96.652 STATUS POST TOTAL KNEE REPLACEMENT, LEFT: ICD-10-CM

## 2020-04-07 DIAGNOSIS — Z96.651 STATUS POST TOTAL KNEE REPLACEMENT, RIGHT: Primary | ICD-10-CM

## 2020-04-07 PROCEDURE — 99024 POSTOP FOLLOW-UP VISIT: CPT | Performed by: ORTHOPAEDIC SURGERY

## 2020-04-07 NOTE — PROGRESS NOTES
POST OP VISIT      NAME: Farheen Gomez    : 1945    MRN: [id number]  ?  Chief Complaint   Patient presents with   • Left Knee - Post-op     Left TKR surgery 2020       Date of Surgery: 2020 when he underwent a left total knee arthroplasty.   ?  HPI:   Patient returns today for 2 month(s) follow up of left total knee arthroplasty Incision(s) healed nicely with no signs of infection. Patient reports doing well with no unusual complaints. No fevers, rigors or chills. Appears to be progressing appropriately. Patient is on appropriate anticoagulation.  He is doing extremely well with both his knee replacements.  The right side was done by me in 2019 and the left side was done in February of this year.  He has gone back to riding on his recumbent stationary bicycle.  He is biking about 10 miles a day and is pleased with his outcome.  He is not using any pain medication at this point whatsoever for his knee symptoms.      Review of Systems:      Ortho Exam:   Bilateral knee.The patient is status post total knee arthroplasty postoperative 5 months on the right side and on the left side it has been 2 month(s). Incision is clean. Calf is soft and nontender. Homans sign is negative. There is no clicking, popping or catching. Anterior and posterior drawer signs are negative.  There is no instability of the components. Appropriate amounts of swelling and bruising are noted. Dorsalis pedis and posterior tibial artery pulses are palpable. Common peroneal nerve function is well preserved. Range of motion is from 0-120 degrees of flexion. Gait is cautious but otherwise fairly normal. There is no evidence of a deep seated joint infection.      Diagnostic Studies:        Assessment:  Farheen was seen today for post-op.    Diagnoses and all orders for this visit:    Status post total knee replacement, right    Status post total knee replacement, left            Plan   · Incision care  · To use ACE wrap/CHRISTOPHER  stocking  · Continue ice to joint   · Stretching and strengthening exercises  · Aggressive ROM  · Falls precautions  · Once Xarelto is completed, change to an enteric coated Aspirin 325 mg daily until 6 weeks following your surgery You may now resume any prescription medications you were taking prior to surgery  · Continue with lifelong antibiotic prophylaxis with dental procedures following total joint replacement.  · Follow up in 1 year(s)    Boby Medina MD  04/07/2020

## 2020-05-04 RX ORDER — ERGOCALCIFEROL 1.25 MG/1
CAPSULE ORAL
Qty: 12 CAPSULE | Refills: 0 | Status: SHIPPED | OUTPATIENT
Start: 2020-05-04 | End: 2020-05-05

## 2020-05-05 RX ORDER — ERGOCALCIFEROL 1.25 MG/1
CAPSULE ORAL
Qty: 12 CAPSULE | Refills: 0 | Status: SHIPPED | OUTPATIENT
Start: 2020-05-05 | End: 2020-08-10

## 2020-05-19 RX ORDER — LISINOPRIL 40 MG/1
TABLET ORAL
Qty: 90 TABLET | Refills: 0 | Status: SHIPPED | OUTPATIENT
Start: 2020-05-19 | End: 2020-09-15

## 2020-06-29 RX ORDER — ATORVASTATIN CALCIUM 20 MG/1
TABLET, FILM COATED ORAL
Qty: 90 TABLET | Refills: 0 | Status: SHIPPED | OUTPATIENT
Start: 2020-06-29 | End: 2020-10-02

## 2020-07-22 RX ORDER — ESCITALOPRAM OXALATE 10 MG/1
TABLET ORAL
Qty: 90 TABLET | Refills: 0 | Status: SHIPPED | OUTPATIENT
Start: 2020-07-22 | End: 2020-10-19

## 2020-07-28 ENCOUNTER — DOCUMENTATION (OUTPATIENT)
Dept: PHYSICAL THERAPY | Facility: CLINIC | Age: 75
End: 2020-07-28

## 2020-07-28 NOTE — PROGRESS NOTES
Discharge Summary  Discharge Summary from Physical Therapy Report      Dates  PT visit: last visit 3/18/20   Number of Visits: 12     Discharge Status of Patient: See  Note dated 3/18/20    Goals: All Met    Discharge Plan: continue HEP, doing well with gait and ROM, early DC from PT due to COVID shut down    Comments     Date of Discharge 7/28/20        Rochelle Carranza, PT  Physical Therapist

## 2020-08-10 ENCOUNTER — TELEPHONE (OUTPATIENT)
Dept: FAMILY MEDICINE CLINIC | Facility: CLINIC | Age: 75
End: 2020-08-10

## 2020-08-10 RX ORDER — ERGOCALCIFEROL 1.25 MG/1
CAPSULE ORAL
Qty: 12 CAPSULE | Refills: 0 | Status: SHIPPED | OUTPATIENT
Start: 2020-08-10 | End: 2020-11-05

## 2020-08-20 ENCOUNTER — OFFICE VISIT (OUTPATIENT)
Dept: FAMILY MEDICINE CLINIC | Facility: CLINIC | Age: 75
End: 2020-08-20

## 2020-08-20 VITALS
TEMPERATURE: 98.5 F | BODY MASS INDEX: 31.12 KG/M2 | WEIGHT: 217.4 LBS | SYSTOLIC BLOOD PRESSURE: 127 MMHG | HEART RATE: 72 BPM | HEIGHT: 70 IN | RESPIRATION RATE: 18 BRPM | OXYGEN SATURATION: 95 % | DIASTOLIC BLOOD PRESSURE: 83 MMHG

## 2020-08-20 DIAGNOSIS — E66.9 OBESITY (BMI 30-39.9): Chronic | ICD-10-CM

## 2020-08-20 DIAGNOSIS — R51.9 CHRONIC NONINTRACTABLE HEADACHE, UNSPECIFIED HEADACHE TYPE: ICD-10-CM

## 2020-08-20 DIAGNOSIS — E78.5 DYSLIPIDEMIA: Chronic | ICD-10-CM

## 2020-08-20 DIAGNOSIS — N40.1 BENIGN PROSTATIC HYPERPLASIA WITH LOWER URINARY TRACT SYMPTOMS, SYMPTOM DETAILS UNSPECIFIED: Chronic | ICD-10-CM

## 2020-08-20 DIAGNOSIS — F32.A DEPRESSION, UNSPECIFIED DEPRESSION TYPE: Chronic | ICD-10-CM

## 2020-08-20 DIAGNOSIS — I10 HYPERTENSION, UNSPECIFIED TYPE: Primary | Chronic | ICD-10-CM

## 2020-08-20 DIAGNOSIS — K21.9 GASTROESOPHAGEAL REFLUX DISEASE, ESOPHAGITIS PRESENCE NOT SPECIFIED: ICD-10-CM

## 2020-08-20 DIAGNOSIS — E55.9 VITAMIN D DEFICIENCY: Chronic | ICD-10-CM

## 2020-08-20 DIAGNOSIS — L91.8 SKIN TAGS, MULTIPLE ACQUIRED: ICD-10-CM

## 2020-08-20 DIAGNOSIS — G44.89 OTHER HEADACHE SYNDROME: ICD-10-CM

## 2020-08-20 DIAGNOSIS — G89.29 CHRONIC NONINTRACTABLE HEADACHE, UNSPECIFIED HEADACHE TYPE: ICD-10-CM

## 2020-08-20 DIAGNOSIS — N28.1 CYST OF KIDNEY, ACQUIRED: ICD-10-CM

## 2020-08-20 DIAGNOSIS — G47.9 SLEEP DISORDER: ICD-10-CM

## 2020-08-20 DIAGNOSIS — D69.6 THROMBOCYTOPENIA (HCC): ICD-10-CM

## 2020-08-20 DIAGNOSIS — R00.0 TACHYCARDIA: ICD-10-CM

## 2020-08-20 DIAGNOSIS — D64.9 ANEMIA, UNSPECIFIED TYPE: ICD-10-CM

## 2020-08-20 PROBLEM — Z96.653 HISTORY OF BILATERAL KNEE ARTHROPLASTY: Status: ACTIVE | Noted: 2020-08-20

## 2020-08-20 LAB
25(OH)D3 SERPL-MCNC: 46.2 NG/ML (ref 30–100)
ALBUMIN SERPL-MCNC: 4.5 G/DL (ref 3.5–5.2)
ALBUMIN/GLOB SERPL: 1.7 G/DL
ALP SERPL-CCNC: 71 U/L (ref 39–117)
ALT SERPL W P-5'-P-CCNC: 40 U/L (ref 1–41)
ANION GAP SERPL CALCULATED.3IONS-SCNC: 11.8 MMOL/L (ref 5–15)
AST SERPL-CCNC: 23 U/L (ref 1–40)
BASOPHILS # BLD AUTO: 0.03 10*3/MM3 (ref 0–0.2)
BASOPHILS NFR BLD AUTO: 0.5 % (ref 0–1.5)
BILIRUB SERPL-MCNC: 0.5 MG/DL (ref 0–1.2)
BUN SERPL-MCNC: 16 MG/DL (ref 8–23)
BUN/CREAT SERPL: 16.7 (ref 7–25)
CALCIUM SPEC-SCNC: 9.7 MG/DL (ref 8.6–10.5)
CHLORIDE SERPL-SCNC: 102 MMOL/L (ref 98–107)
CHOLEST SERPL-MCNC: 140 MG/DL (ref 0–200)
CO2 SERPL-SCNC: 25.2 MMOL/L (ref 22–29)
CREAT SERPL-MCNC: 0.96 MG/DL (ref 0.76–1.27)
DEPRECATED RDW RBC AUTO: 41.1 FL (ref 37–54)
EOSINOPHIL # BLD AUTO: 0.12 10*3/MM3 (ref 0–0.4)
EOSINOPHIL NFR BLD AUTO: 2.2 % (ref 0.3–6.2)
ERYTHROCYTE [DISTWIDTH] IN BLOOD BY AUTOMATED COUNT: 12.5 % (ref 12.3–15.4)
GFR SERPL CREATININE-BSD FRML MDRD: 77 ML/MIN/1.73
GLOBULIN UR ELPH-MCNC: 2.6 GM/DL
GLUCOSE SERPL-MCNC: 114 MG/DL (ref 65–99)
HCT VFR BLD AUTO: 41.9 % (ref 37.5–51)
HDLC SERPL-MCNC: 41 MG/DL (ref 40–60)
HGB BLD-MCNC: 14.1 G/DL (ref 13–17.7)
IMM GRANULOCYTES # BLD AUTO: 0.01 10*3/MM3 (ref 0–0.05)
IMM GRANULOCYTES NFR BLD AUTO: 0.2 % (ref 0–0.5)
LDLC SERPL CALC-MCNC: 64 MG/DL (ref 0–100)
LDLC/HDLC SERPL: 1.57 {RATIO}
LYMPHOCYTES # BLD AUTO: 1.54 10*3/MM3 (ref 0.7–3.1)
LYMPHOCYTES NFR BLD AUTO: 27.7 % (ref 19.6–45.3)
MAGNESIUM SERPL-MCNC: 1.9 MG/DL (ref 1.6–2.4)
MCH RBC QN AUTO: 30.5 PG (ref 26.6–33)
MCHC RBC AUTO-ENTMCNC: 33.7 G/DL (ref 31.5–35.7)
MCV RBC AUTO: 90.5 FL (ref 79–97)
MONOCYTES # BLD AUTO: 0.53 10*3/MM3 (ref 0.1–0.9)
MONOCYTES NFR BLD AUTO: 9.5 % (ref 5–12)
NEUTROPHILS NFR BLD AUTO: 3.32 10*3/MM3 (ref 1.7–7)
NEUTROPHILS NFR BLD AUTO: 59.9 % (ref 42.7–76)
NRBC BLD AUTO-RTO: 0 /100 WBC (ref 0–0.2)
PLATELET # BLD AUTO: 148 10*3/MM3 (ref 140–450)
PMV BLD AUTO: 9.2 FL (ref 6–12)
POTASSIUM SERPL-SCNC: 4.3 MMOL/L (ref 3.5–5.2)
PROT SERPL-MCNC: 7.1 G/DL (ref 6–8.5)
RBC # BLD AUTO: 4.63 10*6/MM3 (ref 4.14–5.8)
SODIUM SERPL-SCNC: 139 MMOL/L (ref 136–145)
TRIGL SERPL-MCNC: 174 MG/DL (ref 0–150)
TSH SERPL DL<=0.05 MIU/L-ACNC: 0.95 UIU/ML (ref 0.27–4.2)
VLDLC SERPL-MCNC: 34.8 MG/DL (ref 5–40)
WBC # BLD AUTO: 5.55 10*3/MM3 (ref 3.4–10.8)

## 2020-08-20 PROCEDURE — 84443 ASSAY THYROID STIM HORMONE: CPT | Performed by: PREVENTIVE MEDICINE

## 2020-08-20 PROCEDURE — 80053 COMPREHEN METABOLIC PANEL: CPT | Performed by: PREVENTIVE MEDICINE

## 2020-08-20 PROCEDURE — 80061 LIPID PANEL: CPT | Performed by: PREVENTIVE MEDICINE

## 2020-08-20 PROCEDURE — 82306 VITAMIN D 25 HYDROXY: CPT | Performed by: PREVENTIVE MEDICINE

## 2020-08-20 PROCEDURE — 99214 OFFICE O/P EST MOD 30 MIN: CPT | Performed by: PREVENTIVE MEDICINE

## 2020-08-20 PROCEDURE — 85025 COMPLETE CBC W/AUTO DIFF WBC: CPT | Performed by: PREVENTIVE MEDICINE

## 2020-08-20 PROCEDURE — 83735 ASSAY OF MAGNESIUM: CPT | Performed by: PREVENTIVE MEDICINE

## 2020-08-20 NOTE — PROGRESS NOTES
Patient presents for follow-up on stable chronic medical problems including hypertension, hyperlipidemia, depression and headache newX6 months 4 night out of 7 qawakenbs with all day. Patient denies adverse effects of medications, dizziness, chest pain, palpitations, shortness of breath, nausea, vomiting, abdominal pain, unexplained change in weight, fatigue, cough and head trauma. Patient complains of headache. Patient is here for monitoring of chronic issues due to need for monitoring of renal function, liver function, blood pressure effects of meds, adverse effects, side effects and interactions    Past Medical History:   Diagnosis Date   • Allergic    • Arthritis    • BPH (benign prostatic hyperplasia)    • Depression    • History of bilateral knee arthroplasty    • Hypertension      Past Surgical History:   Procedure Laterality Date   • BACK SURGERY     • HAND SURGERY Bilateral     carpal tunnel repair   • KNEE SURGERY      meniscus repair    • LUMBAR DISCECTOMY Bilateral 2019    Procedure: left and right  L4-5 lumbar decompression with dura repair;  Surgeon: Edson Khan MD;  Location: [facility];  Service: Neurosurgery   • ROTATOR CUFF REPAIR Right    • TOTAL KNEE ARTHROPLASTY Right 10/23/2019    Procedure: TOTAL KNEE ARTHROPLASTY, WITH LEFT KNEE INJECTION;  Surgeon: Boby Medina MD;  Location: [facility] OR;  Service: Orthopedics   • TOTAL KNEE ARTHROPLASTY Left 2020    Procedure: TOTAL KNEE ARTHROPLASTY;  Surgeon: Boby Medina MD;  Location: [facility] OR;  Service: Orthopedics;  Laterality: Left;     Family History   Problem Relation Age of Onset   • Stroke Mother    • Diabetes Father    • No Known Problems Sister    • No Known Problems Brother    • Malig Hyperthermia Neg Hx      Social History     Tobacco Use   • Smoking status: Former Smoker     Packs/day: 0.25     Years: 5.00     Pack years: 1.25     Last attempt to quit:      Years since quittin.6   • Smokeless  "tobacco: Never Used   Substance Use Topics   • Alcohol use: No     Frequency: Never       PHQ-2 Depression Screening  0  PHQ-9 Depression Screening  0    Review of Systems   Constitutional: Negative.    HENT: Negative.  Negative for sinus pressure and sore throat.    Eyes: Negative.    Respiratory: Negative.  Negative for cough.    Cardiovascular: Negative.    Gastrointestinal: Negative.    Endocrine: Negative.    Genitourinary: Negative.    Musculoskeletal: Negative.    Skin: Negative.    Allergic/Immunologic: Positive for environmental allergies.   Neurological: Positive for headache.   Hematological: Negative.      Vitals:    08/20/20 0953 08/20/20 0955   BP: 127/80 127/83   BP Location: Right arm Left arm   Patient Position: Sitting Sitting   Cuff Size: Large Adult Large Adult   Pulse: 71 72   Resp: 18    Temp: 98.5 °F (36.9 °C)    TempSrc: Temporal    SpO2: 95%    Weight: 98.6 kg (217 lb 6.4 oz)    Height: 177.8 cm (70\")      Body mass index is 31.19 kg/m².  Physical Exam   Constitutional: He is oriented to person, place, and time. He appears well-developed and well-nourished.   HENT:   Head: Normocephalic.   Mouth/Throat: Oropharynx is clear and moist.   Eyes: Pupils are equal, round, and reactive to light. EOM are normal.   Neck: Neck supple.   Cardiovascular: Normal rate, regular rhythm and normal heart sounds.   Pulmonary/Chest: Effort normal and breath sounds normal.   Abdominal: Soft.   Neurological: He is alert and oriented to person, place, and time. No cranial nerve deficit or sensory deficit. He exhibits normal muscle tone. Coordination normal.   Skin: Skin is warm.   Psychiatric: He has a normal mood and affect.   Vitals reviewed.    Lab Results   Component Value Date    HGBA1C 5.5 01/29/2020     Lab Results   Component Value Date    CHOL 113 02/25/2020    TRIG 137 02/25/2020    HDL 36 (L) 02/25/2020    LDL 50 02/25/2020         Diagnoses and all orders for this visit:    1. Hypertension, unspecified " type (Primary)  Comments:  Controlled  Orders:  -     CBC Auto Differential  -     Comprehensive Metabolic Panel    2. Benign prostatic hyperplasia with lower urinary tract symptoms, symptom details unspecified  Comments:  [place]lower [place] and up once at night but doesn't feel needs to see urology presently    3. Depression, unspecified depression type  Comments:  Controlled  Orders:  -     TSH    4. Obesity (BMI 30-39.9)  Comments:  Diet given    5. Dyslipidemia  -     Lipid Panel    6. Vitamin D deficiency  -     Vitamin D 25 Hydroxy    7. Anemia, unspecified type    8. Cyst of kidney, acquired  Comments:  Sees urology    9. Gastroesophageal reflux disease, esophagitis presence not specified  Comments:  Controlled  Orders:  -     Magnesium    10. Sleep disorder  Comments:  Controlled    11. Tachycardia  Comments:  Not noted  Orders:  -     TSH    12. Thrombocytopenia (CMS/HCC)    13. Skin tags, multiple acquired  Comments:  eval dermatology  Orders:  -     Ambulatory Referral to Dermatology    14. Chronic nonintractable headache, unspecified headache type  Comments:  CT head  Orders:  -     CT Head Without Contrast; Future    15. Other headache syndrome

## 2020-08-25 ENCOUNTER — HOSPITAL ENCOUNTER (OUTPATIENT)
Dept: CT IMAGING | Facility: HOSPITAL | Age: 75
Discharge: HOME | End: 2020-08-25
Admitting: PREVENTIVE MEDICINE

## 2020-08-25 DIAGNOSIS — R51.9 CHRONIC NONINTRACTABLE HEADACHE, UNSPECIFIED HEADACHE TYPE: ICD-10-CM

## 2020-08-25 DIAGNOSIS — G89.29 CHRONIC NONINTRACTABLE HEADACHE, UNSPECIFIED HEADACHE TYPE: ICD-10-CM

## 2020-08-25 PROCEDURE — 70450 CT HEAD/BRAIN W/O DYE: CPT

## 2020-08-25 NOTE — PROGRESS NOTES
CT shows no acute changes but some deep white matter changes that deserve MRI evaluation if stillhaving head aches-let me know

## 2020-08-26 DIAGNOSIS — G44.89 OTHER HEADACHE SYNDROME: Primary | ICD-10-CM

## 2020-09-02 ENCOUNTER — HOSPITAL ENCOUNTER (OUTPATIENT)
Dept: MRI IMAGING | Facility: HOSPITAL | Age: 75
Discharge: HOME | End: 2020-09-02
Admitting: PREVENTIVE MEDICINE

## 2020-09-02 DIAGNOSIS — G44.89 OTHER HEADACHE SYNDROME: ICD-10-CM

## 2020-09-02 PROCEDURE — 70553 MRI BRAIN STEM W/O & W/DYE: CPT

## 2020-09-02 PROCEDURE — 25010000002 GADOTERIDOL PER 1 ML: Performed by: PREVENTIVE MEDICINE

## 2020-09-02 PROCEDURE — A9579 GAD-BASE MR CONTRAST NOS,1ML: HCPCS | Performed by: PREVENTIVE MEDICINE

## 2020-09-02 RX ADMIN — GADOTERIDOL 20 ML: 279.3 INJECTION, SOLUTION INTRAVENOUS at 16:45

## 2020-09-03 RX ORDER — PROPRANOLOL HYDROCHLORIDE 80 MG/1
80 CAPSULE, EXTENDED RELEASE ORAL DAILY
Qty: 90 CAPSULE | Refills: 3 | Status: SHIPPED | OUTPATIENT
Start: 2020-09-03 | End: 2022-03-28

## 2020-09-03 NOTE — PROGRESS NOTES
MRI showed only what is probably chronic microvascular disease that is seen with aging.  No masses or tumors.  Can see neurologist if he would like.  Consider antioxidants along with foods deep in color that contain the antioxidants

## 2020-09-09 ENCOUNTER — FLU SHOT (OUTPATIENT)
Dept: FAMILY MEDICINE CLINIC | Facility: CLINIC | Age: 75
End: 2020-09-09

## 2020-09-09 DIAGNOSIS — Z23 NEED FOR INFLUENZA VACCINATION: ICD-10-CM

## 2020-09-09 PROCEDURE — 90694 VACC AIIV4 NO PRSRV 0.5ML IM: CPT | Performed by: PREVENTIVE MEDICINE

## 2020-09-09 PROCEDURE — G0008 ADMIN INFLUENZA VIRUS VAC: HCPCS | Performed by: PREVENTIVE MEDICINE

## 2020-09-15 RX ORDER — LISINOPRIL 40 MG/1
TABLET ORAL
Qty: 90 TABLET | Refills: 0 | Status: SHIPPED | OUTPATIENT
Start: 2020-09-15 | End: 2020-12-17

## 2020-10-02 RX ORDER — ATORVASTATIN CALCIUM 20 MG/1
TABLET, FILM COATED ORAL
Qty: 90 TABLET | Refills: 0 | Status: SHIPPED | OUTPATIENT
Start: 2020-10-02 | End: 2021-01-04

## 2020-10-19 RX ORDER — ESCITALOPRAM OXALATE 10 MG/1
TABLET ORAL
Qty: 90 TABLET | Refills: 0 | Status: SHIPPED | OUTPATIENT
Start: 2020-10-19 | End: 2021-01-18

## 2020-11-05 RX ORDER — ERGOCALCIFEROL 1.25 MG/1
CAPSULE ORAL
Qty: 12 CAPSULE | Refills: 0 | Status: SHIPPED | OUTPATIENT
Start: 2020-11-05 | End: 2021-02-19

## 2020-11-09 ENCOUNTER — OFFICE VISIT (OUTPATIENT)
Dept: FAMILY MEDICINE CLINIC | Facility: CLINIC | Age: 75
End: 2020-11-09

## 2020-11-09 VITALS
BODY MASS INDEX: 34.16 KG/M2 | TEMPERATURE: 98 F | DIASTOLIC BLOOD PRESSURE: 84 MMHG | OXYGEN SATURATION: 95 % | SYSTOLIC BLOOD PRESSURE: 136 MMHG | HEIGHT: 68 IN | WEIGHT: 225.4 LBS | HEART RATE: 68 BPM | RESPIRATION RATE: 16 BRPM

## 2020-11-09 DIAGNOSIS — D64.9 ANEMIA, UNSPECIFIED TYPE: ICD-10-CM

## 2020-11-09 DIAGNOSIS — F32.A DEPRESSION, UNSPECIFIED DEPRESSION TYPE: Chronic | ICD-10-CM

## 2020-11-09 DIAGNOSIS — K21.9 GASTROESOPHAGEAL REFLUX DISEASE, UNSPECIFIED WHETHER ESOPHAGITIS PRESENT: ICD-10-CM

## 2020-11-09 DIAGNOSIS — I10 HYPERTENSION, UNSPECIFIED TYPE: Primary | Chronic | ICD-10-CM

## 2020-11-09 DIAGNOSIS — M19.90 ARTHRITIS: ICD-10-CM

## 2020-11-09 DIAGNOSIS — E78.5 DYSLIPIDEMIA: Chronic | ICD-10-CM

## 2020-11-09 DIAGNOSIS — G44.89 OTHER HEADACHE SYNDROME: ICD-10-CM

## 2020-11-09 DIAGNOSIS — E55.9 VITAMIN D DEFICIENCY: Chronic | ICD-10-CM

## 2020-11-09 DIAGNOSIS — R00.0 TACHYCARDIA: ICD-10-CM

## 2020-11-09 LAB
25(OH)D3 SERPL-MCNC: 41.6 NG/ML (ref 30–100)
ALBUMIN SERPL-MCNC: 4.4 G/DL (ref 3.5–5.2)
ALBUMIN/GLOB SERPL: 1.9 G/DL
ALP SERPL-CCNC: 73 U/L (ref 39–117)
ALT SERPL W P-5'-P-CCNC: 28 U/L (ref 1–41)
ANION GAP SERPL CALCULATED.3IONS-SCNC: 8.9 MMOL/L (ref 5–15)
AST SERPL-CCNC: 23 U/L (ref 1–40)
BASOPHILS # BLD AUTO: 0.03 10*3/MM3 (ref 0–0.2)
BASOPHILS NFR BLD AUTO: 0.6 % (ref 0–1.5)
BILIRUB SERPL-MCNC: 0.3 MG/DL (ref 0–1.2)
BUN SERPL-MCNC: 11 MG/DL (ref 8–23)
BUN/CREAT SERPL: 13.9 (ref 7–25)
CALCIUM SPEC-SCNC: 9.3 MG/DL (ref 8.6–10.5)
CHLORIDE SERPL-SCNC: 103 MMOL/L (ref 98–107)
CHOLEST SERPL-MCNC: 134 MG/DL (ref 0–200)
CO2 SERPL-SCNC: 28.1 MMOL/L (ref 22–29)
CREAT SERPL-MCNC: 0.79 MG/DL (ref 0.76–1.27)
DEPRECATED RDW RBC AUTO: 40 FL (ref 37–54)
EOSINOPHIL # BLD AUTO: 0.14 10*3/MM3 (ref 0–0.4)
EOSINOPHIL NFR BLD AUTO: 2.8 % (ref 0.3–6.2)
ERYTHROCYTE [DISTWIDTH] IN BLOOD BY AUTOMATED COUNT: 12.3 % (ref 12.3–15.4)
ERYTHROCYTE [SEDIMENTATION RATE] IN BLOOD: 7 MM/HR (ref 0–20)
GFR SERPL CREATININE-BSD FRML MDRD: 96 ML/MIN/1.73
GLOBULIN UR ELPH-MCNC: 2.3 GM/DL
GLUCOSE SERPL-MCNC: 110 MG/DL (ref 65–99)
HCT VFR BLD AUTO: 41 % (ref 37.5–51)
HDLC SERPL-MCNC: 40 MG/DL (ref 40–60)
HGB BLD-MCNC: 14.1 G/DL (ref 13–17.7)
IMM GRANULOCYTES # BLD AUTO: 0.01 10*3/MM3 (ref 0–0.05)
IMM GRANULOCYTES NFR BLD AUTO: 0.2 % (ref 0–0.5)
LDLC SERPL CALC-MCNC: 65 MG/DL (ref 0–100)
LDLC/HDLC SERPL: 1.48 {RATIO}
LYMPHOCYTES # BLD AUTO: 1.48 10*3/MM3 (ref 0.7–3.1)
LYMPHOCYTES NFR BLD AUTO: 29.7 % (ref 19.6–45.3)
MAGNESIUM SERPL-MCNC: 2 MG/DL (ref 1.6–2.4)
MCH RBC QN AUTO: 30.8 PG (ref 26.6–33)
MCHC RBC AUTO-ENTMCNC: 34.4 G/DL (ref 31.5–35.7)
MCV RBC AUTO: 89.5 FL (ref 79–97)
MONOCYTES # BLD AUTO: 0.49 10*3/MM3 (ref 0.1–0.9)
MONOCYTES NFR BLD AUTO: 9.8 % (ref 5–12)
NEUTROPHILS NFR BLD AUTO: 2.83 10*3/MM3 (ref 1.7–7)
NEUTROPHILS NFR BLD AUTO: 56.9 % (ref 42.7–76)
NRBC BLD AUTO-RTO: 0 /100 WBC (ref 0–0.2)
PLATELET # BLD AUTO: 156 10*3/MM3 (ref 140–450)
PMV BLD AUTO: 9.3 FL (ref 6–12)
POTASSIUM SERPL-SCNC: 4.6 MMOL/L (ref 3.5–5.2)
PROT SERPL-MCNC: 6.7 G/DL (ref 6–8.5)
RBC # BLD AUTO: 4.58 10*6/MM3 (ref 4.14–5.8)
SODIUM SERPL-SCNC: 140 MMOL/L (ref 136–145)
TRIGL SERPL-MCNC: 174 MG/DL (ref 0–150)
TSH SERPL DL<=0.05 MIU/L-ACNC: 1 UIU/ML (ref 0.27–4.2)
VIT B12 BLD-MCNC: 350 PG/ML (ref 211–946)
VLDLC SERPL-MCNC: 29 MG/DL (ref 5–40)
WBC # BLD AUTO: 4.98 10*3/MM3 (ref 3.4–10.8)

## 2020-11-09 PROCEDURE — G0439 PPPS, SUBSEQ VISIT: HCPCS | Performed by: PREVENTIVE MEDICINE

## 2020-11-09 PROCEDURE — 85025 COMPLETE CBC W/AUTO DIFF WBC: CPT | Performed by: PREVENTIVE MEDICINE

## 2020-11-09 PROCEDURE — 82306 VITAMIN D 25 HYDROXY: CPT | Performed by: PREVENTIVE MEDICINE

## 2020-11-09 PROCEDURE — 80053 COMPREHEN METABOLIC PANEL: CPT | Performed by: PREVENTIVE MEDICINE

## 2020-11-09 PROCEDURE — 36415 COLL VENOUS BLD VENIPUNCTURE: CPT | Performed by: PREVENTIVE MEDICINE

## 2020-11-09 PROCEDURE — 99397 PER PM REEVAL EST PAT 65+ YR: CPT | Performed by: PREVENTIVE MEDICINE

## 2020-11-09 PROCEDURE — 83735 ASSAY OF MAGNESIUM: CPT | Performed by: PREVENTIVE MEDICINE

## 2020-11-09 PROCEDURE — 84443 ASSAY THYROID STIM HORMONE: CPT | Performed by: PREVENTIVE MEDICINE

## 2020-11-09 PROCEDURE — 99214 OFFICE O/P EST MOD 30 MIN: CPT | Performed by: PREVENTIVE MEDICINE

## 2020-11-09 PROCEDURE — 85652 RBC SED RATE AUTOMATED: CPT | Performed by: PREVENTIVE MEDICINE

## 2020-11-09 PROCEDURE — 82607 VITAMIN B-12: CPT | Performed by: PREVENTIVE MEDICINE

## 2020-11-09 PROCEDURE — 80061 LIPID PANEL: CPT | Performed by: PREVENTIVE MEDICINE

## 2020-11-09 NOTE — PATIENT INSTRUCTIONS
Advise decrease portion size    Calorie Counting for Weight Loss  Calories are units of energy. Your body needs a certain amount of calories from food to keep you going throughout the day. When you eat more calories than your body needs, your body stores the extra calories as fat. When you eat fewer calories than your body needs, your body burns fat to get the energy it needs.  Calorie counting means keeping track of how many calories you eat and drink each day. Calorie counting can be helpful if you need to lose weight. If you make sure to eat fewer calories than your body needs, you should lose weight. Ask your health care provider what a healthy weight is for you.  For calorie counting to work, you will need to eat the right number of calories in a day in order to lose a healthy amount of weight per week. A dietitian can help you determine how many calories you need in a day and will give you suggestions on how to reach your calorie goal.  · A healthy amount of weight to lose per week is usually 1-2 lb (0.5-0.9 kg). This usually means that your daily calorie intake should be reduced by 500-750 calories.  · Eating 1,200 - 1,500 calories per day can help most women lose weight.  · Eating 1,500 - 1,800 calories per day can help most men lose weight.  What is my plan?  My goal is to have __________ calories per day.  If I have this many calories per day, I should lose around __________ pounds per week.  What do I need to know about calorie counting?  In order to meet your daily calorie goal, you will need to:  · Find out how many calories are in each food you would like to eat. Try to do this before you eat.  · Decide how much of the food you plan to eat.  · Write down what you ate and how many calories it had. Doing this is called keeping a food log.  To successfully lose weight, it is important to balance calorie counting with a healthy lifestyle that includes regular activity. Aim for 150 minutes of moderate  exercise (such as walking) or 75 minutes of vigorous exercise (such as running) each week.  Where do I find calorie information?      The number of calories in a food can be found on a Nutrition Facts label. If a food does not have a Nutrition Facts label, try to look up the calories online or ask your dietitian for help.  Remember that calories are listed per serving. If you choose to have more than one serving of a food, you will have to multiply the calories per serving by the amount of servings you plan to eat. For example, the label on a package of bread might say that a serving size is 1 slice and that there are 90 calories in a serving. If you eat 1 slice, you will have eaten 90 calories. If you eat 2 slices, you will have eaten 180 calories.  How do I keep a food log?  Immediately after each meal, record the following information in your food log:  · What you ate. Don't forget to include toppings, sauces, and other extras on the food.  · How much you ate. This can be measured in cups, ounces, or number of items.  · How many calories each food and drink had.  · The total number of calories in the meal.  Keep your food log near you, such as in a small notebook in your pocket, or use a mobile lore or website. Some programs will calculate calories for you and show you how many calories you have left for the day to meet your goal.  What are some calorie counting tips?      · Use your calories on foods and drinks that will fill you up and not leave you hungry:  ? Some examples of foods that fill you up are nuts and nut butters, vegetables, lean proteins, and high-fiber foods like whole grains. High-fiber foods are foods with more than 5 g fiber per serving.  ? Drinks such as sodas, specialty coffee drinks, alcohol, and juices have a lot of calories, yet do not fill you up.  · Eat nutritious foods and avoid empty calories. Empty calories are calories you get from foods or beverages that do not have many vitamins or  "protein, such as candy, sweets, and soda. It is better to have a nutritious high-calorie food (such as an avocado) than a food with few nutrients (such as a bag of chips).  · Know how many calories are in the foods you eat most often. This will help you calculate calorie counts faster.  · Pay attention to calories in drinks. Low-calorie drinks include water and unsweetened drinks.  · Pay attention to nutrition labels for \"low fat\" or \"fat free\" foods. These foods sometimes have the same amount of calories or more calories than the full fat versions. They also often have added sugar, starch, or salt, to make up for flavor that was removed with the fat.  · Find a way of tracking calories that works for you. Get creative. Try different apps or programs if writing down calories does not work for you.  What are some portion control tips?  · Know how many calories are in a serving. This will help you know how many servings of a certain food you can have.  · Use a measuring cup to measure serving sizes. You could also try weighing out portions on a kitchen scale. With time, you will be able to estimate serving sizes for some foods.  · Take some time to put servings of different foods on your favorite plates, bowls, and cups so you know what a serving looks like.  · Try not to eat straight from a bag or box. Doing this can lead to overeating. Put the amount you would like to eat in a cup or on a plate to make sure you are eating the right portion.  · Use smaller plates, glasses, and bowls to prevent overeating.  · Try not to multitask (for example, watch TV or use your computer) while eating. If it is time to eat, sit down at a table and enjoy your food. This will help you to know when you are full. It will also help you to be aware of what you are eating and how much you are eating.  What are tips for following this plan?  Reading food labels  · Check the calorie count compared to the serving size. The serving size may be " "smaller than what you are used to eating.  · Check the source of the calories. Make sure the food you are eating is high in vitamins and protein and low in saturated and trans fats.  Shopping  · Read nutrition labels while you shop. This will help you make healthy decisions before you decide to purchase your food.  · Make a grocery list and stick to it.  Cooking  · Try to cook your favorite foods in a healthier way. For example, try baking instead of frying.  · Use low-fat dairy products.  Meal planning  · Use more fruits and vegetables. Half of your plate should be fruits and vegetables.  · Include lean proteins like poultry and fish.  How do I count calories when eating out?  · Ask for smaller portion sizes.  · Consider sharing an entree and sides instead of getting your own entree.  · If you get your own entree, eat only half. Ask for a box at the beginning of your meal and put the rest of your entree in it so you are not tempted to eat it.  · If calories are listed on the menu, choose the lower calorie options.  · Choose dishes that include vegetables, fruits, whole grains, low-fat dairy products, and lean protein.  · Choose items that are boiled, broiled, grilled, or steamed. Stay away from items that are buttered, battered, fried, or served with cream sauce. Items labeled \"crispy\" are usually fried, unless stated otherwise.  · Choose water, low-fat milk, unsweetened iced tea, or other drinks without added sugar. If you want an alcoholic beverage, choose a lower calorie option such as a glass of wine or light beer.  · Ask for dressings, sauces, and syrups on the side. These are usually high in calories, so you should limit the amount you eat.  · If you want a salad, choose a garden salad and ask for grilled meats. Avoid extra toppings like guillen, cheese, or fried items. Ask for the dressing on the side, or ask for olive oil and vinegar or lemon to use as dressing.  · Estimate how many servings of a food you are " given. For example, a serving of cooked rice is ½ cup or about the size of half a baseball. Knowing serving sizes will help you be aware of how much food you are eating at restaurants. The list below tells you how big or small some common portion sizes are based on everyday objects:  ? 1 oz--4 stacked dice.  ? 3 oz--1 deck of cards.  ? 1 tsp--1 die.  ? 1 Tbsp--½ a ping-pong ball.  ? 2 Tbsp--1 ping-pong ball.  ? ½ cup--½ baseball.  ? 1 cup--1 baseball.  Summary  · Calorie counting means keeping track of how many calories you eat and drink each day. If you eat fewer calories than your body needs, you should lose weight.  · A healthy amount of weight to lose per week is usually 1-2 lb (0.5-0.9 kg). This usually means reducing your daily calorie intake by 500-750 calories.  · The number of calories in a food can be found on a Nutrition Facts label. If a food does not have a Nutrition Facts label, try to look up the calories online or ask your dietitian for help.  · Use your calories on foods and drinks that will fill you up, and not on foods and drinks that will leave you hungry.  · Use smaller plates, glasses, and bowls to prevent overeating.  This information is not intended to replace advice given to you by your health care provider. Make sure you discuss any questions you have with your health care provider.  Document Released: 12/18/2006 Document Revised: 09/06/2019 Document Reviewed: 11/17/2017  [employer]vier Interactive Patient Education © 2019 [employer]

## 2020-11-09 NOTE — PROGRESS NOTES
The ABCs of the Annual Wellness Visit  Subsequent Medicare Wellness Visit    Chief Complaint   Patient presents with   • Medicare Wellness-subsequent     fasting      Patient has been advised to wear seatbelt and use sun screen.  He is also here to check on multiple chronic health conditions.  Subjective   History of Present Illness:  Farheen Gomez is a 75 y.o. male who presents for a Subsequent Medicare Wellness Visit.    HEALTH RISK ASSESSMENT    Recent Hospitalizations:  Recently treated at the following:  [facility]     Current Medical Providers:  Patient Care Team:  Penny Hidalgo MD as PCP - General    Smoking Status:  Social History     Tobacco Use   Smoking Status Former Smoker   • Packs/day: 0.25   • Years: 5.00   • Pack years: 1.25   • Quit date:    • Years since quittin.9   Smokeless Tobacco Never Used       Alcohol Consumption:  Social History     Substance and Sexual Activity   Alcohol Use No   • Frequency: Never       Depression Screen:   PHQ-2/PHQ-9 Depression Screening 2020   Little interest or pleasure in doing things 0   Feeling down, depressed, or hopeless 0   Total Score 0       Fall Risk Screen:  SHERRIE Fall Risk Assessment was completed, and patient is at LOW risk for falls.Assessment completed on:2020    Health Habits and Functional and Cognitive Screening:  Functional & Cognitive Status 2020   Do you have difficulty preparing food and eating? No   Do you have difficulty bathing yourself, getting dressed or grooming yourself? No   Do you have difficulty using the toilet? No   Do you have difficulty moving around from place to place? No   Do you have trouble with steps or getting out of a bed or a chair? No   Current Diet Well Balanced Diet   Dental Exam Up to date   Eye Exam Up to date   Exercise (times per week) 7 times per week   Current Exercise Activities Include Walking   Do you need help using the phone?  No   Are you deaf or do you have  serious difficulty hearing?  Yes   Do you need help with transportation? No   Do you need help shopping? No   Do you need help preparing meals?  No   Do you need help with housework?  No   Do you need help with laundry? No   Do you need help taking your medications? No   Do you need help managing money? No   Do you ever drive or ride in a car without wearing a seat belt? No   Have you felt unusual stress, anger or loneliness in the last month? No   Who do you live with? Spouse   If you need help, do you have trouble finding someone available to you? No   Have you been bothered in the last four weeks by sexual problems? No   Do you have difficulty concentrating, remembering or making decisions? No         Does the patient have evidence of cognitive impairment? No    Asprin use counseling:Does not need ASA (and currently is not on it)    Age-appropriate Screening Schedule:  Refer to the list below for future screening recommendations based on patient's age, sex and/or medical conditions. Orders for these recommended tests are listed in the plan section. The patient has been provided with a written plan.    Health Maintenance   Topic Date Due   • TDAP/TD VACCINES (1 - Tdap) 08/13/2025 (Originally 11/8/1964)   • LIPID PANEL  11/09/2021   • COLONOSCOPY  12/07/2027   • INFLUENZA VACCINE  Completed   • ZOSTER VACCINE  Completed          The following portions of the patient's history were reviewed and updated as appropriate: allergies, current medications, past family history, past medical history, past social history, past surgical history and problem list.    Outpatient Medications Prior to Visit   Medication Sig Dispense Refill   • Acetaminophen (TYLENOL ARTHRITIS PAIN PO) Take 2 tablets by mouth Every Night.     • atorvastatin (LIPITOR) 20 MG tablet TAKE 1 TABLET BY MOUTH ONCE DAILY AT BEDTIME 90 tablet 0   • calcium carbonate (OS-JUSTA) 600 MG tablet Take 600 mg by mouth Daily. Stop today for surgery     • escitalopram  (LEXAPRO) 10 MG tablet Take 1 tablet by mouth once daily 90 tablet 0   • lisinopril (PRINIVIL,ZESTRIL) 40 MG tablet Take 1 tablet by mouth once daily 90 tablet 0   • Magnesium 250 MG tablet Take 250 mg by mouth Daily. Stop today for surgery     • Multiple Vitamins-Minerals (EYE VITAMINS PO) Take 2 capsules by mouth. Stop today for surgery     • Multiple Vitamins-Minerals (PRESERVISION AREDS 2 PO) Take 2 tablets by mouth Daily. Stop today for surgery     • tamsulosin (FLOMAX) 0.4 MG capsule 24 hr capsule Take 1 capsule by mouth Every Evening.     • vitamin D (ERGOCALCIFEROL) 1.25 MG (52856 UT) capsule capsule Take 1 capsule by mouth once a week 12 capsule 0   • finasteride (PROSCAR) 5 MG tablet Take 5 mg by mouth every night at bedtime.     • propranolol LA (INDERAL LA) 80 MG 24 hr capsule Take 1 capsule by mouth Daily. 90 capsule 3     No facility-administered medications prior to visit.        Patient Active Problem List   Diagnosis   • Spinal stenosis in cervical region - reveresal of lordosis at C3/4 and C4/5, Modic endpalate cahnges at C7/T1   • Hypertension   • BPH (benign prostatic hyperplasia)   • Anemia   • Depression   • Arthritis   • Obesity (BMI 30-39.9)   • Dupuytren's contracture   • Dyslipidemia   • Family history of malignant neoplasm of urinary bladder   • Foot pain, right   • Ganglion cyst   • Hand paresthesia   • Hearing problem   • History of poliomyelitis   • Other hammer toe(s) (acquired), left foot   • Plantar fasciitis, left   • Sleep disorder   • Tachycardia   • Thrombocytopenia (CMS/HCC)   • Vitamin D deficiency   • Amblyopia   • Dry eye   • Hypertensive retinopathy of both eyes   • Lens replaced by other means   • AMD (age-related macular degeneration), bilateral   • Nonexudative age-related macular degeneration   • Other chronic allergic conjunctivitis   • Other specified disorders of eyelid   • PCO (posterior capsular opacification), bilateral   • Presence of intraocular lens   • PVD  "(posterior vitreous detachment), both eyes   • Regular astigmatism   • History of bilateral knee arthroplasty   • Other headache syndrome       Advanced Care Planning:  ACP discussion was held with the patient during this visit. Patient has an advance directive in EMR which is still valid.     Review of Systems   Constitutional: Negative.    HENT: Negative.    Eyes: Negative.    Respiratory: Negative.    Cardiovascular: Negative.    Gastrointestinal: Negative.    Endocrine: Negative.    Genitourinary: Negative.    Musculoskeletal: Negative.    Skin: Negative.    Allergic/Immunologic: Negative.    Neurological: Negative.    Hematological: Negative.    Psychiatric/Behavioral: Negative.        Compared to one year ago, the patient feels his physical health is better.  Compared to one year ago, the patient feels his mental health is the same.    Reviewed chart for potential of high risk medication in the elderly: yes  Reviewed chart for potential of harmful drug interactions in the elderly:yes    Objective         Vitals:    11/09/20 0858 11/09/20 0905   BP: 124/76 136/84   BP Location: Right arm Left arm   Patient Position: Sitting Sitting   Cuff Size: Adult Adult   Pulse: 71 68   Resp: 16    Temp: 98 °F (36.7 °C)    TempSrc: Infrared    SpO2: 95%    Weight: 102 kg (225 lb 6.4 oz)    Height: 172.7 cm (68\")    PainSc: 0-No pain        Body mass index is 34.27 kg/m².  Discussed the patient's BMI with him. The BMI is above average; BMI management plan is completed.    Physical Exam  Vitals signs reviewed.   Constitutional:       General: He is not in acute distress.     Appearance: Normal appearance. He is well-developed. He is not ill-appearing or toxic-appearing.   HENT:      Head: Normocephalic and atraumatic.      Right Ear: Tympanic membrane, ear canal and external ear normal.      Left Ear: Tympanic membrane, ear canal and external ear normal.      Nose: Nose normal.   Eyes:      Extraocular Movements: Extraocular " movements intact.      Conjunctiva/sclera: Conjunctivae normal.      Pupils: Pupils are equal, round, and reactive to light.   Neck:      Musculoskeletal: Neck supple.   Cardiovascular:      Rate and Rhythm: Normal rate and regular rhythm.      Heart sounds: Normal heart sounds.   Pulmonary:      Effort: Pulmonary effort is normal.      Breath sounds: Normal breath sounds.   Abdominal:      General: Bowel sounds are normal. There is no distension.      Palpations: Abdomen is soft. There is no mass.      Tenderness: There is no abdominal tenderness.   Musculoskeletal: Normal range of motion.   Skin:     General: Skin is warm.   Neurological:      General: No focal deficit present.      Mental Status: He is alert and oriented to person, place, and time.   Psychiatric:         Mood and Affect: Mood normal.         Behavior: Behavior normal.         Lab Results   Component Value Date    TRIG 174 (H) 11/09/2020    HDL 40 11/09/2020    LDL 65 11/09/2020    VLDL 29 11/09/2020        Assessment/Plan   Medicare Risks and Personalized Health Plan  CMS Preventative Services Quick Reference  Obesity/Overweight     The above risks/problems have been discussed with the patient.  Pertinent information has been shared with the patient in the After Visit Summary.  Follow up plans and orders are seen below in the Assessment/Plan Section.    Diagnoses and all orders for this visit:    1. Hypertension, unspecified type (Primary)  Comments:  Controlled  Orders:  -     CBC Auto Differential  -     Comprehensive Metabolic Panel    2. Depression, unspecified depression type  Comments:  Controlkled  Orders:  -     TSH  -     Vitamin B12    3. Dyslipidemia  Comments:  watching sat fats  Orders:  -     Lipid Panel    4. Vitamin D deficiency  -     Vitamin D 25 Hydroxy    5. Anemia, unspecified type    6. Gastroesophageal reflux disease, unspecified whether esophagitis present  Comments:  Gone  Orders:  -     Magnesium    7.  Tachycardia  Comments:  Inderol helped and staying below 90    8. Other headache syndrome  Comments:  Less but stillpresent..  Constipted from Inderolol    9. Arthritis  Comments:  Knees, hands still bother to point finds difficult to sleep-no erythema but joints swell  Orders:  -     Sedimentation Rate; Future  -     Sedimentation Rate      Follow Up:  Return in about 6 months (around 5/9/2021).     An After Visit Summary and PPPS were given to the patient.

## 2020-12-03 ENCOUNTER — TELEPHONE (OUTPATIENT)
Dept: FAMILY MEDICINE CLINIC | Facility: CLINIC | Age: 75
End: 2020-12-03

## 2020-12-03 DIAGNOSIS — Z20.822 CLOSE EXPOSURE TO COVID-19 VIRUS: Primary | ICD-10-CM

## 2020-12-03 PROCEDURE — U0003 INFECTIOUS AGENT DETECTION BY NUCLEIC ACID (DNA OR RNA); SEVERE ACUTE RESPIRATORY SYNDROME CORONAVIRUS 2 (SARS-COV-2) (CORONAVIRUS DISEASE [COVID-19]), AMPLIFIED PROBE TECHNIQUE, MAKING USE OF HIGH THROUGHPUT TECHNOLOGIES AS DESCRIBED BY CMS-2020-01-R: HCPCS | Performed by: PREVENTIVE MEDICINE

## 2020-12-03 NOTE — TELEPHONE ENCOUNTER
DELETE AFTER REVIEWING: Telephone encounter to be sent to the clinical pool     Caller: Farheen Gomez    Relationship to patient: Self    Best call back number: [phone]     Concerns or Questions if Applicable: Patient is calling to state he and his wife were exposed to someone who tested positive yesterday.  Patient is requesting to be tested.  He states he has no symptoms.      Please advise.

## 2020-12-03 NOTE — TELEPHONE ENCOUNTER
Remember to continue to quarantine even if test negative.  Till no symptoms for 10 days or after second  negative test 7 days post exposure

## 2020-12-17 RX ORDER — LISINOPRIL 40 MG/1
TABLET ORAL
Qty: 90 TABLET | Refills: 0 | Status: SHIPPED | OUTPATIENT
Start: 2020-12-17 | End: 2021-03-17

## 2021-01-04 RX ORDER — ATORVASTATIN CALCIUM 20 MG/1
TABLET, FILM COATED ORAL
Qty: 90 TABLET | Refills: 0 | Status: SHIPPED | OUTPATIENT
Start: 2021-01-04 | End: 2021-04-01

## 2021-01-18 RX ORDER — ESCITALOPRAM OXALATE 10 MG/1
TABLET ORAL
Qty: 90 TABLET | Refills: 0 | Status: SHIPPED | OUTPATIENT
Start: 2021-01-18 | End: 2021-04-19

## 2021-02-19 RX ORDER — ERGOCALCIFEROL 1.25 MG/1
CAPSULE ORAL
Qty: 12 CAPSULE | Refills: 0 | Status: SHIPPED | OUTPATIENT
Start: 2021-02-19 | End: 2021-06-07

## 2021-03-17 RX ORDER — LISINOPRIL 40 MG/1
TABLET ORAL
Qty: 90 TABLET | Refills: 0 | Status: SHIPPED | OUTPATIENT
Start: 2021-03-17 | End: 2021-06-18

## 2021-03-30 ENCOUNTER — OFFICE VISIT (OUTPATIENT)
Dept: ORTHOPEDIC SURGERY | Facility: CLINIC | Age: 76
End: 2021-03-30

## 2021-03-30 VITALS — BODY MASS INDEX: 33.59 KG/M2 | WEIGHT: 221.6 LBS | HEIGHT: 68 IN

## 2021-03-30 DIAGNOSIS — Z96.651 HX OF TOTAL KNEE REPLACEMENT, RIGHT: ICD-10-CM

## 2021-03-30 DIAGNOSIS — Z96.651 STATUS POST TOTAL KNEE REPLACEMENT, RIGHT: ICD-10-CM

## 2021-03-30 DIAGNOSIS — Z96.652 STATUS POST TOTAL KNEE REPLACEMENT, LEFT: ICD-10-CM

## 2021-03-30 DIAGNOSIS — Z96.652 HX OF TOTAL KNEE REPLACEMENT, LEFT: Primary | ICD-10-CM

## 2021-03-30 PROCEDURE — 99213 OFFICE O/P EST LOW 20 MIN: CPT | Performed by: ORTHOPAEDIC SURGERY

## 2021-03-30 RX ORDER — MELOXICAM 7.5 MG/1
7.5 TABLET ORAL DAILY
Qty: 90 TABLET | Refills: 3 | Status: SHIPPED | OUTPATIENT
Start: 2021-03-30 | End: 2022-06-17

## 2021-03-30 NOTE — PROGRESS NOTES
"Chief Complaint  Follow-up of the Left Knee and Follow-up of the Right Knee    Subjective    History of Present Illness      Farheen Gomez is a 75 y.o. male who presents to [facility] for routine orthopedic follow-up on bilateral total knee arthroplasty.  History of Present Illness the patient is well-known to me from a previous total knee arthroplasty performed bilaterally by me.  The right side was replaced by me on 23 October 2019.  On the left side he had surgery performed by me on 5 February 2020.  He has done extremely well postoperatively.  He has been able to travel to [facility] in [facility] after the knee replacement surgery.  His quality of life improved significantly for the better following the surgery.  The patient states that he is very glad he had the surgical intervention.  His knees are not perfect but he does have some aching sensation in both knees.  The pain is worse with activities and worse at the end of the day.  He does have some symptoms of aches and pains more so on the right side than the left side.  He states that his strength is coming back and his mobility has improved considerably for the better following the knee replacement surgery.  Pain Location:  BILATERAL knee  Radiation: none  Quality: dull  Intensity/Severity: mild   Duration: 6 months  Progression of symptoms: no worsening, reports improvement  Onset quality: gradual   Timing: intermittent  Aggravating Factors: rising after sitting, squatting  Alleviating Factors: NSAIDs  Previous Episodes: none mentioned  Associated Symptoms: pain, decreased strength  ADLs Affected: ambulating, recreational activities/sports  Previous Treatment: prior surgery       Objective   Vital Signs:   Ht 172.7 cm (68\")   Wt 101 kg (221 lb 9.6 oz)   BMI 33.69 kg/m²     Physical Exam  Physical Exam  Vitals signs and nursing note reviewed.   Constitutional:       Appearance: Normal appearance.   Pulmonary:      Effort: " Pulmonary effort is normal.   Skin:     General: Skin is warm and dry.      Capillary Refill: Capillary refill takes less than 2 seconds.   Neurological:      General: No focal deficit present.      Mental Status: He is alert and oriented to person, place, and time. Mental status is at baseline.   Psychiatric:         Mood and Affect: Mood normal.         Behavior: Behavior normal.         Thought Content: Thought content normal.         Judgment: Judgment normal.     Ortho Exam     Bilateral knee.The patient is status post total knee arthroplasty postoperative 18 months on the right side and on the left side it has been 13 month(s). Incision is clean. Calf is soft and nontender. Homans sign is negative. There is no clicking, popping or catching. Anterior and posterior drawer signs are negative.  There is no instability of the components. Appropriate amounts of swelling and bruising are noted. Dorsalis pedis and posterior tibial artery pulses are palpable. Common peroneal nerve function is well preserved. Range of motion is from 0-130 degrees of flexion. Gait is cautious but otherwise fairly normal. There is no evidence of a deep seated joint infection.          Result Review :   The following data was reviewed by: Boby Medina MD on 03/30/2021:    xrays obtained today  bilateral Knee X-Ray  Indication: EValuation of implant position after total knee arthroplasty  AP, Lateral views  Findings: Well-placed implants with a good cement mantle without any subsidence of the implants  no bony lesion  Soft tissues within normal limits  within normal limits joint spaces  Hardware appropriately positioned yes      yes prior studies available for comparison.      X-RAY was ordered and reviewed by Boby Medina MD          Procedures           Assessment   Assessment and Plan    Diagnoses and all orders for this visit:    1. Hx of total knee replacement, left (Primary)  -     Cancel: XR Knee 1 or 2 View Left  -     Cancel: XR  Knee 1 or 2 View Bilateral  -     XR knee 3 vw bilateral    2. Status post total knee replacement, right  -     Cancel: XR Knee 1 or 2 View Right    3. Hx of total knee replacement, right  -     Cancel: XR Knee 1 or 2 View Bilateral  -     XR knee 3 vw bilateral          Follow Up   · Compression/brace to the knee when he is walking on uneven surfaces.  · , GI and dental procedure prophylaxis with antibiotics to prevent metastatic infection of the knee arthroplasty implants.  · Calcium and vitamin D for bone health.  · Tablet meloxicam 7.5 mg tab 1 p.o. nightly for pain swelling and discomfort.  · Rest, ice, compression, and elevation (RICE) therapy  · Stretching and strengthening exercises of the quads and the hamstrings.  · OTC Tylenol 500-1000mg by mouth every 6 hours as needed for pain   · Follow up in 1 year(s) for joint surveillance and repeat x-rays.  • Patient was given instructions and counseling regarding his condition or for health maintenance advice. Please see specific information pulled into the AVS if appropriate.     Boby Medina MD   Date of Encounter: 3/30/2021        EMR Dragon/Transcription disclaimer:  Much of this encounter note is an electronic transcription/translation of spoken language to printed text. The electronic translation of spoken language may permit erroneous, or at times, nonsensical words or phrases to be inadvertently transcribed; Although I have reviewed the note for such errors, some may still exist.

## 2021-04-01 RX ORDER — ATORVASTATIN CALCIUM 20 MG/1
TABLET, FILM COATED ORAL
Qty: 90 TABLET | Refills: 0 | Status: SHIPPED | OUTPATIENT
Start: 2021-04-01 | End: 2021-07-16

## 2021-04-19 RX ORDER — ESCITALOPRAM OXALATE 10 MG/1
TABLET ORAL
Qty: 90 TABLET | Refills: 0 | Status: SHIPPED | OUTPATIENT
Start: 2021-04-19 | End: 2021-07-07

## 2021-05-10 ENCOUNTER — OFFICE VISIT (OUTPATIENT)
Dept: FAMILY MEDICINE CLINIC | Facility: CLINIC | Age: 76
End: 2021-05-10

## 2021-05-10 ENCOUNTER — TELEPHONE (OUTPATIENT)
Dept: FAMILY MEDICINE CLINIC | Facility: CLINIC | Age: 76
End: 2021-05-10

## 2021-05-10 VITALS
BODY MASS INDEX: 33.8 KG/M2 | TEMPERATURE: 98 F | HEART RATE: 69 BPM | SYSTOLIC BLOOD PRESSURE: 136 MMHG | WEIGHT: 223 LBS | OXYGEN SATURATION: 97 % | DIASTOLIC BLOOD PRESSURE: 83 MMHG | HEIGHT: 68 IN

## 2021-05-10 DIAGNOSIS — E78.5 DYSLIPIDEMIA: Chronic | ICD-10-CM

## 2021-05-10 DIAGNOSIS — Z96.652 HX OF TOTAL KNEE REPLACEMENT, LEFT: ICD-10-CM

## 2021-05-10 DIAGNOSIS — E66.09 CLASS 1 OBESITY DUE TO EXCESS CALORIES WITH SERIOUS COMORBIDITY AND BODY MASS INDEX (BMI) OF 33.0 TO 33.9 IN ADULT: ICD-10-CM

## 2021-05-10 DIAGNOSIS — G44.89 OTHER HEADACHE SYNDROME: ICD-10-CM

## 2021-05-10 DIAGNOSIS — D64.9 ANEMIA, UNSPECIFIED TYPE: ICD-10-CM

## 2021-05-10 DIAGNOSIS — I10 HYPERTENSION, UNSPECIFIED TYPE: Primary | Chronic | ICD-10-CM

## 2021-05-10 DIAGNOSIS — N40.1 BENIGN PROSTATIC HYPERPLASIA WITH LOWER URINARY TRACT SYMPTOMS, SYMPTOM DETAILS UNSPECIFIED: Chronic | ICD-10-CM

## 2021-05-10 DIAGNOSIS — G47.9 SLEEP DISORDER: ICD-10-CM

## 2021-05-10 PROBLEM — E66.811 CLASS 1 OBESITY DUE TO EXCESS CALORIES WITH SERIOUS COMORBIDITY AND BODY MASS INDEX (BMI) OF 33.0 TO 33.9 IN ADULT: Status: ACTIVE | Noted: 2021-05-10

## 2021-05-10 PROBLEM — L21.0 SEBORRHEA CAPITIS IN ADULT: Status: ACTIVE | Noted: 2021-05-10

## 2021-05-10 PROCEDURE — 99213 OFFICE O/P EST LOW 20 MIN: CPT | Performed by: PREVENTIVE MEDICINE

## 2021-05-10 PROCEDURE — 36415 COLL VENOUS BLD VENIPUNCTURE: CPT | Performed by: PREVENTIVE MEDICINE

## 2021-05-10 RX ORDER — KETOCONAZOLE 20 MG/G
1 GEL TOPICAL DAILY
Qty: 4 EACH | Refills: 1 | Status: SHIPPED | OUTPATIENT
Start: 2021-05-10 | End: 2021-07-16

## 2021-05-10 NOTE — TELEPHONE ENCOUNTER
Caller: Pippa Gomez    Relationship: Emergency Contact    Best call back number: [phone]    What test/procedure requested: PRIOR AUTH FOR XOLEGEL    When is it needed: ASAP

## 2021-05-10 NOTE — PATIENT INSTRUCTIONS
Health Maintenance Due   Topic Date Due   • COVID-19 Vaccine (1) Never done     Call 3 weeks if not helping scalp    12 hour fast for labs

## 2021-05-10 NOTE — PROGRESS NOTES
"Subjective   Farheen Gomez is a 75 y.o. male presents for   Chief Complaint   Patient presents with   • Hypertension     non fasting-\"black coffee- cream only\"       Health Maintenance Due   Topic Date Due   • COVID-19 Vaccine (1) Never done       75-year-old white male presents today for follow-up of multiple chronic health conditions all of which seem to be stable.  He has had his Covid shot and does feel quite well.  Weight was discussed and he will try to watch portion size and increase his walking    Hypertension         Vitals:    05/10/21 0952 05/10/21 0953   BP: 130/81 136/83   BP Location: Left arm Right arm   Patient Position: Sitting Sitting   Cuff Size: Adult Adult   Pulse: 69    Temp: 98 °F (36.7 °C)    TempSrc: Oral    SpO2: 97%    Weight: 101 kg (223 lb)    Height: 172.7 cm (67.99\")      Body mass index is 33.92 kg/m².    Current Outpatient Medications on File Prior to Visit   Medication Sig Dispense Refill   • atorvastatin (LIPITOR) 20 MG tablet TAKE 1 TABLET BY MOUTH ONCE DAILY AT BEDTIME 90 tablet 0   • calcium carbonate (OS-JUSTA) 600 MG tablet Take 600 mg by mouth Daily. Stop today for surgery     • escitalopram (LEXAPRO) 10 MG tablet Take 1 tablet by mouth once daily 90 tablet 0   • finasteride (PROSCAR) 5 MG tablet Take 5 mg by mouth every night at bedtime.     • lisinopril (PRINIVIL,ZESTRIL) 40 MG tablet Take 1 tablet by mouth once daily 90 tablet 0   • Magnesium 250 MG tablet Take 250 mg by mouth Daily. Stop today for surgery     • meloxicam (Mobic) 7.5 MG tablet Take 1 tablet by mouth Daily. 90 tablet 3   • Multiple Vitamins-Minerals (EYE VITAMINS PO) Take 2 capsules by mouth. Stop today for surgery     • Multiple Vitamins-Minerals (PRESERVISION AREDS 2 PO) Take 2 tablets by mouth Daily. Stop today for surgery     • tamsulosin (FLOMAX) 0.4 MG capsule 24 hr capsule Take 1 capsule by mouth Every Evening.     • vitamin D (ERGOCALCIFEROL) 1.25 MG (75718 UT) capsule capsule Take 1 capsule by " mouth once a week 12 capsule 0   • Acetaminophen (TYLENOL ARTHRITIS PAIN PO) Take 2 tablets by mouth Every Night.     • propranolol LA (INDERAL LA) 80 MG 24 hr capsule Take 1 capsule by mouth Daily. 90 capsule 3     No current facility-administered medications on file prior to visit.       The following portions of the patient's history were reviewed and updated as appropriate: allergies, current medications, past family history, past medical history, past social history, past surgical history and problem list.    Review of Systems   Constitutional: Negative.    HENT: Negative.  Negative for sinus pressure and sore throat.    Eyes: Negative.    Respiratory: Negative.  Negative for cough.    Cardiovascular: Negative.    Gastrointestinal: Negative.    Endocrine: Negative.    Genitourinary: Negative.    Musculoskeletal: Negative.    Skin: Negative.    Allergic/Immunologic: Positive for environmental allergies.   Neurological: Negative.    Hematological: Negative.    Psychiatric/Behavioral: Negative.        Objective   Physical Exam  Vitals reviewed.   Constitutional:       General: He is not in acute distress.     Appearance: He is well-developed. He is obese. He is not ill-appearing or toxic-appearing.   HENT:      Head: Normocephalic and atraumatic.      Right Ear: Tympanic membrane, ear canal and external ear normal.      Left Ear: Tympanic membrane, ear canal and external ear normal.      Nose: Nose normal.   Eyes:      Extraocular Movements: Extraocular movements intact.      Conjunctiva/sclera: Conjunctivae normal.      Pupils: Pupils are equal, round, and reactive to light.   Cardiovascular:      Rate and Rhythm: Normal rate and regular rhythm.      Heart sounds: Normal heart sounds.   Pulmonary:      Effort: Pulmonary effort is normal.      Breath sounds: Normal breath sounds.   Abdominal:      General: Bowel sounds are normal. There is no distension.      Palpations: Abdomen is soft. There is no mass.       Tenderness: There is no abdominal tenderness.   Musculoskeletal:         General: Tenderness present.      Cervical back: Neck supple.   Skin:     General: Skin is warm.   Neurological:      General: No focal deficit present.      Mental Status: He is alert and oriented to person, place, and time.   Psychiatric:         Mood and Affect: Mood normal.         Behavior: Behavior normal.       PHQ-9 Total Score:      Assessment/Plan   Diagnoses and all orders for this visit:    1. Hypertension, unspecified type (Primary)  Comments:  Blood pressures under good control no headache or chest pain  Orders:  -     CBC Auto Differential  -     Comprehensive Metabolic Panel    2. Anemia, unspecified type  Comments:  No known blood loss    3. Sleep disorder  Comments:  No problems with sleep    4. Other headache syndrome  Comments:  Headaches have improved.    5. Benign prostatic hyperplasia with lower urinary tract symptoms, symptom details unspecified  Comments:  Will follow up with the urologist.    6. Dyslipidemia  Comments:  Trying to eat less saturated fats  Orders:  -     Lipid Panel    7. Class 1 obesity due to excess calories with serious comorbidity and body mass index (BMI) of 33.0 to 33.9 in adult  Comments:  decrease portion size increase walking    8. Hx of total knee replacement, left  Comments:  Still having bilateral knee pain but to a lesser extent than prior to having his knees replaced.    Other orders  -     Ketoconazole (Xolegel) 2 % gel; Apply 1 each topically Daily.  Dispense: 4 each; Refill: 1        Patient Instructions     Health Maintenance Due   Topic Date Due   • COVID-19 Vaccine (1) Never done     Call 3 weeks if not helping scalp    12 hour fast for labs

## 2021-05-13 ENCOUNTER — TELEPHONE (OUTPATIENT)
Dept: FAMILY MEDICINE CLINIC | Facility: CLINIC | Age: 76
End: 2021-05-13

## 2021-05-13 ENCOUNTER — CLINICAL SUPPORT (OUTPATIENT)
Dept: FAMILY MEDICINE CLINIC | Facility: CLINIC | Age: 76
End: 2021-05-13

## 2021-05-13 DIAGNOSIS — N40.1 BENIGN PROSTATIC HYPERPLASIA WITH LOWER URINARY TRACT SYMPTOMS, SYMPTOM DETAILS UNSPECIFIED: Chronic | ICD-10-CM

## 2021-05-13 DIAGNOSIS — D64.9 ANEMIA, UNSPECIFIED TYPE: ICD-10-CM

## 2021-05-13 LAB
ALBUMIN SERPL-MCNC: 4.2 G/DL (ref 3.5–5.2)
ALBUMIN/GLOB SERPL: 1.8 G/DL
ALP SERPL-CCNC: 83 U/L (ref 39–117)
ALT SERPL W P-5'-P-CCNC: 24 U/L (ref 1–41)
ANION GAP SERPL CALCULATED.3IONS-SCNC: 8.4 MMOL/L (ref 5–15)
AST SERPL-CCNC: 21 U/L (ref 1–40)
BASOPHILS # BLD AUTO: 0.03 10*3/MM3 (ref 0–0.2)
BASOPHILS NFR BLD AUTO: 0.5 % (ref 0–1.5)
BILIRUB SERPL-MCNC: 0.3 MG/DL (ref 0–1.2)
BUN SERPL-MCNC: 17 MG/DL (ref 8–23)
BUN/CREAT SERPL: 20.5 (ref 7–25)
CALCIUM SPEC-SCNC: 9 MG/DL (ref 8.6–10.5)
CHLORIDE SERPL-SCNC: 104 MMOL/L (ref 98–107)
CHOLEST SERPL-MCNC: 131 MG/DL (ref 0–200)
CO2 SERPL-SCNC: 27.6 MMOL/L (ref 22–29)
CREAT SERPL-MCNC: 0.83 MG/DL (ref 0.76–1.27)
DEPRECATED RDW RBC AUTO: 42 FL (ref 37–54)
EOSINOPHIL # BLD AUTO: 0.15 10*3/MM3 (ref 0–0.4)
EOSINOPHIL NFR BLD AUTO: 2.7 % (ref 0.3–6.2)
ERYTHROCYTE [DISTWIDTH] IN BLOOD BY AUTOMATED COUNT: 12.4 % (ref 12.3–15.4)
GFR SERPL CREATININE-BSD FRML MDRD: 91 ML/MIN/1.73
GLOBULIN UR ELPH-MCNC: 2.4 GM/DL
GLUCOSE SERPL-MCNC: 118 MG/DL (ref 65–99)
HCT VFR BLD AUTO: 43 % (ref 37.5–51)
HDLC SERPL-MCNC: 43 MG/DL (ref 40–60)
HGB BLD-MCNC: 14.3 G/DL (ref 13–17.7)
IMM GRANULOCYTES # BLD AUTO: 0.01 10*3/MM3 (ref 0–0.05)
IMM GRANULOCYTES NFR BLD AUTO: 0.2 % (ref 0–0.5)
LDLC SERPL CALC-MCNC: 69 MG/DL (ref 0–100)
LDLC/HDLC SERPL: 1.58 {RATIO}
LYMPHOCYTES # BLD AUTO: 2.03 10*3/MM3 (ref 0.7–3.1)
LYMPHOCYTES NFR BLD AUTO: 36.3 % (ref 19.6–45.3)
MCH RBC QN AUTO: 30.8 PG (ref 26.6–33)
MCHC RBC AUTO-ENTMCNC: 33.3 G/DL (ref 31.5–35.7)
MCV RBC AUTO: 92.7 FL (ref 79–97)
MONOCYTES # BLD AUTO: 0.51 10*3/MM3 (ref 0.1–0.9)
MONOCYTES NFR BLD AUTO: 9.1 % (ref 5–12)
NEUTROPHILS NFR BLD AUTO: 2.87 10*3/MM3 (ref 1.7–7)
NEUTROPHILS NFR BLD AUTO: 51.2 % (ref 42.7–76)
NRBC BLD AUTO-RTO: 0 /100 WBC (ref 0–0.2)
PLATELET # BLD AUTO: 162 10*3/MM3 (ref 140–450)
PMV BLD AUTO: 9.5 FL (ref 6–12)
POTASSIUM SERPL-SCNC: 4.4 MMOL/L (ref 3.5–5.2)
PROT SERPL-MCNC: 6.6 G/DL (ref 6–8.5)
RBC # BLD AUTO: 4.64 10*6/MM3 (ref 4.14–5.8)
SODIUM SERPL-SCNC: 140 MMOL/L (ref 136–145)
TRIGL SERPL-MCNC: 101 MG/DL (ref 0–150)
VLDLC SERPL-MCNC: 19 MG/DL (ref 5–40)
WBC # BLD AUTO: 5.6 10*3/MM3 (ref 3.4–10.8)

## 2021-05-13 PROCEDURE — 85025 COMPLETE CBC W/AUTO DIFF WBC: CPT | Performed by: PREVENTIVE MEDICINE

## 2021-05-13 PROCEDURE — 80053 COMPREHEN METABOLIC PANEL: CPT | Performed by: PREVENTIVE MEDICINE

## 2021-05-13 PROCEDURE — 36415 COLL VENOUS BLD VENIPUNCTURE: CPT | Performed by: PREVENTIVE MEDICINE

## 2021-05-13 PROCEDURE — 80061 LIPID PANEL: CPT | Performed by: PREVENTIVE MEDICINE

## 2021-05-13 NOTE — PROGRESS NOTES
Venipuncture Blood Specimen Collection  Venipuncture performed in left arm by Landy Dash MA with good hemostasis. Patient tolerated the procedure well without complications.   05/13/21   WAYLON Masters MD

## 2021-05-13 NOTE — TELEPHONE ENCOUNTER
Patients wife informed that medication for scalp was denied and Dr. Hidalgo will call in a cream instead that insurance is preferring. Verbalized understanding.

## 2021-05-14 ENCOUNTER — TELEPHONE (OUTPATIENT)
Dept: FAMILY MEDICINE CLINIC | Facility: CLINIC | Age: 76
End: 2021-05-14

## 2021-05-14 NOTE — TELEPHONE ENCOUNTER
Pharmacy Name: [facility] [id number] [place] - [address] - [phone]  - [phone]      Pharmacy representative name: MAGALIS    Pharmacy representative phone number: [phone]    What medication are you calling in regards to: KETOCONAZOLE AND PYRITHIONE ZINC. MAGALIS STATED SHE DOES NOT HAVE ANY PRODUCT WITH THESE TWO INGREDIENTS IN IT.    What question does the pharmacy have: MAGALIS WOULD LIKE A CALL TO CONFIRM THE PRESCRIPTION.    Who is the provider that prescribed the medication: DR KILLIAN.    Additional notes:

## 2021-05-14 NOTE — TELEPHONE ENCOUNTER
----- Message from Penny Hidalgo MD sent at 5/14/2021  7:19 AM EDT -----  Glucose 118 so watch carbs and walk.  Rest looks good

## 2021-06-07 RX ORDER — ERGOCALCIFEROL 1.25 MG/1
CAPSULE ORAL
Qty: 12 CAPSULE | Refills: 0 | Status: SHIPPED | OUTPATIENT
Start: 2021-06-07 | End: 2021-09-27

## 2021-06-18 RX ORDER — LISINOPRIL 40 MG/1
TABLET ORAL
Qty: 90 TABLET | Refills: 0 | Status: SHIPPED | OUTPATIENT
Start: 2021-06-18 | End: 2021-09-27

## 2021-07-07 RX ORDER — ESCITALOPRAM OXALATE 10 MG/1
TABLET ORAL
Qty: 90 TABLET | Refills: 0 | Status: SHIPPED | OUTPATIENT
Start: 2021-07-07 | End: 2021-10-04

## 2021-07-15 NOTE — PATIENT INSTRUCTIONS
Health Maintenance Due   Topic Date Due   • COVID-19 Vaccine (1) Never done     Trial Pravastatin at 20 mg size-if muscles aches stop mediicine at his discression.  Can increase to 40 mg if no aches.  Use shampoo/twice weekly and call if noimprovement after 4 weeks.

## 2021-07-16 ENCOUNTER — OFFICE VISIT (OUTPATIENT)
Dept: FAMILY MEDICINE CLINIC | Facility: CLINIC | Age: 76
End: 2021-07-16

## 2021-07-16 VITALS
BODY MASS INDEX: 34.22 KG/M2 | HEART RATE: 86 BPM | WEIGHT: 225 LBS | TEMPERATURE: 98.4 F | DIASTOLIC BLOOD PRESSURE: 77 MMHG | SYSTOLIC BLOOD PRESSURE: 123 MMHG | OXYGEN SATURATION: 93 %

## 2021-07-16 DIAGNOSIS — R21 RASH: ICD-10-CM

## 2021-07-16 DIAGNOSIS — L21.0 SEBORRHEA CAPITIS IN ADULT: Primary | ICD-10-CM

## 2021-07-16 DIAGNOSIS — E66.09 CLASS 1 OBESITY DUE TO EXCESS CALORIES WITH SERIOUS COMORBIDITY AND BODY MASS INDEX (BMI) OF 34.0 TO 34.9 IN ADULT: ICD-10-CM

## 2021-07-16 DIAGNOSIS — E78.5 DYSLIPIDEMIA: Chronic | ICD-10-CM

## 2021-07-16 PROCEDURE — 99214 OFFICE O/P EST MOD 30 MIN: CPT | Performed by: PREVENTIVE MEDICINE

## 2021-07-16 RX ORDER — PRAVASTATIN SODIUM 20 MG
20 TABLET ORAL NIGHTLY
Qty: 30 TABLET | Refills: 1 | Status: SHIPPED | OUTPATIENT
Start: 2021-07-16 | End: 2021-09-01 | Stop reason: SDUPTHER

## 2021-07-16 RX ORDER — KETOCONAZOLE 20 MG/ML
SHAMPOO TOPICAL 2 TIMES WEEKLY
Qty: 8 EACH | Refills: 1 | Status: SHIPPED | OUTPATIENT
Start: 2021-07-19 | End: 2022-06-17

## 2021-07-16 RX ORDER — PRAVASTATIN SODIUM 20 MG
20 TABLET ORAL DAILY
Qty: 30 TABLET | Refills: 1 | Status: SHIPPED | OUTPATIENT
Start: 2021-07-16 | End: 2021-07-16

## 2021-07-16 RX ORDER — KETOCONAZOLE 20 MG/ML
SHAMPOO TOPICAL 2 TIMES WEEKLY
Qty: 8 EACH | Refills: 1 | Status: SHIPPED | OUTPATIENT
Start: 2021-07-19 | End: 2021-07-16

## 2021-07-16 NOTE — PROGRESS NOTES
Subjective   Farheen Gomez is a 75 y.o. male presents for   Chief Complaint   Patient presents with   • Rash     Patient presents today for rash in both arms that he got from clearing brush.  We have advised that he does wear protective sleeves when he is working out in the yard and the skin has lost subcuticular layer and elasticity and he has multiple abrasions.  Problem #2 is scalp itching and scaling.  Patient does have left greater than right post scalp scaly rash which is pruritic.  The Nizoral shampoo that was ordered was going to be $4000 we have sent resubmitted a prescription for ketoconazole and patient has been told that [facility] is for $33.  Finally patient stopped his atorvastatin due to muscle pain and after 2 weeks the muscle pain was approximately 80% improved.  Pros and cons of stopping the statin were discussed he decided to try the pravastatin to see if the statin that is renally metabolized will cause some less difficulty.  If not he will come off of the statin.  Health Maintenance Due   Topic Date Due   • COVID-19 Vaccine (1) Never done       History of Present Illness     Vitals:    07/16/21 1325 07/16/21 1327   BP: 146/80 123/77   BP Location: Right arm Left arm   Patient Position: Sitting Sitting   Cuff Size: Adult Adult   Pulse: 86    Temp: 98.4 °F (36.9 °C)    SpO2: 93%    Weight: 102 kg (225 lb)      Body mass index is 34.22 kg/m².    Current Outpatient Medications on File Prior to Visit   Medication Sig Dispense Refill   • Acetaminophen (TYLENOL ARTHRITIS PAIN PO) Take 2 tablets by mouth Every Night.     • calcium carbonate (OS-JUSTA) 600 MG tablet Take 600 mg by mouth Daily. Stop today for surgery     • escitalopram (LEXAPRO) 10 MG tablet Take 1 tablet by mouth once daily 90 tablet 0   • lisinopril (PRINIVIL,ZESTRIL) 40 MG tablet Take 1 tablet by mouth once daily 90 tablet 0   • Magnesium 250 MG tablet Take 250 mg by mouth Daily. Stop today for surgery     • meloxicam (Mobic) 7.5 MG  tablet Take 1 tablet by mouth Daily. 90 tablet 3   • Mirabegron ER (MYRBETRIQ) 25 MG tablet sustained-release 24 hour 24 hr tablet Take 25 mg by mouth Daily.     • Multiple Vitamins-Minerals (EYE VITAMINS PO) Take 2 capsules by mouth. Stop today for surgery     • Multiple Vitamins-Minerals (PRESERVISION AREDS 2 PO) Take 2 tablets by mouth Daily. Stop today for surgery     • vitamin D (ERGOCALCIFEROL) 1.25 MG (04015 UT) capsule capsule Take 1 capsule by mouth once a week 12 capsule 0   • [DISCONTINUED] atorvastatin (LIPITOR) 20 MG tablet TAKE 1 TABLET BY MOUTH ONCE DAILY AT BEDTIME 90 tablet 0   • finasteride (PROSCAR) 5 MG tablet Take 5 mg by mouth every night at bedtime.     • Ketoconazole & Pyrithione Zinc 2 & 1 % kit Apply 1 each topically 2 (Two) Times a Week. 16 each 3   • propranolol LA (INDERAL LA) 80 MG 24 hr capsule Take 1 capsule by mouth Daily. 90 capsule 3   • tamsulosin (FLOMAX) 0.4 MG capsule 24 hr capsule Take 1 capsule by mouth Every Evening.     • [DISCONTINUED] Ketoconazole (Xolegel) 2 % gel Apply 1 each topically Daily. 4 each 1     No current facility-administered medications on file prior to visit.       The following portions of the patient's history were reviewed and updated as appropriate: allergies, current medications, past family history, past medical history, past social history, past surgical history and problem list.    Review of Systems   Musculoskeletal: Positive for myalgias.   Skin: Positive for dry skin and rash.       Objective   Physical Exam  Constitutional:       Appearance: He is obese.   Skin:     Findings: Erythema and rash present.   Neurological:      Mental Status: He is alert.       PHQ-9 Total Score:      Assessment/Plan   Diagnoses and all orders for this visit:    1. Seborrhea capitis in adult (Primary)  Comments:  Pruritic left greater than right lower scalp scaly rash.  Shampoo that was ordered was going to be $4000.  Patient will be placed on Nizoral shampoo.    2.  Rash  Comments:  Rash on arms due to abrasions from clearing brush.  We suggested that he will work sleeves when he is clearing brush.    3. Dyslipidemia  Comments:  Patient was advised to watch saturated fats.  Muscle aches lessened to about 20% what it was while he was taking the atorvastatin after 2 weeks of cessation.    4. Class 1 obesity due to excess calories with serious comorbidity and body mass index (BMI) of 34.0 to 34.9 in adult    Other orders  -     Discontinue: pravastatin (Pravachol) 20 MG tablet; Take 1 tablet by mouth Daily.  Dispense: 30 tablet; Refill: 1  -     Discontinue: ketoconazole (NIZORAL) 2 % shampoo; Apply  topically to the appropriate area as directed 2 (Two) Times a Week.  Dispense: 8 each; Refill: 1  -     pravastatin (Pravachol) 20 MG tablet; Take 1 tablet by mouth Every Night.  Dispense: 30 tablet; Refill: 1  -     ketoconazole (NIZORAL) 2 % shampoo; Apply  topically to the appropriate area as directed 2 (Two) Times a Week.  Dispense: 8 each; Refill: 1        Patient Instructions     Health Maintenance Due   Topic Date Due   • COVID-19 Vaccine (1) Never done     Trial Pravastatin at 20 mg size-if muscles aches stop mediicine at his discression.  Can increase to 40 mg if no aches.  Use shampoo/twice weekly and call if noimprovement after 4 weeks.

## 2021-09-01 RX ORDER — PRAVASTATIN SODIUM 20 MG
20 TABLET ORAL NIGHTLY
Qty: 90 TABLET | Refills: 1 | Status: SHIPPED | OUTPATIENT
Start: 2021-09-01 | End: 2021-11-11 | Stop reason: SINTOL

## 2021-09-01 NOTE — TELEPHONE ENCOUNTER
Note read from urologist where sleep eval is advised if he agrees we can order if urology did not.

## 2021-09-03 ENCOUNTER — TELEPHONE (OUTPATIENT)
Dept: FAMILY MEDICINE CLINIC | Facility: CLINIC | Age: 76
End: 2021-09-03

## 2021-09-03 NOTE — TELEPHONE ENCOUNTER
Caller: Pippa Gomez    Relationship: Emergency Contact    Best call back number: [phone]    What orders are you requesting (i.e. lab or imaging): YEARLY LABS     In what timeframe would the patient need to come in: WEEK BEFORE 09/10    Where will you receive your lab/imaging services: IN OFFICE     Additional notes: PATIENT HAS MADE A 1 YEAR FU WITH MD KILLIAN AND NEEDING LABS A WEEK PRIOR TO 09/10

## 2021-09-10 ENCOUNTER — OFFICE VISIT (OUTPATIENT)
Dept: FAMILY MEDICINE CLINIC | Facility: CLINIC | Age: 76
End: 2021-09-10

## 2021-09-10 VITALS
OXYGEN SATURATION: 94 % | WEIGHT: 224.4 LBS | DIASTOLIC BLOOD PRESSURE: 89 MMHG | HEIGHT: 68 IN | TEMPERATURE: 97.7 F | HEART RATE: 75 BPM | BODY MASS INDEX: 34.01 KG/M2 | SYSTOLIC BLOOD PRESSURE: 149 MMHG

## 2021-09-10 DIAGNOSIS — N40.1 BENIGN PROSTATIC HYPERPLASIA WITH LOWER URINARY TRACT SYMPTOMS, SYMPTOM DETAILS UNSPECIFIED: Chronic | ICD-10-CM

## 2021-09-10 DIAGNOSIS — F32.A DEPRESSION, UNSPECIFIED DEPRESSION TYPE: Chronic | ICD-10-CM

## 2021-09-10 DIAGNOSIS — I10 HYPERTENSION, UNSPECIFIED TYPE: Chronic | ICD-10-CM

## 2021-09-10 DIAGNOSIS — E66.09 CLASS 1 OBESITY DUE TO EXCESS CALORIES WITH SERIOUS COMORBIDITY AND BODY MASS INDEX (BMI) OF 34.0 TO 34.9 IN ADULT: ICD-10-CM

## 2021-09-10 DIAGNOSIS — M72.0 DUPUYTREN'S CONTRACTURE: ICD-10-CM

## 2021-09-10 DIAGNOSIS — Z00.01 ENCOUNTER FOR ANNUAL GENERAL MEDICAL EXAMINATION WITH ABNORMAL FINDINGS IN ADULT: Primary | ICD-10-CM

## 2021-09-10 DIAGNOSIS — G47.9 SLEEP DISORDER: ICD-10-CM

## 2021-09-10 DIAGNOSIS — L21.0 SEBORRHEA CAPITIS IN ADULT: ICD-10-CM

## 2021-09-10 DIAGNOSIS — G44.89 OTHER HEADACHE SYNDROME: ICD-10-CM

## 2021-09-10 DIAGNOSIS — E78.5 DYSLIPIDEMIA: Chronic | ICD-10-CM

## 2021-09-10 PROCEDURE — 1159F MED LIST DOCD IN RCRD: CPT | Performed by: PREVENTIVE MEDICINE

## 2021-09-10 PROCEDURE — G0439 PPPS, SUBSEQ VISIT: HCPCS | Performed by: PREVENTIVE MEDICINE

## 2021-09-10 PROCEDURE — 99397 PER PM REEVAL EST PAT 65+ YR: CPT | Performed by: PREVENTIVE MEDICINE

## 2021-09-10 PROCEDURE — 1170F FXNL STATUS ASSESSED: CPT | Performed by: PREVENTIVE MEDICINE

## 2021-09-10 PROCEDURE — 1126F AMNT PAIN NOTED NONE PRSNT: CPT | Performed by: PREVENTIVE MEDICINE

## 2021-09-10 RX ORDER — MIRABEGRON 50 MG/1
50 TABLET, FILM COATED, EXTENDED RELEASE ORAL DAILY
COMMUNITY
Start: 2021-07-16

## 2021-09-10 NOTE — PROGRESS NOTES
The ABCs of the Annual Wellness Visit  Subsequent Medicare Wellness Visit    Chief Complaint   Patient presents with   • Medicare Wellness-subsequent   Patient is also here for an age-specific physical and has been advised to wear sunscreen and a seatbelt he wishes not to wear sunscreen then he should not be outside without protection between 10 and 2.Patient is seeing dermatologist next week due to the fact that his scalp dermatitis is not improved.  Patient will home monitor after he has been home and back on his regular diet his blood pressure.  Portion size was discussed and he will be increasing his activity.  Subjective    History of Present Illness:  Farheen Gomez is a 75 y.o. male who presents for a Subsequent Medicare Wellness Visit.    The following portions of the patient's history were reviewed and   updated as appropriate: allergies, current medications, past family history, past medical history, past social history, past surgical history and problem list.    Compared to one year ago, the patient feels his physical   health is better.    Compared to one year ago, the patient feels his mental   health is better.    Recent Hospitalizations:  He was not admitted to the hospital during the last year.       Current Medical Providers:  Patient Care Team:  Penny Hidalgo MD as PCP - General  Lex Villa MD as Consulting Physician (Urology)    Outpatient Medications Prior to Visit   Medication Sig Dispense Refill   • Acetaminophen (TYLENOL ARTHRITIS PAIN PO) Take 2 tablets by mouth Every Night.     • calcium carbonate (OS-JUSTA) 600 MG tablet Take 600 mg by mouth Daily. Stop today for surgery     • escitalopram (LEXAPRO) 10 MG tablet Take 1 tablet by mouth once daily 90 tablet 0   • finasteride (PROSCAR) 5 MG tablet Take 5 mg by mouth every night at bedtime.     • lisinopril (PRINIVIL,ZESTRIL) 40 MG tablet Take 1 tablet by mouth once daily 90 tablet 0   • Magnesium 250 MG tablet Take 250  mg by mouth Daily. Stop today for surgery     • Mirabegron ER (MYRBETRIQ) 25 MG tablet sustained-release 24 hour 24 hr tablet Take 25 mg by mouth Daily.     • Multiple Vitamins-Minerals (EYE VITAMINS PO) Take 2 capsules by mouth. Stop today for surgery     • Multiple Vitamins-Minerals (PRESERVISION AREDS 2 PO) Take 2 tablets by mouth Daily. Stop today for surgery     • propranolol LA (INDERAL LA) 80 MG 24 hr capsule Take 1 capsule by mouth Daily. 90 capsule 3   • vitamin D (ERGOCALCIFEROL) 1.25 MG (84240 UT) capsule capsule Take 1 capsule by mouth once a week 12 capsule 0   • Ketoconazole & Pyrithione Zinc 2 & 1 % kit Apply 1 each topically 2 (Two) Times a Week. 16 each 3   • ketoconazole (NIZORAL) 2 % shampoo Apply  topically to the appropriate area as directed 2 (Two) Times a Week. 8 each 1   • meloxicam (Mobic) 7.5 MG tablet Take 1 tablet by mouth Daily. 90 tablet 3   • Myrbetriq 50 MG tablet sustained-release 24 hour 24 hr tablet TAKE TABLET BY MOUTH ONCE DAILY     • pravastatin (Pravachol) 20 MG tablet Take 1 tablet by mouth Every Night for 90 days. 90 tablet 1   • tamsulosin (FLOMAX) 0.4 MG capsule 24 hr capsule Take 1 capsule by mouth Every Evening.       No facility-administered medications prior to visit.       No opioid medication identified on active medication list. I have reviewed chart for other potential  high risk medication/s and harmful drug interactions in the elderly.          Aspirin is not on active medication list.  Aspirin use is not indicated based on review of current medical condition/s. Risk of harm outweighs potential benefits.  .    Patient Active Problem List   Diagnosis   • Spinal stenosis in cervical region - reveresal of lordosis at C3/4 and C4/5, Modic endpalate cahnges at C7/T1   • Hypertension   • BPH (benign prostatic hyperplasia)   • Anemia   • Arthritis   • Obesity (BMI 30-39.9)   • Dyslipidemia   • Family history of malignant neoplasm of urinary bladder   • Foot pain, right  "  • Ganglion cyst   • Hand paresthesia   • Hearing problem   • History of poliomyelitis   • Chronic pain of both knees   • Other hammer toe(s) (acquired), left foot   • Plantar fasciitis, left   • Sleep disorder   • Tachycardia   • Thrombocytopenia (CMS/HCC)   • Vitamin D deficiency   • Amblyopia   • Dry eye   • Hypertensive retinopathy of both eyes   • Lens replaced by other means   • AMD (age-related macular degeneration), bilateral   • Nonexudative age-related macular degeneration   • Other chronic allergic conjunctivitis   • Other specified disorders of eyelid   • PCO (posterior capsular opacification), bilateral   • Presence of intraocular lens   • PVD (posterior vitreous detachment), both eyes   • Regular astigmatism   • History of bilateral knee arthroplasty   • Other headache syndrome   • Exposure to COVID-19 virus   • Status post total knee replacement, right   • Hx of total knee replacement, left   • Class 1 obesity due to excess calories with serious comorbidity and body mass index (BMI) of 34.0 to 34.9 in adult   • Seborrhea capitis in adult     Advance Care Planning  Advance Directive is on file.  ACP discussion was held with the patient during this visit. Patient has an advance directive in EMR which is still valid.     Review of Systems   Constitutional: Positive for activity change.        Objective    Vitals:    09/10/21 1548 09/10/21 1551   BP: 150/89 149/89   BP Location: Right arm Left arm   Patient Position: Sitting Sitting   Cuff Size: Adult Adult   Pulse: 75    Temp: 97.7 °F (36.5 °C)    TempSrc: Temporal    SpO2: 94%    Weight: 102 kg (224 lb 6.4 oz)    Height: 172.7 cm (67.99\")    PainSc:   2      BMI Readings from Last 1 Encounters:   09/10/21 34.13 kg/m²   BMI is above normal parameters. Recommendations include: exercise counseling and nutrition counseling    Does the patient have evidence of cognitive impairment? No    Physical Exam  Vitals reviewed.   Constitutional:       General: He is " not in acute distress.     Appearance: He is well-developed. He is obese. He is not ill-appearing or toxic-appearing.   HENT:      Head: Normocephalic and atraumatic.      Right Ear: Tympanic membrane, ear canal and external ear normal.      Left Ear: Tympanic membrane, ear canal and external ear normal.      Nose: Nose normal.   Eyes:      Extraocular Movements: Extraocular movements intact.      Conjunctiva/sclera: Conjunctivae normal.      Pupils: Pupils are equal, round, and reactive to light.   Cardiovascular:      Rate and Rhythm: Normal rate and regular rhythm.      Heart sounds: Normal heart sounds.   Pulmonary:      Effort: Pulmonary effort is normal.      Breath sounds: Normal breath sounds.   Abdominal:      General: Bowel sounds are normal. There is no distension.      Palpations: Abdomen is soft. There is no mass.      Tenderness: There is no abdominal tenderness.   Musculoskeletal:         General: Normal range of motion.      Cervical back: Neck supple.   Skin:     General: Skin is warm.   Neurological:      General: No focal deficit present.      Mental Status: He is alert and oriented to person, place, and time.   Psychiatric:         Mood and Affect: Mood normal.         Behavior: Behavior normal.                 HEALTH RISK ASSESSMENT    Smoking Status:  Social History     Tobacco Use   Smoking Status Former Smoker   • Packs/day: 0.25   • Years: 5.00   • Pack years: 1.25   • Quit date:    • Years since quittin.7   Smokeless Tobacco Never Used     Alcohol Consumption:  Social History     Substance and Sexual Activity   Alcohol Use No     Fall Risk Screen:    RYANADI Fall Risk Assessment was completed, and patient is at LOW risk for falls.Assessment completed on:9/10/2021    Depression Screening:  PHQ-2/PHQ-9 Depression Screening 9/10/2021   Little interest or pleasure in doing things 0   Feeling down, depressed, or hopeless 0   Total Score 0       Health Habits and Functional and Cognitive  Screening:  Functional & Cognitive Status 11/9/2020   Do you have difficulty preparing food and eating? No   Do you have difficulty bathing yourself, getting dressed or grooming yourself? No   Do you have difficulty using the toilet? No   Do you have difficulty moving around from place to place? No   Do you have trouble with steps or getting out of a bed or a chair? No   Current Diet Well Balanced Diet   Dental Exam Up to date   Eye Exam Up to date   Exercise (times per week) 7 times per week   Current Exercise Activities Include Walking   Do you need help using the phone?  No   Are you deaf or do you have serious difficulty hearing?  Yes   Do you need help with transportation? No   Do you need help shopping? No   Do you need help preparing meals?  No   Do you need help with housework?  No   Do you need help with laundry? No   Do you need help taking your medications? No   Do you need help managing money? No   Do you ever drive or ride in a car without wearing a seat belt? No   Have you felt unusual stress, anger or loneliness in the last month? No   Who do you live with? Spouse   If you need help, do you have trouble finding someone available to you? No   Have you been bothered in the last four weeks by sexual problems? No   Do you have difficulty concentrating, remembering or making decisions? No       Age-appropriate Screening Schedule:  Refer to the list below for future screening recommendations based on patient's age, sex and/or medical conditions. Orders for these recommended tests are listed in the plan section. The patient has been provided with a written plan.    Health Maintenance   Topic Date Due   • INFLUENZA VACCINE  10/01/2021   • LIPID PANEL  05/13/2022   • TDAP/TD VACCINES (2 - Tdap) 08/13/2025   • ZOSTER VACCINE  Completed              Assessment/Plan   CMS Preventative Services Quick Reference  Risk Factors Identified During Encounter  Obesity/Overweight   The above risks/problems have been  discussed with the patient.  Follow up actions/plans if indicated are seen below in the Assessment/Plan Section.  Pertinent information has been shared with the patient in the After Visit Summary.    Diagnoses and all orders for this visit:    1. Encounter for annual general medical examination with abnormal findings in adult (Primary)  Comments:  Patient has been advised to wear sunscreen and a seatbelt    2. Dyslipidemia  Comments:  Recent vacation with dietary indiscretion  Orders:  -     Comprehensive Metabolic Panel  -     Lipid Panel    3. Seborrhea capitis in adult  Comments:  Shampoo did not help seborrhea is following up with a dermatologist next week.    4. Other headache syndrome  Comments:  No recent difficulties with headaches.    5. Sleep disorder  Comments:  Sleep has improved.    6. Depression, unspecified depression type  Comments:  Depression is really not a concern anymore no HI or SI.  Orders:  -     TSH  -     Vitamin B12    7. Hypertension, unspecified type  Comments:  Pressure not under good control will home monitor and send us the results may need to change his medications.  Orders:  -     CBC Auto Differential    8. Dupuytren's contracture  Comments:  Resolved.    9. Benign prostatic hyperplasia with lower urinary tract symptoms, symptom details unspecified    10. Class 1 obesity due to excess calories with serious comorbidity and body mass index (BMI) of 34.0 to 34.9 in adult        Follow Up:   No follow-ups on file.     An After Visit Summary and PPPS were made available to the patient.

## 2021-09-10 NOTE — PATIENT INSTRUCTIONS
Health Maintenance Due   Topic Date Due   • COVID-19 Vaccine (1) Never done     Check blood pressure cuff for accuracy and send 10 blood pressures over 2 weeks.  Watch sodium, alcohol and weight    Watch portion size and walk.

## 2021-09-27 RX ORDER — ERGOCALCIFEROL 1.25 MG/1
CAPSULE ORAL
Qty: 12 CAPSULE | Refills: 0 | Status: SHIPPED | OUTPATIENT
Start: 2021-09-27 | End: 2021-12-28

## 2021-09-27 RX ORDER — LISINOPRIL 40 MG/1
TABLET ORAL
Qty: 90 TABLET | Refills: 0 | Status: SHIPPED | OUTPATIENT
Start: 2021-09-27 | End: 2021-12-28

## 2021-10-04 RX ORDER — ESCITALOPRAM OXALATE 10 MG/1
TABLET ORAL
Qty: 90 TABLET | Refills: 0 | Status: SHIPPED | OUTPATIENT
Start: 2021-10-04 | End: 2022-01-17

## 2021-11-10 ENCOUNTER — CLINICAL SUPPORT (OUTPATIENT)
Dept: FAMILY MEDICINE CLINIC | Facility: CLINIC | Age: 76
End: 2021-11-10

## 2021-11-10 DIAGNOSIS — E78.5 DYSLIPIDEMIA: Chronic | ICD-10-CM

## 2021-11-10 PROCEDURE — 85025 COMPLETE CBC W/AUTO DIFF WBC: CPT | Performed by: PREVENTIVE MEDICINE

## 2021-11-10 PROCEDURE — 84443 ASSAY THYROID STIM HORMONE: CPT | Performed by: PREVENTIVE MEDICINE

## 2021-11-10 PROCEDURE — 80053 COMPREHEN METABOLIC PANEL: CPT | Performed by: PREVENTIVE MEDICINE

## 2021-11-10 PROCEDURE — 80061 LIPID PANEL: CPT | Performed by: PREVENTIVE MEDICINE

## 2021-11-10 PROCEDURE — 36415 COLL VENOUS BLD VENIPUNCTURE: CPT | Performed by: NURSE PRACTITIONER

## 2021-11-10 PROCEDURE — 82607 VITAMIN B-12: CPT | Performed by: PREVENTIVE MEDICINE

## 2021-11-11 ENCOUNTER — TELEPHONE (OUTPATIENT)
Dept: FAMILY MEDICINE CLINIC | Facility: CLINIC | Age: 76
End: 2021-11-11

## 2021-11-11 LAB
ALBUMIN SERPL-MCNC: 4.3 G/DL (ref 3.5–5.2)
ALBUMIN/GLOB SERPL: 1.8 G/DL
ALP SERPL-CCNC: 62 U/L (ref 39–117)
ALT SERPL W P-5'-P-CCNC: 19 U/L (ref 1–41)
ANION GAP SERPL CALCULATED.3IONS-SCNC: 7.7 MMOL/L (ref 5–15)
AST SERPL-CCNC: 17 U/L (ref 1–40)
BASOPHILS # BLD AUTO: 0.03 10*3/MM3 (ref 0–0.2)
BASOPHILS NFR BLD AUTO: 0.6 % (ref 0–1.5)
BILIRUB SERPL-MCNC: 0.4 MG/DL (ref 0–1.2)
BUN SERPL-MCNC: 13 MG/DL (ref 8–23)
BUN/CREAT SERPL: 13.8 (ref 7–25)
CALCIUM SPEC-SCNC: 9.4 MG/DL (ref 8.6–10.5)
CHLORIDE SERPL-SCNC: 105 MMOL/L (ref 98–107)
CHOLEST SERPL-MCNC: 198 MG/DL (ref 0–200)
CO2 SERPL-SCNC: 28.3 MMOL/L (ref 22–29)
CREAT SERPL-MCNC: 0.94 MG/DL (ref 0.76–1.27)
DEPRECATED RDW RBC AUTO: 42 FL (ref 37–54)
EOSINOPHIL # BLD AUTO: 0.11 10*3/MM3 (ref 0–0.4)
EOSINOPHIL NFR BLD AUTO: 2.2 % (ref 0.3–6.2)
ERYTHROCYTE [DISTWIDTH] IN BLOOD BY AUTOMATED COUNT: 12.7 % (ref 12.3–15.4)
GFR SERPL CREATININE-BSD FRML MDRD: 78 ML/MIN/1.73
GLOBULIN UR ELPH-MCNC: 2.4 GM/DL
GLUCOSE SERPL-MCNC: 106 MG/DL (ref 65–99)
HCT VFR BLD AUTO: 41 % (ref 37.5–51)
HDLC SERPL-MCNC: 37 MG/DL (ref 40–60)
HGB BLD-MCNC: 13.7 G/DL (ref 13–17.7)
IMM GRANULOCYTES # BLD AUTO: 0.01 10*3/MM3 (ref 0–0.05)
IMM GRANULOCYTES NFR BLD AUTO: 0.2 % (ref 0–0.5)
LDLC SERPL CALC-MCNC: 125 MG/DL (ref 0–100)
LDLC/HDLC SERPL: 3.27 {RATIO}
LYMPHOCYTES # BLD AUTO: 1.47 10*3/MM3 (ref 0.7–3.1)
LYMPHOCYTES NFR BLD AUTO: 28.8 % (ref 19.6–45.3)
MCH RBC QN AUTO: 30.5 PG (ref 26.6–33)
MCHC RBC AUTO-ENTMCNC: 33.4 G/DL (ref 31.5–35.7)
MCV RBC AUTO: 91.3 FL (ref 79–97)
MONOCYTES # BLD AUTO: 0.45 10*3/MM3 (ref 0.1–0.9)
MONOCYTES NFR BLD AUTO: 8.8 % (ref 5–12)
NEUTROPHILS NFR BLD AUTO: 3.04 10*3/MM3 (ref 1.7–7)
NEUTROPHILS NFR BLD AUTO: 59.4 % (ref 42.7–76)
NRBC BLD AUTO-RTO: 0 /100 WBC (ref 0–0.2)
PLATELET # BLD AUTO: 165 10*3/MM3 (ref 140–450)
PMV BLD AUTO: 9.5 FL (ref 6–12)
POTASSIUM SERPL-SCNC: 4.6 MMOL/L (ref 3.5–5.2)
PROT SERPL-MCNC: 6.7 G/DL (ref 6–8.5)
RBC # BLD AUTO: 4.49 10*6/MM3 (ref 4.14–5.8)
SODIUM SERPL-SCNC: 141 MMOL/L (ref 136–145)
TRIGL SERPL-MCNC: 200 MG/DL (ref 0–150)
TSH SERPL DL<=0.05 MIU/L-ACNC: 0.96 UIU/ML (ref 0.27–4.2)
VIT B12 BLD-MCNC: >2000 PG/ML (ref 211–946)
VLDLC SERPL-MCNC: 36 MG/DL (ref 5–40)
WBC # BLD AUTO: 5.11 10*3/MM3 (ref 3.4–10.8)

## 2021-11-11 RX ORDER — ROSUVASTATIN CALCIUM 10 MG/1
10 TABLET, COATED ORAL DAILY
Qty: 90 TABLET | Refills: 3 | Status: SHIPPED | OUTPATIENT
Start: 2021-11-11 | End: 2023-02-02 | Stop reason: DRUGHIGH

## 2021-11-11 NOTE — TELEPHONE ENCOUNTER
If patient would consider a different statin such as Crestor or atorvastatin(these are liver metabolized rather than kidney metabolized-may be able to control lipids so that has less risk for CVA, MI and PVD-let me know

## 2021-11-11 NOTE — TELEPHONE ENCOUNTER
----- Message from Delvin Mark MA sent at 11/11/2021  8:26 AM EST -----  Patient discontinued Pravastatin 6 months ago due to myalgia.  Patient otherwise understood

## 2021-11-19 ENCOUNTER — FLU SHOT (OUTPATIENT)
Dept: FAMILY MEDICINE CLINIC | Facility: CLINIC | Age: 76
End: 2021-11-19

## 2021-11-19 DIAGNOSIS — Z23 NEED FOR INFLUENZA VACCINATION: Primary | ICD-10-CM

## 2021-11-19 PROCEDURE — 90662 IIV NO PRSV INCREASED AG IM: CPT | Performed by: PREVENTIVE MEDICINE

## 2021-11-19 PROCEDURE — G0008 ADMIN INFLUENZA VIRUS VAC: HCPCS | Performed by: PREVENTIVE MEDICINE

## 2021-12-15 ENCOUNTER — TELEPHONE (OUTPATIENT)
Dept: FAMILY MEDICINE CLINIC | Facility: CLINIC | Age: 76
End: 2021-12-15

## 2021-12-15 NOTE — TELEPHONE ENCOUNTER
Hub to read  ----- Message from Penny Hidalgo MD sent at 12/15/2021  2:35 PM EST -----  Labs from urology showed glucose elevated and [id number] decreased-we'll continue to moniter wheen we see next.  Watch carbs and walk.

## 2021-12-28 RX ORDER — LISINOPRIL 40 MG/1
TABLET ORAL
Qty: 90 TABLET | Refills: 0 | Status: SHIPPED | OUTPATIENT
Start: 2021-12-28 | End: 2022-03-25

## 2021-12-28 RX ORDER — ERGOCALCIFEROL 1.25 MG/1
CAPSULE ORAL
Qty: 12 CAPSULE | Refills: 0 | Status: SHIPPED | OUTPATIENT
Start: 2021-12-28 | End: 2022-04-12

## 2022-01-04 ENCOUNTER — OFFICE VISIT (OUTPATIENT)
Dept: FAMILY MEDICINE CLINIC | Facility: CLINIC | Age: 77
End: 2022-01-04

## 2022-01-04 VITALS
SYSTOLIC BLOOD PRESSURE: 152 MMHG | TEMPERATURE: 97.4 F | WEIGHT: 230.6 LBS | DIASTOLIC BLOOD PRESSURE: 87 MMHG | HEIGHT: 68 IN | OXYGEN SATURATION: 96 % | BODY MASS INDEX: 34.95 KG/M2 | HEART RATE: 92 BPM

## 2022-01-04 DIAGNOSIS — I10 HYPERTENSION, UNSPECIFIED TYPE: Primary | Chronic | ICD-10-CM

## 2022-01-04 DIAGNOSIS — E66.01 CLASS 2 SEVERE OBESITY DUE TO EXCESS CALORIES WITH SERIOUS COMORBIDITY AND BODY MASS INDEX (BMI) OF 35.0 TO 35.9 IN ADULT: ICD-10-CM

## 2022-01-04 PROBLEM — E66.812 CLASS 2 SEVERE OBESITY WITH SERIOUS COMORBIDITY AND BODY MASS INDEX (BMI) OF 35.0 TO 35.9 IN ADULT: Status: ACTIVE | Noted: 2021-05-10

## 2022-01-04 PROCEDURE — 99213 OFFICE O/P EST LOW 20 MIN: CPT | Performed by: PREVENTIVE MEDICINE

## 2022-01-04 RX ORDER — BETAMETHASONE DIPROPIONATE 0.5 MG/G
CREAM TOPICAL
COMMUNITY
Start: 2021-11-19 | End: 2022-06-17

## 2022-01-04 RX ORDER — AMLODIPINE BESYLATE 5 MG/1
5 TABLET ORAL DAILY
Qty: 90 TABLET | Refills: 3 | Status: SHIPPED | OUTPATIENT
Start: 2022-01-04 | End: 2022-01-27 | Stop reason: DRUGHIGH

## 2022-01-04 RX ORDER — FLUOCINOLONE ACETONIDE 0.11 MG/ML
OIL TOPICAL
COMMUNITY
Start: 2021-10-18 | End: 2022-06-17

## 2022-01-04 NOTE — PROGRESS NOTES
"Subjective   Farheen Gomez is a 76 y.o. male presents for   Chief Complaint   Patient presents with   • Hypertension   Patient's blood pressures have been spiking despite taking his lisinopril 40 is no recent increase in weight and sodium intake will try to correct both of those.  Some mild headache but no chest pain along with this.  His pulse rate has been somewhat high but he does seem to get constipated with propanolol so we will stick with amlodipine 5 mg and warned him about the leg swelling.  After he is on this for 3 days he will send us 10 blood pressures over the next 2 weeks.    Health Maintenance Due   Topic Date Due   • COVID-19 Vaccine (1) Never done       History of Present Illness     Vitals:    01/04/22 1338 01/04/22 1339   BP: 141/87 152/87   BP Location: Right arm Left arm   Patient Position: Sitting Sitting   Cuff Size: Adult Adult   Pulse: 92    Temp: 97.4 °F (36.3 °C)    TempSrc: Temporal    SpO2: 96%    Weight: 105 kg (230 lb 9.6 oz)    Height: 172.7 cm (67.99\")      Body mass index is 35.07 kg/m².    Current Outpatient Medications on File Prior to Visit   Medication Sig Dispense Refill   • calcium carbonate (OS-JUSTA) 600 MG tablet Take 600 mg by mouth Daily. Stop today for surgery     • escitalopram (LEXAPRO) 10 MG tablet Take 1 tablet by mouth once daily 90 tablet 0   • lisinopril (PRINIVIL,ZESTRIL) 40 MG tablet Take 1 tablet by mouth once daily 90 tablet 0   • Magnesium 250 MG tablet Take 250 mg by mouth Daily. Stop today for surgery     • rosuvastatin (Crestor) 10 MG tablet Take 1 tablet by mouth Daily. 90 tablet 3   • vitamin D (ERGOCALCIFEROL) 1.25 MG (78717 UT) capsule capsule Take 1 capsule by mouth once a week 12 capsule 0   • Acetaminophen (TYLENOL ARTHRITIS PAIN PO) Take 2 tablets by mouth Every Night.     • betamethasone dipropionate 0.05 % cream APPLY CREAM TOPICALLY TO AFFECTED AREAS ON SCALP TWICE DAILY FOR 2 TO 3 WEEKS ON, ONE WEEK OFF AS NEEDED     • finasteride (PROSCAR) 5 " MG tablet Take 5 mg by mouth every night at bedtime.     • Fluocinolone Acetonide Scalp 0.01 % oil      • Ketoconazole & Pyrithione Zinc 2 & 1 % kit Apply 1 each topically 2 (Two) Times a Week. 16 each 3   • ketoconazole (NIZORAL) 2 % shampoo Apply  topically to the appropriate area as directed 2 (Two) Times a Week. 8 each 1   • meloxicam (Mobic) 7.5 MG tablet Take 1 tablet by mouth Daily. 90 tablet 3   • Mirabegron ER (MYRBETRIQ) 25 MG tablet sustained-release 24 hour 24 hr tablet Take 25 mg by mouth Daily.     • Multiple Vitamins-Minerals (EYE VITAMINS PO) Take 2 capsules by mouth. Stop today for surgery     • Multiple Vitamins-Minerals (PRESERVISION AREDS 2 PO) Take 2 tablets by mouth Daily. Stop today for surgery     • Myrbetriq 50 MG tablet sustained-release 24 hour 24 hr tablet TAKE TABLET BY MOUTH ONCE DAILY     • propranolol LA (INDERAL LA) 80 MG 24 hr capsule Take 1 capsule by mouth Daily. 90 capsule 3   • tamsulosin (FLOMAX) 0.4 MG capsule 24 hr capsule Take 1 capsule by mouth Every Evening.       No current facility-administered medications on file prior to visit.       The following portions of the patient's history were reviewed and updated as appropriate: allergies, current medications, past family history, past medical history, past social history, past surgical history and problem list.    Review of Systems   Cardiovascular: Negative for chest pain.   Neurological: Positive for headache.       Objective   Physical Exam  Vitals reviewed.   Constitutional:       General: He is not in acute distress.     Appearance: He is well-developed. He is obese. He is not ill-appearing or toxic-appearing.   HENT:      Head: Normocephalic and atraumatic.      Nose: Nose normal.   Eyes:      Extraocular Movements: Extraocular movements intact.      Conjunctiva/sclera: Conjunctivae normal.      Pupils: Pupils are equal, round, and reactive to light.   Cardiovascular:      Rate and Rhythm: Normal rate and regular  rhythm.      Heart sounds: Normal heart sounds.   Pulmonary:      Effort: Pulmonary effort is normal.      Breath sounds: Normal breath sounds.   Abdominal:      General: Bowel sounds are normal. There is no distension.      Palpations: Abdomen is soft. There is no mass.      Tenderness: There is no abdominal tenderness.   Musculoskeletal:      Cervical back: Neck supple.   Skin:     General: Skin is warm.   Neurological:      General: No focal deficit present.      Mental Status: He is alert and oriented to person, place, and time.   Psychiatric:         Mood and Affect: Mood normal.         Behavior: Behavior normal.       PHQ-9 Total Score:      Assessment/Plan   Diagnoses and all orders for this visit:    1. Hypertension, unspecified type (Primary)  Comments:  Despite taking lisinopril 40 patient's blood pressure has been spiking at times he has increase in weight and sodium will work on both of those.  Trial of amlod    2. Class 2 severe obesity due to excess calories with serious comorbidity and body mass index (BMI) of 35.0 to 35.9 in adult (HCC)    Other orders  -     amLODIPine (NORVASC) 5 MG tablet; Take 1 tablet by mouth Daily.  Dispense: 90 tablet; Refill: 3        Patient Instructions     Health Maintenance Due   Topic Date Due   • COVID-19 Vaccine (1) Never done   Check blood pressure cuff for accuracy and send 10 blood pressures over 2 weeks.  Watch sodium, alcohol and weight  Send 10 blood pressures over next 2 weeks-start in 3 days  Calorie Counting for Weight Loss  Calories are units of energy. Your body needs a certain amount of calories from food to keep you going throughout the day. When you eat more calories than your body needs, your body stores the extra calories as fat. When you eat fewer calories than your body needs, your body burns fat to get the energy it needs.  Calorie counting means keeping track of how many calories you eat and drink each day. Calorie counting can be helpful if you  need to lose weight. If you make sure to eat fewer calories than your body needs, you should lose weight. Ask your health care provider what a healthy weight is for you.  For calorie counting to work, you will need to eat the right number of calories in a day in order to lose a healthy amount of weight per week. A dietitian can help you determine how many calories you need in a day and will give you suggestions on how to reach your calorie goal.  · A healthy amount of weight to lose per week is usually 1-2 lb (0.5-0.9 kg). This usually means that your daily calorie intake should be reduced by 500-750 calories.  · Eating 1,200 - 1,500 calories per day can help most women lose weight.  · Eating 1,500 - 1,800 calories per day can help most men lose weight.  What is my plan?  My goal is to have __________ calories per day.  If I have this many calories per day, I should lose around __________ pounds per week.  What do I need to know about calorie counting?  In order to meet your daily calorie goal, you will need to:  · Find out how many calories are in each food you would like to eat. Try to do this before you eat.  · Decide how much of the food you plan to eat.  · Write down what you ate and how many calories it had. Doing this is called keeping a food log.  To successfully lose weight, it is important to balance calorie counting with a healthy lifestyle that includes regular activity. Aim for 150 minutes of moderate exercise (such as walking) or 75 minutes of vigorous exercise (such as running) each week.  Where do I find calorie information?      The number of calories in a food can be found on a Nutrition Facts label. If a food does not have a Nutrition Facts label, try to look up the calories online or ask your dietitian for help.  Remember that calories are listed per serving. If you choose to have more than one serving of a food, you will have to multiply the calories per serving by the amount of servings you  "plan to eat. For example, the label on a package of bread might say that a serving size is 1 slice and that there are 90 calories in a serving. If you eat 1 slice, you will have eaten 90 calories. If you eat 2 slices, you will have eaten 180 calories.  How do I keep a food log?  Immediately after each meal, record the following information in your food log:  · What you ate. Don't forget to include toppings, sauces, and other extras on the food.  · How much you ate. This can be measured in cups, ounces, or number of items.  · How many calories each food and drink had.  · The total number of calories in the meal.  Keep your food log near you, such as in a small notebook in your pocket, or use a mobile lore or website. Some programs will calculate calories for you and show you how many calories you have left for the day to meet your goal.  What are some calorie counting tips?      · Use your calories on foods and drinks that will fill you up and not leave you hungry:  ? Some examples of foods that fill you up are nuts and nut butters, vegetables, lean proteins, and high-fiber foods like whole grains. High-fiber foods are foods with more than 5 g fiber per serving.  ? Drinks such as sodas, specialty coffee drinks, alcohol, and juices have a lot of calories, yet do not fill you up.  · Eat nutritious foods and avoid empty calories. Empty calories are calories you get from foods or beverages that do not have many vitamins or protein, such as candy, sweets, and soda. It is better to have a nutritious high-calorie food (such as an avocado) than a food with few nutrients (such as a bag of chips).  · Know how many calories are in the foods you eat most often. This will help you calculate calorie counts faster.  · Pay attention to calories in drinks. Low-calorie drinks include water and unsweetened drinks.  · Pay attention to nutrition labels for \"low fat\" or \"fat free\" foods. These foods sometimes have the same amount of " calories or more calories than the full fat versions. They also often have added sugar, starch, or salt, to make up for flavor that was removed with the fat.  · Find a way of tracking calories that works for you. Get creative. Try different apps or programs if writing down calories does not work for you.  What are some portion control tips?  · Know how many calories are in a serving. This will help you know how many servings of a certain food you can have.  · Use a measuring cup to measure serving sizes. You could also try weighing out portions on a kitchen scale. With time, you will be able to estimate serving sizes for some foods.  · Take some time to put servings of different foods on your favorite plates, bowls, and cups so you know what a serving looks like.  · Try not to eat straight from a bag or box. Doing this can lead to overeating. Put the amount you would like to eat in a cup or on a plate to make sure you are eating the right portion.  · Use smaller plates, glasses, and bowls to prevent overeating.  · Try not to multitask (for example, watch TV or use your computer) while eating. If it is time to eat, sit down at a table and enjoy your food. This will help you to know when you are full. It will also help you to be aware of what you are eating and how much you are eating.  What are tips for following this plan?  Reading food labels  · Check the calorie count compared to the serving size. The serving size may be smaller than what you are used to eating.  · Check the source of the calories. Make sure the food you are eating is high in vitamins and protein and low in saturated and trans fats.  Shopping  · Read nutrition labels while you shop. This will help you make healthy decisions before you decide to purchase your food.  · Make a grocery list and stick to it.  Cooking  · Try to cook your favorite foods in a healthier way. For example, try baking instead of frying.  · Use low-fat dairy products.  Meal  "planning  · Use more fruits and vegetables. Half of your plate should be fruits and vegetables.  · Include lean proteins like poultry and fish.  How do I count calories when eating out?  · Ask for smaller portion sizes.  · Consider sharing an entree and sides instead of getting your own entree.  · If you get your own entree, eat only half. Ask for a box at the beginning of your meal and put the rest of your entree in it so you are not tempted to eat it.  · If calories are listed on the menu, choose the lower calorie options.  · Choose dishes that include vegetables, fruits, whole grains, low-fat dairy products, and lean protein.  · Choose items that are boiled, broiled, grilled, or steamed. Stay away from items that are buttered, battered, fried, or served with cream sauce. Items labeled \"crispy\" are usually fried, unless stated otherwise.  · Choose water, low-fat milk, unsweetened iced tea, or other drinks without added sugar. If you want an alcoholic beverage, choose a lower calorie option such as a glass of wine or light beer.  · Ask for dressings, sauces, and syrups on the side. These are usually high in calories, so you should limit the amount you eat.  · If you want a salad, choose a garden salad and ask for grilled meats. Avoid extra toppings like guillen, cheese, or fried items. Ask for the dressing on the side, or ask for olive oil and vinegar or lemon to use as dressing.  · Estimate how many servings of a food you are given. For example, a serving of cooked rice is ½ cup or about the size of half a baseball. Knowing serving sizes will help you be aware of how much food you are eating at restaurants. The list below tells you how big or small some common portion sizes are based on everyday objects:  ? 1 oz--4 stacked dice.  ? 3 oz--1 deck of cards.  ? 1 tsp--1 die.  ? 1 Tbsp--½ a ping-pong ball.  ? 2 Tbsp--1 ping-pong ball.  ? ½ cup--½ baseball.  ? 1 cup--1 baseball.  Summary  · Calorie counting means keeping " track of how many calories you eat and drink each day. If you eat fewer calories than your body needs, you should lose weight.  · A healthy amount of weight to lose per week is usually 1-2 lb (0.5-0.9 kg). This usually means reducing your daily calorie intake by 500-750 calories.  · The number of calories in a food can be found on a Nutrition Facts label. If a food does not have a Nutrition Facts label, try to look up the calories online or ask your dietitian for help.  · Use your calories on foods and drinks that will fill you up, and not on foods and drinks that will leave you hungry.  · Use smaller plates, glasses, and bowls to prevent overeating.  This information is not intended to replace advice given to you by your health care provider. Make sure you discuss any questions you have with your health care provider.  Document Released: 12/18/2006 Document Revised: 09/06/2019 Document Reviewed: 11/17/2017  [employer] Patient Education © 2019 [employer]

## 2022-01-04 NOTE — PATIENT INSTRUCTIONS
Health Maintenance Due   Topic Date Due   • COVID-19 Vaccine (1) Never done   Check blood pressure cuff for accuracy and send 10 blood pressures over 2 weeks.  Watch sodium, alcohol and weight  Send 10 blood pressures over next 2 weeks-start in 3 days  Calorie Counting for Weight Loss  Calories are units of energy. Your body needs a certain amount of calories from food to keep you going throughout the day. When you eat more calories than your body needs, your body stores the extra calories as fat. When you eat fewer calories than your body needs, your body burns fat to get the energy it needs.  Calorie counting means keeping track of how many calories you eat and drink each day. Calorie counting can be helpful if you need to lose weight. If you make sure to eat fewer calories than your body needs, you should lose weight. Ask your health care provider what a healthy weight is for you.  For calorie counting to work, you will need to eat the right number of calories in a day in order to lose a healthy amount of weight per week. A dietitian can help you determine how many calories you need in a day and will give you suggestions on how to reach your calorie goal.  · A healthy amount of weight to lose per week is usually 1-2 lb (0.5-0.9 kg). This usually means that your daily calorie intake should be reduced by 500-750 calories.  · Eating 1,200 - 1,500 calories per day can help most women lose weight.  · Eating 1,500 - 1,800 calories per day can help most men lose weight.  What is my plan?  My goal is to have __________ calories per day.  If I have this many calories per day, I should lose around __________ pounds per week.  What do I need to know about calorie counting?  In order to meet your daily calorie goal, you will need to:  · Find out how many calories are in each food you would like to eat. Try to do this before you eat.  · Decide how much of the food you plan to eat.  · Write down what you ate and how many  calories it had. Doing this is called keeping a food log.  To successfully lose weight, it is important to balance calorie counting with a healthy lifestyle that includes regular activity. Aim for 150 minutes of moderate exercise (such as walking) or 75 minutes of vigorous exercise (such as running) each week.  Where do I find calorie information?      The number of calories in a food can be found on a Nutrition Facts label. If a food does not have a Nutrition Facts label, try to look up the calories online or ask your dietitian for help.  Remember that calories are listed per serving. If you choose to have more than one serving of a food, you will have to multiply the calories per serving by the amount of servings you plan to eat. For example, the label on a package of bread might say that a serving size is 1 slice and that there are 90 calories in a serving. If you eat 1 slice, you will have eaten 90 calories. If you eat 2 slices, you will have eaten 180 calories.  How do I keep a food log?  Immediately after each meal, record the following information in your food log:  · What you ate. Don't forget to include toppings, sauces, and other extras on the food.  · How much you ate. This can be measured in cups, ounces, or number of items.  · How many calories each food and drink had.  · The total number of calories in the meal.  Keep your food log near you, such as in a small notebook in your pocket, or use a mobile lore or website. Some programs will calculate calories for you and show you how many calories you have left for the day to meet your goal.  What are some calorie counting tips?      · Use your calories on foods and drinks that will fill you up and not leave you hungry:  ? Some examples of foods that fill you up are nuts and nut butters, vegetables, lean proteins, and high-fiber foods like whole grains. High-fiber foods are foods with more than 5 g fiber per serving.  ? Drinks such as sodas, specialty coffee  "drinks, alcohol, and juices have a lot of calories, yet do not fill you up.  · Eat nutritious foods and avoid empty calories. Empty calories are calories you get from foods or beverages that do not have many vitamins or protein, such as candy, sweets, and soda. It is better to have a nutritious high-calorie food (such as an avocado) than a food with few nutrients (such as a bag of chips).  · Know how many calories are in the foods you eat most often. This will help you calculate calorie counts faster.  · Pay attention to calories in drinks. Low-calorie drinks include water and unsweetened drinks.  · Pay attention to nutrition labels for \"low fat\" or \"fat free\" foods. These foods sometimes have the same amount of calories or more calories than the full fat versions. They also often have added sugar, starch, or salt, to make up for flavor that was removed with the fat.  · Find a way of tracking calories that works for you. Get creative. Try different apps or programs if writing down calories does not work for you.  What are some portion control tips?  · Know how many calories are in a serving. This will help you know how many servings of a certain food you can have.  · Use a measuring cup to measure serving sizes. You could also try weighing out portions on a kitchen scale. With time, you will be able to estimate serving sizes for some foods.  · Take some time to put servings of different foods on your favorite plates, bowls, and cups so you know what a serving looks like.  · Try not to eat straight from a bag or box. Doing this can lead to overeating. Put the amount you would like to eat in a cup or on a plate to make sure you are eating the right portion.  · Use smaller plates, glasses, and bowls to prevent overeating.  · Try not to multitask (for example, watch TV or use your computer) while eating. If it is time to eat, sit down at a table and enjoy your food. This will help you to know when you are full. It will " "also help you to be aware of what you are eating and how much you are eating.  What are tips for following this plan?  Reading food labels  · Check the calorie count compared to the serving size. The serving size may be smaller than what you are used to eating.  · Check the source of the calories. Make sure the food you are eating is high in vitamins and protein and low in saturated and trans fats.  Shopping  · Read nutrition labels while you shop. This will help you make healthy decisions before you decide to purchase your food.  · Make a grocery list and stick to it.  Cooking  · Try to cook your favorite foods in a healthier way. For example, try baking instead of frying.  · Use low-fat dairy products.  Meal planning  · Use more fruits and vegetables. Half of your plate should be fruits and vegetables.  · Include lean proteins like poultry and fish.  How do I count calories when eating out?  · Ask for smaller portion sizes.  · Consider sharing an entree and sides instead of getting your own entree.  · If you get your own entree, eat only half. Ask for a box at the beginning of your meal and put the rest of your entree in it so you are not tempted to eat it.  · If calories are listed on the menu, choose the lower calorie options.  · Choose dishes that include vegetables, fruits, whole grains, low-fat dairy products, and lean protein.  · Choose items that are boiled, broiled, grilled, or steamed. Stay away from items that are buttered, battered, fried, or served with cream sauce. Items labeled \"crispy\" are usually fried, unless stated otherwise.  · Choose water, low-fat milk, unsweetened iced tea, or other drinks without added sugar. If you want an alcoholic beverage, choose a lower calorie option such as a glass of wine or light beer.  · Ask for dressings, sauces, and syrups on the side. These are usually high in calories, so you should limit the amount you eat.  · If you want a salad, choose a garden salad and ask " for grilled meats. Avoid extra toppings like guillen, cheese, or fried items. Ask for the dressing on the side, or ask for olive oil and vinegar or lemon to use as dressing.  · Estimate how many servings of a food you are given. For example, a serving of cooked rice is ½ cup or about the size of half a baseball. Knowing serving sizes will help you be aware of how much food you are eating at restaurants. The list below tells you how big or small some common portion sizes are based on everyday objects:  ? 1 oz--4 stacked dice.  ? 3 oz--1 deck of cards.  ? 1 tsp--1 die.  ? 1 Tbsp--½ a ping-pong ball.  ? 2 Tbsp--1 ping-pong ball.  ? ½ cup--½ baseball.  ? 1 cup--1 baseball.  Summary  · Calorie counting means keeping track of how many calories you eat and drink each day. If you eat fewer calories than your body needs, you should lose weight.  · A healthy amount of weight to lose per week is usually 1-2 lb (0.5-0.9 kg). This usually means reducing your daily calorie intake by 500-750 calories.  · The number of calories in a food can be found on a Nutrition Facts label. If a food does not have a Nutrition Facts label, try to look up the calories online or ask your dietitian for help.  · Use your calories on foods and drinks that will fill you up, and not on foods and drinks that will leave you hungry.  · Use smaller plates, glasses, and bowls to prevent overeating.  This information is not intended to replace advice given to you by your health care provider. Make sure you discuss any questions you have with your health care provider.  Document Released: 12/18/2006 Document Revised: 09/06/2019 Document Reviewed: 11/17/2017  [employer] Patient Education © 2019 [employer]

## 2022-01-17 RX ORDER — ESCITALOPRAM OXALATE 10 MG/1
TABLET ORAL
Qty: 90 TABLET | Refills: 0 | Status: SHIPPED | OUTPATIENT
Start: 2022-01-17 | End: 2022-04-12

## 2022-01-25 ENCOUNTER — OFFICE VISIT (OUTPATIENT)
Dept: FAMILY MEDICINE CLINIC | Facility: CLINIC | Age: 77
End: 2022-01-25

## 2022-01-25 VITALS
SYSTOLIC BLOOD PRESSURE: 127 MMHG | OXYGEN SATURATION: 98 % | HEART RATE: 70 BPM | WEIGHT: 227 LBS | HEIGHT: 68 IN | TEMPERATURE: 97.5 F | BODY MASS INDEX: 34.4 KG/M2 | DIASTOLIC BLOOD PRESSURE: 78 MMHG

## 2022-01-25 DIAGNOSIS — R53.83 FATIGUE, UNSPECIFIED TYPE: Primary | ICD-10-CM

## 2022-01-25 LAB — SARS-COV-2 ORF1AB RESP QL NAA+PROBE: NOT DETECTED

## 2022-01-25 PROCEDURE — 99213 OFFICE O/P EST LOW 20 MIN: CPT | Performed by: NURSE PRACTITIONER

## 2022-01-25 PROCEDURE — U0004 COV-19 TEST NON-CDC HGH THRU: HCPCS | Performed by: NURSE PRACTITIONER

## 2022-01-25 NOTE — PROGRESS NOTES
"Subjective   Farheen Gomez is a 76 y.o. male presents for   Chief Complaint   Patient presents with   • Chills   • Generalized Body Aches     no cough, no SOA, lasted apx 12 hours   • Fatigue   • Fever   • Nasal Congestion     and runny nose wants covid tested       Health Maintenance Due   Topic Date Due   • COVID-19 Vaccine (1) Never done       History of Present Illness   Pt present with fever, chills, body aches, runny nose on Sunday.  He denies fever, cough or shortness of air.  He states yesterday he felt tired, but was feeling better.  He states today he feels fine but his wife wants him to be tested for covid.    Pt has had covid vaccines and most recently had booster in late November 2021.    Vitals:    01/25/22 1114   BP: 127/78   BP Location: Right arm   Patient Position: Sitting   Cuff Size: Large Adult   Pulse: 70   Temp: 97.5 °F (36.4 °C)   TempSrc: Temporal   SpO2: 98%   Weight: 103 kg (227 lb)   Height: 172.7 cm (67.99\")     Body mass index is 34.53 kg/m².    Current Outpatient Medications on File Prior to Visit   Medication Sig Dispense Refill   • Acetaminophen (TYLENOL ARTHRITIS PAIN PO) Take 2 tablets by mouth Every Night.     • amLODIPine (NORVASC) 5 MG tablet Take 1 tablet by mouth Daily. 90 tablet 3   • betamethasone dipropionate 0.05 % cream APPLY CREAM TOPICALLY TO AFFECTED AREAS ON SCALP TWICE DAILY FOR 2 TO 3 WEEKS ON, ONE WEEK OFF AS NEEDED     • calcium carbonate (OS-JUSTA) 600 MG tablet Take 600 mg by mouth Daily. Stop today for surgery     • escitalopram (LEXAPRO) 10 MG tablet Take 1 tablet by mouth once daily 90 tablet 0   • finasteride (PROSCAR) 5 MG tablet Take 5 mg by mouth every night at bedtime.     • Fluocinolone Acetonide Scalp 0.01 % oil      • Ketoconazole & Pyrithione Zinc 2 & 1 % kit Apply 1 each topically 2 (Two) Times a Week. 16 each 3   • ketoconazole (NIZORAL) 2 % shampoo Apply  topically to the appropriate area as directed 2 (Two) Times a Week. 8 each 1   • lisinopril " (PRINIVIL,ZESTRIL) 40 MG tablet Take 1 tablet by mouth once daily 90 tablet 0   • Magnesium 250 MG tablet Take 250 mg by mouth Daily. Stop today for surgery     • meloxicam (Mobic) 7.5 MG tablet Take 1 tablet by mouth Daily. 90 tablet 3   • Mirabegron ER (MYRBETRIQ) 25 MG tablet sustained-release 24 hour 24 hr tablet Take 25 mg by mouth Daily.     • Multiple Vitamins-Minerals (EYE VITAMINS PO) Take 2 capsules by mouth. Stop today for surgery     • Multiple Vitamins-Minerals (PRESERVISION AREDS 2 PO) Take 2 tablets by mouth Daily. Stop today for surgery     • Myrbetriq 50 MG tablet sustained-release 24 hour 24 hr tablet TAKE TABLET BY MOUTH ONCE DAILY     • propranolol LA (INDERAL LA) 80 MG 24 hr capsule Take 1 capsule by mouth Daily. 90 capsule 3   • rosuvastatin (Crestor) 10 MG tablet Take 1 tablet by mouth Daily. 90 tablet 3   • tamsulosin (FLOMAX) 0.4 MG capsule 24 hr capsule Take 1 capsule by mouth Every Evening.     • vitamin D (ERGOCALCIFEROL) 1.25 MG (60143 UT) capsule capsule Take 1 capsule by mouth once a week 12 capsule 0     No current facility-administered medications on file prior to visit.       The following portions of the patient's history were reviewed and updated as appropriate: allergies, current medications, past family history, past medical history, past social history, past surgical history, and problem list.    Review of Systems   Constitutional: Positive for chills and fatigue. Negative for fever.   HENT: Positive for rhinorrhea. Negative for sinus pressure and sore throat.    Eyes: Negative for blurred vision.   Respiratory: Negative for cough and shortness of breath.    Cardiovascular: Negative for chest pain.   Gastrointestinal: Negative for abdominal pain.   Endocrine: Negative.    Genitourinary: Negative.    Musculoskeletal: Negative for arthralgias and joint swelling.   Skin: Negative for color change.   Allergic/Immunologic: Negative.    Neurological: Negative for dizziness.    Hematological: Negative.    Psychiatric/Behavioral: Negative for behavioral problems.       Objective   Physical Exam  Vitals and nursing note reviewed.   Constitutional:       Appearance: Normal appearance. He is well-developed.   HENT:      Head: Normocephalic and atraumatic.      Right Ear: External ear normal.      Left Ear: External ear normal.      Nose: Nose normal.      Mouth/Throat:      Pharynx: Posterior oropharyngeal erythema (slight) present.   Eyes:      Extraocular Movements: Extraocular movements intact.      Pupils: Pupils are equal, round, and reactive to light.   Cardiovascular:      Rate and Rhythm: Normal rate and regular rhythm.      Heart sounds: Normal heart sounds.   Pulmonary:      Effort: Pulmonary effort is normal.      Breath sounds: Normal breath sounds.   Abdominal:      General: Bowel sounds are normal.      Palpations: Abdomen is soft.   Musculoskeletal:         General: Normal range of motion.      Cervical back: Normal range of motion and neck supple.   Skin:     General: Skin is warm and dry.   Neurological:      General: No focal deficit present.      Mental Status: He is alert and oriented to person, place, and time.   Psychiatric:         Mood and Affect: Mood normal.         Behavior: Behavior normal.       PHQ-9 Total Score:      Assessment/Plan   Diagnoses and all orders for this visit:    1. Fatigue, unspecified type (Primary)  Comments:  instructed to quarantine at this time, rest, hydrate well.    Orders:  -     COVID-19,APTIMA PANTHER(DREAD),[facility], NP/OP SWAB IN UTM/VTM/SALINE TRANSPORT MEDIA,24 HR TAT - Swab, Nasal Cavity; Future  -     COVID-19,APTIMA PANTHER(DREAD),[facility], NP/OP SWAB IN UTM/VTM/SALINE TRANSPORT MEDIA,24 HR TAT - Swab, Nasal Cavity        There are no Patient Instructions on file for this visit.

## 2022-01-26 ENCOUNTER — TELEPHONE (OUTPATIENT)
Dept: FAMILY MEDICINE CLINIC | Facility: CLINIC | Age: 77
End: 2022-01-26

## 2022-01-26 NOTE — TELEPHONE ENCOUNTER
----- Message from ALYSSA Trujillo sent at 1/26/2022  7:57 AM EST -----    Hub to read    Covid is negative.  If symptoms return or worsen let us know.

## 2022-01-27 ENCOUNTER — TELEPHONE (OUTPATIENT)
Dept: FAMILY MEDICINE CLINIC | Facility: CLINIC | Age: 77
End: 2022-01-27

## 2022-01-27 RX ORDER — AMLODIPINE BESYLATE 10 MG/1
10 TABLET ORAL DAILY
Qty: 90 TABLET | Refills: 3 | Status: SHIPPED | OUTPATIENT
Start: 2022-01-27 | End: 2023-02-09

## 2022-02-02 ENCOUNTER — OFFICE VISIT (OUTPATIENT)
Dept: ORTHOPEDIC SURGERY | Facility: CLINIC | Age: 77
End: 2022-02-02

## 2022-02-02 VITALS
DIASTOLIC BLOOD PRESSURE: 82 MMHG | SYSTOLIC BLOOD PRESSURE: 142 MMHG | BODY MASS INDEX: 35.63 KG/M2 | WEIGHT: 227 LBS | HEART RATE: 74 BPM | HEIGHT: 67 IN

## 2022-02-02 DIAGNOSIS — G89.29 CHRONIC RIGHT SHOULDER PAIN: Primary | ICD-10-CM

## 2022-02-02 DIAGNOSIS — M25.511 CHRONIC RIGHT SHOULDER PAIN: Primary | ICD-10-CM

## 2022-02-02 PROCEDURE — 99213 OFFICE O/P EST LOW 20 MIN: CPT | Performed by: ORTHOPAEDIC SURGERY

## 2022-02-02 RX ORDER — TRAMADOL HYDROCHLORIDE 50 MG/1
50 TABLET ORAL 2 TIMES DAILY PRN
Qty: 40 TABLET | Refills: 0 | Status: SHIPPED | OUTPATIENT
Start: 2022-02-02 | End: 2022-02-09

## 2022-02-02 NOTE — PROGRESS NOTES
"NEW VISIT    Patient: Farheen Gomez  ?  YOB: 1945    MRN: [id number]  ?  Chief Complaint   Patient presents with   • Right Shoulder - Pain     Pain x \"a few months but worse in the past 10 days\"      ?  HPI: The patient presents to the office with increasing pain and discomfort in his right shoulder.  He has weakness in abduction.  He states that he has had the symptoms for several months but over the past 10 days or so the symptoms have gotten progressively worse.  He has difficulty with reaching into the overhead abducted position.  He denies any recent history of trauma to the shoulder.  He states that he has had a rotator cuff repair surgery in [facility] several years ago.  He states that both his knee replacements performed by me have done extremely well.  He is very pleased with his outcome.  He does not have any symptoms whatsoever as far as his knee replacements are concerned.  \"My new knees are doing absolutely great \".    Pain Location: Right shoulder.  Radiation: none  Quality: dull, aching  Intensity/Severity: moderate  Duration: 3 months  Onset quality: sudden  Timing: intermittent  Aggravating Factors: standing for prolonged time, reaching forward, reaching backward, reaching across the body and repetitive motion  Alleviating Factors: OTC analgesics  Previous Episodes: yes  Associated Symptoms: decreased strength  ADL Affected: toileting and personal cares  Previous Treatment: Prior rotator cuff repair surgery several years ago.    This patient is an established patient.  This problem is not new to this examiner.      Allergies:   Allergies   Allergen Reactions   • Penicillins Hives     Tongue bled   • Propranolol GI Intolerance     constipation       Medications:   Home Medications:  Current Outpatient Medications on File Prior to Visit   Medication Sig   • Acetaminophen (TYLENOL ARTHRITIS PAIN PO) Take 2 tablets by mouth Every Night.   • amLODIPine (NORVASC) 10 MG tablet Take " 1 tablet by mouth Daily.   • betamethasone dipropionate 0.05 % cream APPLY CREAM TOPICALLY TO AFFECTED AREAS ON SCALP TWICE DAILY FOR 2 TO 3 WEEKS ON, ONE WEEK OFF AS NEEDED   • calcium carbonate (OS-JUSTA) 600 MG tablet Take 600 mg by mouth Daily. Stop today for surgery   • escitalopram (LEXAPRO) 10 MG tablet Take 1 tablet by mouth once daily   • finasteride (PROSCAR) 5 MG tablet Take 5 mg by mouth every night at bedtime.   • Fluocinolone Acetonide Scalp 0.01 % oil    • Ketoconazole & Pyrithione Zinc 2 & 1 % kit Apply 1 each topically 2 (Two) Times a Week.   • ketoconazole (NIZORAL) 2 % shampoo Apply  topically to the appropriate area as directed 2 (Two) Times a Week.   • lisinopril (PRINIVIL,ZESTRIL) 40 MG tablet Take 1 tablet by mouth once daily   • Magnesium 250 MG tablet Take 250 mg by mouth Daily. Stop today for surgery   • meloxicam (Mobic) 7.5 MG tablet Take 1 tablet by mouth Daily.   • Mirabegron ER (MYRBETRIQ) 25 MG tablet sustained-release 24 hour 24 hr tablet Take 25 mg by mouth Daily.   • Multiple Vitamins-Minerals (EYE VITAMINS PO) Take 2 capsules by mouth. Stop today for surgery   • Multiple Vitamins-Minerals (PRESERVISION AREDS 2 PO) Take 2 tablets by mouth Daily. Stop today for surgery   • Myrbetriq 50 MG tablet sustained-release 24 hour 24 hr tablet TAKE TABLET BY MOUTH ONCE DAILY   • propranolol LA (INDERAL LA) 80 MG 24 hr capsule Take 1 capsule by mouth Daily.   • rosuvastatin (Crestor) 10 MG tablet Take 1 tablet by mouth Daily.   • tamsulosin (FLOMAX) 0.4 MG capsule 24 hr capsule Take 1 capsule by mouth Every Evening.   • vitamin D (ERGOCALCIFEROL) 1.25 MG (88328 UT) capsule capsule Take 1 capsule by mouth once a week     No current facility-administered medications on file prior to visit.     Current Medications:  Scheduled Meds:  PRN Meds:.    I have reviewed the patient's medical history in detail and updated the computerized patient record.  Review and summarization of old records include:     Past Medical History:   Diagnosis Date   • Arthritis    • BPH (benign prostatic hyperplasia)    • Depression    • History of bilateral knee arthroplasty    • Hypertension      Past Surgical History:   Procedure Laterality Date   • BACK SURGERY     • HAND SURGERY Bilateral     carpal tunnel repair   • KNEE SURGERY      meniscus repair    • LUMBAR DISCECTOMY Bilateral 2019    Procedure: left and right  L4-5 lumbar decompression with dura repair;  Surgeon: Edson Khan MD;  Location: [facility] MAIN OR;  Service: Neurosurgery   • ROTATOR CUFF REPAIR Right    • TOTAL KNEE ARTHROPLASTY Right 10/23/2019    Procedure: TOTAL KNEE ARTHROPLASTY, WITH LEFT KNEE INJECTION;  Surgeon: Boby Medina MD;  Location: [facility] MAIN OR;  Service: Orthopedics   • TOTAL KNEE ARTHROPLASTY Left 2020    Procedure: TOTAL KNEE ARTHROPLASTY;  Surgeon: Boby Medina MD;  Location: [facility] MAIN OR;  Service: Orthopedics;  Laterality: Left;     Social History     Occupational History   • Occupation: retired   Tobacco Use   • Smoking status: Former Smoker     Packs/day: 0.25     Years: 5.00     Pack years: 1.25     Quit date:      Years since quittin.1   • Smokeless tobacco: Never Used   Vaping Use   • Vaping Use: Never used   Substance and Sexual Activity   • Alcohol use: No   • Drug use: No   • Sexual activity: Yes     Partners: Female     Birth control/protection: None      Social History     Social History Narrative   • Not on file     Family History   Problem Relation Age of Onset   • Stroke Mother    • Diabetes Father    • No Known Problems Sister    • No Known Problems Brother    • Malig Hyperthermia Neg Hx          Review of Systems  Constitutional: Negative for appetite change.   HENT: Negative.    Eyes: Negative.    Respiratory: Negative.    Cardiovascular: Negative.    Gastrointestinal: Negative.    Endocrine: Negative.    Genitourinary: Negative.    Musculoskeletal: See details of exam below.  Skin: Negative.   "  Allergic/Immunologic: Negative.    Hematological: Negative.    Psychiatric/Behavioral: Negative.         Wt Readings from Last 3 Encounters:   02/02/22 103 kg (227 lb)   01/25/22 103 kg (227 lb)   01/04/22 105 kg (230 lb 9.6 oz)     Ht Readings from Last 3 Encounters:   02/02/22 170.2 cm (67\")   01/25/22 172.7 cm (67.99\")   01/04/22 172.7 cm (67.99\")     Body mass index is 35.55 kg/m².  Facility age limit for growth percentiles is 20 years.  Vitals:    02/02/22 1552   BP: 142/82   Pulse: 74         Physical Exam  Constitutional: Patient is oriented to person, place, and time. Appears well-developed and well-nourished.   HENT:   Head: Normocephalic and atraumatic.   Eyes: Conjunctivae and EOM are normal. Pupils are equal, round, and reactive to light.   Cardiovascular: Normal rate, regular rhythm, normal heart sounds and intact distal pulses.   Pulmonary/Chest: Effort normal and breath sounds normal.   Musculoskeletal:   See detailed exam below   Neurological: Alert and oriented to person, place, and time. No sensory deficit. Coordination normal.   Skin: Skin is warm and dry. Capillary refill takes less than 2 seconds. No rash noted. No erythema.   Psychiatric: Patient has a normal mood and affect. His behavior is normal. Judgment and thought content normal.   Nursing note and vitals reviewed.      Ortho Exam:   Right shoulder (rct). The shoulder is extremely tender anteriorly. There is tenderness over the insertion of the rotator cuff over the greater tuberosity of the humerus. Slight proximal migration of the humeral head is noted. AC joint is tender. Crossover adduction test is positive. Drop arm sign is positive. Forward flexion is 0-90 degrees, abduction is 0-90 degrees, external rotation is 0-10 degrees. Patient has mild atrophy both of the supra and infraspinatus fossa. Sulcus sign is negative. There is no evidence of multidirectional instability. Neer test is positive on compression. Skin and soft tissue " tenderness is noted. Patient's strength in abduction and external rotation are extremely lacking. The pain level is 6.    Bilateral knee.The patient is status post total knee arthroplasty postoperative Two year(s). Incision is clean. Calf is soft and nontender. Homans sign is negative. There is no clicking, popping or catching. Anterior and posterior drawer signs are negative.  There is no instability of the components. Appropriate amounts of swelling and bruising are noted. Dorsalis pedis and posterior tibial artery pulses are palpable. Common peroneal nerve function is well preserved. Range of motion is from 0-130 degrees of flexion. Gait is cautious but otherwise fairly normal. There is no evidence of a deep seated joint infection.          Diagnostics:  xrays obtained today   right Shoulder X-Ray  Indication: Evaluation of pain and discomfort with weakness in abduction  AP and Lateral  Findings: Sclerosis over the greater tuberosity of the humerus with narrowing of the glenohumeral joint space.  no bony lesion  Soft tissues within normal limits  decreased joint spaces  Hardware appropriately positioned not applicable      no prior studies available for comparison.    X-RAY was ordered and reviewed by Boby Medina MD        Assessment:  Diagnoses and all orders for this visit:    1. Chronic right shoulder pain (Primary)  -     XR Shoulder 2+ View Right          Procedures  ?    Plan    · Compression/brace to the knee to prevent the knees from buckling and giving out.  · Calcium and vitamin D for bone health.  · Tablet Ultram 50 mg tab 1 p.o. twice daily total 40 pills for pain and discomfort in the shoulder.  · Schedule an MRI of the right shoulder because I do think that he has an incompetent rotator cuff given the fact that he has prior surgical intervention several years ago.  · Intra-articular steroid injection for the shoulder discussed with the patient.  · , GI and dental procedure prophylaxis with  antibiotics to prevent metastatic infection of the knee arthroplasty implants.  · Controlled substance treatment options discussed in detail. Patient's signed consent to medical options on file. LAURENCE form in chart.  The patient is being provided this narcotic prescription to address the acute medical condition that they are undergoing/experiencing at this time.  It is my medical and surgical assessment that more than 3 days of narcotic medication is necessary to help control the pain and discomfort that this patient is experiencing at this point.  Risks of narcotic medication usage outlined.  Possibility of physical and psychological dependence and abuse, especially long term, emphasized to the patient.  I have explained to the patient that we will try to wean them off the narcotic medication as soon as possible and introduce non-narcotic modalities of pain control.  At this point in the patient's clinical spectrum, however, alternative available treatments are inadequate to control their pain and symptoms.  I have also discussed the possibility of random drug testing as well as pill counts to prevent misuse and misappropriation of the narcotic medications prescribed to the patient.  · Rest, ice, compression, and elevation (RICE) therapy  · Stretching and strengthening exercises of the flexors and abductors of the shoulder to prevent arthrofibrosis.  · We will go ahead and schedule an MRI of the right shoulder for evaluation of cuff pathology.  If he has a full-thickness cuff tear and he might require surgical intervention.  If he has a partial-thickness tear or is developing cuff tear arthropathy our line of management would possibly be somewhat different.  · OTC Tylenol 500-1000mg by mouth every 6 hours as needed for pain   · Follow up in 6 week(s)    Date of encounter: 02/02/2022   Boby Medina MD

## 2022-02-06 PROBLEM — G89.29 CHRONIC RIGHT SHOULDER PAIN: Status: ACTIVE | Noted: 2022-02-06

## 2022-02-06 PROBLEM — M25.511 CHRONIC RIGHT SHOULDER PAIN: Status: ACTIVE | Noted: 2022-02-06

## 2022-02-09 DIAGNOSIS — G89.29 CHRONIC RIGHT SHOULDER PAIN: ICD-10-CM

## 2022-02-09 DIAGNOSIS — M25.511 CHRONIC RIGHT SHOULDER PAIN: ICD-10-CM

## 2022-02-09 RX ORDER — TRAMADOL HYDROCHLORIDE 50 MG/1
TABLET ORAL
Qty: 40 TABLET | Refills: 0 | Status: SHIPPED | OUTPATIENT
Start: 2022-02-09 | End: 2022-06-17

## 2022-02-21 ENCOUNTER — HOSPITAL ENCOUNTER (OUTPATIENT)
Dept: MRI IMAGING | Facility: HOSPITAL | Age: 77
Discharge: HOME | End: 2022-02-21
Admitting: ORTHOPAEDIC SURGERY

## 2022-02-21 DIAGNOSIS — M25.511 CHRONIC RIGHT SHOULDER PAIN: ICD-10-CM

## 2022-02-21 DIAGNOSIS — G89.29 CHRONIC RIGHT SHOULDER PAIN: ICD-10-CM

## 2022-02-21 PROCEDURE — 73221 MRI JOINT UPR EXTREM W/O DYE: CPT

## 2022-03-16 ENCOUNTER — OFFICE VISIT (OUTPATIENT)
Dept: ORTHOPEDIC SURGERY | Facility: CLINIC | Age: 77
End: 2022-03-16

## 2022-03-16 DIAGNOSIS — M12.811 ROTATOR CUFF TEAR ARTHROPATHY OF RIGHT SHOULDER: Primary | ICD-10-CM

## 2022-03-16 DIAGNOSIS — M75.101 ROTATOR CUFF TEAR ARTHROPATHY OF RIGHT SHOULDER: Primary | ICD-10-CM

## 2022-03-16 PROCEDURE — 99214 OFFICE O/P EST MOD 30 MIN: CPT | Performed by: ORTHOPAEDIC SURGERY

## 2022-03-16 NOTE — PROGRESS NOTES
Chief Complaint  Results of the Right Shoulder    Subjective    History of Present Illness      Farheen Gomez is a 76 y.o. male who presents to [facility] for persistent right shoulder pain and discomfort.  History of Present Illness the patient continues to have pain and discomfort in the right shoulder.  It is affecting his quality of life because it impairs his activities of daily living.  He finds it difficult to sleep in bed at night.  He finds it difficult to sleep in a decubitus position with the right side down.  Cross body activities bother the patient significantly.  The patient states that he has had prior rotator cuff repair surgery.  He feels a sense of weakness in the shoulder and I have explained to him that is most likely related to tendon and muscle atrophy which are part of the cuff tear arthropathy syndrome.  He does have a tear of the long head of the biceps tendon as well.  Pain Location: RIGHT shoulder  Radiation: none  Quality: dull, aching  Intensity/Severity: moderate  Duration: 3-4 months  Progression of symptoms: yes, progressive worsening  Onset quality: sudden  Timing: constant  Aggravating Factors: reaching backward, reaching forward, reaching across body, repetitive motion, standing for prolonged time  Alleviating Factors: NSAIDs, rest  Previous Episodes: yes  Associated Symptoms: decreased ROM, decreased strength, soreness  ADLs Affected: grooming/hygiene/toileting/personal care, dressing  Previous Treatment: prior surgery       Objective   Vital Signs:   There were no vitals taken for this visit.    Physical Exam  Physical Exam  Vitals signs and nursing note reviewed.   Constitutional:       Appearance: Normal appearance.   Pulmonary:      Effort: Pulmonary effort is normal.   Skin:     General: Skin is warm and dry.      Capillary Refill: Capillary refill takes less than 2 seconds.   Neurological:      General: No focal deficit present.      Mental  Status: He is alert and oriented to person, place, and time. Mental status is at baseline.   Psychiatric:         Mood and Affect: Mood normal.         Behavior: Behavior normal.         Thought Content: Thought content normal.         Judgment: Judgment normal.     Ortho Exam   Right shoulder (cta). Rotator cuff function is extremely impaired. There is a high riding humeral head with articulation of the humerus to the undersurface of the acromiohumeral articulation. AC joint is exquisitely tender and painful for patient. Axillary nerve function is well preserved. There is significant winging of the scapula with hollowing of the fossae over the proximal and distal aspects of the spine of the scapula. Forward flexion is 0-30 degrees, active abduction is 0-60 degrees. Drop arm sign is positive. External rotation against resistance is extremely painful and limited for the patient. Skin and soft tissues are essentially normal other than some amounts of swelling. The pain level is 5          Result Review :{ Labs  Result Review  Imaging  Med Tab  Media :23}   The following data was reviewed by: Boby Medina MD on 03/16/2022:  Radiologic studies - see below for interpretation  Reviewed MRI report of right shoulder, performed at  [facility], [place], operated by [facility] on 02/21/22, summary of impression below:    MRI reports are available in the office today.  There is full width tear of the supraspinatus tendon.  There is a metallic artifact from prior surgical intervention.  There is infraspinatus atrophy with a tear of the subscapularis tendon.  There are some loose bodies intra-articularly consistent with osteoarticular fragments caused by degenerative changes.  The long head of the biceps tendon is torn.  There is evidence of prior surgical intervention.  There is subacromial fluid present in the joint as well.        Procedures           Assessment   Assessment and Plan    Diagnoses and all orders for this visit:    1. Rotator cuff tear  arthropathy of right shoulder (Primary)          Follow Up   · Extensive discussion with the patient about joint replacement surgery for right reverse total shoulder arthroplasty.  He has a large rotator cuff tear involving both the supraspinatus with significant atrophy of the infraspinatus.  The subscapularis tendon also shows 50% thickness tear.  There are intra-articular bodies in the glenohumeral joint and I do think that his arthritis is becoming dominant causing a lot of pain and symptoms.  The patient would like to wait for reverse total shoulder arthroplasty until the cooler months and possibly be ready for surgery in October or November 2022.  · Time spent in the office today with the patient is 45 minutes going over treatment options and the indications for surgical intervention.  After evaluation in the office, x-rays and history, we have diagnosed rotator cuff arthropathy of the shoulder.  This is a degenerative condition with a chronic tear of the rotator cuff that is not repairable with loss of cartilage in the glenohumeral joint. The only definitive treatment for this condition is total shoulder arthroplasty or joint replacement.  Conservative measures have been used, including cortisone injections, NSAIDS, activity modification and physical therapy.  This will require 3-4 months of time for recovery. It starts with 4-6 weeks in a sling, followed by rehabilitation at physical therapy and exercises to be done at home.  With this type of implant, you will need to take oral antibiotics prior to all dental visits and for some procedures (for example: colonoscopy, skin lesion removal, etc).  This will be a single dose one hour prior to the procedure.      Risks and benefits of surgery were discussed with the patient in detail.  Risks include but are not limited to infection, myocardial infarction, stroke, DVT, pulmonary embolism, death, dislocation, neurovascular compromise, limb length discrepancy,  revision surgery, limited range of motion, wound healing problems and scar tissue build-up.  Patient understands and agrees to proceed with surgery.  · Rest, ice, compression, and elevation (RICE) therapy  · Stretching and strengthening exercises of the flexors and abductors of the shoulder.  · OTC Alternate Ibuprofen and Tylenol as needed  · Follow up in 6 week(s)  • Patient was given instructions and counseling regarding his condition or for health maintenance advice. Please see specific information pulled into the AVS if appropriate.     Boby Medina MD   Date of Encounter: 3/16/2022        EMR Dragon/Transcription disclaimer:  Much of this encounter note is an electronic transcription/translation of spoken language to printed text. The electronic translation of spoken language may permit erroneous, or at times, nonsensical words or phrases to be inadvertently transcribed; Although I have reviewed the note for such errors, some may still exist.

## 2022-03-23 PROBLEM — M75.101 ROTATOR CUFF TEAR ARTHROPATHY OF RIGHT SHOULDER: Status: ACTIVE | Noted: 2022-03-23

## 2022-03-23 PROBLEM — M12.811 ROTATOR CUFF TEAR ARTHROPATHY OF RIGHT SHOULDER: Status: ACTIVE | Noted: 2022-03-23

## 2022-03-25 RX ORDER — LISINOPRIL 40 MG/1
TABLET ORAL
Qty: 90 TABLET | Refills: 0 | Status: SHIPPED | OUTPATIENT
Start: 2022-03-25 | End: 2022-06-17

## 2022-03-28 ENCOUNTER — OFFICE VISIT (OUTPATIENT)
Dept: FAMILY MEDICINE CLINIC | Facility: CLINIC | Age: 77
End: 2022-03-28

## 2022-03-28 VITALS
HEIGHT: 67 IN | WEIGHT: 221.4 LBS | BODY MASS INDEX: 34.75 KG/M2 | HEART RATE: 88 BPM | OXYGEN SATURATION: 97 % | TEMPERATURE: 98 F | DIASTOLIC BLOOD PRESSURE: 73 MMHG | SYSTOLIC BLOOD PRESSURE: 114 MMHG

## 2022-03-28 DIAGNOSIS — D69.59 THROMBOCYTOPENIA DUE TO COVID-19 VIRUS: ICD-10-CM

## 2022-03-28 DIAGNOSIS — I10 HYPERTENSION, UNSPECIFIED TYPE: ICD-10-CM

## 2022-03-28 DIAGNOSIS — E78.5 HYPERLIPIDEMIA, UNSPECIFIED HYPERLIPIDEMIA TYPE: ICD-10-CM

## 2022-03-28 DIAGNOSIS — R73.9 HYPERGLYCEMIA: ICD-10-CM

## 2022-03-28 DIAGNOSIS — J02.9 SORE THROAT: ICD-10-CM

## 2022-03-28 DIAGNOSIS — M19.90 ARTHRITIS: ICD-10-CM

## 2022-03-28 DIAGNOSIS — E66.09 CLASS 1 OBESITY DUE TO EXCESS CALORIES WITH SERIOUS COMORBIDITY AND BODY MASS INDEX (BMI) OF 34.0 TO 34.9 IN ADULT: ICD-10-CM

## 2022-03-28 DIAGNOSIS — R05.9 COUGH: Primary | ICD-10-CM

## 2022-03-28 DIAGNOSIS — U07.1 THROMBOCYTOPENIA DUE TO COVID-19 VIRUS: ICD-10-CM

## 2022-03-28 LAB
EXPIRATION DATE: NORMAL
EXPIRATION DATE: NORMAL
INTERNAL CONTROL: NORMAL
INTERNAL CONTROL: NORMAL
Lab: NORMAL
Lab: NORMAL
S PYO AG THROAT QL: NEGATIVE
SARS-COV-2 AG UPPER RESP QL IA.RAPID: NOT DETECTED

## 2022-03-28 PROCEDURE — 87426 SARSCOV CORONAVIRUS AG IA: CPT | Performed by: PREVENTIVE MEDICINE

## 2022-03-28 PROCEDURE — 99214 OFFICE O/P EST MOD 30 MIN: CPT | Performed by: PREVENTIVE MEDICINE

## 2022-03-28 PROCEDURE — 87880 STREP A ASSAY W/OPTIC: CPT | Performed by: PREVENTIVE MEDICINE

## 2022-03-28 RX ORDER — AZITHROMYCIN 250 MG/1
TABLET, FILM COATED ORAL
Qty: 6 TABLET | Refills: 0 | Status: SHIPPED | OUTPATIENT
Start: 2022-03-28 | End: 2022-06-17

## 2022-03-28 RX ORDER — CLOBETASOL PROPIONATE 0.5 MG/G
OINTMENT TOPICAL
COMMUNITY
Start: 2022-02-21 | End: 2022-03-28 | Stop reason: SDUPTHER

## 2022-03-28 NOTE — PATIENT INSTRUCTIONS
Call if Arthritis strength tylenol three tmes daily not making comfortable.    Trial Zyrtec and Flonase along with antibiotic.  Call Friday morning to report     12 hour fast for labs

## 2022-03-28 NOTE — PROGRESS NOTES
"Subjective   Farheen Gomez is a 76 y.o. male presents for   Chief Complaint   Patient presents with   • Nasal Congestion     2-3 days, no nausea, vomiting, or diarrhea   • Fever     Did not take temp   • Cough     Clear sputum that is thick   • Sore Throat   • Headache     Slight but nothing intolerable   • Fatigue     Didn't sleep last night due to coughing; no known exposure to strep, flu, or covid. Has been out of the country in the last 12 months.   • Hypertension     AVG at home 124/72 in AM and 131/72 in PM     Patient presents today with purulent upper respiratory infection without high fever may have had a slight temperature.  Throat is extremely sore and was around children at [employer].  Sputum is thick and cloudy.  Does not usually have allergies and this does not feel like allergies presently.  Home blood pressure readings 124/70 2 in the morning 131/70 2 in the evening pulse was 77 and 89 respectively.  Health Maintenance Due   Topic Date Due   • COVID-19 Vaccine (1) Never done       History of Present Illness     Vitals:    03/28/22 1037 03/28/22 1042   BP: 128/79 114/73   BP Location: Left arm Right arm   Patient Position: Sitting Sitting   Cuff Size: Large Adult Large Adult   Pulse: 88    Temp: 98 °F (36.7 °C)    TempSrc: Temporal    SpO2: 97%    Weight: 100 kg (221 lb 6.4 oz)    Height: 170.2 cm (67\")      Body mass index is 34.68 kg/m².    Current Outpatient Medications on File Prior to Visit   Medication Sig Dispense Refill   • Acetaminophen (TYLENOL ARTHRITIS PAIN PO) Take 2 tablets by mouth 2 (Two) Times a Day.     • amLODIPine (NORVASC) 10 MG tablet Take 1 tablet by mouth Daily. 90 tablet 3   • betamethasone dipropionate 0.05 % cream APPLY CREAM TOPICALLY TO AFFECTED AREAS ON SCALP TWICE DAILY FOR 2 TO 3 WEEKS ON, ONE WEEK OFF AS NEEDED     • calcium carbonate (OS-JUSTA) 600 MG tablet Take 600 mg by mouth Daily. Stop today for surgery     • escitalopram (LEXAPRO) 10 MG tablet Take 1 tablet by " mouth once daily 90 tablet 0   • Fluocinolone Acetonide Scalp 0.01 % oil      • Ketoconazole & Pyrithione Zinc 2 & 1 % kit Apply 1 each topically 2 (Two) Times a Week. 16 each 3   • ketoconazole (NIZORAL) 2 % shampoo Apply  topically to the appropriate area as directed 2 (Two) Times a Week. 8 each 1   • lisinopril (PRINIVIL,ZESTRIL) 40 MG tablet Take 1 tablet by mouth once daily 90 tablet 0   • Magnesium 250 MG tablet Take 250 mg by mouth Daily. Stop today for surgery     • meloxicam (Mobic) 7.5 MG tablet Take 1 tablet by mouth Daily. 90 tablet 3   • Multiple Vitamins-Minerals (EYE VITAMINS PO) Take 2 capsules by mouth. Stop today for surgery     • rosuvastatin (Crestor) 10 MG tablet Take 1 tablet by mouth Daily. 90 tablet 3   • vitamin D (ERGOCALCIFEROL) 1.25 MG (49345 UT) capsule capsule Take 1 capsule by mouth once a week 12 capsule 0   • finasteride (PROSCAR) 5 MG tablet Take 5 mg by mouth every night at bedtime.     • Mirabegron ER (MYRBETRIQ) 25 MG tablet sustained-release 24 hour 24 hr tablet Take 25 mg by mouth Daily.     • Multiple Vitamins-Minerals (PRESERVISION AREDS 2 PO) Take 2 tablets by mouth Daily. Stop today for surgery     • Myrbetriq 50 MG tablet sustained-release 24 hour 24 hr tablet TAKE TABLET BY MOUTH ONCE DAILY     • tamsulosin (FLOMAX) 0.4 MG capsule 24 hr capsule Take 1 capsule by mouth Every Evening.     • traMADol (ULTRAM) 50 MG tablet TAKE 1 TABLET BY MOUTH TWICE DAILY AS NEEDED FOR MODERATE PAIN 40 tablet 0   • [DISCONTINUED] clobetasol (TEMOVATE) 0.05 % ointment APPLY OINTMENT TOPICALLY TO AFFECTED AREA ON SCALP TWICE DAILY FOR 2-3 WEEKS ON, ONE WEEK OFF     • [DISCONTINUED] propranolol LA (INDERAL LA) 80 MG 24 hr capsule Take 1 capsule by mouth Daily. 90 capsule 3     No current facility-administered medications on file prior to visit.       The following portions of the patient's history were reviewed and updated as appropriate: allergies, current medications, past family history,  past medical history, past social history, past surgical history and problem list.    Review of Systems   Constitutional: Positive for fever.   HENT: Positive for congestion and sore throat.    Respiratory: Positive for cough.    Musculoskeletal: Positive for arthralgias, back pain and myalgias.       Objective   Physical Exam  Vitals reviewed.   Constitutional:       General: He is not in acute distress.     Appearance: He is well-developed. He is obese. He is not ill-appearing or toxic-appearing.   HENT:      Head: Normocephalic and atraumatic.      Right Ear: Tympanic membrane, ear canal and external ear normal.      Left Ear: Tympanic membrane, ear canal and external ear normal.      Nose: Nose normal.      Mouth/Throat:      Mouth: Mucous membranes are moist.      Pharynx: Posterior oropharyngeal erythema present. No oropharyngeal exudate.   Eyes:      Extraocular Movements: Extraocular movements intact.      Conjunctiva/sclera: Conjunctivae normal.      Pupils: Pupils are equal, round, and reactive to light.   Cardiovascular:      Rate and Rhythm: Normal rate and regular rhythm.      Heart sounds: Normal heart sounds.   Pulmonary:      Effort: Pulmonary effort is normal.      Breath sounds: Normal breath sounds.   Abdominal:      General: Bowel sounds are normal. There is no distension.      Palpations: Abdomen is soft. There is no mass.      Tenderness: There is no abdominal tenderness.   Musculoskeletal:         General: Tenderness present.      Cervical back: Neck supple.      Comments: Full range of motion of the right shoulder equal to the left shoulder.  Strength is strong bilaterally mild discomfort with full internal rotation left shoulder.   Skin:     General: Skin is warm.   Neurological:      General: No focal deficit present.      Mental Status: He is alert and oriented to person, place, and time.   Psychiatric:         Mood and Affect: Mood normal.         Behavior: Behavior normal.       PHQ-9  Total Score:      Assessment/Plan   Diagnoses and all orders for this visit:    1. Cough (Primary)  Comments:  Thick whitish congestion without fever started a few days ago.  No increase in shortness of breath.  Does not usually have allergies this time of year.  Orders:  -     POCT DEANNA SARS-CoV-2 Antigen JENN    2. Hypertension, unspecified type  Comments:  Controlled.    3. Arthritis  Comments:  Having significant back and shoulder arthritis.  We will try infrequent anti-inflammatory along with Tylenol arthritis strength.    4. Hyperlipidemia, unspecified hyperlipidemia type  Comments:  Trying to watch saturated fats  Orders:  -     Lipid Panel    5. Hyperglycemia  Comments:  Trying to watch carbohydrates  Orders:  -     Comprehensive Metabolic Panel  -     Hemoglobin A1c    6. Sore throat  Comments:  Strep test Covid test negative.  Z-Estuardo given.  Orders:  -     POCT rapid strep A    7. Thrombocytopenia due to COVID-19 virus  Comments:  Platelets low will not blood test at the urologist will repeat today.  Orders:  -     CBC Auto Differential    8. Class 1 obesity due to excess calories with serious comorbidity and body mass index (BMI) of 34.0 to 34.9 in adult    Other orders  -     azithromycin (Zithromax Z-Estuardo) 250 MG tablet; Take 2 tablets the first day, then 1 tablet daily for 4 days.  Dispense: 6 tablet; Refill: 0        Patient Instructions   Call if Arthritis strength tylenol three tmes daily not making comfortable.    Trial Zyrtec and Flonase along with antibiotic.  Call Friday morning to report     12 hour fast for labs

## 2022-03-29 ENCOUNTER — CLINICAL SUPPORT (OUTPATIENT)
Dept: FAMILY MEDICINE CLINIC | Facility: CLINIC | Age: 77
End: 2022-03-29

## 2022-03-29 DIAGNOSIS — E78.5 DYSLIPIDEMIA: Chronic | ICD-10-CM

## 2022-03-29 LAB
ALBUMIN SERPL-MCNC: 4.5 G/DL (ref 3.5–5.2)
ALBUMIN/GLOB SERPL: 2.1 G/DL
ALP SERPL-CCNC: 63 U/L (ref 39–117)
ALT SERPL W P-5'-P-CCNC: 30 U/L (ref 1–41)
ANION GAP SERPL CALCULATED.3IONS-SCNC: 9.4 MMOL/L (ref 5–15)
AST SERPL-CCNC: 17 U/L (ref 1–40)
BILIRUB SERPL-MCNC: 0.3 MG/DL (ref 0–1.2)
BUN SERPL-MCNC: 15 MG/DL (ref 8–23)
BUN/CREAT SERPL: 13.9 (ref 7–25)
CALCIUM SPEC-SCNC: 9.4 MG/DL (ref 8.6–10.5)
CHLORIDE SERPL-SCNC: 101 MMOL/L (ref 98–107)
CHOLEST SERPL-MCNC: 130 MG/DL (ref 0–200)
CO2 SERPL-SCNC: 28.6 MMOL/L (ref 22–29)
CREAT SERPL-MCNC: 1.08 MG/DL (ref 0.76–1.27)
EGFRCR SERPLBLD CKD-EPI 2021: 71.6 ML/MIN/1.73
GLOBULIN UR ELPH-MCNC: 2.1 GM/DL
GLUCOSE SERPL-MCNC: 114 MG/DL (ref 65–99)
HBA1C MFR BLD: 6.1 % (ref 3.5–5.6)
HDLC SERPL-MCNC: 34 MG/DL (ref 40–60)
LDLC SERPL CALC-MCNC: 55 MG/DL (ref 0–100)
LDLC/HDLC SERPL: 1.31 {RATIO}
POTASSIUM SERPL-SCNC: 4.3 MMOL/L (ref 3.5–5.2)
PROT SERPL-MCNC: 6.6 G/DL (ref 6–8.5)
SODIUM SERPL-SCNC: 139 MMOL/L (ref 136–145)
TRIGL SERPL-MCNC: 257 MG/DL (ref 0–150)
VLDLC SERPL-MCNC: 41 MG/DL (ref 5–40)

## 2022-03-29 PROCEDURE — 83036 HEMOGLOBIN GLYCOSYLATED A1C: CPT | Performed by: PREVENTIVE MEDICINE

## 2022-03-29 PROCEDURE — 36415 COLL VENOUS BLD VENIPUNCTURE: CPT | Performed by: PREVENTIVE MEDICINE

## 2022-03-29 PROCEDURE — 80053 COMPREHEN METABOLIC PANEL: CPT | Performed by: PREVENTIVE MEDICINE

## 2022-03-29 PROCEDURE — 85025 COMPLETE CBC W/AUTO DIFF WBC: CPT | Performed by: PREVENTIVE MEDICINE

## 2022-03-29 PROCEDURE — 80061 LIPID PANEL: CPT | Performed by: PREVENTIVE MEDICINE

## 2022-03-29 NOTE — PROGRESS NOTES
Venipuncture Blood Specimen Collection  Venipuncture performed in left arm by Harper Bonilla MA with good hemostasis. Patient tolerated the procedure well without complications.   03/29/22   Harper Bonilla MA

## 2022-03-30 LAB
BASOPHILS # BLD AUTO: 0.03 10*3/MM3 (ref 0–0.2)
BASOPHILS NFR BLD AUTO: 0.5 % (ref 0–1.5)
DEPRECATED RDW RBC AUTO: 43.7 FL (ref 37–54)
EOSINOPHIL # BLD AUTO: 0.2 10*3/MM3 (ref 0–0.4)
EOSINOPHIL NFR BLD AUTO: 3.6 % (ref 0.3–6.2)
ERYTHROCYTE [DISTWIDTH] IN BLOOD BY AUTOMATED COUNT: 12.7 % (ref 12.3–15.4)
HCT VFR BLD AUTO: 42.4 % (ref 37.5–51)
HGB BLD-MCNC: 13.7 G/DL (ref 13–17.7)
IMM GRANULOCYTES # BLD AUTO: 0.01 10*3/MM3 (ref 0–0.05)
IMM GRANULOCYTES NFR BLD AUTO: 0.2 % (ref 0–0.5)
LYMPHOCYTES # BLD AUTO: 1.53 10*3/MM3 (ref 0.7–3.1)
LYMPHOCYTES NFR BLD AUTO: 27.8 % (ref 19.6–45.3)
MCH RBC QN AUTO: 30.2 PG (ref 26.6–33)
MCHC RBC AUTO-ENTMCNC: 32.3 G/DL (ref 31.5–35.7)
MCV RBC AUTO: 93.4 FL (ref 79–97)
MONOCYTES # BLD AUTO: 0.6 10*3/MM3 (ref 0.1–0.9)
MONOCYTES NFR BLD AUTO: 10.9 % (ref 5–12)
NEUTROPHILS NFR BLD AUTO: 3.13 10*3/MM3 (ref 1.7–7)
NEUTROPHILS NFR BLD AUTO: 57 % (ref 42.7–76)
NRBC BLD AUTO-RTO: 0 /100 WBC (ref 0–0.2)
PLATELET # BLD AUTO: 178 10*3/MM3 (ref 140–450)
PMV BLD AUTO: 9.2 FL (ref 6–12)
RBC # BLD AUTO: 4.54 10*6/MM3 (ref 4.14–5.8)
WBC NRBC COR # BLD: 5.5 10*3/MM3 (ref 3.4–10.8)

## 2022-03-31 ENCOUNTER — TELEPHONE (OUTPATIENT)
Dept: FAMILY MEDICINE CLINIC | Facility: CLINIC | Age: 77
End: 2022-03-31

## 2022-03-31 NOTE — TELEPHONE ENCOUNTER
HUB TO READ:  ----- Message from Penny Hidalgo MD sent at 3/31/2022  7:23 AM EDT -----  Glucose 114 and A1c 6.1 so he does have increased risk for diabetes so continue to watch carbohydrates and increase his walking.

## 2022-03-31 NOTE — PROGRESS NOTES
Glucose 114 and A1c 6.1 so he does have increased risk for diabetes so continue to watch carbohydrates and increase his walking.

## 2022-04-12 RX ORDER — ESCITALOPRAM OXALATE 10 MG/1
TABLET ORAL
Qty: 90 TABLET | Refills: 0 | Status: SHIPPED | OUTPATIENT
Start: 2022-04-12 | End: 2022-07-07

## 2022-04-12 RX ORDER — ERGOCALCIFEROL 1.25 MG/1
CAPSULE ORAL
Qty: 12 CAPSULE | Refills: 0 | Status: SHIPPED | OUTPATIENT
Start: 2022-04-12 | End: 2022-07-07

## 2022-06-16 NOTE — PATIENT INSTRUCTIONS
Health Maintenance Due   Topic Date Due    COVID-19 Vaccine (1) Never done    Home weight today and in one week.  Careful in heat.  Call with weight and blood pressure in one week

## 2022-06-17 ENCOUNTER — OFFICE VISIT (OUTPATIENT)
Dept: FAMILY MEDICINE CLINIC | Facility: CLINIC | Age: 77
End: 2022-06-17

## 2022-06-17 VITALS
DIASTOLIC BLOOD PRESSURE: 81 MMHG | OXYGEN SATURATION: 95 % | WEIGHT: 230 LBS | HEIGHT: 67 IN | HEART RATE: 67 BPM | TEMPERATURE: 97.1 F | SYSTOLIC BLOOD PRESSURE: 130 MMHG | BODY MASS INDEX: 36.1 KG/M2

## 2022-06-17 DIAGNOSIS — I10 HYPERTENSION, UNSPECIFIED TYPE: Chronic | ICD-10-CM

## 2022-06-17 DIAGNOSIS — R60.9 EDEMA, UNSPECIFIED TYPE: Primary | ICD-10-CM

## 2022-06-17 DIAGNOSIS — D17.23 LIPOMA OF RIGHT THIGH: ICD-10-CM

## 2022-06-17 DIAGNOSIS — R00.0 TACHYCARDIA: ICD-10-CM

## 2022-06-17 DIAGNOSIS — N40.1 BENIGN PROSTATIC HYPERPLASIA WITH LOWER URINARY TRACT SYMPTOMS, SYMPTOM DETAILS UNSPECIFIED: Chronic | ICD-10-CM

## 2022-06-17 PROCEDURE — 99213 OFFICE O/P EST LOW 20 MIN: CPT | Performed by: PREVENTIVE MEDICINE

## 2022-06-17 RX ORDER — HYDROXYZINE HYDROCHLORIDE 25 MG/1
25 TABLET, FILM COATED ORAL 3 TIMES DAILY PRN
COMMUNITY
End: 2022-07-26

## 2022-06-17 RX ORDER — DOXYCYCLINE HYCLATE 200 MG/1
100 TABLET, DELAYED RELEASE ORAL 2 TIMES DAILY
COMMUNITY
End: 2022-07-26

## 2022-06-17 RX ORDER — LISINOPRIL 40 MG/1
40 TABLET ORAL DAILY
COMMUNITY
End: 2022-06-28

## 2022-06-17 RX ORDER — FUROSEMIDE 20 MG/1
TABLET ORAL
Qty: 30 TABLET | Refills: 1 | Status: SHIPPED | OUTPATIENT
Start: 2022-06-17 | End: 2023-01-16

## 2022-06-17 RX ORDER — POTASSIUM CHLORIDE 750 MG/1
TABLET, FILM COATED, EXTENDED RELEASE ORAL
Qty: 30 TABLET | Refills: 1 | Status: SHIPPED | OUTPATIENT
Start: 2022-06-17 | End: 2023-01-16

## 2022-06-17 NOTE — PROGRESS NOTES
"Subjective   Farheen Gomez is a 76 y.o. male presents for   Chief Complaint   Patient presents with   • retaining water     ankles   Patient presents today for follow-up on a few chronic problems all of which are stable.  Does have 2 new problems 1 is bilateral leg edema that does increase toward the end of the day.  He has recently been started on 10 mg of amlodipine and I feel that probably the cause we reassured him that we do not think there is any heart problems and indeed he has not had any trouble breathing or having any chest pain.  Tachycardia is controlled.We have elected not to change his blood pressure medicine due to the fact that he gets extreme constipation from beta-blockers and he is getting ready to leave on a trip and we do not want to risk this.  So instead we will place him on some Lasix 20 mg take it every other day potassium 10 mEq when he does take that and see if that helps to control his leg swelling.  Patient has had some psoriasis of the scalp and is seeing the rheumatologist for what may be psoriatic arthritis work-up is in progress.    Health Maintenance Due   Topic Date Due   • COVID-19 Vaccine (1) Never done       History of Present Illness     Vitals:    06/17/22 1019 06/17/22 1020   BP: 137/76 130/81   BP Location: Right arm Left arm   Patient Position: Sitting Sitting   Cuff Size: Adult Adult   Pulse: 67    Temp: 97.1 °F (36.2 °C)    TempSrc: Temporal    SpO2: 95%    Weight: 104 kg (230 lb)    Height: 170.2 cm (67\")      Body mass index is 36.02 kg/m².    Current Outpatient Medications on File Prior to Visit   Medication Sig Dispense Refill   • Acetaminophen (TYLENOL ARTHRITIS PAIN PO) Take 2 tablets by mouth 2 (Two) Times a Day.     • amLODIPine (NORVASC) 10 MG tablet Take 1 tablet by mouth Daily. 90 tablet 3   • calcium carbonate (OS-JUSTA) 600 MG tablet Take 600 mg by mouth Daily. Stop today for surgery     • Doxycycline Hyclate 200 MG tablet delayed-release enteric coated " tablet Take 100 mg by mouth 2 (Two) Times a Day.     • escitalopram (LEXAPRO) 10 MG tablet Take 1 tablet by mouth once daily 90 tablet 0   • hydrOXYzine (ATARAX) 25 MG tablet Take 25 mg by mouth 3 (Three) Times a Day As Needed for Itching.     • lisinopril (PRINIVIL,ZESTRIL) 40 MG tablet Take 40 mg by mouth Daily.     • Magnesium 250 MG tablet Take 250 mg by mouth Daily. Stop today for surgery     • Myrbetriq 50 MG tablet sustained-release 24 hour 24 hr tablet TAKE TABLET BY MOUTH ONCE DAILY     • rosuvastatin (Crestor) 10 MG tablet Take 1 tablet by mouth Daily. 90 tablet 3   • vitamin D (ERGOCALCIFEROL) 1.25 MG (66665 UT) capsule capsule Take 1 capsule by mouth once a week 12 capsule 0   • [DISCONTINUED] Mirabegron ER (MYRBETRIQ) 25 MG tablet sustained-release 24 hour 24 hr tablet Take 25 mg by mouth Daily.     • [DISCONTINUED] Multiple Vitamins-Minerals (EYE VITAMINS PO) Take 2 capsules by mouth. Stop today for surgery     • [DISCONTINUED] azithromycin (Zithromax Z-Estuardo) 250 MG tablet Take 2 tablets the first day, then 1 tablet daily for 4 days. 6 tablet 0   • [DISCONTINUED] betamethasone dipropionate 0.05 % cream APPLY CREAM TOPICALLY TO AFFECTED AREAS ON SCALP TWICE DAILY FOR 2 TO 3 WEEKS ON, ONE WEEK OFF AS NEEDED     • [DISCONTINUED] finasteride (PROSCAR) 5 MG tablet Take 5 mg by mouth every night at bedtime.     • [DISCONTINUED] Fluocinolone Acetonide Scalp 0.01 % oil      • [DISCONTINUED] Ketoconazole & Pyrithione Zinc 2 & 1 % kit Apply 1 each topically 2 (Two) Times a Week. 16 each 3   • [DISCONTINUED] ketoconazole (NIZORAL) 2 % shampoo Apply  topically to the appropriate area as directed 2 (Two) Times a Week. 8 each 1   • [DISCONTINUED] lisinopril (PRINIVIL,ZESTRIL) 40 MG tablet Take 1 tablet by mouth once daily 90 tablet 0   • [DISCONTINUED] meloxicam (Mobic) 7.5 MG tablet Take 1 tablet by mouth Daily. 90 tablet 3   • [DISCONTINUED] Multiple Vitamins-Minerals (PRESERVISION AREDS 2 PO) Take 2 tablets by  mouth Daily. Stop today for surgery     • [DISCONTINUED] tamsulosin (FLOMAX) 0.4 MG capsule 24 hr capsule Take 1 capsule by mouth Every Evening.     • [DISCONTINUED] traMADol (ULTRAM) 50 MG tablet TAKE 1 TABLET BY MOUTH TWICE DAILY AS NEEDED FOR MODERATE PAIN 40 tablet 0     No current facility-administered medications on file prior to visit.       The following portions of the patient's history were reviewed and updated as appropriate: allergies, current medications, past family history, past medical history, past social history, past surgical history and problem list.    Review of Systems   Cardiovascular: Positive for leg swelling. Negative for chest pain and palpitations.   Musculoskeletal: Positive for arthralgias, back pain, gait problem and myalgias.   Skin: Positive for rash.       Objective   Physical Exam  Cardiovascular:      Rate and Rhythm: Normal rate and regular rhythm.      Pulses: Normal pulses.      Heart sounds: Normal heart sounds.   Pulmonary:      Effort: Pulmonary effort is normal.      Breath sounds: Normal breath sounds.   Musculoskeletal:      Right lower leg: Edema present.      Left lower leg: Edema present.      Comments: Edema is very mild but patient states that he does ache at the end of the day so we have started diuretic.   Skin:     Findings: No rash.       PHQ-9 Total Score: 0    Assessment & Plan   Diagnoses and all orders for this visit:    1. Edema, unspecified type (Primary)  Comments:  Patient had noted bilateral leg edema for about the last month.  This is the first summer that he has been taking amlodipine.  Does get ankle pain at the end of    2. Benign prostatic hyperplasia with lower urinary tract symptoms, symptom details unspecified  Comments:  Urine seems to be doing well he was just checked by the urologist this morning.    3. Hypertension, unspecified type  Comments:  Blood pressures under good control.  Orders:  -     Basic metabolic panel; Future    4.  Tachycardia  Comments:  Heart rate has been doing well and is under control now.    5. Lipoma of right thigh  Comments:  Taking doxycycline from the dermatologist due to infected lipoma of his right upper posterior thigh.    Other orders  -     furosemide (Lasix) 20 MG tablet; One tablet by mouth every other day  Dispense: 30 tablet; Refill: 1  -     potassium chloride 10 MEQ CR tablet; One tablet by mouth when takes Lasix  Dispense: 30 tablet; Refill: 1        Patient Instructions     Health Maintenance Due   Topic Date Due   • COVID-19 Vaccine (1) Never done    Home weight today and in one week.  Careful in heat.  Call with weight and blood pressure in one week

## 2022-06-28 RX ORDER — LISINOPRIL 40 MG/1
TABLET ORAL
Qty: 90 TABLET | Refills: 0 | Status: SHIPPED | OUTPATIENT
Start: 2022-06-28 | End: 2022-07-07

## 2022-07-07 RX ORDER — LISINOPRIL 40 MG/1
TABLET ORAL
Qty: 90 TABLET | Refills: 0 | Status: SHIPPED | OUTPATIENT
Start: 2022-07-07 | End: 2023-01-16

## 2022-07-07 RX ORDER — ESCITALOPRAM OXALATE 10 MG/1
TABLET ORAL
Qty: 90 TABLET | Refills: 0 | Status: SHIPPED | OUTPATIENT
Start: 2022-07-07 | End: 2022-10-24

## 2022-07-07 RX ORDER — ERGOCALCIFEROL 1.25 MG/1
CAPSULE ORAL
Qty: 12 CAPSULE | Refills: 0 | Status: SHIPPED | OUTPATIENT
Start: 2022-07-07 | End: 2022-11-11

## 2022-07-11 ENCOUNTER — CLINICAL SUPPORT (OUTPATIENT)
Dept: FAMILY MEDICINE CLINIC | Facility: CLINIC | Age: 77
End: 2022-07-11

## 2022-07-11 DIAGNOSIS — I10 HYPERTENSION, UNSPECIFIED TYPE: Chronic | ICD-10-CM

## 2022-07-11 LAB
ANION GAP SERPL CALCULATED.3IONS-SCNC: 12 MMOL/L (ref 5–15)
BUN SERPL-MCNC: 14 MG/DL (ref 8–23)
BUN/CREAT SERPL: 15.1 (ref 7–25)
CALCIUM SPEC-SCNC: 9.2 MG/DL (ref 8.6–10.5)
CHLORIDE SERPL-SCNC: 102 MMOL/L (ref 98–107)
CO2 SERPL-SCNC: 27 MMOL/L (ref 22–29)
CREAT SERPL-MCNC: 0.93 MG/DL (ref 0.76–1.27)
EGFRCR SERPLBLD CKD-EPI 2021: 85.6 ML/MIN/1.73
GLUCOSE SERPL-MCNC: 128 MG/DL (ref 65–99)
POTASSIUM SERPL-SCNC: 3.9 MMOL/L (ref 3.5–5.2)
SODIUM SERPL-SCNC: 141 MMOL/L (ref 136–145)

## 2022-07-11 PROCEDURE — 80048 BASIC METABOLIC PNL TOTAL CA: CPT | Performed by: PREVENTIVE MEDICINE

## 2022-07-11 PROCEDURE — 36415 COLL VENOUS BLD VENIPUNCTURE: CPT | Performed by: PREVENTIVE MEDICINE

## 2022-07-11 NOTE — PROGRESS NOTES
Venipuncture Blood Specimen Collection  Venipuncture performed in LEFT ARM by Harper Bonilla MA with good hemostasis. Patient tolerated the procedure well without complications.   07/11/22   Harper Bonilla MA

## 2022-07-12 ENCOUNTER — TELEPHONE (OUTPATIENT)
Dept: FAMILY MEDICINE CLINIC | Facility: CLINIC | Age: 77
End: 2022-07-12

## 2022-07-12 NOTE — TELEPHONE ENCOUNTER
----- Message from Penny Hidalgo MD sent at 7/12/2022  6:57 AM EDT -----  Blood sugar was 128 so he should watch his carbohydrates and increase his walking the electrolytes look fine call if any other questions or concerns.

## 2022-07-12 NOTE — PROGRESS NOTES
Blood sugar was 128 so he should watch his carbohydrates and increase his walking the electrolytes look fine call if any other questions or concerns.

## 2022-07-13 RX ORDER — CICLOPIROX 1 G/100ML
SHAMPOO TOPICAL
COMMUNITY
Start: 2022-05-04 | End: 2022-07-14

## 2022-07-13 RX ORDER — VITAMIN B COMPLEX
TABLET ORAL
Status: ON HOLD | COMMUNITY
End: 2022-10-18

## 2022-07-14 ENCOUNTER — OFFICE VISIT (OUTPATIENT)
Dept: FAMILY MEDICINE CLINIC | Facility: CLINIC | Age: 77
End: 2022-07-14

## 2022-07-14 VITALS
DIASTOLIC BLOOD PRESSURE: 76 MMHG | BODY MASS INDEX: 35.97 KG/M2 | WEIGHT: 229.2 LBS | TEMPERATURE: 97.7 F | OXYGEN SATURATION: 96 % | HEIGHT: 67 IN | HEART RATE: 76 BPM | SYSTOLIC BLOOD PRESSURE: 116 MMHG

## 2022-07-14 DIAGNOSIS — R73.9 HYPERGLYCEMIA: ICD-10-CM

## 2022-07-14 DIAGNOSIS — E66.01 CLASS 2 SEVERE OBESITY DUE TO EXCESS CALORIES WITH SERIOUS COMORBIDITY AND BODY MASS INDEX (BMI) OF 35.0 TO 35.9 IN ADULT: ICD-10-CM

## 2022-07-14 DIAGNOSIS — R60.9 EDEMA, UNSPECIFIED TYPE: Primary | ICD-10-CM

## 2022-07-14 DIAGNOSIS — E55.9 VITAMIN D DEFICIENCY: Chronic | ICD-10-CM

## 2022-07-14 DIAGNOSIS — N40.1 BENIGN PROSTATIC HYPERPLASIA WITH LOWER URINARY TRACT SYMPTOMS, SYMPTOM DETAILS UNSPECIFIED: Chronic | ICD-10-CM

## 2022-07-14 DIAGNOSIS — E78.5 DYSLIPIDEMIA: Chronic | ICD-10-CM

## 2022-07-14 DIAGNOSIS — I10 HYPERTENSION, UNSPECIFIED TYPE: Chronic | ICD-10-CM

## 2022-07-14 PROCEDURE — 99214 OFFICE O/P EST MOD 30 MIN: CPT | Performed by: PREVENTIVE MEDICINE

## 2022-07-14 NOTE — PROGRESS NOTES
"Subjective   Farheen Gomez is a 76 y.o. male presents for   Chief Complaint   Patient presents with   • Edema     Follow up before vacation.      Patient states that he is here to follow-up on his his edema.  His average blood pressure pulse and weight were well controlled at home.  He has not lost a lot of weight and I do feel as though since he is stable he can wait till he gets home from his vacation to start taking his diuretic every day.  We did caution that he needs to watch his sodium while he is on vacation and that that may cause an increase in leg swelling.  Has had no chest pain shortness of breath and is able to empty his bladder fine.  Electrolytes were in good shape.  There are no preventive care reminders to display for this patient.    History of Present Illness     Vitals:    07/14/22 1448 07/14/22 1449   BP: 116/76 116/76   BP Location: Right arm Left arm   Patient Position: Sitting Sitting   Cuff Size: Adult Adult   Pulse: 76    Temp: 97.7 °F (36.5 °C)    TempSrc: Temporal    SpO2: 96%    Weight: 104 kg (229 lb 3.2 oz)    Height: 170.2 cm (67\")      Body mass index is 35.9 kg/m².    Current Outpatient Medications on File Prior to Visit   Medication Sig Dispense Refill   • Acetaminophen (TYLENOL ARTHRITIS PAIN PO) Take 2 tablets by mouth 2 (Two) Times a Day.     • amLODIPine (NORVASC) 10 MG tablet Take 1 tablet by mouth Daily. 90 tablet 3   • calcium carbonate (OS-JUSTA) 600 MG tablet Take 600 mg by mouth Daily. Stop today for surgery     • Cyanocobalamin 2500 MCG sublingual tablet      • Doxycycline Hyclate 200 MG tablet delayed-release enteric coated tablet Take 100 mg by mouth 2 (Two) Times a Day.     • escitalopram (LEXAPRO) 10 MG tablet Take 1 tablet by mouth once daily 90 tablet 0   • furosemide (Lasix) 20 MG tablet One tablet by mouth every other day 30 tablet 1   • hydrOXYzine (ATARAX) 25 MG tablet Take 25 mg by mouth 3 (Three) Times a Day As Needed for Itching.     • lisinopril " (PRINIVIL,ZESTRIL) 40 MG tablet Take 1 tablet by mouth once daily 90 tablet 0   • Magnesium 250 MG tablet Take 250 mg by mouth Daily. Stop today for surgery     • Myrbetriq 50 MG tablet sustained-release 24 hour 24 hr tablet TAKE TABLET BY MOUTH ONCE DAILY     • potassium chloride 10 MEQ CR tablet One tablet by mouth when takes Lasix 30 tablet 1   • rosuvastatin (Crestor) 10 MG tablet Take 1 tablet by mouth Daily. 90 tablet 3   • vitamin D (ERGOCALCIFEROL) 1.25 MG (22196 UT) capsule capsule Take 1 capsule by mouth once a week 12 capsule 0   • [DISCONTINUED] ciclopirox (LOPROX) 1 % shampoo APPLY TOPICALLY THREE TIMES WEEKLY. LATHER AND LET SIT ON FOR 10 MINUTES BEFORE RINSING     • [DISCONTINUED] Tetrahydroz-Dextran-PEG-Povid 0.05-0.1-1-1 % solution        No current facility-administered medications on file prior to visit.       The following portions of the patient's history were reviewed and updated as appropriate: allergies, current medications, past family history, past medical history, past social history, past surgical history and problem list.    Review of Systems   Cardiovascular: Positive for leg swelling.       Objective   Physical Exam  Vitals reviewed.   Constitutional:       General: He is not in acute distress.     Appearance: He is well-developed. He is obese. He is not ill-appearing or toxic-appearing.   HENT:      Head: Normocephalic and atraumatic.      Nose: Nose normal.   Cardiovascular:      Rate and Rhythm: Normal rate and regular rhythm.      Heart sounds: Normal heart sounds.   Pulmonary:      Effort: Pulmonary effort is normal.      Breath sounds: Normal breath sounds.   Abdominal:      General: There is no distension.      Palpations: There is no mass.      Tenderness: There is no abdominal tenderness.   Musculoskeletal:      Cervical back: Neck supple.   Skin:     General: Skin is warm.   Neurological:      General: No focal deficit present.      Mental Status: He is alert and oriented to  person, place, and time.   Psychiatric:         Mood and Affect: Mood normal.         Behavior: Behavior normal.       PHQ-9 Total Score:      Assessment & Plan   Diagnoses and all orders for this visit:    1. Edema, unspecified type (Primary)  Comments:  Patient feels as though his swelling is about the same he has not really noted any increase in urination multiple home readings show that his weight     2. Vitamin D deficiency  -     Vitamin D 25 Hydroxy; Future    3. Hypertension, unspecified type  Comments:  Blood pressure controlled at 130/78 average at home  Orders:  -     CBC Auto Differential; Future  -     Comprehensive Metabolic Panel; Future    4. Dyslipidemia  Comments:  Patient is trying to watch his saturated fats  Orders:  -     Lipid Panel; Future    5. Benign prostatic hyperplasia with lower urinary tract symptoms, symptom details unspecified  Comments:  Patient is able to empty his bladder.    6. Hyperglycemia  Comments:  Patient states he has been watching his carbs.  Orders:  -     Hemoglobin A1c; Future    7. Class 2 severe obesity due to excess calories with serious comorbidity and body mass index (BMI) of 35.0 to 35.9 in adult (HCC)        Patient Instructions   There are no preventive care reminders to display for this patient.   Increase diuretic to daily after back from [place]

## 2022-07-14 NOTE — PATIENT INSTRUCTIONS
There are no preventive care reminders to display for this patient.   Increase diuretic to daily after back from [place]

## 2022-07-25 NOTE — PATIENT INSTRUCTIONS
There are no preventive care reminders to display for this patient.     How to Quarantine at Home  Information for Patients and Families    These instructions are for people with confirmed or suspected COVID-19 who do not need to be hospitalized and those with confirmed COVID-19 who were hospitalized and discharged to care for themselves at home.    If you were tested through the Health Department  The Health Department will monitor your wellbeing.  If it is determined that you do not need to be hospitalized and can be isolated at home, you will be monitored by staff from your local or state health department.     If you were tested through a [facility]  You will need to monitor yourself and report changes in your symptoms to your doctor.  See the section below called Monitor Your Symptoms.    Follow these steps until a healthcare provider or local or state health department says you can return to your normal activities.    Stay home except to get medical care  Restrict activities outside your home, except for getting medical care.   Do not go to work, school, or public areas.   Avoid using public transportation, ride-sharing, or taxis.    Separate yourself from other people and animals in your home  People  As much as possible, you should stay in a specific room and away from other people in your home. Also, you should use a separate bathroom, if available.    Animals  You should restrict contact with pets and other animals while you are sick with COVID-19, just like you would around other people. When possible, have another member of your household care for your animals while you are sick. If you are sick with COVID-19, avoid contact with your pet, including petting, snuggling, being kissed or licked, and sharing food. If you must care for your pet or be around animals while you are sick, wash your hands before and after you interact with pets and wear a facemask. See COVID-19 and Animals for more  information.    Call ahead before visiting your doctor  If you have a medical appointment, call the healthcare provider and tell them that you have or may have COVID-19. This information will help the healthcare provider’s office take steps to keep other people from getting infected or exposed.    Wear a facemask  You should wear a facemask when you are around other people (e.g., sharing a room or vehicle) or pets and before you enter a healthcare provider’s office.     If you are not able to wear a facemask (for example, because it causes trouble breathing), then people who live with you should not stay in the same room with you, or they should wear a facemask if they enter your room.    Cover your coughs and sneezes  Cover your mouth and nose with a tissue when you cough or sneeze.   Throw used tissues in a lined trash can.   Immediately wash your hands with soap and water for at least 20 seconds or, if soap and water are not available, clean your hands with an alcohol-based hand  that contains at least 60% alcohol.    Clean your hands often  Wash your hands often with soap and water for at least 20 seconds, especially after blowing your nose, coughing, or sneezing; going to the bathroom; and before eating or preparing food.     If soap and water are not readily available, use an alcohol-based hand  with at least 60% alcohol, covering all surfaces of your hands and rubbing them together until they feel dry.    Soap and water are the best option if hands are visibly dirty. Avoid touching your eyes, nose, and mouth with unwashed hands.    Avoid sharing personal household items  You should not share dishes, drinking glasses, cups, eating utensils, towels, or bedding with other people or pets in your home.   After using these items, they should be washed thoroughly with soap and water.    Clean all “high-touch” surfaces everyday  High touch surfaces include counters, tabletops, doorknobs, bathroom  fixtures, toilets, phones, keyboards, tablets, and bedside tables.   Also, clean any surfaces that may have blood, stool, or body fluids on them.   Use a household cleaning spray or wipe, according to the label instructions. Labels contain instructions for safe and effective use of the cleaning product, including precautions you should take when applying the product, such as wearing gloves and making sure you have good ventilation during use of the product.    Monitor your symptoms  Seek prompt medical attention if your illness is worsening (e.g., difficulty breathing).   Before seeking care, call your healthcare provider and tell them that you have, or are being evaluated for, COVID-19.   Put on a facemask before you enter the facility.     These steps will help the healthcare provider’s office to keep other people in the office or waiting room from getting infected or exposed.   Persons who are placed under active monitoring or facilitated self-monitoring should follow instructions provided by their local health department or occupational health professionals, as appropriate.  If you have a medical emergency and need to call 911, notify the dispatch personnel that you have, or are being evaluated for COVID-19. If possible, put on a facemask before emergency medical services arrive.    Discontinuing home isolation  Patients with confirmed COVID-19 should remain under home isolation precautions until the risk of secondary transmission to others is thought to be low. The decision to discontinue home isolation precautions should be made on a case-by-case basis, in consultation with healthcare providers and state and local health departments.    The below content are for household members, intimate partners, and caregivers of a patient with symptomatic laboratory-confirmed COVID-19 or a patient under investigation:    Household members, intimate partners, and caregivers may have close contact with a person with  symptomatic, laboratory-confirmed COVID-19 or a person under investigation.     Close contacts should monitor their health; they should call their healthcare provider right away if they develop symptoms suggestive of COVID-19 (e.g., fever, cough, shortness of breath)     Close contacts should also follow these recommendations:  Make sure that you understand and can help the patient follow their healthcare provider’s instructions for medication(s) and care. You should help the patient with basic needs in the home and provide support for getting groceries, prescriptions, and other personal needs.  Monitor the patient’s symptoms. If the patient is getting sicker, call his or her healthcare provider and tell them that the patient has laboratory-confirmed COVID-19. This will help the healthcare provider’s office take steps to keep other people in the office or waiting room from getting infected. Ask the healthcare provider to call the local or [facility] health department for additional guidance. If the patient has a medical emergency and you need to call 911, notify the dispatch personnel that the patient has, or is being evaluated for COVID-19.  Household members should stay in another room or be  from the patient as much as possible. Household members should use a separate bedroom and bathroom, if available.  Prohibit visitors who do not have an essential need to be in the home.  Household members should care for any pets in the home. Do not handle pets or other animals while sick.  For more information, see COVID-19 and Animals.  Make sure that shared spaces in the home have good air flow, such as by an air conditioner or an opened window, weather permitting.  Perform hand hygiene frequently. Wash your hands often with soap and water for at least 20 seconds or use an alcohol-based hand  that contains 60 to 95% alcohol, covering all surfaces of your hands and rubbing them together until they feel dry.  Soap and water should be used preferentially if hands are visibly dirty.  Avoid touching your eyes, nose, and mouth with unwashed hands.  The patient should wear a facemask when you are around other people. If the patient is not able to wear a facemask (for example, because it causes trouble breathing), you, as the caregiver, should wear a mask when you are in the same room as the patient.  Wear a disposable facemask and gloves when you touch or have contact with the patient’s blood, stool, or body fluids, such as saliva, sputum, nasal mucus, vomit, or urine.   Throw out disposable facemasks and gloves after using them. Do not reuse.  When removing personal protective equipment, first remove and dispose of gloves. Then, immediately clean your hands with soap and water or alcohol-based hand . Next, remove and dispose of facemask, and immediately clean your hands again with soap and water or alcohol-based hand .  Avoid sharing household items with the patient. You should not share dishes, drinking glasses, cups, eating utensils, towels, bedding, or other items. After the patient uses these items, you should wash them thoroughly (see below “Wash laundry thoroughly”).  Clean all “high-touch” surfaces, such as counters, tabletops, doorknobs, bathroom fixtures, toilets, phones, keyboards, tablets, and bedside tables, every day. Also, clean any surfaces that may have blood, stool, or body fluids on them.   Use a household cleaning spray or wipe, according to the label instructions. Labels contain instructions for safe and effective use of the cleaning product including precautions you should take when applying the product, such as wearing gloves and making sure you have good ventilation during use of the product.  Wash laundry thoroughly.   Immediately remove and wash clothes or bedding that have blood, stool, or body fluids on them.  Wear disposable gloves while handling soiled items and keep soiled  items away from your body. Clean your hands (with soap and water or an alcohol-based hand ) immediately after removing your gloves.  Read and follow directions on labels of laundry or clothing items and detergent. In general, using a normal laundry detergent according to washing machine instructions and dry thoroughly using the warmest temperatures recommended on the clothing label.  Place all used disposable gloves, facemasks, and other contaminated items in a lined container before disposing of them with other household waste. Clean your hands (with soap and water or an alcohol-based hand ) immediately after handling these items. Soap and water should be used preferentially if hands are visibly dirty.  Discuss any additional questions with your state or local health department or healthcare provider.    Adapted from information provided by the Centers for Disease Control and Prevention.  For more information, visit https://www.cdc.gov/coronavirus/2019-ncov/hcp/guidance-prevent-spread.html      Rest fluids and flonase

## 2022-07-26 ENCOUNTER — OFFICE VISIT (OUTPATIENT)
Dept: FAMILY MEDICINE CLINIC | Facility: CLINIC | Age: 77
End: 2022-07-26

## 2022-07-26 VITALS
TEMPERATURE: 97.9 F | OXYGEN SATURATION: 96 % | DIASTOLIC BLOOD PRESSURE: 81 MMHG | WEIGHT: 229.2 LBS | HEART RATE: 77 BPM | SYSTOLIC BLOOD PRESSURE: 123 MMHG | HEIGHT: 67 IN | BODY MASS INDEX: 35.97 KG/M2

## 2022-07-26 DIAGNOSIS — J06.9 UPPER RESPIRATORY TRACT INFECTION, UNSPECIFIED TYPE: Primary | ICD-10-CM

## 2022-07-26 DIAGNOSIS — E66.01 CLASS 2 SEVERE OBESITY DUE TO EXCESS CALORIES WITH SERIOUS COMORBIDITY AND BODY MASS INDEX (BMI) OF 35.0 TO 35.9 IN ADULT: ICD-10-CM

## 2022-07-26 DIAGNOSIS — J02.9 SORE THROAT: ICD-10-CM

## 2022-07-26 LAB
EXPIRATION DATE: ABNORMAL
EXPIRATION DATE: NORMAL
FLUAV AG UPPER RESP QL IA.RAPID: NOT DETECTED
FLUBV AG UPPER RESP QL IA.RAPID: NOT DETECTED
INTERNAL CONTROL: ABNORMAL
INTERNAL CONTROL: NORMAL
Lab: ABNORMAL
Lab: NORMAL
S PYO AG THROAT QL: NEGATIVE
SARS-COV-2 AG UPPER RESP QL IA.RAPID: DETECTED

## 2022-07-26 PROCEDURE — U0004 COV-19 TEST NON-CDC HGH THRU: HCPCS | Performed by: PREVENTIVE MEDICINE

## 2022-07-26 PROCEDURE — 99213 OFFICE O/P EST LOW 20 MIN: CPT | Performed by: PREVENTIVE MEDICINE

## 2022-07-26 PROCEDURE — 87428 SARSCOV & INF VIR A&B AG IA: CPT | Performed by: PREVENTIVE MEDICINE

## 2022-07-26 PROCEDURE — 87880 STREP A ASSAY W/OPTIC: CPT | Performed by: PREVENTIVE MEDICINE

## 2022-07-26 NOTE — PROGRESS NOTES
Covid is detected.  Since it will be 6 days before PCR test comes back he would not be a candidate for Paxlovid.  Rest drink plenty of fluids and stay quarantined from his wife.  Watch O2 sats and if below 90 for an hour or 2 call 911 and get to ER.  Patient informed and will not start Paxlovid

## 2022-07-26 NOTE — PROGRESS NOTES
"Subjective   Farheen Gomez is a 76 y.o. male presents for   Chief Complaint   Patient presents with   • Sore Throat     Since Thursday      Upper respiratory infection with possible fever and clear sinus drainage for the last 5 days.  He has been traveling in [place] and has been doubly vaccinated [employer] booster but could have had an exposure on the plane or the trip.  No known strep exposure diarrhea dysuria nausea vomiting.  Did have some fatigue and body aches.  There are no preventive care reminders to display for this patient.    History of Present Illness     Vitals:    07/26/22 0904 07/26/22 0906   BP: 120/76 123/81   BP Location: Right arm Left arm   Patient Position: Sitting Sitting   Cuff Size: Adult Adult   Pulse: 77    Temp: 97.9 °F (36.6 °C)    TempSrc: Temporal    SpO2: 96%    Weight: 104 kg (229 lb 3.2 oz)    Height: 170.2 cm (67\")      Body mass index is 35.9 kg/m².    Current Outpatient Medications on File Prior to Visit   Medication Sig Dispense Refill   • Acetaminophen (TYLENOL ARTHRITIS PAIN PO) Take 2 tablets by mouth 2 (Two) Times a Day.     • amLODIPine (NORVASC) 10 MG tablet Take 1 tablet by mouth Daily. 90 tablet 3   • calcium carbonate (OS-JUSTA) 600 MG tablet Take 600 mg by mouth Daily. Stop today for surgery     • Cyanocobalamin 2500 MCG sublingual tablet      • escitalopram (LEXAPRO) 10 MG tablet Take 1 tablet by mouth once daily 90 tablet 0   • furosemide (Lasix) 20 MG tablet One tablet by mouth every other day 30 tablet 1   • lisinopril (PRINIVIL,ZESTRIL) 40 MG tablet Take 1 tablet by mouth once daily 90 tablet 0   • Magnesium 250 MG tablet Take 250 mg by mouth Daily. Stop today for surgery     • Myrbetriq 50 MG tablet sustained-release 24 hour 24 hr tablet TAKE TABLET BY MOUTH ONCE DAILY     • potassium chloride 10 MEQ CR tablet One tablet by mouth when takes Lasix 30 tablet 1   • rosuvastatin (Crestor) 10 MG tablet Take 1 tablet by mouth Daily. 90 tablet 3   • vitamin D (ERGOCALCIFEROL) " 1.25 MG (63136 UT) capsule capsule Take 1 capsule by mouth once a week 12 capsule 0   • [DISCONTINUED] Doxycycline Hyclate 200 MG tablet delayed-release enteric coated tablet Take 100 mg by mouth 2 (Two) Times a Day.     • [DISCONTINUED] hydrOXYzine (ATARAX) 25 MG tablet Take 25 mg by mouth 3 (Three) Times a Day As Needed for Itching.       No current facility-administered medications on file prior to visit.       The following portions of the patient's history were reviewed and updated as appropriate: allergies, current medications, past family history, past medical history, past social history, past surgical history and problem list.    Review of Systems   Constitutional: Positive for fatigue and fever.   HENT: Positive for congestion, postnasal drip, rhinorrhea, sinus pressure and sore throat.    Respiratory: Positive for cough. Negative for shortness of breath.    Cardiovascular: Negative for chest pain.   Gastrointestinal: Negative for abdominal pain, diarrhea, nausea and vomiting.   Genitourinary: Negative for dysuria and flank pain.       Objective   Physical Exam  Vitals reviewed.   Constitutional:       General: He is not in acute distress.     Appearance: He is well-developed. He is obese. He is not ill-appearing or toxic-appearing.   HENT:      Head: Normocephalic and atraumatic.      Right Ear: Tympanic membrane, ear canal and external ear normal.      Left Ear: Tympanic membrane, ear canal and external ear normal.      Nose: Nose normal.      Mouth/Throat:      Mouth: Mucous membranes are moist.      Pharynx: Posterior oropharyngeal erythema present.      Comments: Clear postnasal drip  Eyes:      Extraocular Movements: Extraocular movements intact.      Conjunctiva/sclera: Conjunctivae normal.      Pupils: Pupils are equal, round, and reactive to light.   Cardiovascular:      Rate and Rhythm: Normal rate and regular rhythm.      Heart sounds: Normal heart sounds.   Pulmonary:      Effort: Pulmonary  effort is normal. No respiratory distress.      Breath sounds: Normal breath sounds. No wheezing, rhonchi or rales.   Abdominal:      General: Bowel sounds are normal. There is no distension.      Palpations: Abdomen is soft. There is no mass.      Tenderness: There is no abdominal tenderness.   Musculoskeletal:         General: Normal range of motion.      Cervical back: Neck supple.   Skin:     General: Skin is warm.   Neurological:      General: No focal deficit present.      Mental Status: He is alert and oriented to person, place, and time.   Psychiatric:         Mood and Affect: Mood normal.         Behavior: Behavior normal.       PHQ-9 Total Score:      Assessment & Plan   Diagnoses and all orders for this visit:    1. Upper respiratory tract infection, unspecified type (Primary)  Comments:  Clear sinus drainage fatigue sore throat and body aches for 5 days.  Seems somewhat better today has been traveling in [place] over this period.  Double vaccine   Orders:  -     POCT SARS-CoV-2 Antigen JENN + Flu  -     COVID-19,APTIMA PANTHER(DREAD),[facility], NP/OP SWAB IN UTM/VTM/S[facility]INE TRANSPORT MEDIA,24 HR TAT - Swab, Nasopharynx; Future  -     COVID-19,APTIMA PANTHER(DREAD),[facility], NP/OP SWAB IN UTM/VTM/SALINE TRANSPORT MEDIA,24 HR TAT - Swab, Nasopharynx    2. Sore throat  Comments:  Extremely sore throat today 2 3 and 4 of upper respiratory infection now improved.  No known strep exposure rapid strep was negative rapid COVID positive flu AM  Orders:  -     POCT rapid strep A    3. Class 2 severe obesity due to excess calories with serious comorbidity and body mass index (BMI) of 35.0 to 35.9 in adult (HCC)        Patient Instructions   There are no preventive care reminders to display for this patient.     How to Quarantine at Home  Information for Patients and Families    These instructions are for people with confirmed or suspected COVID-19 who do not need to be hospitalized and those with confirmed  COVID-19 who were hospitalized and discharged to care for themselves at home.    If you were tested through the Health Department  The Health Department will monitor your wellbeing.  If it is determined that you do not need to be hospitalized and can be isolated at home, you will be monitored by staff from your local or state health department.     If you were tested through a Commercial Lab  You will need to monitor yourself and report changes in your symptoms to your doctor.  See the section below called Monitor Your Symptoms.    Follow these steps until a healthcare provider or local or state health department says you can return to your normal activities.    Stay home except to get medical care  • Restrict activities outside your home, except for getting medical care.   • Do not go to work, school, or public areas.   • Avoid using public transportation, ride-sharing, or taxis.    Separate yourself from other people and animals in your home  People  As much as possible, you should stay in a specific room and away from other people in your home. Also, you should use a separate bathroom, if available.    Animals  You should restrict contact with pets and other animals while you are sick with COVID-19, just like you would around other people. When possible, have another member of your household care for your animals while you are sick. If you are sick with COVID-19, avoid contact with your pet, including petting, snuggling, being kissed or licked, and sharing food. If you must care for your pet or be around animals while you are sick, wash your hands before and after you interact with pets and wear a facemask. See COVID-19 and Animals for more information.    Call ahead before visiting your doctor  If you have a medical appointment, call the healthcare provider and tell them that you have or may have COVID-19. This information will help the healthcare provider’s office take steps to keep other people from getting  infected or exposed.    Wear a facemask  You should wear a facemask when you are around other people (e.g., sharing a room or vehicle) or pets and before you enter a healthcare provider’s office.     If you are not able to wear a facemask (for example, because it causes trouble breathing), then people who live with you should not stay in the same room with you, or they should wear a facemask if they enter your room.    Cover your coughs and sneezes  • Cover your mouth and nose with a tissue when you cough or sneeze.   • Throw used tissues in a lined trash can.   • Immediately wash your hands with soap and water for at least 20 seconds or, if soap and water are not available, clean your hands with an alcohol-based hand  that contains at least 60% alcohol.    Clean your hands often  • Wash your hands often with soap and water for at least 20 seconds, especially after blowing your nose, coughing, or sneezing; going to the bathroom; and before eating or preparing food.     • If soap and water are not readily available, use an alcohol-based hand  with at least 60% alcohol, covering all surfaces of your hands and rubbing them together until they feel dry.    • Soap and water are the best option if hands are visibly dirty. Avoid touching your eyes, nose, and mouth with unwashed hands.    Avoid sharing personal household items  • You should not share dishes, drinking glasses, cups, eating utensils, towels, or bedding with other people or pets in your home.   • After using these items, they should be washed thoroughly with soap and water.    Clean all “high-touch” surfaces everyday  • High touch surfaces include counters, tabletops, doorknobs, bathroom fixtures, toilets, phones, keyboards, tablets, and bedside tables.   • Also, clean any surfaces that may have blood, stool, or body fluids on them.   • Use a household cleaning spray or wipe, according to the label instructions. Labels contain instructions  for safe and effective use of the cleaning product, including precautions you should take when applying the product, such as wearing gloves and making sure you have good ventilation during use of the product.    Monitor your symptoms  • Seek prompt medical attention if your illness is worsening (e.g., difficulty breathing).   • Before seeking care, call your healthcare provider and tell them that you have, or are being evaluated for, COVID-19.   • Put on a facemask before you enter the facility.     • These steps will help the healthcare provider’s office to keep other people in the office or waiting room from getting infected or exposed.   • Persons who are placed under active monitoring or facilitated self-monitoring should follow instructions provided by their local health department or occupational health professionals, as appropriate.  • If you have a medical emergency and need to call 911, notify the dispatch personnel that you have, or are being evaluated for COVID-19. If possible, put on a facemask before emergency medical services arrive.    Discontinuing home isolation  Patients with confirmed COVID-19 should remain under home isolation precautions until the risk of secondary transmission to others is thought to be low. The decision to discontinue home isolation precautions should be made on a case-by-case basis, in consultation with healthcare providers and state and local health departments.    The below content are for household members, intimate partners, and caregivers of a patient with symptomatic laboratory-confirmed COVID-19 or a patient under investigation:    Household members, intimate partners, and caregivers may have close contact with a person with symptomatic, laboratory-confirmed COVID-19 or a person under investigation.     Close contacts should monitor their health; they should call their healthcare provider right away if they develop symptoms suggestive of COVID-19 (e.g., fever, cough,  shortness of breath)     Close contacts should also follow these recommendations:  • Make sure that you understand and can help the patient follow their healthcare provider’s instructions for medication(s) and care. You should help the patient with basic needs in the home and provide support for getting groceries, prescriptions, and other personal needs.  • Monitor the patient’s symptoms. If the patient is getting sicker, call his or her healthcare provider and tell them that the patient has laboratory-confirmed COVID-19. This will help the healthcare provider’s office take steps to keep other people in the office or waiting room from getting infected. Ask the healthcare provider to call the local or [facility] health department for additional guidance. If the patient has a medical emergency and you need to call 911, notify the dispatch personnel that the patient has, or is being evaluated for COVID-19.  • Household members should stay in another room or be  from the patient as much as possible. Household members should use a separate bedroom and bathroom, if available.  • Prohibit visitors who do not have an essential need to be in the home.  • Household members should care for any pets in the home. Do not handle pets or other animals while sick.  For more information, see COVID-19 and Animals.  • Make sure that shared spaces in the home have good air flow, such as by an air conditioner or an opened window, weather permitting.  • Perform hand hygiene frequently. Wash your hands often with soap and water for at least 20 seconds or use an alcohol-based hand  that contains 60 to 95% alcohol, covering all surfaces of your hands and rubbing them together until they feel dry. Soap and water should be used preferentially if hands are visibly dirty.  • Avoid touching your eyes, nose, and mouth with unwashed hands.  • The patient should wear a facemask when you are around other people. If the patient is not  able to wear a facemask (for example, because it causes trouble breathing), you, as the caregiver, should wear a mask when you are in the same room as the patient.  • Wear a disposable facemask and gloves when you touch or have contact with the patient’s blood, stool, or body fluids, such as saliva, sputum, nasal mucus, vomit, or urine.   o Throw out disposable facemasks and gloves after using them. Do not reuse.  o When removing personal protective equipment, first remove and dispose of gloves. Then, immediately clean your hands with soap and water or alcohol-based hand . Next, remove and dispose of facemask, and immediately clean your hands again with soap and water or alcohol-based hand .  • Avoid sharing household items with the patient. You should not share dishes, drinking glasses, cups, eating utensils, towels, bedding, or other items. After the patient uses these items, you should wash them thoroughly (see below “Wash laundry thoroughly”).  • Clean all “high-touch” surfaces, such as counters, tabletops, doorknobs, bathroom fixtures, toilets, phones, keyboards, tablets, and bedside tables, every day. Also, clean any surfaces that may have blood, stool, or body fluids on them.   o Use a household cleaning spray or wipe, according to the label instructions. Labels contain instructions for safe and effective use of the cleaning product including precautions you should take when applying the product, such as wearing gloves and making sure you have good ventilation during use of the product.  • Wash laundry thoroughly.   o Immediately remove and wash clothes or bedding that have blood, stool, or body fluids on them.  o Wear disposable gloves while handling soiled items and keep soiled items away from your body. Clean your hands (with soap and water or an alcohol-based hand ) immediately after removing your gloves.  o Read and follow directions on labels of laundry or clothing items and  detergent. In general, using a normal laundry detergent according to washing machine instructions and dry thoroughly using the warmest temperatures recommended on the clothing label.  • Place all used disposable gloves, facemasks, and other contaminated items in a lined container before disposing of them with other household waste. Clean your hands (with soap and water or an alcohol-based hand ) immediately after handling these items. Soap and water should be used preferentially if hands are visibly dirty.  • Discuss any additional questions with your state or local health department or healthcare provider.    Adapted from information provided by the Centers for Disease Control and Prevention.  For more information, visit https://www.cdc.gov/coronavirus/2019-ncov/hcp/guidance-prevent-spread.html      Rest fluids and flonase

## 2022-07-27 ENCOUNTER — TELEPHONE (OUTPATIENT)
Dept: FAMILY MEDICINE CLINIC | Facility: CLINIC | Age: 77
End: 2022-07-27

## 2022-07-27 LAB — SARS-COV-2 ORF1AB RESP QL NAA+PROBE: DETECTED

## 2022-07-27 NOTE — TELEPHONE ENCOUNTER
HUB RELAYED COVID POSITIVE INFORMATION AND PATIENT HAS MORE QUESTION, PLEASE HAVE DELVIN GIVE HIM A CALL [phone].

## 2022-07-27 NOTE — TELEPHONE ENCOUNTER
HUB TO READ:  ----- Message from Penny Hidalgo MD sent at 7/27/2022  8:56 AM EDT -----  Covid PCR test is positive-hope he continues to do well.

## 2022-09-08 ENCOUNTER — TELEPHONE (OUTPATIENT)
Dept: ORTHOPEDIC SURGERY | Facility: CLINIC | Age: 77
End: 2022-09-08

## 2022-09-08 NOTE — TELEPHONE ENCOUNTER
Patient would like to proceed with surgery with Dr. Medina. Last seen 3/16/22 would like to proceed with right shoulder reverse arthroplasty. Please add case request and orders.

## 2022-09-08 NOTE — TELEPHONE ENCOUNTER
I can only see him having seen Dr. Medina. If he wants Carol then fine if he wants with me I need to see him

## 2022-09-10 NOTE — TELEPHONE ENCOUNTER
I have put in the orders for right reverse total shoulder arthroplasty at [facility].  Please go ahead and contact the patient and find a date that is suitable for him thank you

## 2022-09-14 ENCOUNTER — TELEPHONE (OUTPATIENT)
Dept: FAMILY MEDICINE CLINIC | Facility: CLINIC | Age: 77
End: 2022-09-14

## 2022-09-22 ENCOUNTER — TRANSCRIBE ORDERS (OUTPATIENT)
Dept: ADMINISTRATIVE | Facility: HOSPITAL | Age: 77
End: 2022-09-22

## 2022-09-22 DIAGNOSIS — M54.6 PAIN IN THORACIC SPINE: Primary | ICD-10-CM

## 2022-09-22 DIAGNOSIS — M96.1 POSTLAMINECTOMY SYNDROME, LUMBAR REGION: ICD-10-CM

## 2022-10-03 NOTE — PATIENT INSTRUCTIONS
Health Maintenance Due   Topic Date Due    INFLUENZA VACCINE  08/01/2022    COVID-19 Vaccine (5 - Booster for Moderna series) 09/06/2022    ANNUAL WELLNESS VISIT  09/10/2022    Calorie Counting for Weight Loss  Calories are units of energy. Your body needs a certain amount of calories from food to keep you going throughout the day. When you eat more calories than your body needs, your body stores the extra calories as fat. When you eat fewer calories than your body needs, your body burns fat to get the energy it needs.  Calorie counting means keeping track of how many calories you eat and drink each day. Calorie counting can be helpful if you need to lose weight. If you make sure to eat fewer calories than your body needs, you should lose weight. Ask your health care provider what a healthy weight is for you.  For calorie counting to work, you will need to eat the right number of calories in a day in order to lose a healthy amount of weight per week. A dietitian can help you determine how many calories you need in a day and will give you suggestions on how to reach your calorie goal.  A healthy amount of weight to lose per week is usually 1-2 lb (0.5-0.9 kg). This usually means that your daily calorie intake should be reduced by 500-750 calories.  Eating 1,200 - 1,500 calories per day can help most women lose weight.  Eating 1,500 - 1,800 calories per day can help most men lose weight.  What is my plan?  My goal is to have __________ calories per day.  If I have this many calories per day, I should lose around __________ pounds per week.  What do I need to know about calorie counting?  In order to meet your daily calorie goal, you will need to:  Find out how many calories are in each food you would like to eat. Try to do this before you eat.  Decide how much of the food you plan to eat.  Write down what you ate and how many calories it had. Doing this is called keeping a food log.  To successfully lose weight, it is  important to balance calorie counting with a healthy lifestyle that includes regular activity. Aim for 150 minutes of moderate exercise (such as walking) or 75 minutes of vigorous exercise (such as running) each week.  Where do I find calorie information?      The number of calories in a food can be found on a Nutrition Facts label. If a food does not have a Nutrition Facts label, try to look up the calories online or ask your dietitian for help.  Remember that calories are listed per serving. If you choose to have more than one serving of a food, you will have to multiply the calories per serving by the amount of servings you plan to eat. For example, the label on a package of bread might say that a serving size is 1 slice and that there are 90 calories in a serving. If you eat 1 slice, you will have eaten 90 calories. If you eat 2 slices, you will have eaten 180 calories.  How do I keep a food log?  Immediately after each meal, record the following information in your food log:  What you ate. Don't forget to include toppings, sauces, and other extras on the food.  How much you ate. This can be measured in cups, ounces, or number of items.  How many calories each food and drink had.  The total number of calories in the meal.  Keep your food log near you, such as in a small notebook in your pocket, or use a mobile lore or website. Some programs will calculate calories for you and show you how many calories you have left for the day to meet your goal.  What are some calorie counting tips?      Use your calories on foods and drinks that will fill you up and not leave you hungry:  Some examples of foods that fill you up are nuts and nut butters, vegetables, lean proteins, and high-fiber foods like whole grains. High-fiber foods are foods with more than 5 g fiber per serving.  Drinks such as sodas, specialty coffee drinks, alcohol, and juices have a lot of calories, yet do not fill you up.  Eat nutritious foods and avoid  "empty calories. Empty calories are calories you get from foods or beverages that do not have many vitamins or protein, such as candy, sweets, and soda. It is better to have a nutritious high-calorie food (such as an avocado) than a food with few nutrients (such as a bag of chips).  Know how many calories are in the foods you eat most often. This will help you calculate calorie counts faster.  Pay attention to calories in drinks. Low-calorie drinks include water and unsweetened drinks.  Pay attention to nutrition labels for \"low fat\" or \"fat free\" foods. These foods sometimes have the same amount of calories or more calories than the full fat versions. They also often have added sugar, starch, or salt, to make up for flavor that was removed with the fat.  Find a way of tracking calories that works for you. Get creative. Try different apps or programs if writing down calories does not work for you.  What are some portion control tips?  Know how many calories are in a serving. This will help you know how many servings of a certain food you can have.  Use a measuring cup to measure serving sizes. You could also try weighing out portions on a kitchen scale. With time, you will be able to estimate serving sizes for some foods.  Take some time to put servings of different foods on your favorite plates, bowls, and cups so you know what a serving looks like.  Try not to eat straight from a bag or box. Doing this can lead to overeating. Put the amount you would like to eat in a cup or on a plate to make sure you are eating the right portion.  Use smaller plates, glasses, and bowls to prevent overeating.  Try not to multitask (for example, watch TV or use your computer) while eating. If it is time to eat, sit down at a table and enjoy your food. This will help you to know when you are full. It will also help you to be aware of what you are eating and how much you are eating.  What are tips for following this plan?  Reading " "food labels  Check the calorie count compared to the serving size. The serving size may be smaller than what you are used to eating.  Check the source of the calories. Make sure the food you are eating is high in vitamins and protein and low in saturated and trans fats.  Shopping  Read nutrition labels while you shop. This will help you make healthy decisions before you decide to purchase your food.  Make a grocery list and stick to it.  Cooking  Try to cook your favorite foods in a healthier way. For example, try baking instead of frying.  Use low-fat dairy products.  Meal planning  Use more fruits and vegetables. Half of your plate should be fruits and vegetables.  Include lean proteins like poultry and fish.  How do I count calories when eating out?  Ask for smaller portion sizes.  Consider sharing an entree and sides instead of getting your own entree.  If you get your own entree, eat only half. Ask for a box at the beginning of your meal and put the rest of your entree in it so you are not tempted to eat it.  If calories are listed on the menu, choose the lower calorie options.  Choose dishes that include vegetables, fruits, whole grains, low-fat dairy products, and lean protein.  Choose items that are boiled, broiled, grilled, or steamed. Stay away from items that are buttered, battered, fried, or served with cream sauce. Items labeled \"crispy\" are usually fried, unless stated otherwise.  Choose water, low-fat milk, unsweetened iced tea, or other drinks without added sugar. If you want an alcoholic beverage, choose a lower calorie option such as a glass of wine or light beer.  Ask for dressings, sauces, and syrups on the side. These are usually high in calories, so you should limit the amount you eat.  If you want a salad, choose a garden salad and ask for grilled meats. Avoid extra toppings like guillen, cheese, or fried items. Ask for the dressing on the side, or ask for olive oil and vinegar or lemon to use " as dressing.  Estimate how many servings of a food you are given. For example, a serving of cooked rice is ½ cup or about the size of half a baseball. Knowing serving sizes will help you be aware of how much food you are eating at restaurants. The list below tells you how big or small some common portion sizes are based on everyday objects:  1 oz--4 stacked dice.  3 oz--1 deck of cards.  1 tsp--1 die.  1 Tbsp--½ a ping-pong ball.  2 Tbsp--1 ping-pong ball.  ½ cup--½ baseball.  1 cup--1 baseball.  Summary  Calorie counting means keeping track of how many calories you eat and drink each day. If you eat fewer calories than your body needs, you should lose weight.  A healthy amount of weight to lose per week is usually 1-2 lb (0.5-0.9 kg). This usually means reducing your daily calorie intake by 500-750 calories.  The number of calories in a food can be found on a Nutrition Facts label. If a food does not have a Nutrition Facts label, try to look up the calories online or ask your dietitian for help.  Use your calories on foods and drinks that will fill you up, and not on foods and drinks that will leave you hungry.  Use smaller plates, glasses, and bowls to prevent overeating.  This information is not intended to replace advice given to you by your health care provider. Make sure you discuss any questions you have with your health care provider.  Document Released: 12/18/2006 Document Revised: 09/06/2019 Document Reviewed: 11/17/2017  [employer] Interactive Patient Education © 2019 [employer]

## 2022-10-03 NOTE — PROGRESS NOTES
The ABCs of the Annual Wellness Visit  Subsequent Medicare Wellness Visit  Patient is here for an age-specific physical and has been advised to wear sunscreen and seatbelt.  He is also here to check on multiple chronic health conditions most of which seem stable he is about ready to have a right shoulder replacement and is considering some sort of ablation due to his psoriatic arthritis in his back.  Chief Complaint   Patient presents with   • Annual Exam     Annual wellness, having shoulder surgery oct 18th      Subjective    History of Present Illness:  Farheen Gomez is a 76 y.o. male who presents for a Subsequent Medicare Wellness Visit.    The following portions of the patient's history were reviewed and   updated as appropriate: allergies, current medications, past family history, past medical history, past social history, past surgical history and problem list.    Compared to one year ago, the patient feels his physical   health is better.    Compared to one year ago, the patient feels his mental   health is better.    Recent Hospitalizations:  He was not admitted to the hospital during the last year.       Current Medical Providers:  Patient Care Team:  Penny Hidalgo MD as PCP - General  Lex Villa MD as Consulting Physician (Urology)  Adria Gallegos MD as Consulting Physician (Pulmonary Disease)    Outpatient Medications Prior to Visit   Medication Sig Dispense Refill   • Acetaminophen (TYLENOL ARTHRITIS PAIN PO) Take 2 tablets by mouth 2 (Two) Times a Day.     • amLODIPine (NORVASC) 10 MG tablet Take 1 tablet by mouth Daily. 90 tablet 3   • calcium carbonate (OS-JUSTA) 600 MG tablet Take 600 mg by mouth Daily. Stop today for surgery     • escitalopram (LEXAPRO) 10 MG tablet Take 1 tablet by mouth once daily 90 tablet 0   • furosemide (Lasix) 20 MG tablet One tablet by mouth every other day 30 tablet 1   • lisinopril (PRINIVIL,ZESTRIL) 40 MG tablet Take 1 tablet by mouth once  daily 90 tablet 0   • Magnesium 250 MG tablet Take 250 mg by mouth Daily. Stop today for surgery     • Myrbetriq 50 MG tablet sustained-release 24 hour 24 hr tablet TAKE TABLET BY MOUTH ONCE DAILY     • potassium chloride 10 MEQ CR tablet One tablet by mouth when takes Lasix 30 tablet 1   • vitamin D (ERGOCALCIFEROL) 1.25 MG (32564 UT) capsule capsule Take 1 capsule by mouth once a week 12 capsule 0   • Cyanocobalamin 2500 MCG sublingual tablet      • rosuvastatin (Crestor) 10 MG tablet Take 1 tablet by mouth Daily. 90 tablet 3     No facility-administered medications prior to visit.       No opioid medication identified on active medication list. I have reviewed chart for other potential  high risk medication/s and harmful drug interactions in the elderly.          Aspirin is not on active medication list.  Aspirin use is not indicated based on review of current medical condition/s. Risk of harm outweighs potential benefits.  .    Patient Active Problem List   Diagnosis   • Spinal stenosis in cervical region - reveresal of lordosis at C3/4 and C4/5, Modic endpalate cahnges at C7/T1   • Hypertension   • BPH (benign prostatic hyperplasia)   • Anemia   • Arthritis   • Morbid (severe) obesity due to excess calories (HCC)   • Dyslipidemia   • Family history of malignant neoplasm of urinary bladder   • Foot pain, right   • Ganglion cyst   • Hand paresthesia   • Hearing problem   • History of poliomyelitis   • Hyperglycemia   • Chronic pain of both knees   • Other hammer toe(s) (acquired), left foot   • Plantar fasciitis, left   • Sleep disorder   • Tachycardia   • Thrombocytopenia (HCC)   • Vitamin D deficiency   • Amblyopia   • Dry eye   • Hypertensive retinopathy of both eyes   • Lens replaced by other means   • AMD (age-related macular degeneration), bilateral   • Nonexudative age-related macular degeneration   • Other chronic allergic conjunctivitis   • Other specified disorders of eyelid   • PCO (posterior capsular  "opacification), bilateral   • Presence of intraocular lens   • PVD (posterior vitreous detachment), both eyes   • Regular astigmatism   • History of bilateral knee arthroplasty   • Other headache syndrome   • Exposure to COVID-19 virus   • Status post total knee replacement, right   • Hx of total knee replacement, left   • Class 2 severe obesity with serious comorbidity and body mass index (BMI) of 35.0 to 35.9 in adult (HCC)   • Seborrhea capitis in adult   • Chronic right shoulder pain   • Rotator cuff tear arthropathy of right shoulder   • Thrombocytopenia due to COVID-19 virus   • Lipoma of right thigh   • Chronic midline low back pain     Advance Care Planning  Advance Directive is on file.  ACP discussion was held with the patient during this visit. Patient has an advance directive in EMR which is still valid.     Review of Systems   HENT: Positive for hearing loss.    Musculoskeletal: Positive for arthralgias, back pain, gait problem and myalgias.   Skin: Positive for rash.        Objective    Vitals:    10/04/22 1129 10/04/22 1132 10/04/22 1133   BP: 108/68 108/69 118/80   BP Location: Left arm Right arm Left arm   Patient Position: Sitting Sitting Standing   Cuff Size: Adult Adult Adult   Pulse: 75     Temp: 98.1 °F (36.7 °C)     SpO2: 97%     Weight: 104 kg (228 lb 6.4 oz)     Height: 170.2 cm (67.01\")     PainSc:     6   PainLoc:   Shoulder  Comment: right     Estimated body mass index is 35.76 kg/m² as calculated from the following:    Height as of this encounter: 170.2 cm (67.01\").    Weight as of this encounter: 104 kg (228 lb 6.4 oz).    Class 2 Severe Obesity (BMI >=35 and <=39.9). Obesity-related health conditions include the following: obstructive sleep apnea, hypertension, dyslipidemias and osteoarthritis. Obesity is worsening. BMI is is above average; BMI management plan is completed. We discussed portion control and increasing exercise.      Does the patient have evidence of cognitive " impairment? No    Physical Exam  Vitals reviewed.   Constitutional:       General: He is not in acute distress.     Appearance: He is well-developed. He is obese. He is not ill-appearing or toxic-appearing.   HENT:      Head: Normocephalic and atraumatic.      Right Ear: Tympanic membrane, ear canal and external ear normal.      Left Ear: Tympanic membrane, ear canal and external ear normal.      Nose: Nose normal.      Mouth/Throat:      Mouth: Mucous membranes are moist.      Pharynx: No posterior oropharyngeal erythema.   Eyes:      Extraocular Movements: Extraocular movements intact.      Conjunctiva/sclera: Conjunctivae normal.      Pupils: Pupils are equal, round, and reactive to light.   Cardiovascular:      Rate and Rhythm: Normal rate and regular rhythm.      Heart sounds: Normal heart sounds.   Pulmonary:      Effort: Pulmonary effort is normal.      Comments: Decreased breath sounds bilaterally  Abdominal:      General: Bowel sounds are normal. There is no distension.      Palpations: Abdomen is soft. There is no mass.      Tenderness: There is no abdominal tenderness.   Musculoskeletal:         General: Tenderness present.      Cervical back: Neck supple.   Skin:     General: Skin is warm.      Findings: Rash present.   Neurological:      General: No focal deficit present.      Mental Status: He is alert and oriented to person, place, and time.   Psychiatric:         Mood and Affect: Mood normal.         Behavior: Behavior normal.                 HEALTH RISK ASSESSMENT    Smoking Status:  Social History     Tobacco Use   Smoking Status Former Smoker   • Packs/day: 0.25   • Years: 5.00   • Pack years: 1.25   • Quit date:    • Years since quittin.7   Smokeless Tobacco Never Used     Alcohol Consumption:  Social History     Substance and Sexual Activity   Alcohol Use No     Fall Risk Screen:    STEADI Fall Risk Assessment was completed, and patient is at LOW risk for falls.Assessment completed  on:10/4/2022    Depression Screening:  PHQ-2/PHQ-9 Depression Screening 10/4/2022   Retired PHQ-9 Total Score -   Retired Total Score -   Little Interest or Pleasure in Doing Things 0-->not at all   Feeling Down, Depressed or Hopeless 0-->not at all   PHQ-9: Brief Depression Severity Measure Score 0       Health Habits and Functional and Cognitive Screening:  Functional & Cognitive Status 11/9/2020   Do you have difficulty preparing food and eating? No   Do you have difficulty bathing yourself, getting dressed or grooming yourself? No   Do you have difficulty using the toilet? No   Do you have difficulty moving around from place to place? No   Do you have trouble with steps or getting out of a bed or a chair? No   Current Diet Well Balanced Diet   Dental Exam Up to date   Eye Exam Up to date   Exercise (times per week) 7 times per week   Current Exercise Activities Include Walking   Do you need help using the phone?  No   Are you deaf or do you have serious difficulty hearing?  Yes   Do you need help with transportation? No   Do you need help shopping? No   Do you need help preparing meals?  No   Do you need help with housework?  No   Do you need help with laundry? No   Do you need help taking your medications? No   Do you need help managing money? No   Do you ever drive or ride in a car without wearing a seat belt? No   Have you felt unusual stress, anger or loneliness in the last month? No   Who do you live with? Spouse   If you need help, do you have trouble finding someone available to you? No   Have you been bothered in the last four weeks by sexual problems? No   Do you have difficulty concentrating, remembering or making decisions? No       Age-appropriate Screening Schedule:  Refer to the list below for future screening recommendations based on patient's age, sex and/or medical conditions. Orders for these recommended tests are listed in the plan section. The patient has been provided with a written  plan.    Health Maintenance   Topic Date Due   • LIPID PANEL  03/29/2023   • TDAP/TD VACCINES (2 - Tdap) 08/13/2025   • INFLUENZA VACCINE  Completed   • ZOSTER VACCINE  Completed              Assessment & Plan   CMS Preventative Services Quick Reference  Risk Factors Identified During Encounter  Obesity/Overweight   The above risks/problems have been discussed with the patient.  Follow up actions/plans if indicated are seen below in the Assessment/Plan Section.  Pertinent information has been shared with the patient in the After Visit Summary.    Diagnoses and all orders for this visit:    1. Encounter for annual general medical examination with abnormal findings in adult (Primary)  Comments:  Advised to wear sun screen and seat belt    2. Anemia, unspecified type  Comments:  No blood loss    3. Benign prostatic hyperplasia with lower urinary tract symptoms, symptom details unspecified  Comments:  No problems with urine    4. Dyslipidemia  Comments:  Watching sat fats    5. Hyperglycemia  Comments:  Watching carbs    6. Hypertension, unspecified type  Comments:  Controlled    7. Lipoma of right thigh  Comments:  Removing next week      8. Other headache syndrome  Comments:  Gone with CPAP    9. Sleep disorder  Comments:  Using CPAP    10. Tachycardia  Comments:  None noted    11. Rotator cuff tear arthropathy of right shoulder  Comments:  10/18/2022 total shoulder     12. Chronic midline low back pain without sciatica  Comments:  To get MRI and then may have ablation    13. Class 2 severe obesity due to excess calories with serious comorbidity and body mass index (BMI) of 35.0 to 35.9 in adult (HCC)    Other orders  -     Fluzone High-Dose 65+yrs  -     Pneumococcal Conjugate Vaccine 20-Valent (PCV20)        Follow Up:   Return in about 3 months (around 1/4/2023).     An After Visit Summary and PPPS were made available to the patient.

## 2022-10-04 ENCOUNTER — OFFICE VISIT (OUTPATIENT)
Dept: FAMILY MEDICINE CLINIC | Facility: CLINIC | Age: 77
End: 2022-10-04

## 2022-10-04 VITALS
OXYGEN SATURATION: 97 % | SYSTOLIC BLOOD PRESSURE: 118 MMHG | WEIGHT: 228.4 LBS | HEART RATE: 75 BPM | BODY MASS INDEX: 35.85 KG/M2 | HEIGHT: 67 IN | TEMPERATURE: 98.1 F | DIASTOLIC BLOOD PRESSURE: 80 MMHG

## 2022-10-04 DIAGNOSIS — R00.0 TACHYCARDIA: ICD-10-CM

## 2022-10-04 DIAGNOSIS — Z00.01 ENCOUNTER FOR ANNUAL GENERAL MEDICAL EXAMINATION WITH ABNORMAL FINDINGS IN ADULT: Primary | ICD-10-CM

## 2022-10-04 DIAGNOSIS — D64.9 ANEMIA, UNSPECIFIED TYPE: ICD-10-CM

## 2022-10-04 DIAGNOSIS — G44.89 OTHER HEADACHE SYNDROME: ICD-10-CM

## 2022-10-04 DIAGNOSIS — M54.50 CHRONIC MIDLINE LOW BACK PAIN WITHOUT SCIATICA: ICD-10-CM

## 2022-10-04 DIAGNOSIS — G47.9 SLEEP DISORDER: ICD-10-CM

## 2022-10-04 DIAGNOSIS — N40.1 BENIGN PROSTATIC HYPERPLASIA WITH LOWER URINARY TRACT SYMPTOMS, SYMPTOM DETAILS UNSPECIFIED: Chronic | ICD-10-CM

## 2022-10-04 DIAGNOSIS — E78.5 DYSLIPIDEMIA: Chronic | ICD-10-CM

## 2022-10-04 DIAGNOSIS — M12.811 ROTATOR CUFF TEAR ARTHROPATHY OF RIGHT SHOULDER: ICD-10-CM

## 2022-10-04 DIAGNOSIS — M75.101 ROTATOR CUFF TEAR ARTHROPATHY OF RIGHT SHOULDER: ICD-10-CM

## 2022-10-04 DIAGNOSIS — E66.01 CLASS 2 SEVERE OBESITY DUE TO EXCESS CALORIES WITH SERIOUS COMORBIDITY AND BODY MASS INDEX (BMI) OF 35.0 TO 35.9 IN ADULT: ICD-10-CM

## 2022-10-04 DIAGNOSIS — D17.23 LIPOMA OF RIGHT THIGH: ICD-10-CM

## 2022-10-04 DIAGNOSIS — G89.29 CHRONIC MIDLINE LOW BACK PAIN WITHOUT SCIATICA: ICD-10-CM

## 2022-10-04 DIAGNOSIS — I10 HYPERTENSION, UNSPECIFIED TYPE: Chronic | ICD-10-CM

## 2022-10-04 DIAGNOSIS — R73.9 HYPERGLYCEMIA: ICD-10-CM

## 2022-10-04 PROCEDURE — 1125F AMNT PAIN NOTED PAIN PRSNT: CPT | Performed by: PREVENTIVE MEDICINE

## 2022-10-04 PROCEDURE — 90662 IIV NO PRSV INCREASED AG IM: CPT | Performed by: PREVENTIVE MEDICINE

## 2022-10-04 PROCEDURE — 1170F FXNL STATUS ASSESSED: CPT | Performed by: PREVENTIVE MEDICINE

## 2022-10-04 PROCEDURE — 99397 PER PM REEVAL EST PAT 65+ YR: CPT | Performed by: PREVENTIVE MEDICINE

## 2022-10-04 PROCEDURE — 1159F MED LIST DOCD IN RCRD: CPT | Performed by: PREVENTIVE MEDICINE

## 2022-10-04 PROCEDURE — 90677 PCV20 VACCINE IM: CPT | Performed by: PREVENTIVE MEDICINE

## 2022-10-04 PROCEDURE — G0439 PPPS, SUBSEQ VISIT: HCPCS | Performed by: PREVENTIVE MEDICINE

## 2022-10-04 PROCEDURE — G0008 ADMIN INFLUENZA VIRUS VAC: HCPCS | Performed by: PREVENTIVE MEDICINE

## 2022-10-04 PROCEDURE — G0009 ADMIN PNEUMOCOCCAL VACCINE: HCPCS | Performed by: PREVENTIVE MEDICINE

## 2022-10-05 RX ORDER — TRAMADOL HYDROCHLORIDE 50 MG/1
50 TABLET ORAL EVERY 6 HOURS PRN
Status: ON HOLD | COMMUNITY
End: 2022-10-18

## 2022-10-05 RX ORDER — MULTIPLE VITAMINS W/ MINERALS TAB 9MG-400MCG
1 TAB ORAL DAILY
COMMUNITY

## 2022-10-07 ENCOUNTER — HOSPITAL ENCOUNTER (OUTPATIENT)
Dept: GENERAL RADIOLOGY | Facility: HOSPITAL | Age: 77
Discharge: HOME | End: 2022-10-07

## 2022-10-07 ENCOUNTER — HOSPITAL ENCOUNTER (OUTPATIENT)
Dept: CARDIOLOGY | Facility: HOSPITAL | Age: 77
Discharge: HOME | End: 2022-10-07

## 2022-10-07 ENCOUNTER — LAB (OUTPATIENT)
Dept: LAB | Facility: HOSPITAL | Age: 77
End: 2022-10-07

## 2022-10-07 DIAGNOSIS — M75.101 ROTATOR CUFF TEAR ARTHROPATHY OF RIGHT SHOULDER: ICD-10-CM

## 2022-10-07 DIAGNOSIS — M12.811 ROTATOR CUFF TEAR ARTHROPATHY OF RIGHT SHOULDER: ICD-10-CM

## 2022-10-07 LAB
ABO GROUP BLD: NORMAL
ANION GAP SERPL CALCULATED.3IONS-SCNC: 10.5 MMOL/L (ref 5–15)
BILIRUB UR QL STRIP: NEGATIVE
BLD GP AB SCN SERPL QL: NEGATIVE
BUN SERPL-MCNC: 14 MG/DL (ref 8–23)
BUN/CREAT SERPL: 15.7 (ref 7–25)
CALCIUM SPEC-SCNC: 9.5 MG/DL (ref 8.6–10.5)
CHLORIDE SERPL-SCNC: 101 MMOL/L (ref 98–107)
CLARITY UR: CLEAR
CO2 SERPL-SCNC: 28.5 MMOL/L (ref 22–29)
COLOR UR: YELLOW
CREAT SERPL-MCNC: 0.89 MG/DL (ref 0.76–1.27)
DEPRECATED RDW RBC AUTO: 40.1 FL (ref 37–54)
EGFRCR SERPLBLD CKD-EPI 2021: 89.4 ML/MIN/1.73
ERYTHROCYTE [DISTWIDTH] IN BLOOD BY AUTOMATED COUNT: 12.3 % (ref 12.3–15.4)
GLUCOSE SERPL-MCNC: 128 MG/DL (ref 65–99)
GLUCOSE UR STRIP-MCNC: NEGATIVE MG/DL
HCT VFR BLD AUTO: 39 % (ref 37.5–51)
HGB BLD-MCNC: 12.8 G/DL (ref 13–17.7)
HGB UR QL STRIP.AUTO: NEGATIVE
KETONES UR QL STRIP: NEGATIVE
LEUKOCYTE ESTERASE UR QL STRIP.AUTO: NEGATIVE
MCH RBC QN AUTO: 29.6 PG (ref 26.6–33)
MCHC RBC AUTO-ENTMCNC: 32.8 G/DL (ref 31.5–35.7)
MCV RBC AUTO: 90.3 FL (ref 79–97)
MRSA DNA SPEC QL NAA+PROBE: NORMAL
NITRITE UR QL STRIP: NEGATIVE
PH UR STRIP.AUTO: 6.5 [PH] (ref 5–8)
PLATELET # BLD AUTO: 168 10*3/MM3 (ref 140–450)
PMV BLD AUTO: 8.9 FL (ref 6–12)
POTASSIUM SERPL-SCNC: 4.4 MMOL/L (ref 3.5–5.2)
PROT UR QL STRIP: NEGATIVE
RBC # BLD AUTO: 4.32 10*6/MM3 (ref 4.14–5.8)
RH BLD: POSITIVE
SODIUM SERPL-SCNC: 140 MMOL/L (ref 136–145)
SP GR UR STRIP: 1.02 (ref 1–1.03)
T&S EXPIRATION DATE: NORMAL
UROBILINOGEN UR QL STRIP: NORMAL
WBC NRBC COR # BLD: 6.23 10*3/MM3 (ref 3.4–10.8)

## 2022-10-07 PROCEDURE — 86900 BLOOD TYPING SEROLOGIC ABO: CPT

## 2022-10-07 PROCEDURE — 71046 X-RAY EXAM CHEST 2 VIEWS: CPT

## 2022-10-07 PROCEDURE — 80048 BASIC METABOLIC PNL TOTAL CA: CPT

## 2022-10-07 PROCEDURE — 93005 ELECTROCARDIOGRAM TRACING: CPT | Performed by: ORTHOPAEDIC SURGERY

## 2022-10-07 PROCEDURE — 36415 COLL VENOUS BLD VENIPUNCTURE: CPT

## 2022-10-07 PROCEDURE — 87641 MR-STAPH DNA AMP PROBE: CPT

## 2022-10-07 PROCEDURE — 86850 RBC ANTIBODY SCREEN: CPT

## 2022-10-07 PROCEDURE — 86901 BLOOD TYPING SEROLOGIC RH(D): CPT

## 2022-10-07 PROCEDURE — 81003 URINALYSIS AUTO W/O SCOPE: CPT

## 2022-10-07 PROCEDURE — 93010 ELECTROCARDIOGRAM REPORT: CPT | Performed by: INTERNAL MEDICINE

## 2022-10-07 PROCEDURE — 73030 X-RAY EXAM OF SHOULDER: CPT

## 2022-10-07 PROCEDURE — 85027 COMPLETE CBC AUTOMATED: CPT

## 2022-10-09 LAB — QT INTERVAL: 378 MS

## 2022-10-11 ENCOUNTER — TELEPHONE (OUTPATIENT)
Dept: FAMILY MEDICINE CLINIC | Facility: CLINIC | Age: 77
End: 2022-10-11

## 2022-10-14 ENCOUNTER — HOSPITAL ENCOUNTER (OUTPATIENT)
Dept: MRI IMAGING | Facility: HOSPITAL | Age: 77
Discharge: HOME | End: 2022-10-14

## 2022-10-14 DIAGNOSIS — M96.1 POSTLAMINECTOMY SYNDROME, LUMBAR REGION: ICD-10-CM

## 2022-10-14 DIAGNOSIS — M54.6 PAIN IN THORACIC SPINE: ICD-10-CM

## 2022-10-14 PROCEDURE — 72146 MRI CHEST SPINE W/O DYE: CPT

## 2022-10-14 PROCEDURE — 72148 MRI LUMBAR SPINE W/O DYE: CPT

## 2022-10-18 ENCOUNTER — ANESTHESIA (OUTPATIENT)
Dept: PERIOP | Facility: HOSPITAL | Age: 77
End: 2022-10-18

## 2022-10-18 ENCOUNTER — ANESTHESIA EVENT (OUTPATIENT)
Dept: PERIOP | Facility: HOSPITAL | Age: 77
End: 2022-10-18

## 2022-10-18 ENCOUNTER — HOSPITAL ENCOUNTER (OUTPATIENT)
Facility: HOSPITAL | Age: 77
LOS: 1 days | Discharge: HOME | End: 2022-10-19
Attending: ORTHOPAEDIC SURGERY | Admitting: ORTHOPAEDIC SURGERY

## 2022-10-18 DIAGNOSIS — M12.811 ROTATOR CUFF TEAR ARTHROPATHY OF RIGHT SHOULDER: ICD-10-CM

## 2022-10-18 DIAGNOSIS — M12.811 ROTATOR CUFF TEAR ARTHROPATHY OF RIGHT SHOULDER: Primary | ICD-10-CM

## 2022-10-18 DIAGNOSIS — M75.101 ROTATOR CUFF TEAR ARTHROPATHY OF RIGHT SHOULDER: ICD-10-CM

## 2022-10-18 DIAGNOSIS — M75.101 ROTATOR CUFF TEAR ARTHROPATHY OF RIGHT SHOULDER: Primary | ICD-10-CM

## 2022-10-18 DIAGNOSIS — J96.91 RESPIRATORY FAILURE WITH HYPOXIA, UNSPECIFIED CHRONICITY: ICD-10-CM

## 2022-10-18 DIAGNOSIS — R09.02 HYPOXIA: Primary | ICD-10-CM

## 2022-10-18 PROCEDURE — 23472 RECONSTRUCT SHOULDER JOINT: CPT | Performed by: SPECIALIST/TECHNOLOGIST, OTHER

## 2022-10-18 PROCEDURE — A9270 NON-COVERED ITEM OR SERVICE: HCPCS | Performed by: ORTHOPAEDIC SURGERY

## 2022-10-18 PROCEDURE — 76942 ECHO GUIDE FOR BIOPSY: CPT | Performed by: ORTHOPAEDIC SURGERY

## 2022-10-18 PROCEDURE — C1713 ANCHOR/SCREW BN/BN,TIS/BN: HCPCS | Performed by: ORTHOPAEDIC SURGERY

## 2022-10-18 PROCEDURE — 23472 RECONSTRUCT SHOULDER JOINT: CPT | Performed by: ORTHOPAEDIC SURGERY

## 2022-10-18 PROCEDURE — C9290 INJ, BUPIVACAINE LIPOSOME: HCPCS | Performed by: ANESTHESIOLOGY

## 2022-10-18 PROCEDURE — 25010000002 MIDAZOLAM PER 1 MG: Performed by: ANESTHESIOLOGY

## 2022-10-18 PROCEDURE — 25010000002 HYDROMORPHONE PER 4 MG: Performed by: ANESTHESIOLOGIST ASSISTANT

## 2022-10-18 PROCEDURE — S0260 H&P FOR SURGERY: HCPCS | Performed by: ORTHOPAEDIC SURGERY

## 2022-10-18 PROCEDURE — 25010000002 ONDANSETRON PER 1 MG: Performed by: ANESTHESIOLOGIST ASSISTANT

## 2022-10-18 PROCEDURE — G0378 HOSPITAL OBSERVATION PER HR: HCPCS

## 2022-10-18 PROCEDURE — 63710000001 OXYCODONE 5 MG TABLET: Performed by: ORTHOPAEDIC SURGERY

## 2022-10-18 PROCEDURE — C9290 INJ, BUPIVACAINE LIPOSOME: HCPCS | Performed by: ORTHOPAEDIC SURGERY

## 2022-10-18 PROCEDURE — 0 BUPIVACAINE LIPOSOME 1.3 % SUSPENSION: Performed by: ANESTHESIOLOGY

## 2022-10-18 PROCEDURE — 25010000002 ROPIVACAINE PER 1 MG: Performed by: ORTHOPAEDIC SURGERY

## 2022-10-18 PROCEDURE — 25010000002 PROPOFOL 200 MG/20ML EMULSION: Performed by: ANESTHESIOLOGIST ASSISTANT

## 2022-10-18 PROCEDURE — C1776 JOINT DEVICE (IMPLANTABLE): HCPCS | Performed by: ORTHOPAEDIC SURGERY

## 2022-10-18 PROCEDURE — 25010000002 CEFAZOLIN PER 500 MG: Performed by: ORTHOPAEDIC SURGERY

## 2022-10-18 PROCEDURE — 0 BUPIVACAINE LIPOSOME 1.3 % SUSPENSION 10 ML VIAL: Performed by: ORTHOPAEDIC SURGERY

## 2022-10-18 PROCEDURE — 25010000002 FENTANYL CITRATE (PF) 50 MCG/ML SOLUTION: Performed by: ANESTHESIOLOGY

## 2022-10-18 PROCEDURE — 97166 OT EVAL MOD COMPLEX 45 MIN: CPT

## 2022-10-18 DEVICE — SEAL HEMO SURG ARISTA/AH ABS/PWDR 3GM: Type: IMPLANTABLE DEVICE | Site: SHOULDER | Status: FUNCTIONAL

## 2022-10-18 DEVICE — SCRW FIX LK HEX 4.75X20MM: Type: IMPLANTABLE DEVICE | Site: SHOULDER | Status: FUNCTIONAL

## 2022-10-18 DEVICE — TOTL SHLDER REV: Type: IMPLANTABLE DEVICE | Site: SHOULDER | Status: FUNCTIONAL

## 2022-10-18 DEVICE — SPACR HUM REV TM PLS 9MM: Type: IMPLANTABLE DEVICE | Site: SHOULDER | Status: FUNCTIONAL

## 2022-10-18 DEVICE — IMPLANTABLE DEVICE: Type: IMPLANTABLE DEVICE | Site: SHOULDER | Status: FUNCTIONAL

## 2022-10-18 DEVICE — SCRW COMPRNSV CNTRL 6.5X25MM REUS: Type: IMPLANTABLE DEVICE | Site: SHOULDER | Status: FUNCTIONAL

## 2022-10-18 DEVICE — STEM HUM/SHLDR TRABECULARMETAL REV 14X130MM: Type: IMPLANTABLE DEVICE | Site: SHOULDER | Status: FUNCTIONAL

## 2022-10-18 DEVICE — SUT NONABS MAXBRAID/PE NMBR2 C7 38IN BLU 900335: Type: IMPLANTABLE DEVICE | Site: HUMERUS | Status: FUNCTIONAL

## 2022-10-18 DEVICE — COMP HUM COMPNSV VERSADL STD TI46X18X53: Type: IMPLANTABLE DEVICE | Site: SHOULDER | Status: FUNCTIONAL

## 2022-10-18 DEVICE — LINER HUM/SHLDR TRABECULARMETAL REV POLY 60DEG 36MM PLS0: Type: IMPLANTABLE DEVICE | Site: SHOULDER | Status: FUNCTIONAL

## 2022-10-18 DEVICE — BASEPLT GLEN REV/SHLDR 28MM: Type: IMPLANTABLE DEVICE | Site: SHOULDER | Status: FUNCTIONAL

## 2022-10-18 DEVICE — SCRW FIX LK HEX 4.75X30MM: Type: IMPLANTABLE DEVICE | Site: SHOULDER | Status: FUNCTIONAL

## 2022-10-18 DEVICE — GLENOSPHERE VERSA DIAL FXD OFFST36 PLS3: Type: IMPLANTABLE DEVICE | Site: SHOULDER | Status: FUNCTIONAL

## 2022-10-18 DEVICE — DEV CONTRL TISS STRATAFIX SPIRAL MNCRYL UD 3/0 PLS 30CM: Type: IMPLANTABLE DEVICE | Site: SHOULDER | Status: FUNCTIONAL

## 2022-10-18 RX ORDER — DIPHENHYDRAMINE HYDROCHLORIDE 50 MG/ML
12.5 INJECTION INTRAMUSCULAR; INTRAVENOUS ONCE AS NEEDED
Status: DISCONTINUED | OUTPATIENT
Start: 2022-10-18 | End: 2022-10-18 | Stop reason: HOSPADM

## 2022-10-18 RX ORDER — HYDROCODONE BITARTRATE AND ACETAMINOPHEN 5; 325 MG/1; MG/1
1 TABLET ORAL ONCE AS NEEDED
Status: DISCONTINUED | OUTPATIENT
Start: 2022-10-18 | End: 2022-10-18 | Stop reason: HOSPADM

## 2022-10-18 RX ORDER — SODIUM CHLORIDE, SODIUM LACTATE, POTASSIUM CHLORIDE, CALCIUM CHLORIDE 600; 310; 30; 20 MG/100ML; MG/100ML; MG/100ML; MG/100ML
9 INJECTION, SOLUTION INTRAVENOUS CONTINUOUS PRN
Status: DISCONTINUED | OUTPATIENT
Start: 2022-10-18 | End: 2022-10-18 | Stop reason: HOSPADM

## 2022-10-18 RX ORDER — POTASSIUM CHLORIDE 750 MG/1
10 TABLET, FILM COATED, EXTENDED RELEASE ORAL DAILY
Status: DISCONTINUED | OUTPATIENT
Start: 2022-10-18 | End: 2022-10-19 | Stop reason: HOSPADM

## 2022-10-18 RX ORDER — SODIUM CHLORIDE 0.9 % (FLUSH) 0.9 %
10 SYRINGE (ML) INJECTION AS NEEDED
Status: DISCONTINUED | OUTPATIENT
Start: 2022-10-18 | End: 2022-10-18 | Stop reason: HOSPADM

## 2022-10-18 RX ORDER — TRANEXAMIC ACID 10 MG/ML
1000 INJECTION, SOLUTION INTRAVENOUS ONCE
Status: COMPLETED | OUTPATIENT
Start: 2022-10-18 | End: 2022-10-18

## 2022-10-18 RX ORDER — MEPERIDINE HYDROCHLORIDE 25 MG/ML
12.5 INJECTION INTRAMUSCULAR; INTRAVENOUS; SUBCUTANEOUS
Status: DISCONTINUED | OUTPATIENT
Start: 2022-10-18 | End: 2022-10-18 | Stop reason: HOSPADM

## 2022-10-18 RX ORDER — ACETAMINOPHEN 325 MG/1
650 TABLET ORAL ONCE AS NEEDED
Status: DISCONTINUED | OUTPATIENT
Start: 2022-10-18 | End: 2022-10-18 | Stop reason: HOSPADM

## 2022-10-18 RX ORDER — SODIUM CHLORIDE 0.9 % (FLUSH) 0.9 %
3-10 SYRINGE (ML) INJECTION AS NEEDED
Status: DISCONTINUED | OUTPATIENT
Start: 2022-10-18 | End: 2022-10-18 | Stop reason: HOSPADM

## 2022-10-18 RX ORDER — ACETAMINOPHEN 500 MG
1000 TABLET ORAL ONCE
Status: COMPLETED | OUTPATIENT
Start: 2022-10-18 | End: 2022-10-18

## 2022-10-18 RX ORDER — LIDOCAINE HYDROCHLORIDE 10 MG/ML
0.5 INJECTION, SOLUTION INFILTRATION; PERINEURAL ONCE AS NEEDED
Status: DISCONTINUED | OUTPATIENT
Start: 2022-10-18 | End: 2022-10-18 | Stop reason: HOSPADM

## 2022-10-18 RX ORDER — AMLODIPINE BESYLATE 5 MG/1
10 TABLET ORAL DAILY
Status: DISCONTINUED | OUTPATIENT
Start: 2022-10-18 | End: 2022-10-19 | Stop reason: HOSPADM

## 2022-10-18 RX ORDER — ONDANSETRON 2 MG/ML
4 INJECTION INTRAMUSCULAR; INTRAVENOUS EVERY 6 HOURS PRN
Status: DISCONTINUED | OUTPATIENT
Start: 2022-10-18 | End: 2022-10-19 | Stop reason: HOSPADM

## 2022-10-18 RX ORDER — HYDROMORPHONE HCL 110MG/55ML
PATIENT CONTROLLED ANALGESIA SYRINGE INTRAVENOUS AS NEEDED
Status: DISCONTINUED | OUTPATIENT
Start: 2022-10-18 | End: 2022-10-18 | Stop reason: SURG

## 2022-10-18 RX ORDER — MIDAZOLAM HYDROCHLORIDE 1 MG/ML
INJECTION INTRAMUSCULAR; INTRAVENOUS
Status: COMPLETED | OUTPATIENT
Start: 2022-10-18 | End: 2022-10-18

## 2022-10-18 RX ORDER — ACETAMINOPHEN 325 MG/1
325 TABLET ORAL ONCE AS NEEDED
Status: DISCONTINUED | OUTPATIENT
Start: 2022-10-18 | End: 2022-10-18 | Stop reason: HOSPADM

## 2022-10-18 RX ORDER — OXYCODONE HYDROCHLORIDE 5 MG/1
10 TABLET ORAL EVERY 4 HOURS PRN
Status: DISCONTINUED | OUTPATIENT
Start: 2022-10-18 | End: 2022-10-19 | Stop reason: HOSPADM

## 2022-10-18 RX ORDER — ONDANSETRON 4 MG/1
4 TABLET, FILM COATED ORAL EVERY 6 HOURS PRN
Qty: 30 TABLET | Refills: 0 | Status: SHIPPED | OUTPATIENT
Start: 2022-10-18

## 2022-10-18 RX ORDER — LABETALOL HYDROCHLORIDE 5 MG/ML
INJECTION, SOLUTION INTRAVENOUS AS NEEDED
Status: DISCONTINUED | OUTPATIENT
Start: 2022-10-18 | End: 2022-10-18 | Stop reason: SURG

## 2022-10-18 RX ORDER — SODIUM CHLORIDE, SODIUM LACTATE, POTASSIUM CHLORIDE, CALCIUM CHLORIDE 600; 310; 30; 20 MG/100ML; MG/100ML; MG/100ML; MG/100ML
75 INJECTION, SOLUTION INTRAVENOUS CONTINUOUS
Status: DISCONTINUED | OUTPATIENT
Start: 2022-10-18 | End: 2022-10-19 | Stop reason: HOSPADM

## 2022-10-18 RX ORDER — MIDAZOLAM HYDROCHLORIDE 1 MG/ML
0.5 INJECTION INTRAMUSCULAR; INTRAVENOUS
Status: DISCONTINUED | OUTPATIENT
Start: 2022-10-18 | End: 2022-10-18 | Stop reason: HOSPADM

## 2022-10-18 RX ORDER — FUROSEMIDE 20 MG/1
20 TABLET ORAL DAILY
Status: DISCONTINUED | OUTPATIENT
Start: 2022-10-19 | End: 2022-10-19 | Stop reason: HOSPADM

## 2022-10-18 RX ORDER — LIDOCAINE HYDROCHLORIDE 10 MG/ML
INJECTION, SOLUTION EPIDURAL; INFILTRATION; INTRACAUDAL; PERINEURAL AS NEEDED
Status: DISCONTINUED | OUTPATIENT
Start: 2022-10-18 | End: 2022-10-18 | Stop reason: SURG

## 2022-10-18 RX ORDER — ACETAMINOPHEN 325 MG/1
650 TABLET ORAL EVERY 4 HOURS PRN
Status: DISCONTINUED | OUTPATIENT
Start: 2022-10-18 | End: 2022-10-19 | Stop reason: HOSPADM

## 2022-10-18 RX ORDER — FLUMAZENIL 0.1 MG/ML
0.5 INJECTION INTRAVENOUS AS NEEDED
Status: DISCONTINUED | OUTPATIENT
Start: 2022-10-18 | End: 2022-10-18 | Stop reason: HOSPADM

## 2022-10-18 RX ORDER — CELECOXIB 200 MG/1
200 CAPSULE ORAL ONCE
Status: COMPLETED | OUTPATIENT
Start: 2022-10-18 | End: 2022-10-18

## 2022-10-18 RX ORDER — PROPOFOL 10 MG/ML
INJECTION, EMULSION INTRAVENOUS AS NEEDED
Status: DISCONTINUED | OUTPATIENT
Start: 2022-10-18 | End: 2022-10-18 | Stop reason: SURG

## 2022-10-18 RX ORDER — METOPROLOL TARTRATE 5 MG/5ML
INJECTION INTRAVENOUS AS NEEDED
Status: DISCONTINUED | OUTPATIENT
Start: 2022-10-18 | End: 2022-10-18 | Stop reason: SURG

## 2022-10-18 RX ORDER — SODIUM CHLORIDE 0.9 % (FLUSH) 0.9 %
3 SYRINGE (ML) INJECTION EVERY 12 HOURS SCHEDULED
Status: DISCONTINUED | OUTPATIENT
Start: 2022-10-18 | End: 2022-10-18 | Stop reason: HOSPADM

## 2022-10-18 RX ORDER — NALOXONE HCL 0.4 MG/ML
0.4 VIAL (ML) INJECTION AS NEEDED
Status: DISCONTINUED | OUTPATIENT
Start: 2022-10-18 | End: 2022-10-18 | Stop reason: HOSPADM

## 2022-10-18 RX ORDER — SODIUM CHLORIDE, SODIUM LACTATE, POTASSIUM CHLORIDE, CALCIUM CHLORIDE 600; 310; 30; 20 MG/100ML; MG/100ML; MG/100ML; MG/100ML
1000 INJECTION, SOLUTION INTRAVENOUS CONTINUOUS
Status: DISCONTINUED | OUTPATIENT
Start: 2022-10-18 | End: 2022-10-18

## 2022-10-18 RX ORDER — ONDANSETRON 4 MG/1
4 TABLET, FILM COATED ORAL EVERY 6 HOURS PRN
Status: DISCONTINUED | OUTPATIENT
Start: 2022-10-18 | End: 2022-10-19 | Stop reason: HOSPADM

## 2022-10-18 RX ORDER — ROCURONIUM BROMIDE 10 MG/ML
INJECTION, SOLUTION INTRAVENOUS AS NEEDED
Status: DISCONTINUED | OUTPATIENT
Start: 2022-10-18 | End: 2022-10-18 | Stop reason: SURG

## 2022-10-18 RX ORDER — MIDAZOLAM HYDROCHLORIDE 1 MG/ML
2 INJECTION INTRAMUSCULAR; INTRAVENOUS
Status: DISCONTINUED | OUTPATIENT
Start: 2022-10-18 | End: 2022-10-18 | Stop reason: HOSPADM

## 2022-10-18 RX ORDER — ONDANSETRON 2 MG/ML
4 INJECTION INTRAMUSCULAR; INTRAVENOUS ONCE AS NEEDED
Status: DISCONTINUED | OUTPATIENT
Start: 2022-10-18 | End: 2022-10-18 | Stop reason: HOSPADM

## 2022-10-18 RX ORDER — PROMETHAZINE HYDROCHLORIDE 25 MG/1
25 SUPPOSITORY RECTAL ONCE AS NEEDED
Status: DISCONTINUED | OUTPATIENT
Start: 2022-10-18 | End: 2022-10-18 | Stop reason: HOSPADM

## 2022-10-18 RX ORDER — OXYCODONE HCL 10 MG/1
10 TABLET, FILM COATED, EXTENDED RELEASE ORAL ONCE
Status: COMPLETED | OUTPATIENT
Start: 2022-10-18 | End: 2022-10-18

## 2022-10-18 RX ORDER — LISINOPRIL 20 MG/1
40 TABLET ORAL DAILY
Status: DISCONTINUED | OUTPATIENT
Start: 2022-10-19 | End: 2022-10-19 | Stop reason: HOSPADM

## 2022-10-18 RX ORDER — BUPIVACAINE HYDROCHLORIDE 5 MG/ML
INJECTION, SOLUTION EPIDURAL; INTRACAUDAL
Status: COMPLETED | OUTPATIENT
Start: 2022-10-18 | End: 2022-10-18

## 2022-10-18 RX ORDER — ONDANSETRON 2 MG/ML
INJECTION INTRAMUSCULAR; INTRAVENOUS AS NEEDED
Status: DISCONTINUED | OUTPATIENT
Start: 2022-10-18 | End: 2022-10-18 | Stop reason: SURG

## 2022-10-18 RX ORDER — FENTANYL CITRATE 50 UG/ML
50 INJECTION, SOLUTION INTRAMUSCULAR; INTRAVENOUS
Status: DISCONTINUED | OUTPATIENT
Start: 2022-10-18 | End: 2022-10-18 | Stop reason: HOSPADM

## 2022-10-18 RX ORDER — CLINDAMYCIN PHOSPHATE 900 MG/50ML
900 INJECTION, SOLUTION INTRAVENOUS ONCE
Status: COMPLETED | OUTPATIENT
Start: 2022-10-18 | End: 2022-10-18

## 2022-10-18 RX ORDER — OXYCODONE AND ACETAMINOPHEN 7.5; 325 MG/1; MG/1
TABLET ORAL
Qty: 40 TABLET | Refills: 0 | Status: SHIPPED | OUTPATIENT
Start: 2022-10-18

## 2022-10-18 RX ORDER — HYDRALAZINE HYDROCHLORIDE 20 MG/ML
5 INJECTION INTRAMUSCULAR; INTRAVENOUS
Status: DISCONTINUED | OUTPATIENT
Start: 2022-10-18 | End: 2022-10-18 | Stop reason: HOSPADM

## 2022-10-18 RX ORDER — PROMETHAZINE HYDROCHLORIDE 25 MG/1
25 TABLET ORAL ONCE AS NEEDED
Status: DISCONTINUED | OUTPATIENT
Start: 2022-10-18 | End: 2022-10-18 | Stop reason: HOSPADM

## 2022-10-18 RX ORDER — CLINDAMYCIN PHOSPHATE 900 MG/50ML
900 INJECTION, SOLUTION INTRAVENOUS EVERY 8 HOURS
Status: COMPLETED | OUTPATIENT
Start: 2022-10-18 | End: 2022-10-19

## 2022-10-18 RX ORDER — EPHEDRINE SULFATE 5 MG/ML
5 INJECTION INTRAVENOUS ONCE AS NEEDED
Status: DISCONTINUED | OUTPATIENT
Start: 2022-10-18 | End: 2022-10-18 | Stop reason: HOSPADM

## 2022-10-18 RX ORDER — DIPHENHYDRAMINE HCL 25 MG
25 CAPSULE ORAL
Status: DISCONTINUED | OUTPATIENT
Start: 2022-10-18 | End: 2022-10-18 | Stop reason: HOSPADM

## 2022-10-18 RX ORDER — HYDROCODONE BITARTRATE AND ACETAMINOPHEN 10; 325 MG/1; MG/1
1 TABLET ORAL EVERY 4 HOURS PRN
Status: DISCONTINUED | OUTPATIENT
Start: 2022-10-18 | End: 2022-10-18 | Stop reason: HOSPADM

## 2022-10-18 RX ORDER — ACETAMINOPHEN 650 MG/1
650 SUPPOSITORY RECTAL ONCE AS NEEDED
Status: DISCONTINUED | OUTPATIENT
Start: 2022-10-18 | End: 2022-10-18 | Stop reason: HOSPADM

## 2022-10-18 RX ORDER — DIPHENHYDRAMINE HYDROCHLORIDE 50 MG/ML
12.5 INJECTION INTRAMUSCULAR; INTRAVENOUS
Status: DISCONTINUED | OUTPATIENT
Start: 2022-10-18 | End: 2022-10-18 | Stop reason: HOSPADM

## 2022-10-18 RX ORDER — NALOXONE HCL 0.4 MG/ML
0.4 VIAL (ML) INJECTION
Status: DISCONTINUED | OUTPATIENT
Start: 2022-10-18 | End: 2022-10-19 | Stop reason: HOSPADM

## 2022-10-18 RX ORDER — FENTANYL CITRATE 50 UG/ML
INJECTION, SOLUTION INTRAMUSCULAR; INTRAVENOUS
Status: COMPLETED | OUTPATIENT
Start: 2022-10-18 | End: 2022-10-18

## 2022-10-18 RX ORDER — ASPIRIN 325 MG
325 TABLET ORAL DAILY
Status: DISCONTINUED | OUTPATIENT
Start: 2022-10-19 | End: 2022-10-19 | Stop reason: HOSPADM

## 2022-10-18 RX ORDER — ACETAMINOPHEN 650 MG/1
650 SUPPOSITORY RECTAL EVERY 4 HOURS PRN
Status: DISCONTINUED | OUTPATIENT
Start: 2022-10-18 | End: 2022-10-19 | Stop reason: HOSPADM

## 2022-10-18 RX ORDER — LABETALOL HYDROCHLORIDE 5 MG/ML
5 INJECTION, SOLUTION INTRAVENOUS
Status: DISCONTINUED | OUTPATIENT
Start: 2022-10-18 | End: 2022-10-18 | Stop reason: HOSPADM

## 2022-10-18 RX ORDER — LORAZEPAM 2 MG/ML
1 INJECTION INTRAMUSCULAR
Status: DISCONTINUED | OUTPATIENT
Start: 2022-10-18 | End: 2022-10-18 | Stop reason: HOSPADM

## 2022-10-18 RX ORDER — IPRATROPIUM BROMIDE AND ALBUTEROL SULFATE 2.5; .5 MG/3ML; MG/3ML
3 SOLUTION RESPIRATORY (INHALATION) ONCE AS NEEDED
Status: DISCONTINUED | OUTPATIENT
Start: 2022-10-18 | End: 2022-10-18 | Stop reason: HOSPADM

## 2022-10-18 RX ADMIN — SUGAMMADEX 100 MG: 100 INJECTION, SOLUTION INTRAVENOUS at 11:52

## 2022-10-18 RX ADMIN — CLINDAMYCIN PHOSPHATE 900 MG: 900 INJECTION, SOLUTION INTRAVENOUS at 18:16

## 2022-10-18 RX ADMIN — SUGAMMADEX 200 MG: 100 INJECTION, SOLUTION INTRAVENOUS at 12:04

## 2022-10-18 RX ADMIN — SODIUM CHLORIDE, SODIUM LACTATE, POTASSIUM CHLORIDE, AND CALCIUM CHLORIDE: .6; .31; .03; .02 INJECTION, SOLUTION INTRAVENOUS at 09:58

## 2022-10-18 RX ADMIN — MIDAZOLAM 2 MG: 1 INJECTION INTRAMUSCULAR; INTRAVENOUS at 09:37

## 2022-10-18 RX ADMIN — BUPIVACAINE 10 ML: 13.3 INJECTION, SUSPENSION, LIPOSOMAL INFILTRATION at 09:38

## 2022-10-18 RX ADMIN — ROCURONIUM BROMIDE 50 MG: 10 INJECTION, SOLUTION INTRAVENOUS at 10:00

## 2022-10-18 RX ADMIN — METOPROLOL TARTRATE 2.5 MG: 5 INJECTION, SOLUTION INTRAVENOUS at 11:55

## 2022-10-18 RX ADMIN — Medication 10 MG: at 11:45

## 2022-10-18 RX ADMIN — ONDANSETRON 4 MG: 2 INJECTION INTRAMUSCULAR; INTRAVENOUS at 11:47

## 2022-10-18 RX ADMIN — PROPOFOL 170 MG: 10 INJECTION, EMULSION INTRAVENOUS at 10:00

## 2022-10-18 RX ADMIN — Medication 10 MG: at 11:31

## 2022-10-18 RX ADMIN — ROCURONIUM BROMIDE 10 MG: 10 INJECTION, SOLUTION INTRAVENOUS at 11:02

## 2022-10-18 RX ADMIN — SODIUM CHLORIDE, POTASSIUM CHLORIDE, SODIUM LACTATE AND CALCIUM CHLORIDE 75 ML/HR: 600; 310; 30; 20 INJECTION, SOLUTION INTRAVENOUS at 15:52

## 2022-10-18 RX ADMIN — PROPOFOL 80 MCG/KG/MIN: 10 INJECTION, EMULSION INTRAVENOUS at 10:05

## 2022-10-18 RX ADMIN — HYDROMORPHONE HYDROCHLORIDE 1 MG: 2 INJECTION, SOLUTION INTRAMUSCULAR; INTRAVENOUS; SUBCUTANEOUS at 10:45

## 2022-10-18 RX ADMIN — SUGAMMADEX 100 MG: 100 INJECTION, SOLUTION INTRAVENOUS at 12:00

## 2022-10-18 RX ADMIN — PROPOFOL 30 MG: 10 INJECTION, EMULSION INTRAVENOUS at 10:45

## 2022-10-18 RX ADMIN — FENTANYL CITRATE 100 MCG: 50 INJECTION, SOLUTION INTRAMUSCULAR; INTRAVENOUS at 09:37

## 2022-10-18 RX ADMIN — CELECOXIB 200 MG: 200 CAPSULE ORAL at 09:10

## 2022-10-18 RX ADMIN — LIDOCAINE HYDROCHLORIDE 50 MG: 10 INJECTION, SOLUTION EPIDURAL; INFILTRATION; INTRACAUDAL; PERINEURAL at 10:00

## 2022-10-18 RX ADMIN — OXYCODONE 10 MG: 5 TABLET ORAL at 21:41

## 2022-10-18 RX ADMIN — TRANEXAMIC ACID 1000 MG: 10 INJECTION, SOLUTION INTRAVENOUS at 10:05

## 2022-10-18 RX ADMIN — OXYCODONE HYDROCHLORIDE 10 MG: 10 TABLET, FILM COATED, EXTENDED RELEASE ORAL at 09:10

## 2022-10-18 RX ADMIN — SODIUM CHLORIDE, POTASSIUM CHLORIDE, SODIUM LACTATE AND CALCIUM CHLORIDE 9 ML/HR: 600; 310; 30; 20 INJECTION, SOLUTION INTRAVENOUS at 08:47

## 2022-10-18 RX ADMIN — ACETAMINOPHEN 1000 MG: 500 TABLET ORAL at 09:10

## 2022-10-18 RX ADMIN — HYDROMORPHONE HYDROCHLORIDE 1 MG: 2 INJECTION, SOLUTION INTRAMUSCULAR; INTRAVENOUS; SUBCUTANEOUS at 11:23

## 2022-10-18 RX ADMIN — CLINDAMYCIN PHOSPHATE 900 MG: 900 INJECTION, SOLUTION INTRAVENOUS at 10:08

## 2022-10-18 RX ADMIN — BUPIVACAINE HYDROCHLORIDE 10 ML: 5 INJECTION, SOLUTION EPIDURAL; INTRACAUDAL; PERINEURAL at 09:38

## 2022-10-18 NOTE — CASE MANAGEMENT/SOCIAL WORK
Discharge Planning Assessment  [facility]     Patient Name: Farheen Gomez  MRN: [id number]  Today's Date: 10/18/2022    Admit Date: 10/18/2022    Plan: Home with OP PT with [facility] in [place] Oct. 20th at 9am   Discharge Needs Assessment     Row Name 10/18/22 1610       Living Environment    People in Home spouse    Current Living Arrangements home    Primary Care Provided by self    Provides Primary Care For no one    Able to Return to Prior Arrangements yes       Resource/Environmental Concerns    Resource/Environmental Concerns none    Transportation Concerns none       Transition Planning    Patient/Family Anticipates Transition to home with family    Patient/Family Anticipated Services at Transition none    Transportation Anticipated family or friend will provide       Discharge Needs Assessment    Readmission Within the Last 30 Days no previous admission in last 30 days    Equipment Currently Used at Home cpap    Concerns to be Addressed discharge planning    Anticipated Changes Related to Illness none    Equipment Needed After Discharge none               Discharge Plan     Row Name 10/18/22 1611       Plan    Plan Home with OP PT with [facility] in [place] Oct. 20th at 9am    Plan Comments Met with Patient and wife at bedside Lives at home with Wife who will provide transportation. IADL's PCP and Pharmacy verified, able to afford medications. Wants OP PT at [facility] which has already been set up for first appt. D/C Barriers: Has Surgery today, will have pt/ot eval in AM              Continued Care and Services - Admitted Since 10/18/2022     Therapy Coordination complete.    Service Provider Request Status Selected Services Address Phone Fax Patient Preferred    [facility]  Selected Outpatient Rehabilitation [address] [place] [address] [phone] --              Expected Discharge Date and Time     Expected Discharge Date Expected Discharge  Time    Oct 19, 2022          Demographic Summary     Row Name 10/18/22 1610       General Information    Admission Type observation    Arrived From emergency department    Required Notices Provided Observation Status Notice    Referral Source admission list    Reason for Consult discharge planning    Preferred Language English               Functional Status     Row Name 10/18/22 1610       Functional Status    Usual Activity Tolerance good    Current Activity Tolerance moderate       Functional Status, IADL    Medications independent    Meal Preparation independent    Housekeeping independent    Laundry independent    Shopping independent       Mental Status    General Appearance WDL WDL       Mental Status Summary    Recent Changes in Mental Status/Cognitive Functioning no changes                      Patient Forms     Row Name 10/18/22 7507       Patient Forms    Important Message from Medicare (IMM) --  Imm 10/18 per reg                  Ashley Marcano RN

## 2022-10-18 NOTE — THERAPY EVALUATION
Patient Name: Farheen Gomez  : 1945    MRN: [id number]                              Today's Date: 10/18/2022       Admit Date: 10/18/2022    Visit Dx:     ICD-10-CM ICD-9-CM   1. Rotator cuff tear arthropathy of right shoulder  M75.101 716.81    M12.811      Patient Active Problem List   Diagnosis   • Spinal stenosis in cervical region - reveresal of lordosis at C3/4 and C4/5, Modic endpalate cahnges at C7/T1   • Hypertension   • BPH (benign prostatic hyperplasia)   • Anemia   • Arthritis   • Morbid (severe) obesity due to excess calories ([facility])   • Dyslipidemia   • Family history of malignant neoplasm of urinary bladder   • Foot pain, right   • Ganglion cyst   • Hand paresthesia   • Hearing problem   • History of poliomyelitis   • Hyperglycemia   • Chronic pain of both knees   • Other hammer toe(s) (acquired), left foot   • Plantar fasciitis, left   • Sleep disorder   • Tachycardia   • Thrombocytopenia ([facility])   • Vitamin D deficiency   • Amblyopia   • Dry eye   • Hypertensive retinopathy of both eyes   • Lens replaced by other means   • AMD (age-related macular degeneration), bilateral   • Nonexudative age-related macular degeneration   • Other chronic allergic conjunctivitis   • Other specified disorders of eyelid   • PCO (posterior capsular opacification), bilateral   • Presence of intraocular lens   • PVD (posterior vitreous detachment), both eyes   • Regular astigmatism   • History of bilateral knee arthroplasty   • Other headache syndrome   • Exposure to COVID-19 virus   • Status post total knee replacement, right   • Hx of total knee replacement, left   • Class 2 severe obesity with serious comorbidity and body mass index (BMI) of 35.0 to 35.9 in adult ([facility])   • Seborrhea capitis in adult   • Chronic right shoulder pain   • Rotator cuff tear arthropathy of right shoulder   • Thrombocytopenia due to COVID-19 virus   • Lipoma of right thigh   • Chronic midline low back pain     Past Medical History:    Diagnosis Date   • Arthritis    • BPH (benign prostatic hyperplasia)    • Depression    • History of bilateral knee arthroplasty    • Hypertension      Past Surgical History:   Procedure Laterality Date   • BACK SURGERY     • HAND SURGERY Bilateral     carpal tunnel repair   • KNEE SURGERY      meniscus repair    • LUMBAR DISCECTOMY Bilateral 4/22/2019    Procedure: left and right  L4-5 lumbar decompression with dura repair;  Surgeon: Edson Khan MD;  Location: [facility] MAIN OR;  Service: Neurosurgery   • ROTATOR CUFF REPAIR Right    • TOTAL KNEE ARTHROPLASTY Right 10/23/2019    Procedure: TOTAL KNEE ARTHROPLASTY, WITH LEFT KNEE INJECTION;  Surgeon: Boby Medina MD;  Location: [facility] MAIN OR;  Service: Orthopedics   • TOTAL KNEE ARTHROPLASTY Left 2/5/2020    Procedure: TOTAL KNEE ARTHROPLASTY;  Surgeon: Boby Medina MD;  Location: [facility] MAIN OR;  Service: Orthopedics;  Laterality: Left;      General Information     Row Name 10/18/22 1602          OT Time and Intention    Document Type evaluation  -     Mode of Treatment individual therapy  -     Row Name 10/18/22 1602          General Information    Patient Profile Reviewed yes  -BL     Prior Level of Function independent:  -     Existing Precautions/Restrictions fall;shoulder  -     Row Name 10/18/22 1602          Living Environment    People in Home spouse  -     Row Name 10/18/22 1602          Home Main Entrance    Number of Stairs, Main Entrance three  -BL     Stair Railings, Main Entrance railings safe and in good condition  -BL     Row Name 10/18/22 1602          Stairs Within Home, Primary    Number of Stairs, Within Home, Primary none  -BL     Row Name 10/18/22 1602          Cognition    Orientation Status (Cognition) oriented x 4  -BL           User Key  (r) = Recorded By, (t) = Taken By, (c) = Cosigned By    Initials Name Provider Type    BL Annetta Dyson OT Occupational Therapist                 Mobility/ADL's     Row Name  10/18/22 1603          Bed Mobility    Bed Mobility supine-sit  -BL     Supine-Sit [place] (Bed Mobility) contact guard  -     Row Name 10/18/22 1603          Transfers    Transfers sit-stand transfer  -BL     Row Name 10/18/22 1603          Sit-Stand Transfer    Sit-Stand [place] (Transfers) contact guard  -     Row Name 10/18/22 1603          Activities of Daily Living    BADL Assessment/Intervention upper body dressing  -     Row Name 10/18/22 1603          Upper Body Dressing Assessment/Training    [place] Level (Upper Body Dressing) doff;don;maximum assist (25% patient effort);moderate assist (50% patient effort)  -     Position (Upper Body Dressing) edge of bed sitting  -BL     Comment, (Upper Body Dressing) Mod-max A for donning/doffing sling  -           User Key  (r) = Recorded By, (t) = Taken By, (c) = Cosigned By    Initials Name Provider Type    BL Annetta Dyson, OT Occupational Therapist               Obj/Interventions     Row Name 10/18/22 1604          Sensory Assessment (Somatosensory)    Sensory Assessment (Somatosensory) other (see comments)  Pt with nerve block  -     Row Name 10/18/22 1604          Vision Assessment/Intervention    Visual Impairment/Limitations corrective lenses full-time  -[facility] Name 10/18/22 1604          Range of Motion Comprehensive    Comment, General Range of Motion RUE shoulder AAROM 0-90; other RUE AAROM WFL; LUE WFL  -[facility] Name 10/18/22 1604          Strength Comprehensive (MMT)    Comment, General Manual Muscle Testing (MMT) Assessment RUE not tested; LUE WFL  -     Row Name 10/18/22 1604          Motor Skills    Motor Skills functional endurance  -BL     Functional Endurance fair  -     Row Name 10/18/22 1604          Balance    Balance Assessment standing static balance  -BL     Static Standing Balance contact guard  -BL     Position/Device Used, Standing Balance unsupported  -BL           User Key  (r) = Recorded By, (t) =  Taken By, (c) = Cosigned By    Initials Name Provider Type     Annetta Dyson OT Occupational Therapist               Goals/Plan     Row Name 10/18/22 1613          Dressing Goal 1 (OT)    Activity/Device (Dressing Goal 1, OT) dressing skills, all  -BL     [place]/Cues Needed (Dressing Goal 1, OT) contact guard required  -BL     Time Frame (Dressing Goal 1, OT) 2 weeks  -BL     Row Name 10/18/22 1613          Toileting Goal 1 (OT)    Activity/Device (Toileting Goal 1, OT) toileting skills, all  -BL     [place] Level/Cues Needed (Toileting Goal 1, OT) contact guard required  -BL     Time Frame (Toileting Goal 1, OT) 2 weeks  -BL     Row Name 10/18/22 1613          Grooming Goal 1 (OT)    Activity/Device (Grooming Goal 1, OT) grooming skills, all  -BL     [place] (Grooming Goal 1, OT) contact guard required  -BL     Time Frame (Grooming Goal 1, OT) 2 weeks  -BL     Row Name 10/18/22 1613          Therapy Assessment/Plan (OT)    Planned Therapy Interventions (OT) activity tolerance training;BADL retraining;cognitive/visual perception retraining;neuromuscular control/coordination retraining;IADL retraining;functional balance retraining;occupation/activity based interventions;passive ROM/stretching;patient/caregiver education/training;transfer/mobility retraining;strengthening exercise;ROM/therapeutic exercise  -BL           User Key  (r) = Recorded By, (t) = Taken By, (c) = Cosigned By    Initials Name Provider Type     Annetta Dyson OT Occupational Therapist               Clinical Impression     Row Name 10/18/22 1606          Pain Assessment    Pretreatment Pain Rating 0/10 - no pain  -BL     Posttreatment Pain Rating 0/10 - no pain  -BL     Row Name 10/18/22 1606          Plan of Care Review    Plan of Care Reviewed With patient  -BL     Outcome Evaluation 75 y/o male POD #0 Right Reverse TSA on 10/18/22. Per Dr. Medina pt to perform pendulum exercises to shoulder starting POD #1 5-6/day. Pt  lives with his wife and is typically independent with all ADLs/IADLs at baseline. At time of evalution, pt lethargic from sx this morning and required cues to keep eyes open throughout. Pt required CGA for bed mobility and sit to stand. Pt required mod-max A for donning/doffing sling. Pt without icepack and RN made aware. Pt able to perform exercises (not pendulums) with cues and good carryover. OT recommending pt return home with family and OP OT. OT will follow up tomorrow for dressing, exercises, and sling/icepack management.  -     Row Name 10/18/22 1606          Therapy Assessment/Plan (OT)    Criteria for Skilled Therapeutic Interventions Met (OT) yes;skilled treatment is necessary  -     Therapy Frequency (OT) 5 times/wk  -     Row Name 10/18/22 1606          Therapy Plan Review/Discharge Plan (OT)    Anticipated Discharge Disposition (OT) home with assist;home with outpatient therapy services  -     Row Name 10/18/22 1606          Positioning and Restraints    Pre-Treatment Position in bed  -BL     Post Treatment Position bed  -BL     In Bed notified nsg;encouraged to call for assist;exit alarm on;call light within reach  -           User Key  (r) = Recorded By, (t) = Taken By, (c) = Cosigned By    Initials Name Provider Type    Annetta Lugo, OT Occupational Therapist               Outcome Measures    No documentation.                 Occupational Therapy Education     Title: PT OT SLP Therapies (In Progress)     Topic: Occupational Therapy (Done)     Point: ADL training (Done)     Description:   Instruct learner(s) on proper safety adaptation and remediation techniques during self care or transfers.   Instruct in proper use of assistive devices.              Learning Progress Summary           Patient Acceptance, E,TB, VU by  at 10/18/2022 1614                   Point: Home exercise program (Done)     Description:   Instruct learner(s) on appropriate technique for monitoring, assisting  and/or progressing therapeutic exercises/activities.              Learning Progress Summary           Patient Acceptance, E,TB, VU by  at 10/18/2022 1614                   Point: Precautions (Done)     Description:   Instruct learner(s) on prescribed precautions during self-care and functional transfers.              Learning Progress Summary           Patient Acceptance, E,TB, VU by  at 10/18/2022 1614                               User Key     Initials Effective Dates Name Provider Type Discipline     09/22/22 -  Annetta Dyson OT Occupational Therapist OT              OT Recommendation and Plan  Planned Therapy Interventions (OT): activity tolerance training, BADL retraining, cognitive/visual perception retraining, neuromuscular control/coordination retraining, IADL retraining, functional balance retraining, occupation/activity based interventions, passive ROM/stretching, patient/caregiver education/training, transfer/mobility retraining, strengthening exercise, ROM/therapeutic exercise  Therapy Frequency (OT): 5 times/wk  Plan of Care Review  Plan of Care Reviewed With: patient  Outcome Evaluation: 75 y/o male POD #0 Right Reverse TSA on 10/18/22. Per Dr. Medina pt to perform pendulum exercises to shoulder starting POD #1 5-6/day. Pt lives with his wife and is typically independent with all ADLs/IADLs at baseline. At time of evalution, pt lethargic from sx this morning and required cues to keep eyes open throughout. Pt required CGA for bed mobility and sit to stand. Pt required mod-max A for donning/doffing sling. Pt without icepack and RN made aware. Pt able to perform exercises (not pendulums) with cues and good carryover. OT recommending pt return home with family and OP OT. OT will follow up tomorrow for dressing, exercises, and sling/icepack management.     Time Calculation:    Time Calculation- OT     Row Name 10/18/22 1614             Time Calculation- OT    OT Start Time 1451  -BL      OT Stop  Time 1521  -BL      OT Time Calculation (min) 30 min  -BL      OT Received On 10/18/22  -      OT - Next Appointment 10/19/22  -BL      OT Goal Re-Cert Due Date 11/01/22  -BL            User Key  (r) = Recorded By, (t) = Taken By, (c) = Cosigned By    Initials Name Provider Type     Annetta Dyson OT Occupational Therapist              Therapy Charges for Today     Code Description Service Date Service Provider Modifiers Qty    [id number]  OT EVAL MOD COMPLEXITY 4 10/18/2022 Annetta Dyson OT GO 1               Annetta Dyson OT  10/18/2022

## 2022-10-18 NOTE — OP NOTE
REVERSE TOTAL SHOULDER ARTHROPLASTY       PATIENT NAME:Farheen Gomez     YOB: 1945     ATTENDING PHYSICIAN: Boby Medina MD     DATE OF PROCEDURE: 10/18/2022     PREOPERATIVE DIAGNOSIS: Rotator cuff tear arthropathy of right shoulder [M75.101, M12.811]    POSTOPERATIVE DIAGNOSIS: Post-Op Diagnosis Codes:     * Rotator cuff tear arthropathy of right shoulder [M75.101, M12.811]    PRINCIPAL DIAGNOSIS:  Severe degenerative osteoarthritis, right glenohumeral joint-shoulder.    PROCEDURE: Right reverse total shoulder arthroplasty.    SURGEON:  Boby Medina M.D.    ASSISTANT: Aaliyah Godoy first assistant was responsible for performing the following activities: Retraction, Suction, Irrigation, Suturing, Closing and Placing Dressing and their skilled assistance was necessary for the success of this case.       ANESTHESIOLOGIST: Dr. Mars Wade MD    ANESTHESIA: Scalene block anesthetic for postop pain control followed by general anesthesia.    POSITION: Beach chair.    DRAINS: None.    COMPLICATIONS: None.    ESTIMATED BLOOD LOSS: 100ml    SPECIMENS: * No orders in the log *    IMPLANT USED: A #14 [place] TM coated press-fit humeral stem with a +9 mm polyethylene insert, a 28 mm standard comprehensive base plate from [employer] for the glenoid with a 36 mm Glenosphere with a +3 mm offset and 5 screws.      INDICATION: The patient has severe pain and discomfort in the right shoulder with exquisite discomfort. Patient could not carry out activities of daily living and recreational activities. Risks and benefits of surgical intervention were discussed with the patient only after it was determined that conservative care would not provide them with adequate relief of symptoms.  All potential complications were discussed with the patient in great detail.     DETAILS OF PROCEDURE: Surgical timeout was called. Operative extremity was correctly identified in the operating room suite. Patient was placed under  appropriate anesthetic.  The patient was placed in a beach chair position.  All bony prominences were carefully padded and protected.  Patient was given antibiotics per the SCIP protocol. Extremity was prepped and draped in a standard fashion. A deltopectoral incision was carried out. The interval between the deltoid and the pectoralis major was developed. The cephalic vein was carefully protected and retracted laterally. The subscap was identified, traction sutures were placed for subsequent reattachment using FiberWire suture. The anterior capsulotomy was carried out. The anterior capsule was tagged for subsequent reattachment as well. The humeral head was subluxated anteriorly.The biceps tendon was tagged for subsequent reattachment. The anterior and inferior capsular release was carried out along the humeral neck. Intramedullary canal was identified and we started with a #8 reamer. We progressed on with 1 mm increments up to the point that there was a good intramedullary fit with the appropriate reamer. The appropriate #14 humeral trial component was impacted into position and an excellent fit was obtained. Good metaphyseal fit was obtained along with diaphyseal fixation. Appropriate amounts of retroversion were built into the positioning of the #14 humeral component.     Retractors were placed around the glenoid posteriorly, anteriorly, and inferiorly. The glenoid labrum was resected. The stump of the long head of the biceps was resected superiorly. Articular remnants of the of articular cartilage were scarped off the glenoid. The central post pin was placed after careful gauging of the anterior, posterior, and inferior aspect of the glenoid. There was a slight interior tilt based into the placement of the pin. The central post was drilled followed by the placement of the pin. The central post was drilled followed by the placement of a peripheral soft tissue reamer. Bleeding subchondral bone was exposed in the  base of the glenoid. A central post reamer was then used and the cancellous bone was lavaged with bacitracin irrigating solution. Diluted Betadine solution was used as an antibiotic solution and a press-fit 28 mm, TM coated standard comprehensive glenoid component was impacted into position. The stability of the glenoid component was augmented with screws, one superoinferiorly, the other one posteroinferiorly. The 36 mm diameter, +3.0 mm lateral offset glenosphere was then impacted and locked onto the basilio taper of the glenoid component.     Trial reduction was carried out.  The stability of the components was excellent. The limb lengths were checked and found to be accurate. There was no tendency towards dislocation of the components in any position of the prosthesis. The humerus was subluxated anteriorly. The humeral canal was lavaged with bacitracin irrigating solution and diluted Betadine was used as an antibiotic solution. The appropriate humeral stem, #14 press-fit TM coated was then impacted and locked into position. Excellent rotational and axial stability were obtained. The trial reductions were carried out and then the appropriate polyethylene insert, +9 mm in thickness was snapped and locked onto the humeral side. A final reduction was carried out. Stability was checked and found to be excellent. There was no tendency towards dislocation, and limb lengths were accurate. The anterior capsule was then repaired with interrupted sutures. The remnants of the rotator were reattached to build a soft tissue envelope to provide stability anteriorly and superiorly for the components. Deltopectorial interval was carefully approximated. Subcuticular sutures applied. An analgesic  cocktail was instilled into the subperiosteal tissues and intra-articularlys into the capsule for postoperative analgesia. Occlusive dressing was applied. Slingshot was applied to control the position of the extremity. Patient tolerated the  procedure well. Sponge, gauze and needle count was correct. The patient was reversed from anesthesia and taken from the operating room to the recovery room in stable condition. I discussed the satisfactory performance of this procedure with the patient’s family and answered all questions for them.      Boby Medina MD  10/18/2022  14:58 EDT

## 2022-10-18 NOTE — PLAN OF CARE
Goal Outcome Evaluation:  Plan of Care Reviewed With: patient           Outcome Evaluation: 77 y/o male POD #0 Right Reverse TSA on 10/18/22. Per Dr. Medina pt to perform pendulum exercises to shoulder starting POD #1 5-6/day. Pt lives with his wife and is typically independent with all ADLs/IADLs at baseline. At time of evalution, pt lethargic from sx this morning and required cues to keep eyes open throughout. Pt required CGA for bed mobility and sit to stand. Pt required mod-max A for donning/doffing sling. Pt without icepack and RN made aware. Pt able to perform exercises (not pendulums) with cues and good carryover. OT recommending pt return home with family and OP OT. OT will follow up tomorrow for dressing, exercises, and sling/icepack management.

## 2022-10-18 NOTE — ANESTHESIA POSTPROCEDURE EVALUATION
Patient: Farheen Gomez    Procedure Summary     Date: 10/18/22 Room / Location: [facility] OR 09 / [facility] MAIN OR    Anesthesia Start: 0958 Anesthesia Stop: 1235    Procedure: TOTAL SHOULDER REVERSE ARTHROPLASTY (Right: Shoulder) Diagnosis:       Rotator cuff tear arthropathy of right shoulder      (Rotator cuff tear arthropathy of right shoulder [M75.101, M12.811])    Surgeons: Boby Medina MD Provider: Joseph Smith MD    Anesthesia Type: general with block, ERAS Protocol ASA Status: 2          Anesthesia Type: general with block, ERAS Protocol    Vitals  Vitals Value Taken Time   /80 10/18/22 1345   Temp 96.8 °F (36 °C) 10/18/22 1344   Pulse 59 10/18/22 1347   Resp 10 10/18/22 1344   SpO2 91 % 10/18/22 1347   Vitals shown include unvalidated device data.        Post Anesthesia Care and Evaluation    Patient location during evaluation: PACU  Patient participation: complete - patient participated  Level of consciousness: awake  Pain scale: See nurse's notes for pain score.  Pain management: adequate    Airway patency: patent  Anesthetic complications: No anesthetic complications  PONV Status: none  Cardiovascular status: acceptable  Respiratory status: acceptable  Hydration status: acceptable    Comments: Patient seen and examined postoperatively; vital signs stable; SpO2 greater than or equal to 90%; cardiopulmonary status stable; nausea/vomiting adequately controlled; pain adequately controlled; no apparent anesthesia complications; patient discharged from anesthesia care when discharge criteria were met

## 2022-10-18 NOTE — PLAN OF CARE
Goal Outcome Evaluation:            [facility]. Patient VSS on 4L of oxygen. Patient resting comfortably, spouse at bedside, care plan ongoing

## 2022-10-18 NOTE — ANESTHESIA PREPROCEDURE EVALUATION
Anesthesia Evaluation     Patient summary reviewed and Nursing notes reviewed   no history of anesthetic complications:  NPO Solid Status: > 8 hours  NPO Liquid Status: > 6 hours           Airway   Mallampati: II  TM distance: >3 FB  Neck ROM: full  No difficulty expected and Anterior  Dental - normal exam     Pulmonary - negative pulmonary ROS and normal exam   Cardiovascular - normal exam    (+) hypertension, hyperlipidemia,       Neuro/Psych  (+) headaches, psychiatric history Depression,    GI/Hepatic/Renal/Endo    (+) obesity,  GERD well controlled,  renal disease,     Musculoskeletal     (+) neck pain,   Abdominal  - normal exam    Bowel sounds: normal.   Substance History - negative use     OB/GYN negative ob/gyn ROS         Other   arthritis,          Phys Exam Other: Glidescope needed for previous intubation per chart review                  Anesthesia Plan    ASA 2     general with block and ERAS Protocol   total IV anesthesia  (ERAS protocol.  PO meds ordered per surgeon. )  intravenous induction     Anesthetic plan, risks, benefits, and alternatives have been provided, discussed and informed consent has been obtained with: patient.    Plan discussed with CAA.

## 2022-10-18 NOTE — ANESTHESIA PROCEDURE NOTES
Airway  Urgency: elective    Date/Time: 10/18/2022 10:02 AM  End Time:10/18/2022 10:03 AM  Airway not difficult    General Information and Staff    Patient location during procedure: OR  Anesthesiologist: Joseph Smith MD    Indications and Patient Condition  Indications for airway management: airway protection    Preoxygenated: yes  Mask difficulty assessment: 1 - vent by mask    Final Airway Details  Final airway type: endotracheal airway      Successful airway: ETT  Cuffed: yes   Successful intubation technique: direct laryngoscopy  Facilitating devices/methods: intubating stylet, cricoid pressure and Bougie  Endotracheal tube insertion site: oral  Blade: Steve  Blade size: 4  ETT size (mm): 7.5  Cormack-Lehane Classification: grade IIa - partial view of glottis  Placement verified by: chest auscultation, capnometry and palpation of cuff   Measured from: lips  ETT/EBT  to lips (cm): 22  Number of attempts at approach: 2  Assessment: lips, teeth, and gum same as pre-op and atraumatic intubation

## 2022-10-18 NOTE — H&P
History & Physical       Patient: Farheen Gomez    Date of Admission: 10/18/2022  6:18 AM    YOB: 1945    Medical Record Number: [id number]    Attending Physician: Boby Medina MD        Chief Complaints: Rotator cuff tear arthropathy of right shoulder [M75.101, M12.811]      History of Present Illness: 76 y.o. male presents with Rotator cuff tear arthropathy of right shoulder [M75.101, M12.811]. Onset of symptoms was slowly progressive over the past 1 year or so involving both shoulders, right worse than left.  Symptoms are associated with pain and discomfort over the anterior aspect of the right glenohumeral joint.  Symptoms are aggravated by active abduction of the shoulder.   Symptoms improve with resting the arm by the side. Patient is now being admitted to the services of Boby Medina MD for further evaluation and treatment.        Allergies   Allergen Reactions   • Penicillins Hives and Other (See Comments)     Tongue bled   • Propranolol GI Intolerance     constipation         Home Medications:  Medications Prior to Admission   Medication Sig Dispense Refill Last Dose   • Acetaminophen (TYLENOL ARTHRITIS PAIN PO) Take 2 tablets by mouth 2 (Two) Times a Day.      • amLODIPine (NORVASC) 10 MG tablet Take 1 tablet by mouth Daily. 90 tablet 3    • calcium carbonate (OS-JUSTA) 600 MG tablet Take 600 mg by mouth Daily. Stop today for surgery      • escitalopram (LEXAPRO) 10 MG tablet Take 1 tablet by mouth once daily 90 tablet 0    • furosemide (Lasix) 20 MG tablet One tablet by mouth every other day (Patient taking differently: Take 20 mg by mouth Daily. One tablet by mouth every other day      HOLD DOS) 30 tablet 1    • lisinopril (PRINIVIL,ZESTRIL) 40 MG tablet Take 1 tablet by mouth once daily (Patient taking differently: Take 40 mg by mouth Daily. HOLD 24 hours before surgery) 90 tablet 0    • Magnesium 250 MG tablet Take 250 mg by mouth Daily. Stop today for surgery   10/5/2022   •  multivitamin with minerals (MULTIVITAMIN ADULT PO) Take 1 tablet by mouth Daily.   10/5/2022   • Myrbetriq 50 MG tablet sustained-release 24 hour 24 hr tablet Take 50 mg by mouth Daily.      • potassium chloride 10 MEQ CR tablet One tablet by mouth when takes Lasix (Patient taking differently: Take 10 mEq by mouth Daily. One tablet by mouth when takes Lasix) 30 tablet 1    • rosuvastatin (Crestor) 10 MG tablet Take 1 tablet by mouth Daily. 90 tablet 3    • traMADol (ULTRAM) 50 MG tablet Take 50 mg by mouth Every 6 (Six) Hours As Needed for Moderate Pain.      • vitamin D (ERGOCALCIFEROL) 1.25 MG (04666 UT) capsule capsule Take 1 capsule by mouth once a week 12 capsule 0    • Cyanocobalamin 2500 MCG sublingual tablet           Current Medications:  Scheduled Meds:clindamycin, 900 mg, Intravenous, Once  mupirocin, , Nasal, BID  Tranexamic Acid-NaCl, 1,000 mg, Intravenous, Once      Continuous Infusions:lactated ringers, 1,000 mL      PRN Meds:.•  lidocaine  •  sodium chloride       Past Medical History:   Diagnosis Date   • Arthritis    • BPH (benign prostatic hyperplasia)    • Depression    • History of bilateral knee arthroplasty    • Hypertension         Past Surgical History:   Procedure Laterality Date   • BACK SURGERY     • HAND SURGERY Bilateral     carpal tunnel repair   • KNEE SURGERY      meniscus repair    • LUMBAR DISCECTOMY Bilateral 4/22/2019    Procedure: left and right  L4-5 lumbar decompression with dura repair;  Surgeon: Edson Khan MD;  Location: [facility] OR;  Service: Neurosurgery   • ROTATOR CUFF REPAIR Right    • TOTAL KNEE ARTHROPLASTY Right 10/23/2019    Procedure: TOTAL KNEE ARTHROPLASTY, WITH LEFT KNEE INJECTION;  Surgeon: Boby Medina MD;  Location: [facility] MAIN OR;  Service: Orthopedics   • TOTAL KNEE ARTHROPLASTY Left 2/5/2020    Procedure: TOTAL KNEE ARTHROPLASTY;  Surgeon: Boby Medina MD;  Location: [facility] MAIN OR;  Service: Orthopedics;  Laterality: Left;        Social  History     Occupational History   • Occupation: retired   Tobacco Use   • Smoking status: Former     Packs/day: 0.25     Years: 5.00     Pack years: 1.25     Types: Cigarettes     Quit date:      Years since quittin.8   • Smokeless tobacco: Never   Vaping Use   • Vaping Use: Never used   Substance and Sexual Activity   • Alcohol use: No   • Drug use: No   • Sexual activity: Yes     Partners: Female     Birth control/protection: None      Social History     Social History Narrative   • Not on file        Family History   Problem Relation Age of Onset   • Stroke Mother    • Diabetes Father    • No Known Problems Sister    • No Known Problems Brother    • Malig Hyperthermia Neg Hx          Review of Systems:   HEENT: Patient denies any headaches, vision changes, change in hearing, or tinnitus, Patient denies any rhinorrhea,epistaxis, sinus pain, mouth or dental problems, sore throat or hoarseness, or dysphagia  Pulmonary: Patient denies any cough, congestion, SOA, or wheezing  Cardiovascular: Patient denies any chest pain, dyspnea, palpitations, weakness, intolerance of exercise, varicosities, swelling of extremities, known murmur  Gastrointestinal:  Patient denies nausea, vomiting, diarrhea, constipation, loss  of appetite, change in appetite, dysphagia, gas, heartburn, melena, change in bowel habits, use of laxatives or other drugs to alter the function of the gastrointestinal tract.  Genital/Urinary: Patient denies dysuria, change in color of urine, change in frequency of urination, pain with urgency, incontinence, retention, or nocturia.  Musculoskeletal: Patient denies increased warmth; redness; or swelling of joints; limitation of function; deformity; crepitation: pain in a joint or an extremity, the neck, or the back, especially with movement.  Neurological: Patient denies dizziness, tremor, ataxia, difficulty in speaking, change in speech, paresthesia, loss of sensation, seizures, syncope, changes in  "memory.  Endocrine system: Patient denies tremors, palpitations, intolerance of heat or cold, polyuria, polydipsia, polyphagia, diaphoresis, exophthalmos, or goiter.  Psychological: Patient denies thoughts/plans or harming self or other; depression,  insomnia, night terrors, alicia, memory loss, disorientation.  Skin: Patient denies any bruising, rashes, discoloration, pruritus, wounds, ulcers, decubiti, changes in the hair or nails  Hematopoietic: Patient denies history of spontaneous or excessive bleeding, epistaxis, hematuria, melena, fatigue, enlarged or tender lymph nodes, pallor, history of anemia.    Physical Exam: 76 y.o. male  Vitals:    10/05/22 1042   Weight: 103 kg (228 lb)   Height: 177.8 cm (70\")       General Appearance:          Alert, cooperative, in no acute distress                                                 Head:    Normocephalic, without obvious abnormality, atraumatic   Eyes:            Lids and lashes normal, conjunctivae and sclerae normal, no   icterus, no pallor, corneas clear, PERRLA   Ears:    Ears appear intact with no abnormalities noted   Throat:   No oral lesions, no thrush, oral mucosa moist   Neck:   No adenopathy, supple, trachea midline, no thyromegaly, no   carotid bruit, no JVD   Back:     No kyphosis present, no scoliosis present, no skin lesions,      erythema or scars, no tenderness to percussion or                   palpation,   range of motion normal   Lungs:     Clear to auscultation,respirations regular, even and                  unlabored    Heart:    Regular rhythm and normal rate, normal S1 and S2, no            murmur, no gallop, no rub, no click   Chest Wall:    No abnormalities observed   Abdomen:     Normal bowel sounds, no masses, no organomegaly, soft        nontender, nondistended, no guarding, no rebound                tenderness   Rectal:     Deferred   Extremities:   Tenderness over anterior aspect of the right shoulder. Moves all extremities well, no " edema,   no cyanosis, no redness   Pulses:   Pulses palpable and equal bilaterally   Skin:   No bleeding, bruising or rash   Lymph nodes:   No palpable adenopathy   Neurologic:   Cranial nerves 2 - 12 grossly intact, sensation intact, DTR       present and equal bilaterally      right shoulder. Rotator cuff function is extremely impaired. There is a high riding humeral head with articulation of the humerus to the undersurface of the acromiohumeral articulation. AC joint is exquisitely tender and painful for patient. Axillary nerve function is well preserved. There is significant winging of the scapula with hollowing of the fossae over the proximal and distal aspects of the spine of the scapula. Forward flexion is 0-30 degrees, active abduction is 0-60 degrees. Drop arm sign is positive. External rotation against resistance is extremely painful and limited for the patient. Skin and soft tissues are essentially normal other than some amounts of swelling. The pain level is 6.       Diagnostic Tests:  No visits with results within 2 Day(s) from this visit.   Latest known visit with results is:   Hospital Outpatient Visit on 10/07/2022   Component Date Value Ref Range Status   • QT Interval 10/07/2022 378  ms Final     No results found.      Assessment:  Patient Active Problem List   Diagnosis   • Spinal stenosis in cervical region - reveresal of lordosis at C3/4 and C4/5, Modic endpalate cahnges at C7/T1   • Hypertension   • BPH (benign prostatic hyperplasia)   • Anemia   • Arthritis   • Morbid (severe) obesity due to excess calories (HCC)   • Dyslipidemia   • Family history of malignant neoplasm of urinary bladder   • Foot pain, right   • Ganglion cyst   • Hand paresthesia   • Hearing problem   • History of poliomyelitis   • Hyperglycemia   • Chronic pain of both knees   • Other hammer toe(s) (acquired), left foot   • Plantar fasciitis, left   • Sleep disorder   • Tachycardia   • Thrombocytopenia (HCC)   • Vitamin D  deficiency   • Amblyopia   • Dry eye   • Hypertensive retinopathy of both eyes   • Lens replaced by other means   • AMD (age-related macular degeneration), bilateral   • Nonexudative age-related macular degeneration   • Other chronic allergic conjunctivitis   • Other specified disorders of eyelid   • PCO (posterior capsular opacification), bilateral   • Presence of intraocular lens   • PVD (posterior vitreous detachment), both eyes   • Regular astigmatism   • History of bilateral knee arthroplasty   • Other headache syndrome   • Exposure to COVID-19 virus   • Status post total knee replacement, right   • Hx of total knee replacement, left   • Class 2 severe obesity with serious comorbidity and body mass index (BMI) of 35.0 to 35.9 in adult (HCC)   • Seborrhea capitis in adult   • Chronic right shoulder pain   • Rotator cuff tear arthropathy of right shoulder   • Thrombocytopenia due to COVID-19 virus   • Lipoma of right thigh   • Chronic midline low back pain         Plan:  The patient voiced understanding of the risks, benefits, and alternative forms of treatment that were discussed and the patient consents to proceed with right reverse total shoulder arthroplasty.     After evaluation in the office, x-rays and history, we have diagnosed rotator cuff arthropathy of the shoulder.  This is a degenerative condition with a chronic tear of the rotator cuff that is not repairable with loss of cartilage in the glenohumeral joint. The only definitive treatment for this condition is total shoulder arthroplasty or joint replacement.  Conservative measures have been used, including cortisone injections, NSAIDS, activity modification and physical therapy.  This will require 3-4 months of time for recovery. It starts with 4-6 weeks in a sling, followed by rehabilitation at physical therapy and exercises to be done at home.  With this type of implant, you will need to take oral antibiotics prior to all dental visits and for some  procedures (for example: colonoscopy, skin lesion removal, etc).  This will be a single dose one hour prior to the procedure.      Risks and benefits of surgery were discussed with the patient in detail.  Risks include but are not limited to infection, myocardial infarction, stroke, DVT, pulmonary embolism, death, dislocation, neurovascular compromise, limb length discrepancy, revision surgery, limited range of motion, wound healing problems and scar tissue build-up.  Patient understands and agrees to proceed with surgery.    Discharge Plan: today to home and home health      Date: 10/18/2022    Boby Medina MD      [employer]ATED [employer] DICT[employer]

## 2022-10-18 NOTE — DISCHARGE PLACEMENT REQUEST
"Farheen Gomez (76 y.o. Male)     Date of Birth   1945    Social Security Number       Address   [address] [place] [address]    Home Phone   [phone]    MRN   [id number]       Alevism   Anglican    Marital Status                               Admission Date   10/18/22    Admission Type   Elective    Admitting Provider   Boby Medina MD    Attending Provider   Boby Medina MD    Department, Room/Bed   [facility] SURGICAL INPATIENT, 4121/1       Discharge Date       Discharge Disposition       Discharge Destination                               Attending Provider: Boby Medina MD    Allergies: Penicillins, Propranolol    Isolation: None   Infection: COVID (History) (10/18/22)   Code Status: Prior    Ht: 177.8 cm (70\")   Wt: 105 kg (231 lb 14.8 oz)    Admission Cmt: None   Principal Problem: Rotator cuff tear arthropathy of right shoulder [M75.101,M12.811]                 Active Insurance as of 10/18/2022     Primary Coverage     Payor Plan Insurance Group Employer/Plan Group    [facility] MEDICARE REPLACEMENT [facility] MEDICARE REPLACEMENT [id number]     Payor Plan Address Payor Plan Phone Number Payor Plan Fax Number Effective Dates    [address] [address]   1/1/2019 - None Entered    [facility] [id number]       Subscriber Name Subscriber Birth Date Member ID       FARHEEN GOMEZ [id number]                 Emergency Contacts      (Rel.) Home Phone Work Phone Mobile Phone    JasonPippa (Spouse) -- -- [phone]              "

## 2022-10-19 ENCOUNTER — READMISSION MANAGEMENT (OUTPATIENT)
Dept: CALL CENTER | Facility: HOSPITAL | Age: 77
End: 2022-10-19

## 2022-10-19 VITALS
TEMPERATURE: 98.7 F | BODY MASS INDEX: 33.2 KG/M2 | OXYGEN SATURATION: 88 % | DIASTOLIC BLOOD PRESSURE: 74 MMHG | HEIGHT: 70 IN | WEIGHT: 231.92 LBS | RESPIRATION RATE: 18 BRPM | SYSTOLIC BLOOD PRESSURE: 115 MMHG | HEART RATE: 107 BPM

## 2022-10-19 PROCEDURE — A9270 NON-COVERED ITEM OR SERVICE: HCPCS | Performed by: ORTHOPAEDIC SURGERY

## 2022-10-19 PROCEDURE — 63710000001 OXYCODONE 5 MG TABLET: Performed by: ORTHOPAEDIC SURGERY

## 2022-10-19 PROCEDURE — 97110 THERAPEUTIC EXERCISES: CPT

## 2022-10-19 PROCEDURE — 63710000001 ACETAMINOPHEN 325 MG TABLET: Performed by: ORTHOPAEDIC SURGERY

## 2022-10-19 PROCEDURE — 63710000001 ASPIRIN 325 MG TABLET: Performed by: ORTHOPAEDIC SURGERY

## 2022-10-19 PROCEDURE — 63710000001 LISINOPRIL 20 MG TABLET: Performed by: ORTHOPAEDIC SURGERY

## 2022-10-19 PROCEDURE — 94799 UNLISTED PULMONARY SVC/PX: CPT

## 2022-10-19 PROCEDURE — 97162 PT EVAL MOD COMPLEX 30 MIN: CPT

## 2022-10-19 PROCEDURE — 94618 PULMONARY STRESS TESTING: CPT

## 2022-10-19 PROCEDURE — 63710000001 POTASSIUM CHLORIDE 10 MEQ TABLET CONTROLLED-RELEASE: Performed by: ORTHOPAEDIC SURGERY

## 2022-10-19 PROCEDURE — G0378 HOSPITAL OBSERVATION PER HR: HCPCS

## 2022-10-19 PROCEDURE — 63710000001 FUROSEMIDE 20 MG TABLET: Performed by: ORTHOPAEDIC SURGERY

## 2022-10-19 PROCEDURE — 63710000001 AMLODIPINE 5 MG TABLET: Performed by: ORTHOPAEDIC SURGERY

## 2022-10-19 PROCEDURE — 97535 SELF CARE MNGMENT TRAINING: CPT

## 2022-10-19 PROCEDURE — 99024 POSTOP FOLLOW-UP VISIT: CPT | Performed by: ORTHOPAEDIC SURGERY

## 2022-10-19 RX ADMIN — AMLODIPINE BESYLATE 10 MG: 5 TABLET ORAL at 08:23

## 2022-10-19 RX ADMIN — LISINOPRIL 40 MG: 20 TABLET ORAL at 08:23

## 2022-10-19 RX ADMIN — OXYCODONE 10 MG: 5 TABLET ORAL at 04:59

## 2022-10-19 RX ADMIN — ASPIRIN 325 MG ORAL TABLET 325 MG: 325 PILL ORAL at 08:23

## 2022-10-19 RX ADMIN — FUROSEMIDE 20 MG: 20 TABLET ORAL at 08:23

## 2022-10-19 RX ADMIN — ACETAMINOPHEN 650 MG: 325 TABLET, FILM COATED ORAL at 08:23

## 2022-10-19 RX ADMIN — POTASSIUM CHLORIDE 10 MEQ: 750 TABLET, EXTENDED RELEASE ORAL at 08:23

## 2022-10-19 RX ADMIN — CLINDAMYCIN PHOSPHATE 900 MG: 900 INJECTION, SOLUTION INTRAVENOUS at 01:44

## 2022-10-19 NOTE — PLAN OF CARE
Goal Outcome Evaluation:           Progress: no change  Outcome Evaluation: Patient remains on O2. Pain level controlled.

## 2022-10-19 NOTE — PROGRESS NOTES
Orthopedic Discharge Summary      Patient: Farheen Gomez      YOB: 1945    Medical Record Number: [id number]    Attending Physician: Boby Medina MD  Consulting Physician(s):   Date of Admission: 10/18/2022  6:18 AM  Date of Discharge: 10/19/22  The patient underwent right reverse total shoulder arthroplasty yesterday.  He has done very well postoperatively.  He is neurovascularly intact.  His pain is well controlled.  He is tolerating his symptoms well with narcotics in the form of Percocet.  He is on aspirin for DVT prophylaxis of the upper extremity.  Patient will be released from the hospital today and follow-up with me in 2 weeks.    Patient Active Problem List   Diagnosis   • Spinal stenosis in cervical region - reveresal of lordosis at C3/4 and C4/5, Modic endpalate cahnges at C7/T1   • Hypertension   • BPH (benign prostatic hyperplasia)   • Anemia   • Arthritis   • Morbid (severe) obesity due to excess calories (HCC)   • Dyslipidemia   • Family history of malignant neoplasm of urinary bladder   • Foot pain, right   • Ganglion cyst   • Hand paresthesia   • Hearing problem   • History of poliomyelitis   • Hyperglycemia   • Chronic pain of both knees   • Other hammer toe(s) (acquired), left foot   • Plantar fasciitis, left   • Sleep disorder   • Tachycardia   • Thrombocytopenia (HCC)   • Vitamin D deficiency   • Amblyopia   • Dry eye   • Hypertensive retinopathy of both eyes   • Lens replaced by other means   • AMD (age-related macular degeneration), bilateral   • Nonexudative age-related macular degeneration   • Other chronic allergic conjunctivitis   • Other specified disorders of eyelid   • PCO (posterior capsular opacification), bilateral   • Presence of intraocular lens   • PVD (posterior vitreous detachment), both eyes   • Regular astigmatism   • History of bilateral knee arthroplasty   • Other headache syndrome   • Exposure to COVID-19 virus   • Status post total knee replacement,  right   • Hx of total knee replacement, left   • Class 2 severe obesity with serious comorbidity and body mass index (BMI) of 35.0 to 35.9 in adult ([facility])   • Seborrhea capitis in adult   • Chronic right shoulder pain   • Rotator cuff tear arthropathy of right shoulder   • Thrombocytopenia due to COVID-19 virus   • Lipoma of right thigh   • Chronic midline low back pain     ND RECONSTR TOTAL SHOULDER IMPLANT [14092] (TOTAL SHOULDER REVERSE ARTHROPLASTY)       Allergies   Allergen Reactions   • Penicillins Hives and Other (See Comments)     Tongue bled   • Propranolol GI Intolerance     constipation       Current Medications:     Discharge Medications      New Medications      Instructions Start Date   apixaban 2.5 MG tablet tablet  Commonly known as: ELIQUIS   2.5 mg, Oral, 2 Times Daily      ondansetron 4 MG tablet  Commonly known as: Zofran   4 mg, Oral, Every 6 Hours PRN      oxyCODONE-acetaminophen 7.5-325 MG per tablet  Commonly known as: PERCOCET   TAKE ONE TABLET BY MOUTH EVERY 4-6 HOURS AS NEEDED FOR PAIN         Changes to Medications      Instructions Start Date   furosemide 20 MG tablet  Commonly known as: Lasix  What changed:   · how much to take  · how to take this  · when to take this  · additional instructions   One tablet by mouth every other day      lisinopril 40 MG tablet  Commonly known as: PRINIVIL,ZESTRIL  What changed: additional instructions   Take 1 tablet by mouth once daily      potassium chloride 10 MEQ CR tablet  What changed:   · how much to take  · how to take this  · when to take this   One tablet by mouth when takes Lasix         Continue These Medications      Instructions Start Date   amLODIPine 10 MG tablet  Commonly known as: NORVASC   10 mg, Oral, Daily      calcium carbonate 600 MG tablet  Commonly known as: OS-JUSTA   600 mg, Oral, Daily, Stop today for surgery      escitalopram 10 MG tablet  Commonly known as: LEXAPRO   Take 1 tablet by mouth once daily      Magnesium 250 MG  tablet   250 mg, Oral, Daily, Stop today for surgery      multivitamin with minerals tablet tablet   1 tablet, Oral, Daily      Myrbetriq 50 MG tablet sustained-release 24 hour 24 hr tablet  Generic drug: Mirabegron ER   50 mg, Oral, Daily      rosuvastatin 10 MG tablet  Commonly known as: Crestor   10 mg, Oral, Daily      TYLENOL ARTHRITIS PAIN PO   2 tablets, Oral, 2 Times Daily      vitamin D 1.25 MG (70550 UT) capsule capsule  Commonly known as: ERGOCALCIFEROL   Take 1 capsule by mouth once a week                  Past Medical History:   Diagnosis Date   • Arthritis    • BPH (benign prostatic hyperplasia)    • Depression    • History of bilateral knee arthroplasty    • Hypertension         Past Surgical History:   Procedure Laterality Date   • BACK SURGERY     • HAND SURGERY Bilateral     carpal tunnel repair   • KNEE SURGERY      meniscus repair    • LUMBAR DISCECTOMY Bilateral 2019    Procedure: left and right  L4-5 lumbar decompression with dura repair;  Surgeon: Edson Khan MD;  Location: [facility];  Service: Neurosurgery   • ROTATOR CUFF REPAIR Right    • TOTAL KNEE ARTHROPLASTY Right 10/23/2019    Procedure: TOTAL KNEE ARTHROPLASTY, WITH LEFT KNEE INJECTION;  Surgeon: Boby Medina MD;  Location: [facility];  Service: Orthopedics   • TOTAL KNEE ARTHROPLASTY Left 2020    Procedure: TOTAL KNEE ARTHROPLASTY;  Surgeon: Boby Medina MD;  Location: [facility];  Service: Orthopedics;  Laterality: Left;        Social History     Occupational History   • Occupation: retired   Tobacco Use   • Smoking status: Former     Packs/day: 0.25     Years: 5.00     Pack years: 1.25     Types: Cigarettes     Quit date:      Years since quittin.8   • Smokeless tobacco: Never   Vaping Use   • Vaping Use: Never used   Substance and Sexual Activity   • Alcohol use: No   • Drug use: No   • Sexual activity: Yes     Partners: Female     Birth control/protection: None      Social History      Social History Narrative   • Not on file        Family History   Problem Relation Age of Onset   • Stroke Mother    • Diabetes Father    • No Known Problems Sister    • No Known Problems Brother    • Malig Hyperthermia Neg Hx              Hospital Course:  76 y.o. male admitted to [facility] to services of Boby Medina MD with Rotator cuff tear arthropathy of right shoulder [M75.101, M12.811] on 10/18/2022 and underwent [place] RECONSTR TOTAL SHOULDER IMPLANT [98961] (TOTAL SHOULDER REVERSE ARTHROPLASTY) Per Boby Medina MD. Antibiotic and VTE prophylaxis were per SCIP protocols. Post-operatively the patient transferred to the post-operative floor where the patient underwent mobilization therapy that included active as well as passive ROM exercises. Opioids were titrated to achieve appropriate pain management to allow for participation in mobilization exercises. Vital signs are now stable. The incision is intact without signs or symptoms of infection. Operative extremity neurovascular status remains intact.   Appropriate education re: incision care, activity levels, medications, and follow up visits was completed and all questions were answered. The patient is now deemed stable for discharge from hospital.      DIAGNOSTIC TESTS:   No visits with results within 2 Day(s) from this visit.   Latest known visit with results is:   Hospital Outpatient Visit on 10/07/2022   Component Date Value Ref Range Status   • QT Interval 10/07/2022 378  ms Final     No results found.    Discharge and Follow up Instructions:   Discharge Instructions:       1. Patient is to continue with physical therapy exercises BID and continue working with        the physical therapist as ordered.  2.  Continue to follow precautions as instructed.   3.  Patient may weight bear as tolerated.   4.  Continue CHRISTOPHER hose daily and ice regularly. Patient instructed on frequent calf                  pumping exercises.  May remove compression  stockings at night.  5.  Patient also instructed on incentive spirometer during hospitalization and                          encouraged to continue to use at home regularly.   6.  Patient is instructed to continue DVT prophylaxis.  7.  The dressing should be left in place. If waterproof dressing is intact the patient may         shower immediately following discharge. If the dressing becomes disloged or                   saturated it should be changed and patient must wait until POD #5 to shower. If               dressing is changed, apply dry sterile dressing after showering.  8.  Follow up appointment in 2 weeks - patient to call the office at [phone] to schedule.       Date: 10/19/2022    Boby Medina MD      Time: Discharge 20 min     DICTATED UTILIZING DRAGON DICTATION

## 2022-10-19 NOTE — PLAN OF CARE
Goal Outcome Evaluation:  Plan of Care Reviewed With: patient, spouse  Pt presents as a 77 y/o M s/p right reverse TSA per Dr. Medina on 10/19/22. Procedure was tolerated well. Alert and Oriented x 4. At baseline, pt is independent with all mobility.  Pt was able to demonstrate safe and independent gait and transfers without AD. O2 sats at 90% on room air- Nsg aware. Pt seen for PT Eval only.  PT recommendation is out-patient Physical Therapy. PT will complete orders.

## 2022-10-19 NOTE — CASE MANAGEMENT/SOCIAL WORK
Case Management Discharge Note      Final Note: Home with OP PT Boroke Tavarez and [facility] for Oxygen             Therapy Coordination complete.    Service Provider Selected Services Address Phone Fax Patient Preferred    [facility]ER[facility] [address] [place] [address] [facility] [address] [phone] --                  Transportation Services  Private: Car    Final Discharge Disposition Code: 01 - home or self-care

## 2022-10-19 NOTE — PLAN OF CARE
Patient demonstrates good progression toward stated goals. Also with good understanding of HEP and shoulder precautions. Anticipate discharge home with family assistance this date. Pt still did not have cold pack, nurse made aware and will make sure pt has prior to going home.

## 2022-10-19 NOTE — OUTREACH NOTE
Prep Survey    Flowsheet Row Responses   [facility] patient discharged from? [facility]   Is LACE score < 7 ? Yes   Emergency Room discharge w/ pulse ox? No   Eligibility [facility]   Date of Admission 10/18/22   Date of Discharge 10/19/22   Discharge Disposition Home or Self Care   Discharge diagnosis TOTAL SHOULDER REVERSE ARTHROPLASTY   Does the patient have one of the following disease processes/diagnoses(primary or secondary)? Total Joint Replacement   Does the patient have Home health ordered? No   Is there a DME ordered? Yes   What DME was ordered?  [facility] for Oxygen   Prep survey completed? Yes          VIANEY BURGER - Registered Nurse

## 2022-10-19 NOTE — PLAN OF CARE
Goal Outcome Evaluation:  Plan of Care Reviewed With: patient        Progress: improving     Pt vss, pt is discharging home with wife.

## 2022-10-19 NOTE — PROGRESS NOTES
Orthopedic progress note.  This patient has done very well status post reverse total shoulder arthroplasty.  The patient did experience 1 round of hypoxia while he was in the hospital.  This resolved with the application of oxygen through the nasal cannula.  It is our medical recommendation that the patient should be allowed to use his home oxygen upon discharge from the hospital so that he can maintain appropriate and safe levels of peripheral oxygenation.

## 2022-10-19 NOTE — DISCHARGE SUMMARY
Orthopedic Discharge Summary      Patient: Farheen Gomez      YOB: 1945    Medical Record Number: [id number]    Attending Physician: ALINE Medina  Consulting Physician(s):   Date of Admission: 10/18/2022  6:18 AM  Date of Discharge: 10/19/2022      Patient Active Problem List   Diagnosis   • Spinal stenosis in cervical region - reveresal of lordosis at C3/4 and C4/5, Modic endpalate cahnges at C7/T1   • Hypertension   • BPH (benign prostatic hyperplasia)   • Anemia   • Arthritis   • Morbid (severe) obesity due to excess calories ([facility])   • Dyslipidemia   • Family history of malignant neoplasm of urinary bladder   • Foot pain, right   • Ganglion cyst   • Hand paresthesia   • Hearing problem   • History of poliomyelitis   • Hyperglycemia   • Chronic pain of both knees   • Other hammer toe(s) (acquired), left foot   • Plantar fasciitis, left   • Sleep disorder   • Tachycardia   • Thrombocytopenia ([facility])   • Vitamin D deficiency   • Amblyopia   • Dry eye   • Hypertensive retinopathy of both eyes   • Lens replaced by other means   • AMD (age-related macular degeneration), bilateral   • Nonexudative age-related macular degeneration   • Other chronic allergic conjunctivitis   • Other specified disorders of eyelid   • PCO (posterior capsular opacification), bilateral   • Presence of intraocular lens   • PVD (posterior vitreous detachment), both eyes   • Regular astigmatism   • History of bilateral knee arthroplasty   • Other headache syndrome   • Exposure to COVID-19 virus   • Status post total knee replacement, right   • Hx of total knee replacement, left   • Class 2 severe obesity with serious comorbidity and body mass index (BMI) of 35.0 to 35.9 in adult ([facility])   • Seborrhea capitis in adult   • Chronic right shoulder pain   • Rotator cuff tear arthropathy of right shoulder   • Thrombocytopenia due to COVID-19 virus   • Lipoma of right thigh   • Chronic midline low back pain     ME RECONSTR TOTAL SHOULDER  IMPLANT [21736] (TOTAL SHOULDER REVERSE ARTHROPLASTY)       Allergies   Allergen Reactions   • Penicillins Hives and Other (See Comments)     Tongue bled   • Propranolol GI Intolerance     constipation       Current Medications:     Discharge Medications      New Medications      Instructions Start Date   Eliquis 2.5 MG tablet tablet  Generic drug: apixaban   2.5 mg, Oral, 2 Times Daily      ondansetron 4 MG tablet  Commonly known as: Zofran   4 mg, Oral, Every 6 Hours PRN      oxyCODONE-acetaminophen 7.5-325 MG per tablet  Commonly known as: PERCOCET   TAKE ONE TABLET BY MOUTH EVERY 4-6 HOURS AS NEEDED FOR PAIN         Changes to Medications      Instructions Start Date   furosemide 20 MG tablet  Commonly known as: Lasix  What changed:   · how much to take  · how to take this  · when to take this  · additional instructions   One tablet by mouth every other day      lisinopril 40 MG tablet  Commonly known as: PRINIVILZESTRIL  What changed: additional instructions   Take 1 tablet by mouth once daily      potassium chloride 10 MEQ CR tablet  What changed:   · how much to take  · how to take this  · when to take this   One tablet by mouth when takes Lasix         Continue These Medications      Instructions Start Date   amLODIPine 10 MG tablet  Commonly known as: NORVASC   10 mg, Oral, Daily      calcium carbonate 600 MG tablet  Commonly known as: OS-JUSTA   600 mg, Oral, Daily, Stop today for surgery      escitalopram 10 MG tablet  Commonly known as: LEXAPRO   Take 1 tablet by mouth once daily      Magnesium 250 MG tablet   250 mg, Oral, Daily, Stop today for surgery      multivitamin with minerals tablet tablet   1 tablet, Oral, Daily      Myrbetriq 50 MG tablet sustained-release 24 hour 24 hr tablet  Generic drug: Mirabegron ER   50 mg, Oral, Daily      rosuvastatin 10 MG tablet  Commonly known as: Crestor   10 mg, Oral, Daily      TYLENOL ARTHRITIS PAIN PO   2 tablets, Oral, 2 Times Daily      vitamin D 1.25 MG  (97759 UT) capsule capsule  Commonly known as: ERGOCALCIFEROL   Take 1 capsule by mouth once a week                  Past Medical History:   Diagnosis Date   • Arthritis    • BPH (benign prostatic hyperplasia)    • Depression    • History of bilateral knee arthroplasty    • Hypertension         Past Surgical History:   Procedure Laterality Date   • BACK SURGERY     • HAND SURGERY Bilateral     carpal tunnel repair   • KNEE SURGERY      meniscus repair    • LUMBAR DISCECTOMY Bilateral 2019    Procedure: left and right  L4-5 lumbar decompression with dura repair;  Surgeon: Edson Khan MD;  Location: [facility] MAIN OR;  Service: Neurosurgery   • ROTATOR CUFF REPAIR Right    • TOTAL KNEE ARTHROPLASTY Right 10/23/2019    Procedure: TOTAL KNEE ARTHROPLASTY, WITH LEFT KNEE INJECTION;  Surgeon: Boby Medina MD;  Location: [facility] MAIN OR;  Service: Orthopedics   • TOTAL KNEE ARTHROPLASTY Left 2020    Procedure: TOTAL KNEE ARTHROPLASTY;  Surgeon: Boby Medina MD;  Location: [facility] MAIN OR;  Service: Orthopedics;  Laterality: Left;   • TOTAL SHOULDER ARTHROPLASTY W/ DISTAL CLAVICLE EXCISION Right 10/18/2022    Procedure: TOTAL SHOULDER REVERSE ARTHROPLASTY;  Surgeon: Boby Medina MD;  Location: [facility] MAIN OR;  Service: Orthopedics;  Laterality: Right;        Social History     Occupational History   • Occupation: retired   Tobacco Use   • Smoking status: Former     Packs/day: 0.25     Years: 5.00     Pack years: 1.25     Types: Cigarettes     Quit date:      Years since quittin.8   • Smokeless tobacco: Never   Vaping Use   • Vaping Use: Never used   Substance and Sexual Activity   • Alcohol use: No   • Drug use: No   • Sexual activity: Yes     Partners: Female     Birth control/protection: None      Social History     Social History Narrative   • Not on file        Family History   Problem Relation Age of Onset   • Stroke Mother    • Diabetes Father    • No Known Problems Sister    • No Known  Problems Brother    • Malig Hyperthermia Neg               Hospital Course:  76 y.o. male admitted to [facility] to services of No att. providers found with Rotator cuff tear arthropathy of right shoulder [M75.101, M12.811] on 10/18/2022 and underwent [place] RECONSTR TOTAL SHOULDER IMPLANT [97067] (TOTAL SHOULDER REVERSE ARTHROPLASTY) Per No att. providers found. Antibiotic and VTE prophylaxis were per SCIP protocols. Post-operatively the patient transferred to the post-operative floor where the patient underwent mobilization therapy that included active as well as passive ROM exercises. Opioids were titrated to achieve appropriate pain management to allow for participation in mobilization exercises. Vital signs are now stable. The incision is intact without signs or symptoms of infection. Operative extremity neurovascular status remains intact.   Appropriate education re: incision care, activity levels, medications, and follow up visits was completed and all questions were answered. The patient is now deemed stable for discharge from hospital.      DIAGNOSTIC TESTS:   No visits with results within 2 Day(s) from this visit.   Latest known visit with results is:   Hospital Outpatient Visit on 10/07/2022   Component Date Value Ref Range Status   • QT Interval 10/07/2022 378  ms Final     No results found.    Discharge and Follow up Instructions:         Date: 10/19/2022    Boby Medina MD      Time: Discharge 20 min     DICTATED UTILIZING DRAGON DICTATION

## 2022-10-19 NOTE — DISCHARGE PLACEMENT REQUEST
"Farheen Gomez (76 y.o. Male)     Date of Birth   1945    Social Security Number       Address   [address] [place] [address]    Home Phone   [phone]    MRN   [id number]       [employer]    Marital Status                               Admission Date   10/18/22    Admission Type   Elective    Admitting Provider   Boby Medina MD    Attending Provider   Boby Medina MD    Department, Room/Bed   [facility] SURGICAL INPATIENT, 4121/1       Discharge Date       Discharge Disposition   Home or Self Care    Discharge Destination                               Attending Provider: Boby Medina MD    Allergies: Penicillins, Propranolol    Isolation: None   Infection: COVID (History) (10/18/22)   Code Status: Prior    Ht: 177.8 cm (70\")   Wt: 105 kg (231 lb 14.8 oz)    Admission Cmt: None   Principal Problem: Rotator cuff tear arthropathy of right shoulder [M75.101,M12.811]                 Active Insurance as of 10/18/2022     Primary Coverage     Payor Plan Insurance Group Employer/Plan Group    [facility] MEDICARE REPLACEMENT [facility] MEDICARE REPLACEMENT [id number]     Payor Plan Address Payor Plan Phone Number Payor Plan Fax Number Effective Dates    [address] [address]   1/1/2019 - None Entered    [facility] [id number]       Subscriber Name Subscriber Birth Date Member ID       FARHEEN GOMEZ 1945 [id number]                 Emergency Contacts      (Rel.) Home Phone Work Phone Mobile Phone    EraPippa perez (Spouse) -- -- [phone]          "

## 2022-10-19 NOTE — PROGRESS NOTES
Exercise Oximetry    Patient Name:Farheen Gomez   MRN: [id number]   Date: 10/19/22             ROOM AIR BASELINE   SpO2% 88   Heart Rate 105   Blood Pressure      EXERCISE ON ROOM AIR SpO2% EXERCISE ON O2 @ 3 LPM SpO2%   1 MINUTE  1 MINUTE Placed on 1 liter, sat increased to 90%   2 MINUTES  2 MINUTES Increased to 2 liters, sat decreased to 89%   3 MINUTES  3 MINUTES Left on 2 liters, sat increased to 90%    4 MINUTES  4 MINUTES Left on 2 liters, sat maintained at 90%   5 MINUTES  5 MINUTES Increased to 3 liters, sat increased to 91%   6 MINUTES  6 MINUTES Increased t o 94% on 3 liters.              Distance Walked   Distance Walked 6 minutes in cervantes   Dyspnea (Claudio Scale)   Dyspnea (Claudio Scale)   Fatigue (Claudio Scale)   Fatigue (Claudio Scale)   SpO2% Post Exercise   SpO2% Post Exercise 93% on 3 liters   HR Post Exercise   HR Post Exercise 102   Time to Recovery   Time to Recovery N/A     Comments: patient walked entire six minutes in cervantes without pausing or stopping.

## 2022-10-19 NOTE — THERAPY TREATMENT NOTE
Subjective: Farheen Gomez is s/p TSA, anticipating discharge this date.     Pain level: 0/10 VAS p/o shoulder    Objective: Pt states completing TSA HEP yesterday after therapy services without difficulty. OT reviewed HEP consisting of AROM of elbow/wrist and hand this date. Pt demos good understanding. Pt still did not have cold pack, nurse made aware and will make sure pt has prior to going home.    Pt dons/doffs abductor sling with Julito.  Completes UB dressing with hemiplegic techniques with ModA, and LB dressing with ModA.      Pt completes PROM shoulder flexion 0 - 90 this date.    Education: Post-op shoulder precautions, self-care techniques and HEP. Handout provided in total joint handbook.     Assessment: Patient demonstrates good progression toward stated goals. Also with good understanding of HEP and shoulder precautions. Anticipate discharge home with family assistance this date.     Plan: Home Health OT/PT

## 2022-10-19 NOTE — CASE MANAGEMENT/SOCIAL WORK
Continued Stay Note  FAISAL Lennon     Patient Name: Farheen Gomez  MRN: [id number]  Today's Date: 10/19/2022    Admit Date: 10/18/2022    Plan: Home with OP PT with [facility] in [place] Oct. 20th at 9am   Discharge Plan     Row Name 10/19/22 1511       Plan    Plan Comments Per [facility] pateint does not have a quailfying Diagnosis for insurance to cover oxygen. Per Liaison Cost is $100/month without insurance. Patient wants to pay for it so he can discharge home             Met with patient at bedside wearing mask and goggles, Spent less than 15 minutes in room at greater than 6 feet distance.         Ashley Marcano RN

## 2022-10-19 NOTE — CASE MANAGEMENT/SOCIAL WORK
Continued Stay Note  FAISAL Lennon     Patient Name: Farheen Gomez  MRN: [id number]  Today's Date: 10/19/2022    Admit Date: 10/18/2022    Plan: Home with OP PT with [facility] in [place] Oct. 20th at 9am   Discharge Plan     Row Name 10/19/22 1336       Plan    Plan Comments Patient failed 6 min walk will need home oxygen. [facility] rep notified to please bring to bedside for discharge order in place                  Ashley Marcano RN

## 2022-10-19 NOTE — THERAPY EVALUATION
Patient Name: Farheen Gomez  : 1945    MRN: [id number]                              Today's Date: 10/19/2022       Admit Date: 10/18/2022    Visit Dx:     ICD-10-CM ICD-9-CM   1. Rotator cuff tear arthropathy of right shoulder  M75.101 716.81    M12.811      Patient Active Problem List   Diagnosis   • Spinal stenosis in cervical region - reveresal of lordosis at C3/4 and C4/5, Modic endpalate cahnges at C7/T1   • Hypertension   • BPH (benign prostatic hyperplasia)   • Anemia   • Arthritis   • Morbid (severe) obesity due to excess calories ([facility])   • Dyslipidemia   • Family history of malignant neoplasm of urinary bladder   • Foot pain, right   • Ganglion cyst   • Hand paresthesia   • Hearing problem   • History of poliomyelitis   • Hyperglycemia   • Chronic pain of both knees   • Other hammer toe(s) (acquired), left foot   • Plantar fasciitis, left   • Sleep disorder   • Tachycardia   • Thrombocytopenia ([facility])   • Vitamin D deficiency   • Amblyopia   • Dry eye   • Hypertensive retinopathy of both eyes   • Lens replaced by other means   • AMD (age-related macular degeneration), bilateral   • Nonexudative age-related macular degeneration   • Other chronic allergic conjunctivitis   • Other specified disorders of eyelid   • PCO (posterior capsular opacification), bilateral   • Presence of intraocular lens   • PVD (posterior vitreous detachment), both eyes   • Regular astigmatism   • History of bilateral knee arthroplasty   • Other headache syndrome   • Exposure to COVID-19 virus   • Status post total knee replacement, right   • Hx of total knee replacement, left   • Class 2 severe obesity with serious comorbidity and body mass index (BMI) of 35.0 to 35.9 in adult ([facility])   • Seborrhea capitis in adult   • Chronic right shoulder pain   • Rotator cuff tear arthropathy of right shoulder   • Thrombocytopenia due to COVID-19 virus   • Lipoma of right thigh   • Chronic midline low back pain     Past Medical History:    Diagnosis Date   • Arthritis    • BPH (benign prostatic hyperplasia)    • Depression    • History of bilateral knee arthroplasty    • Hypertension      Past Surgical History:   Procedure Laterality Date   • BACK SURGERY     • HAND SURGERY Bilateral     carpal tunnel repair   • KNEE SURGERY      meniscus repair    • LUMBAR DISCECTOMY Bilateral 4/22/2019    Procedure: left and right  L4-5 lumbar decompression with dura repair;  Surgeon: Edson Khan MD;  Location: [facility] MAIN OR;  Service: Neurosurgery   • ROTATOR CUFF REPAIR Right    • TOTAL KNEE ARTHROPLASTY Right 10/23/2019    Procedure: TOTAL KNEE ARTHROPLASTY, WITH LEFT KNEE INJECTION;  Surgeon: Boby Medina MD;  Location: [facility] MAIN OR;  Service: Orthopedics   • TOTAL KNEE ARTHROPLASTY Left 2/5/2020    Procedure: TOTAL KNEE ARTHROPLASTY;  Surgeon: Boby Medina MD;  Location: [facility] MAIN OR;  Service: Orthopedics;  Laterality: Left;      General Information     Row Name 10/19/22 1046          Physical Therapy Time and Intention    Document Type evaluation  -BR     Mode of Treatment physical therapy  -BR     Row Name 10/19/22 1046          General Information    Patient Profile Reviewed yes  -BR     Prior Level of Function independent:  -BR     Existing Precautions/Restrictions shoulder  -BR     Row Name 10/19/22 1046          Living Environment    People in Home spouse  -BR     Row Name 10/19/22 1046          Home Main Entrance    Number of Stairs, Main Entrance three  -BR     Stair Railings, Main Entrance railings safe and in good condition  -BR     Row Name 10/19/22 1046          Stairs Within Home, Primary    Number of Stairs, Within Home, Primary none  -BR     Row Name 10/19/22 1046          Cognition    Orientation Status (Cognition) oriented x 4  -BR     Row Name 10/19/22 1046          Safety Issues, Functional Mobility    Impairments Affecting Function (Mobility) endurance/activity tolerance;pain;shortness of breath  -BR           User Key   (r) = Recorded By, (t) = Taken By, (c) = Cosigned By    Initials Name Provider Type    Helga Joel PT Physical Therapist               Mobility     Row Name 10/19/22 1047          Sit-Stand Transfer    Sit-Stand McKean (Transfers) supervision  -BR     Row Name 10/19/22 1047          Gait/Stairs (Locomotion)    McKean Level (Gait) contact guard  -BR     Distance in Feet (Gait) 125  -BR     Row Name 10/19/22 1047          Mobility    Extremity Weight-bearing Status right upper extremity  -BR     Right Upper Extremity (Weight-bearing Status) non weight-bearing (NWB)  -BR           User Key  (r) = Recorded By, (t) = Taken By, (c) = Cosigned By    Initials Name Provider Type    Helga Joel PT Physical Therapist               Obj/Interventions     Row Name 10/19/22 1047          Range of Motion Comprehensive    General Range of Motion bilateral lower extremity ROM WFL  -BR     Row Name 10/19/22 1047          Strength Comprehensive (MMT)    Comment, General Manual Muscle Testing (MMT) Assessment grossly 3+/5 BLE  -BR     Row Name 10/19/22 1047          Balance    Balance Assessment standing static balance  -BR     Static Standing Balance supervision  -BR     Position/Device Used, Standing Balance unsupported  -BR     Row Name 10/19/22 1047          Sensory Assessment (Somatosensory)    Sensory Assessment (Somatosensory) LE sensation intact  -BR           User Key  (r) = Recorded By, (t) = Taken By, (c) = Cosigned By    Initials Name Provider Type    Helga Joel PT Physical Therapist               Goals/Plan    No documentation.                Clinical Impression     Row Name 10/19/22 1048          Pain    Pretreatment Pain Rating 3/10  -BR     Posttreatment Pain Rating 4/10  -BR     Pain Location - Side/Orientation Right  -BR     Pain Location - shoulder  -BR     Row Name 10/19/22 1053 10/19/22 1048       Plan of Care Review    Plan of Care Reviewed With -- patient;spouse  -BR     Outcome Evaluation Pt presents as a 77 y/o M s/p right reverse TSA per Dr. Medina on 10/19/22. Procedure was tolerated well. Alert and Oriented x 4. At baseline, pt is independent with all mobility.  Pt was able to demonstrate safe and independent gait and transfers without AD. O2 sats at 90% on room air- Nsg aware. Pt seen for PT Eval only.  PT recommendation is out-patient Physical Therapy. PT will complete orders.  -BR --    Row Name 10/19/22 1048          Therapy Assessment/Plan (PT)    Criteria for Skilled Interventions Met (PT) no;other (see comments)  no deficits for hospital seeting  -BR     Therapy Frequency (PT) evaluation only  -BR     Row Name 10/19/22 1048          Vital Signs    Pre SpO2 (%) 93  -BR     O2 Delivery Pre Treatment nasal cannula  3 L  -BR     Intra SpO2 (%) 88  -BR     O2 Delivery Intra Treatment room air  -BR     Post SpO2 (%) 90  -BR     O2 Delivery Post Treatment room air  -BR     Row Name 10/19/22 1048          Positioning and Restraints    Pre-Treatment Position sitting in chair/recliner  -BR     Post Treatment Position chair  -BR     In Bed notified nsg;sitting;encouraged to call for assist;call light within reach;with family/caregiver  -BR           User Key  (r) = Recorded By, (t) = Taken By, (c) = Cosigned By    Initials Name Provider Type    BR Helga Villagran, PT Physical Therapist               Outcome Measures     Row Name 10/19/22 1055          How much help from another person do you currently need...    Turning from your back to your side while in flat bed without using bedrails? 4  -BR     Moving from lying on back to sitting on the side of a flat bed without bedrails? 4  -BR     Moving to and from a bed to a chair (including a wheelchair)? 4  -BR     Standing up from a chair using your arms (e.g., wheelchair, bedside chair)? 4  -BR     Climbing 3-5 steps with a railing? 3  -BR     To walk in hospital room? 4  -BR     AM-PAC 6 Clicks Score (PT) 23  -BR     Highest level  of mobility 7 --> Walked 25 feet or more  -BR     Row Name 10/19/22 1054          Functional Assessment    Outcome Measure Options AM-PAC 6 Clicks Basic Mobility (PT)  -BR           User Key  (r) = Recorded By, (t) = Taken By, (c) = Cosigned By    Initials Name Provider Type    BR Helga Villagran PT Physical Therapist                             Physical Therapy Education     Title: PT OT SLP Therapies (Done)     Topic: Physical Therapy (Done)     Point: Mobility training (Done)     Learning Progress Summary           Patient Acceptance, E,TB,D, VU,DU by BR at 10/19/2022 1054   Family Acceptance, E,TB,D, VU,DU by BR at 10/19/2022 1054                   Point: Body mechanics (Done)     Learning Progress Summary           Patient Acceptance, E,TB,D, VU,DU by BR at 10/19/2022 1054   Family Acceptance, E,TB,D, VU,DU by BR at 10/19/2022 1054                   Point: Precautions (Done)     Learning Progress Summary           Patient Acceptance, E,TB,D, VU,DU by BR at 10/19/2022 1054   Family Acceptance, E,TB,D, VU,DU by BR at 10/19/2022 1054                               User Key     Initials Effective Dates Name Provider Type Discipline    BR 02/01/22 -  Helga Villagran PT Physical Therapist PT              PT Recommendation and Plan     Plan of Care Reviewed With: patient, spouse  Outcome Evaluation: Pt presents as a 77 y/o M s/p right reverse TSA per Dr. Medina on 10/19/22. Procedure was tolerated well. Alert and Oriented x 4. At baseline, pt is independent with all mobility.  Pt was able to demonstrate safe and independent gait and transfers without AD. O2 sats at 90% on room air- Nsg aware. Pt seen for PT Eval only.  PT recommendation is out-patient Physical Therapy. PT will complete orders.     Time Calculation:    PT Charges     Row Name 10/19/22 1055             Time Calculation    Start Time 0959  -BR      Stop Time 1016  -BR      Time Calculation (min) 17 min  -BR      PT Received On 10/19/22  -BR          Time Calculation- PT    Total Timed Code Minutes- PT 0 minute(s)  -DEEPALI            User Key  (r) = Recorded By, (t) = Taken By, (c) = Cosigned By    Initials Name Provider Type    Helga Joel, PEGGY Physical Therapist              Therapy Charges for Today     Code Description Service Date Service Provider Modifiers Qty    [id number] HC PT EVAL MOD COMPLEXITY 3 10/19/2022 Helga Villagran, PEGGY GP 1          PT G-Codes  Outcome Measure Options: AM-PAC 6 Clicks Basic Mobility (PT)  AM-PAC 6 Clicks Score (PT): 23    Helga Villagran, PEGGY  10/19/2022

## 2022-10-20 ENCOUNTER — TRANSITIONAL CARE MANAGEMENT TELEPHONE ENCOUNTER (OUTPATIENT)
Dept: CALL CENTER | Facility: HOSPITAL | Age: 77
End: 2022-10-20

## 2022-10-20 NOTE — OUTREACH NOTE
Call Center TCM Note    Flowsheet Row Responses   [facility] patient discharged from? [facility]   Does the patient have one of the following disease processes/diagnoses(primary or secondary)? Total Joint Replacement   Joint surgery performed? Shoulder   TCM attempt successful? Yes   Call start time 0841   Call end time 0847   Has the patient been back in either the hospital or Emergency Department since discharge? No   Discharge diagnosis TOTAL SHOULDER REVERSE ARTHROPLASTY   Person spoke with today (if not patient) and relationship patient and wife   Does the patient have all medications related to this admission filled (includes all antibiotics, pain medications, etc.) Yes   Is the patient taking all medications as directed (includes completed medication regime)? Yes   Is the patient able to teach back alternate methods of pain control? Shoulder-elevate above heart/ keep in sling as advised, Reposition, Correct alignment, Ice   Comments Declines followups to be made at this time.   Does the patient have an appointment with their PCP within 7 days of discharge? No   Nursing Interventions Patient desires to follow up with specialty only   What DME was ordered?  [place] for Oxygen   Has all DME been delivered? Yes   DME comments 02 sats are in 90's   Psychosocial issues? No   Did the patient receive a copy of their discharge instructions? Yes   Nursing interventions Reviewed instructions with patient   What is the patient's perception of their functional status since discharge? Improving   Is the patient able to teach back signs and symptoms of infection? Temp >100.4 for 24h or longer, Increased swelling or redness around incision (not associated with surgical edema)   Is the patient able to teach back how to prevent infection? Check incision daily   If the patient is a current smoker, are they able to teach back resources for cessation? Not a smoker   TCM call completed? Yes   Wrap up additional comments Quick  call. Patient traveling to outpatient PT at present time. Reports he is doing well.   Call end time 0847   Would this patient benefit from a Referral to [facility] Social Work? No   Is the patient interested in additional calls from an ambulatory ?  NOTE:  applies to high risk patients requiring additional follow-up. No          Campbell Rivera RN    10/20/2022, 08:47 EDT

## 2022-10-24 RX ORDER — ESCITALOPRAM OXALATE 10 MG/1
TABLET ORAL
Qty: 90 TABLET | Refills: 0 | Status: SHIPPED | OUTPATIENT
Start: 2022-10-24 | End: 2023-03-13

## 2022-10-26 ENCOUNTER — OFFICE VISIT (OUTPATIENT)
Dept: ORTHOPEDIC SURGERY | Facility: CLINIC | Age: 77
End: 2022-10-26

## 2022-10-26 DIAGNOSIS — M12.811 ROTATOR CUFF TEAR ARTHROPATHY OF RIGHT SHOULDER: Primary | ICD-10-CM

## 2022-10-26 DIAGNOSIS — M75.101 ROTATOR CUFF TEAR ARTHROPATHY OF RIGHT SHOULDER: Primary | ICD-10-CM

## 2022-10-26 PROCEDURE — 99024 POSTOP FOLLOW-UP VISIT: CPT | Performed by: ORTHOPAEDIC SURGERY

## 2022-11-02 ENCOUNTER — TREATMENT (OUTPATIENT)
Dept: PHYSICAL THERAPY | Facility: CLINIC | Age: 77
End: 2022-11-02

## 2022-11-02 DIAGNOSIS — Z96.611 S/P REVERSE TOTAL SHOULDER ARTHROPLASTY, RIGHT: Primary | ICD-10-CM

## 2022-11-02 DIAGNOSIS — R29.898 RIGHT ARM WEAKNESS: ICD-10-CM

## 2022-11-02 DIAGNOSIS — M25.511 ACUTE PAIN OF RIGHT SHOULDER: ICD-10-CM

## 2022-11-02 PROCEDURE — 97140 MANUAL THERAPY 1/> REGIONS: CPT | Performed by: PHYSICAL THERAPIST

## 2022-11-02 PROCEDURE — 97110 THERAPEUTIC EXERCISES: CPT | Performed by: PHYSICAL THERAPIST

## 2022-11-02 PROCEDURE — 97161 PT EVAL LOW COMPLEX 20 MIN: CPT | Performed by: PHYSICAL THERAPIST

## 2022-11-02 NOTE — PROGRESS NOTES
"  Physical Therapy Initial Evaluation and Plan of Care     [address] [place] [address] , Alfonzo Richardson, [place] [address]    Patient: Farheen Gomez   : 1945  Diagnosis/ICD-10 Code:  S/P reverse total shoulder arthroplasty, right [Z96.611]  Referring practitioner: Boby Medina MD  Date of Initial Visit: 2022  Today's Date: 2022  Patient seen for 1 sessions           Subjective Questionnaire: PT Functional Test: Quick DASH = 86% impairment      Subjective Evaluation    History of Present Illness  Date of surgery: 10/18/2022  Mechanism of injury: Pt is 2 wk post-op R reverse TSR. Pt states he is not taking any pain meds due to they messed up his sleep. Has been sleeping in a recliner x1wk and that has improved his sleep. Taking 4 tylenol a day and is \"just toughing it out\". Reports TSR pain has been worse and TKR pain. Hx of B TKR. Icing shoulder about 1x/day. Saw MD last wk. Has been doing pendulum exercises. Wants to be able to work in his wood shop again. Lives [place] and does a lot mowing. Likes to cut wood with chainsaw and wood splitter. Pt states L shoulder is arthritic and just as bad as the R before surgery.  Pt and his wife travel a lot. Next trip is 2023 and will be gone 24 days on [employer] cruise.      Patient Occupation: retired Quality of life: good    Pain  Current pain ratin  At best pain rating: 3  At worst pain ratin  Location: R shoulder  Quality: dull ache and tight  Relieving factors: ice, medications and rest  Progression: improved    Social Support  Lives with: spouse    Hand dominance: right    Treatments  Current treatment: immobilization  Patient Goals  Patient goals for therapy: decreased edema, decreased pain, increased motion, increased strength, independence with ADLs/IADLs and return to sport/leisure activities             Objective          Active Range of Motion   Left Shoulder   Normal active range of motion    Left Elbow   Normal active range of motion    Right Elbow "   Normal active range of motion    Passive Range of Motion     Right Shoulder   Flexion: 115 degrees   Abduction: 70 degrees   External rotation 45°: 23 degrees   Internal rotation 45°: 50 degrees     Strength/Myotome Testing     Left Shoulder     Planes of Motion   Flexion: 5   Abduction: 4+   External rotation at 0°: 4+   Internal rotation at 0°: 5     Left Elbow   Flexion: 5  Extension: 5    Right Elbow   Flexion: 3+  Extension: 3+          Assessment & Plan     Assessment  Impairments: abnormal coordination, abnormal muscle tone, abnormal or restricted ROM, activity intolerance, impaired physical strength, lacks appropriate home exercise program and pain with function  Functional Limitations: carrying objects, lifting, sleeping, pulling, pushing, uncomfortable because of pain, moving in bed, reaching behind back, reaching overhead and unable to perform repetitive tasks  Assessment details: Pt is 77 yo male who is ~2 wk s/p R reverse TSR. Pt presents with decreased ROM and strength of R UE. Pt with limited use of dominant R UE due to immobilization for healing. Pt is having difficulty with ADLS. Difficulty with pain. Difficulty sleeping. Quick DASH indicates 86% impairment.    Patient presents with the impairments listed above and based on the objective findings and the physical therapy evaluation, the patient’s condition has the potential to improve in response to therapy.   The patient’s condition and/or services required are at a level of complexity that necessitates the skill & supervision of a physical therapist.    Prognosis: good    Goals  Plan Goals: STG to be met in 3 wk:  - Increase R shoulder flex PROM to 130 deg.  - Pt to demonstrated improved posture with min verbal cues.  - PT to initiate AAROM next visit.  LTG to be met in 12 wk:  - Improve Quick DASH to 25% or less impairment.  - Increase R shoulder flex AROM to 120 deg in order to reach into overhead cabinets.  - Pt to be independent with HEP.  -  ADLs to be returned to PLOF.    Plan  Therapy options: will be seen for skilled therapy services  Planned modality interventions: electrical stimulation/Russian stimulation, thermotherapy (hydrocollator packs) and cryotherapy  Other planned modality interventions: modalities as indicated  Planned therapy interventions: manual therapy, body mechanics training, flexibility, functional ROM exercises, home exercise program, joint mobilization, postural training, soft tissue mobilization, spinal/joint mobilization, strengthening, stretching, therapeutic activities and neuromuscular re-education  Frequency: 2x week  Duration in weeks: 12  Treatment plan discussed with: patient        Timed:         Manual Therapy:    15     mins  50542;     Therapeutic Exercise:    8     mins  66341;     Neuromuscular Vanna:        mins  15582;    Therapeutic Activity:          mins  08903;     Gait Training:           mins  16479;     Ultrasound:          mins  67150;    Ionto                                   mins   78957  Self - Care                          mins  94790    Un-Timed:  Electrical Stimulation:         mins  67184 ( );  Dry Needling          20561/40102  Traction          mins 21731  Can Repos          mins 57572  Low Eval     20     Mins  90391  Mod Eval          Mins  50854  High Eval                            Mins  60282      Timed Treatment:   23   mins   Total Treatment:     43   mins      PT SIGNATURE: Mariam Loera PT, CLT  [place] Lic # [id number]  Electronically signed by Mariam Loera PT, 11/02/22, 11:33 AM EDT    Initial Certification  Certification Period: 11/2/2022 thru 1/30/2023  I certify that the therapy services are furnished while this patient is under my care.  The services outlined above are required by this patient, and will be reviewed every 90 days.     PHYSICIAN: Boby Medina MD _____________________________________________________  NPI: [id number]                                       DATE:    Please sign and return via fax to [phone]. Thank you, [facility].

## 2022-11-08 ENCOUNTER — TREATMENT (OUTPATIENT)
Dept: PHYSICAL THERAPY | Facility: CLINIC | Age: 77
End: 2022-11-08

## 2022-11-08 DIAGNOSIS — R29.898 RIGHT ARM WEAKNESS: ICD-10-CM

## 2022-11-08 DIAGNOSIS — M25.511 ACUTE PAIN OF RIGHT SHOULDER: ICD-10-CM

## 2022-11-08 DIAGNOSIS — Z96.611 S/P REVERSE TOTAL SHOULDER ARTHROPLASTY, RIGHT: Primary | ICD-10-CM

## 2022-11-08 PROCEDURE — 97110 THERAPEUTIC EXERCISES: CPT | Performed by: PHYSICAL THERAPIST

## 2022-11-08 PROCEDURE — 97140 MANUAL THERAPY 1/> REGIONS: CPT | Performed by: PHYSICAL THERAPIST

## 2022-11-08 NOTE — PROGRESS NOTES
Physical Therapy Daily Treatment Note      Patient: Farheen Gomez   : 1945  Diagnosis/ICD-10 Code:  S/P reverse total shoulder arthroplasty, right [Z96.611]   Problems Addressed this Visit    None  Visit Diagnoses     S/P reverse total shoulder arthroplasty, right    -  Primary    Acute pain of right shoulder        Right arm weakness          Diagnoses       Codes Comments    S/P reverse total shoulder arthroplasty, right    -  Primary ICD-10-CM: Z96.611  ICD-9-CM: V43.61     Acute pain of right shoulder     ICD-10-CM: M25.511  ICD-9-CM: 719.41     Right arm weakness     ICD-10-CM: R29.898  ICD-9-CM: 729.89          Referring practitioner: Boby Medina MD  Date of Initial Visit: Type: THERAPY  Noted: 2022  Today's Date: 2022    VISIT#: 2    Subjective Patient reports overall shoulder is feeling good, with only mild and manageable pain.       Objective     See Exercise, Manual, and Modality Logs for complete treatment.     Assessment/Plan Patient with good early passive motion with soft end feel. Patient tolerated progressed activity well with no reports of increased symptoms. Patient provided proper return demonstration with reviewed HEP.   Goals  Plan Goals: STG to be met in 3 wk:  - Increase R shoulder flex PROM to 130 deg.  - Pt to demonstrated improved posture with min verbal cues.  - PT to initiate AAROM next visit.  LTG to be met in 12 wk:  - Improve Quick DASH to 25% or less impairment.  - Increase R shoulder flex AROM to 120 deg in order to reach into overhead cabinets.  - Pt to be independent with HEP.  - ADLs to be returned to [facility].    Progress per Plan of Care and Progress strengthening /stabilization /functional activity         Timed:         Manual Therapy:    15     mins  94171;     Therapeutic Exercise:    20     mins  64894;     Neuromuscular Vanna:        mins  39022;    Therapeutic Activity:          mins  55307;     Gait Training:           mins  36466;     Ultrasound:           mins  43990;    Ionto                                   mins   56488  Self Care                    _____  mins   60263  Canalith Repos                   mins  62944    Un-Timed:  Electrical Stimulation:         mins  54944 ( );  Dry Needling          mins self-pay   Traction          mins 02736    Timed Treatment:   35   mins   Total Treatment:     35   mins    Martin Leung, PTA  [place] License [id number]  Physical Therapist Assistant

## 2022-11-11 RX ORDER — ERGOCALCIFEROL 1.25 MG/1
CAPSULE ORAL
Qty: 12 CAPSULE | Refills: 0 | Status: SHIPPED | OUTPATIENT
Start: 2022-11-11 | End: 2023-02-09

## 2022-11-15 ENCOUNTER — TREATMENT (OUTPATIENT)
Dept: PHYSICAL THERAPY | Facility: CLINIC | Age: 77
End: 2022-11-15

## 2022-11-15 DIAGNOSIS — R29.898 RIGHT ARM WEAKNESS: ICD-10-CM

## 2022-11-15 DIAGNOSIS — Z96.611 S/P REVERSE TOTAL SHOULDER ARTHROPLASTY, RIGHT: Primary | ICD-10-CM

## 2022-11-15 DIAGNOSIS — M25.511 ACUTE PAIN OF RIGHT SHOULDER: ICD-10-CM

## 2022-11-15 PROCEDURE — 97140 MANUAL THERAPY 1/> REGIONS: CPT | Performed by: PHYSICAL THERAPIST

## 2022-11-15 PROCEDURE — 97110 THERAPEUTIC EXERCISES: CPT | Performed by: PHYSICAL THERAPIST

## 2022-11-15 NOTE — PROGRESS NOTES
Physical Therapy Daily Treatment Note      Patient: Farheen Gomez   : 1945  Diagnosis/ICD-10 Code:  S/P reverse total shoulder arthroplasty, right [Z96.611]   Problems Addressed this Visit    None  Visit Diagnoses     S/P reverse total shoulder arthroplasty, right    -  Primary    Acute pain of right shoulder        Right arm weakness          Diagnoses       Codes Comments    S/P reverse total shoulder arthroplasty, right    -  Primary ICD-10-CM: Z96.611  ICD-9-CM: V43.61     Acute pain of right shoulder     ICD-10-CM: M25.511  ICD-9-CM: 719.41     Right arm weakness     ICD-10-CM: R29.898  ICD-9-CM: 729.89         Referring practitioner: Boby Medina MD  Date of Initial Visit: Type: THERAPY  Noted: 2022  Today's Date: 11/15/2022    VISIT#: 3    Subjective : Pt reports his shoulder is feeling better. States pain has moved to more just discomfort. Is mostly wearing his sling when he leaves his house now. States he is ready to get rid of sling. Has a [employer] gathering at [employer] this weekend and will be going to [facility] [place] next week to visit family.    Objective : Encouraged pt to continue using sling outside of his home, especially at gatherings to avoid anyone hitting his arm. Pt verbalized understanding.  Instructed pt in submax shoulder isometrics. Pt performed these well without difficulty. Added these to HEP and issued copy of exercises.  Flex PROM in supine ~140-145 deg, abd = ~150 deg    See Exercise, Manual, and Modality Logs for complete treatment.     Assessment/Plan : Pain gradually improving. Continues to make gains in PROM. Added submax isometrics with good tolerance. Needs continued PT to progress R shoulder ROM and strength as tolerated.    Goals  Plan Goals: STG to be met in 3 wk:  - Increase R shoulder flex PROM to 130 deg. - MET  - Pt to demonstrated improved posture with min verbal cues. - Progressing  - PT to initiate AAROM next visit. - MET  LTG to be met in 12 wk:  -  Improve Quick DASH to 25% or less impairment.  - Increase R shoulder flex AROM to 120 deg in order to reach into overhead cabinets.  - Pt to be independent with HEP.  - ADLs to be returned to PLOF.    Progress per Plan of Care and Progress strengthening /stabilization /functional activity         Timed:         Manual Therapy:    15     mins  12200;     Therapeutic Exercise:    23     mins  90839;     Neuromuscular Vanna:        mins  52098;    Therapeutic Activity:          mins  53975;     Gait Training:           mins  94249;     Ultrasound:          mins  08782;    Ionto                                   mins   74418  Self Care                            mins   96268    Un-Timed:  Electrical Stimulation:         mins  41828 ( );  Dry Needling          mins 42220/99283  Traction          mins 18040  Canalith Repos                   mins  18167  Low Eval          Mins  61250  Mod Eval          Mins  39620  High Eval                            Mins  55161  Re-Eval                               mins  74407    Timed Treatment:   38   mins   Total Treatment:     38   mins    Mariam Loera, PT, CLT, Cert DN  Physical Therapist  [place] Lic # [id number]

## 2022-11-19 NOTE — ANESTHESIA PROCEDURE NOTES
Peripheral Block      Patient reassessed immediately prior to procedure    Patient location during procedure: pre-op  Reason for block: at surgeon's request and post-op pain management  Performed by  Anesthesiologist: Joseph Smith MD  Preanesthetic Checklist  Completed: patient identified, IV checked, site marked, risks and benefits discussed, surgical consent, monitors and equipment checked, pre-op evaluation and timeout performed  Prep:  Pt Position: sitting  Sterile barriers:cap, gloves, sterile barriers and mask  Prep: ChloraPrep  Patient monitoring: blood pressure monitoring, continuous pulse oximetry and EKG  Procedure    Sedation: yes  Performed under: MAC  Guidance:ultrasound guided and ultrasound used for direct visiulization of needle and medication adminstration    ULTRASOUND INTERPRETATION.  Using ultrasound guidance a 20 G gauge needle was placed in close proximity to the brachial plexus nerve, at which point, under ultrasound guidance anesthetic was injected in the area of the nerve and spread of the anesthesia was seen on ultrasound in close proximity thereto.  There were no abnormalities seen on ultrasound; a digital image was taken; and the patient tolerated the procedure with no complications. Images:still images obtained, printed/placed on chart    Laterality:right  Block Type:interscalene  Injection Technique:single-shot  Needle Type:echogenic  Needle Gauge:20 G  Resistance on Injection: none  Sedation medications used: midazolam (VERSED) injection - Intravenous   2 mg - 10/18/2022 9:37:00 AM  fentaNYL citrate (PF) (SUBLIMAZE) injection - Intravenous   100 mcg - 10/18/2022 9:37:00 AM  Medications Used: bupivacaine liposome (EXPAREL) injection 1.3% - Injection   10 mL - 10/18/2022 9:38:00 AM  bupivacaine PF (MARCAINE) injection 0.5% - Injection   10 mL - 10/18/2022 9:38:00 AM      Post Assessment  Injection Assessment: negative aspiration for heme, no paresthesia on injection and  incremental injection  Patient Tolerance:comfortable throughout block  Complications:no           normal

## 2022-11-21 ENCOUNTER — TREATMENT (OUTPATIENT)
Dept: PHYSICAL THERAPY | Facility: CLINIC | Age: 77
End: 2022-11-21

## 2022-11-21 DIAGNOSIS — Z96.611 S/P REVERSE TOTAL SHOULDER ARTHROPLASTY, RIGHT: Primary | ICD-10-CM

## 2022-11-21 DIAGNOSIS — M25.511 ACUTE PAIN OF RIGHT SHOULDER: ICD-10-CM

## 2022-11-21 DIAGNOSIS — R29.898 RIGHT ARM WEAKNESS: ICD-10-CM

## 2022-11-21 PROCEDURE — 97110 THERAPEUTIC EXERCISES: CPT | Performed by: PHYSICAL THERAPIST

## 2022-11-21 PROCEDURE — 97140 MANUAL THERAPY 1/> REGIONS: CPT | Performed by: PHYSICAL THERAPIST

## 2022-11-21 NOTE — PROGRESS NOTES
Physical Therapy Daily Treatment Note    Patient: Farheen Gomez   : 1945  Diagnosis/ICD-10 Code:  S/P reverse total shoulder arthroplasty, right [Z96.611]  Referring practitioner: Boby Medina MD  Date of Initial Visit: Type: THERAPY  Noted: 2022  Today's Date: 2022  Patient seen for 4 sessions             Subjective Pt reports his shoulder is feeling better. States pain has moved to more just discomfort. Is mostly wearing his sling when he leaves his house now. States he is ready to get rid of sling. Has a Thanksgiving gathering at [place] this weekend and will be going to [facility] [place] next week to visit family.    Objective   See Exercise, Manual, and Modality Logs for complete treatment. Encouraged pt to continue using sling outside of his home, especially at gatherings to avoid anyone hitting his arm. Pt verbalized understanding.  Instructed pt in submax shoulder isometrics. Pt performed these well without difficulty. Added these to HEP and issued copy of exercises.  Flex PROM in supine ~140-145 deg, abd = ~150 deg      Assessment/Plan  STG to be met in 3 wk:  - Increase R shoulder flex PROM to 130 deg. - MET  - Pt to demonstrated improved posture with min verbal cues. - Progressing  - PT to initiate AAROM next visit. - MET  LTG to be met in 12 wk:  - Improve Quick DASH to 25% or less impairment.  - Increase R shoulder flex AROM to 120 deg in order to reach into overhead cabinets.  - Pt to be independent with HEP.  - ADLs to be returned to OF.    Progress per Plan of Care expiring 23           Timed:         Manual Therapy:    15     mins  81664;     Therapeutic Exercise:    23     mins  83424;     Neuromuscular Vanna:        mins  71068;    Therapeutic Activity:          mins  22846;     Gait Training:           mins  99782;     Ultrasound:          mins  82880;    Ionto                                   mins   75183  Self Care                            mins    31684      Un-Timed:  Electrical Stimulation:         mins  15205 ( );  Dry Needling          mins self-pay  Traction          mins 17704  Low Eval          Mins  92836  Mod Eval          Mins  01723  High Eval                            Mins  60496  Canalith Repos                   mins  04317    Timed Treatment:   38   mins   Total Treatment:     38   mins    Robin A Sprigler, PT  Physical Therapist

## 2022-11-30 ENCOUNTER — TREATMENT (OUTPATIENT)
Dept: PHYSICAL THERAPY | Facility: CLINIC | Age: 77
End: 2022-11-30

## 2022-11-30 DIAGNOSIS — Z96.611 S/P REVERSE TOTAL SHOULDER ARTHROPLASTY, RIGHT: Primary | ICD-10-CM

## 2022-11-30 DIAGNOSIS — R29.898 RIGHT ARM WEAKNESS: ICD-10-CM

## 2022-11-30 DIAGNOSIS — M25.511 ACUTE PAIN OF RIGHT SHOULDER: ICD-10-CM

## 2022-11-30 PROCEDURE — 97140 MANUAL THERAPY 1/> REGIONS: CPT | Performed by: PHYSICAL THERAPIST

## 2022-11-30 PROCEDURE — 97110 THERAPEUTIC EXERCISES: CPT | Performed by: PHYSICAL THERAPIST

## 2022-11-30 NOTE — PROGRESS NOTES
Physical Therapy Daily Treatment Note      Patient: Farheen Gomez   : 1945  Diagnosis/ICD-10 Code:  S/P reverse total shoulder arthroplasty, right [Z96.611]   Problems Addressed this Visit    None  Visit Diagnoses     S/P reverse total shoulder arthroplasty, right    -  Primary    Acute pain of right shoulder        Right arm weakness          Diagnoses       Codes Comments    S/P reverse total shoulder arthroplasty, right    -  Primary ICD-10-CM: Z96.611  ICD-9-CM: V43.61     Acute pain of right shoulder     ICD-10-CM: M25.511  ICD-9-CM: 719.41     Right arm weakness     ICD-10-CM: R29.898  ICD-9-CM: 729.89          Referring practitioner: Boby Medina MD  Date of Initial Visit: Type: THERAPY  Noted: 2022  Today's Date: 2022    VISIT#: 5    Subjective patient reports feeling good with progress with decreased aching in R shoulder.       Objective     See Exercise, Manual, and Modality Logs for complete treatment.     Assessment/Plan Patient tolerated progressed therapeutic exercise well with no reports of increased symptoms. Patient will continue to benefit from improved active motion and improved base scapular strengthening.   STG to be met in 3 wk:  - Increase R shoulder flex PROM to 130 deg. - MET  - Pt to demonstrated improved posture with min verbal cues. - Progressing  - PT to initiate AAROM next visit. - MET  LTG to be met in 12 wk:  - Improve Quick DASH to 25% or less impairment.  - Increase R shoulder flex AROM to 120 deg in order to reach into overhead cabinets.  - Pt to be independent with HEP.  - ADLs to be returned to PLOF    Progress per Plan of Care and Progress strengthening /stabilization /functional activity         Timed:         Manual Therapy:    15     mins  48951;     Therapeutic Exercise:    28     mins  47108;     Neuromuscular Vanna:        mins  36616;    Therapeutic Activity:          mins  66598;     Gait Training:           mins  17716;     Ultrasound:           mins  88081;    Ionto                                   mins   46716  Self Care                    _____  mins   17642  Canalith Repos                   mins  07459    Un-Timed:  Electrical Stimulation:         mins  01446 ( );  Dry Needling          mins self-pay   Traction          mins 26065    Timed Treatment:   43   mins   Total Treatment:     43   mins    Martin Leung, PTA  [place] License [id number]  Physical Therapist Assistant

## 2022-12-05 ENCOUNTER — TREATMENT (OUTPATIENT)
Dept: PHYSICAL THERAPY | Facility: CLINIC | Age: 77
End: 2022-12-05

## 2022-12-05 DIAGNOSIS — R29.898 RIGHT ARM WEAKNESS: ICD-10-CM

## 2022-12-05 DIAGNOSIS — M25.511 ACUTE PAIN OF RIGHT SHOULDER: ICD-10-CM

## 2022-12-05 DIAGNOSIS — Z96.611 S/P REVERSE TOTAL SHOULDER ARTHROPLASTY, RIGHT: Primary | ICD-10-CM

## 2022-12-05 PROCEDURE — 97140 MANUAL THERAPY 1/> REGIONS: CPT | Performed by: PHYSICAL THERAPIST

## 2022-12-05 PROCEDURE — 97110 THERAPEUTIC EXERCISES: CPT | Performed by: PHYSICAL THERAPIST

## 2022-12-05 NOTE — PROGRESS NOTES
Physical Therapy Daily Treatment Note      Patient: Farheen Gomez   : 1945  Diagnosis/ICD-10 Code:  S/P reverse total shoulder arthroplasty, right [Z96.611]   Problems Addressed this Visit    None  Visit Diagnoses     S/P reverse total shoulder arthroplasty, right    -  Primary    Acute pain of right shoulder        Right arm weakness          Diagnoses       Codes Comments    S/P reverse total shoulder arthroplasty, right    -  Primary ICD-10-CM: Z96.611  ICD-9-CM: V43.61     Acute pain of right shoulder     ICD-10-CM: M25.511  ICD-9-CM: 719.41     Right arm weakness     ICD-10-CM: R29.898  ICD-9-CM: 729.89          Referring practitioner: Boby Medina MD  Date of Initial Visit: Type: THERAPY  Noted: 2022  Today's Date: 2022    VISIT#: 6    Subjective Patient reports having mild but manageable muscle soreness, did not perform HEP yesterday due to busy schedule.       Objective     See Exercise, Manual, and Modality Logs for complete treatment.     Assessment/Plan Patient tolerated progressed therapeutic exercise well with only reports of increased fatigue. Patient will continue to benefit from improved base scapular strengthening for improved stability with daily functional activity.   STG to be met in 3 wk:  - Increase R shoulder flex PROM to 130 deg. - MET  - Pt to demonstrated improved posture with min verbal cues. - Progressing  - PT to initiate AAROM next visit. - MET  LTG to be met in 12 wk:  - Improve Quick DASH to 25% or less impairment.  - Increase R shoulder flex AROM to 120 deg in order to reach into overhead cabinets.  - Pt to be independent with HEP.  - ADLs to be returned to PLOF    Progress per Plan of Care and Progress strengthening /stabilization /functional activity         Timed:         Manual Therapy:    15     mins  93856;     Therapeutic Exercise:    30     mins  85236;     Neuromuscular Vanna:        mins  98499;    Therapeutic Activity:          mins  27716;     Gait  Training:           mins  47811;     Ultrasound:          mins  35856;    Ionto                                   mins   36313  Self Care                    _____  mins   94005  Canalith Repos                   mins  83202    Un-Timed:  Electrical Stimulation:         mins  74052 ( );  Dry Needling          mins self-pay   Traction          mins 34869    Timed Treatment:   45   mins   Total Treatment:     45   mins    Martin Leung PTA  [place] License [id number]  Physical Therapist Assistant

## 2022-12-12 ENCOUNTER — TREATMENT (OUTPATIENT)
Dept: PHYSICAL THERAPY | Facility: CLINIC | Age: 77
End: 2022-12-12

## 2022-12-12 DIAGNOSIS — R29.898 RIGHT ARM WEAKNESS: ICD-10-CM

## 2022-12-12 DIAGNOSIS — M25.511 ACUTE PAIN OF RIGHT SHOULDER: ICD-10-CM

## 2022-12-12 DIAGNOSIS — Z96.611 S/P REVERSE TOTAL SHOULDER ARTHROPLASTY, RIGHT: Primary | ICD-10-CM

## 2022-12-12 PROCEDURE — 97110 THERAPEUTIC EXERCISES: CPT | Performed by: PHYSICAL THERAPIST

## 2022-12-12 PROCEDURE — 97140 MANUAL THERAPY 1/> REGIONS: CPT | Performed by: PHYSICAL THERAPIST

## 2022-12-12 NOTE — PROGRESS NOTES
Physical Therapy Daily Treatment Note      Patient: Farheen Gomez   : 1945  Diagnosis/ICD-10 Code:  S/P reverse total shoulder arthroplasty, right [Z96.611]   Problems Addressed this Visit    None  Visit Diagnoses     S/P reverse total shoulder arthroplasty, right    -  Primary    Acute pain of right shoulder        Right arm weakness          Diagnoses       Codes Comments    S/P reverse total shoulder arthroplasty, right    -  Primary ICD-10-CM: Z96.611  ICD-9-CM: V43.61     Acute pain of right shoulder     ICD-10-CM: M25.511  ICD-9-CM: 719.41     Right arm weakness     ICD-10-CM: R29.898  ICD-9-CM: 729.89          Referring practitioner: Boby Medina MD  Date of Initial Visit: Type: THERAPY  Noted: 2022  Today's Date: 2022    VISIT#: 7    Subjective Patient reports feeling some mild muscle soreness following increased use the past couple weeks. Overall pleased with progress at this time, just feels he needs continued strengthening, pleased with mobility in shoulder.       Objective     See Exercise, Manual, and Modality Logs for complete treatment.     Assessment/Plan Patient tolerated progressed therapeutic exercise well with only reports of increased fatigue. Patient will continue to benefit from improved base scapular strengthening for improved stability with daily functional activity.     STG to be met in 3 wk:  - Increase R shoulder flex PROM to 130 deg. - MET  - Pt to demonstrated improved posture with min verbal cues. - Progressing  - PT to initiate AAROM next visit. - MET  LTG to be met in 12 wk:  - Improve Quick DASH to 25% or less impairment.  - Increase R shoulder flex AROM to 120 deg in order to reach into overhead cabinets.  - Pt to be independent with HEP.  - ADLs to be returned to PLOF  Progress per Plan of Care and Progress strengthening /stabilization /functional activity         Timed:         Manual Therapy:    15     mins  42890;     Therapeutic Exercise:    25     mins   15817;     Neuromuscular Vanna:        mins  14271;    Therapeutic Activity:          mins  58138;     Gait Training:           mins  05883;     Ultrasound:          mins  27513;    Ionto                                   mins   17689  Self Care                    _____  mins   86180  Canalith Repos                   mins  91192    Un-Timed:  Electrical Stimulation:         mins  40005 ( );  Dry Needling          mins self-pay   Traction          mins 82897    Timed Treatment:   40   mins   Total Treatment:     40   mins    Martin Leung PTA  [place] License [id number]  Physical Therapist Assistant

## 2022-12-14 ENCOUNTER — TREATMENT (OUTPATIENT)
Dept: PHYSICAL THERAPY | Facility: CLINIC | Age: 77
End: 2022-12-14

## 2022-12-14 DIAGNOSIS — R29.898 RIGHT ARM WEAKNESS: ICD-10-CM

## 2022-12-14 DIAGNOSIS — M25.511 ACUTE PAIN OF RIGHT SHOULDER: ICD-10-CM

## 2022-12-14 DIAGNOSIS — Z96.611 S/P REVERSE TOTAL SHOULDER ARTHROPLASTY, RIGHT: Primary | ICD-10-CM

## 2022-12-14 PROCEDURE — 97110 THERAPEUTIC EXERCISES: CPT | Performed by: PHYSICAL THERAPIST

## 2022-12-14 PROCEDURE — 97140 MANUAL THERAPY 1/> REGIONS: CPT | Performed by: PHYSICAL THERAPIST

## 2022-12-14 NOTE — PROGRESS NOTES
Physical Therapy Daily Treatment Note      Patient: Farheen Gomez   : 1945  Diagnosis/ICD-10 Code:  S/P reverse total shoulder arthroplasty, right [Z96.611]   Problems Addressed this Visit    None  Visit Diagnoses     S/P reverse total shoulder arthroplasty, right    -  Primary    Acute pain of right shoulder        Right arm weakness          Diagnoses       Codes Comments    S/P reverse total shoulder arthroplasty, right    -  Primary ICD-10-CM: Z96.611  ICD-9-CM: V43.61     Acute pain of right shoulder     ICD-10-CM: M25.511  ICD-9-CM: 719.41     Right arm weakness     ICD-10-CM: R29.898  ICD-9-CM: 729.89          Referring practitioner: Boby Medina MD  Date of Initial Visit: Type: THERAPY  Noted: 2022  Today's Date: 2022    VISIT#: 8    Subjective Patient reports feeling good with no increased soreness following progressions last visit.       Objective     See Exercise, Manual, and Modality Logs for complete treatment.     Assessment/Plan Patient tolerated progressed therapeutic exercise well with only reports of increased fatigue. Patient will continue to benefit from base scapular strengthening for improved stability/tolerance with daily functional activity.   STG to be met in 3 wk:  - Increase R shoulder flex PROM to 130 deg. - MET  - Pt to demonstrated improved posture with min verbal cues. - Progressing  - PT to initiate AAROM next visit. - MET  LTG to be met in 12 wk:  - Improve Quick DASH to 25% or less impairment.  - Increase R shoulder flex AROM to 120 deg in order to reach into overhead cabinets.  - Pt to be independent with HEP.  - ADLs to be returned to PLOF    Progress per Plan of Care and Progress strengthening /stabilization /functional activity         Timed:         Manual Therapy:    15     mins  59096;     Therapeutic Exercise:    25     mins  37408;     Neuromuscular Vanna:        mins  56021;    Therapeutic Activity:          mins  84065;     Gait Training:            mins  32760;     Ultrasound:          mins  67099;    Ionto                                   mins   19700  Self Care                    _____  mins   90203  Canalith Repos                   mins  19369    Un-Timed:  Electrical Stimulation:         mins  98981 ( );  Dry Needling          mins self-pay   Traction          mins 18405    Timed Treatment:   40   mins   Total Treatment:     40   mins    Martin Leung PTA  [place] License [id number]  Physical Therapist Assistant

## 2022-12-21 ENCOUNTER — OFFICE VISIT (OUTPATIENT)
Dept: ORTHOPEDIC SURGERY | Facility: CLINIC | Age: 77
End: 2022-12-21
Payer: MEDICARE

## 2022-12-21 ENCOUNTER — TREATMENT (OUTPATIENT)
Dept: PHYSICAL THERAPY | Facility: CLINIC | Age: 77
End: 2022-12-21

## 2022-12-21 VITALS — WEIGHT: 231 LBS | OXYGEN SATURATION: 96 % | HEIGHT: 70 IN | BODY MASS INDEX: 33.07 KG/M2 | HEART RATE: 88 BPM

## 2022-12-21 DIAGNOSIS — Z96.611 S/P REVERSE TOTAL SHOULDER ARTHROPLASTY, RIGHT: Primary | ICD-10-CM

## 2022-12-21 DIAGNOSIS — M25.511 ACUTE PAIN OF RIGHT SHOULDER: ICD-10-CM

## 2022-12-21 DIAGNOSIS — M12.811 ROTATOR CUFF TEAR ARTHROPATHY OF RIGHT SHOULDER: Primary | ICD-10-CM

## 2022-12-21 DIAGNOSIS — M75.101 ROTATOR CUFF TEAR ARTHROPATHY OF RIGHT SHOULDER: Primary | ICD-10-CM

## 2022-12-21 DIAGNOSIS — R29.898 RIGHT ARM WEAKNESS: ICD-10-CM

## 2022-12-21 PROCEDURE — 97140 MANUAL THERAPY 1/> REGIONS: CPT | Performed by: PHYSICAL THERAPIST

## 2022-12-21 PROCEDURE — 1160F RVW MEDS BY RX/DR IN RCRD: CPT | Performed by: ORTHOPAEDIC SURGERY

## 2022-12-21 PROCEDURE — 99024 POSTOP FOLLOW-UP VISIT: CPT | Performed by: ORTHOPAEDIC SURGERY

## 2022-12-21 PROCEDURE — 97110 THERAPEUTIC EXERCISES: CPT | Performed by: PHYSICAL THERAPIST

## 2022-12-21 PROCEDURE — 1159F MED LIST DOCD IN RCRD: CPT | Performed by: ORTHOPAEDIC SURGERY

## 2022-12-21 RX ORDER — KETOCONAZOLE 20 MG/G
CREAM TOPICAL
COMMUNITY
Start: 2022-12-19

## 2022-12-21 RX ORDER — TRIAMCINOLONE ACETONIDE 1 MG/G
CREAM TOPICAL
COMMUNITY
Start: 2022-12-19

## 2022-12-21 NOTE — PROGRESS NOTES
POST OP TOTAL GLOBAL      NAME: Farheen Gomez           : 1945    MRN: [id number]    Chief Complaint   Patient presents with   • Right Shoulder - Post-op       Date of Surgery: 10/18/2022  ?  HPI:   Patient returns today for 9 week follow up of right reverse total shoulder arthroplasty Incision(s) healed nicely with no signs of infection. Patient reports doing well with no unusual complaints. No fevers, rigors or chills. Appears to be progressing appropriately. Patient is on appropriate anticoagulation.  He is very pleased with his outcome since his range of motion has improved to near normal levels with physical therapy.  He is using his upper extremity for activities of daily living without any difficulty.      Ortho Exam:   Right shoulder. Patient is postoperative 9 week(s). Afebrile. Vital signs are stable. Deltopectoral incision is clean and healing well. Axillary nerve function is well preserved. There is no glenohumeral instability. No clicking, popping or catching is noted. Apprehension sign is negative. Axillary nerve function is well preserved. Radial artery pulses are palpable. There is no clinical deformity. Range of motion is flexion 0-120 degrees, abduction 0-120 degrees.      Diagnostic Studies:  no diagnostic testing performed this visit        Assessment:  Diagnoses and all orders for this visit:    1. Rotator cuff tear arthropathy of right shoulder (Primary)            Plan   · Incision care  · To use ACE wrap/CHRISTOPHER stocking  · Continue ice to joint   · Stretching and strengthening exercises of the flexors and abductors of the shoulder.  · Calcium and vitamin D for bone health.  · Falls and dislocation precautions for the shoulder arthroplasty discussed with the patient.  · , GI and dental procedure prophylaxis with antibiotics to prevent metastatic infection of the shoulder arthroplasty implants.  · Aggressive ROM  · Falls precautions  · You may now resume any prescription medications  you were taking prior to surgery  · Continue with lifelong antibiotic prophylaxis with dental procedures following total joint replacement.  · Follow up in 12 month(s)    Date of encounter: 12/21/2022   Boby Medina MD

## 2022-12-21 NOTE — PROGRESS NOTES
Physical Therapy Progress Note/Reassessment  [address] [place] [address] , [place] [address]    Patient: Farheen Gomez   : 1945  Diagnosis/ICD-10 Code:  S/P reverse total shoulder arthroplasty, right [Z96.611]  Referring practitioner: Boby Medina MD  Date of Initial Evaluation:  Type: THERAPY  Noted: 2022  Patient seen for 9 sessions      Subjective:   Visit Diagnoses:    ICD-10-CM ICD-9-CM   1. S/P reverse total shoulder arthroplasty, right  Z96.611 V43.61   2. Acute pain of right shoulder  M25.511 719.41   3. Right arm weakness  R29.898 729.89         Farheen Gomez reports his shoulder is feeling so much better since reverse TSR. Still has muscle soreness. Pt pleased   Subjective Questionnaire: QuickDASH: not completed this date  Clinical Progress: improved  Home Program Compliance: Yes  Treatment has included: therapeutic exercise, neuromuscular re-education, manual therapy, therapeutic activity and cryotherapy      Objective          Active Range of Motion     Right Shoulder   Flexion: 120 degrees   Abduction: 90 degrees   External rotation BTH: C4   Internal rotation BTB: sacrum     Passive Range of Motion     Right Shoulder   Flexion: 160 degrees   Abduction: 170 degrees   External rotation 45°: 75 degrees     Strength/Myotome Testing     Right Shoulder     Planes of Motion   Flexion: 3-   Abduction: 3-   External rotation at 0°: 3+   Internal rotation at 0°: 4       Talked with pt regarding continued weakness and benefit of continued PT to progress strengthening. Pt verbalized understanding and will talk with his MD today at f/u.  See Exercise, Manual, and Modality Logs for complete treatment.     Assessment/Plan : Pt has made very good gains in PROM. Weakness in shoulder flex and abd limit use of R UE with overhead tasks. Patient will continue to benefit from base scapular strengthening and shoulder strengthening to improve functional use of R UE with all daily tasks.     STG to be met in 3  wk:  - Increase R shoulder flex PROM to 130 deg. - MET  - Pt to demonstrated improved posture with min verbal cues. - MET  - PT to initiate AAROM next visit. - MET  LTG to be met in 12 wk:  - Improve Quick DASH to 25% or less impairment.  - Increase R shoulder flex AROM to 120 deg in order to reach into overhead cabinets. - MET  - Pt to be independent with HEP. - Progressing  - ADLs to be returned to PLOF - Progressing       Recommendations: Continue as planned  Timeframe: 2 months  Prognosis to achieve goals: good      Timed:         Manual Therapy:    15     mins  84568;     Therapeutic Exercise:    25     mins  13285;     Neuromuscular Vanna:        mins  28196;    Therapeutic Activity:          mins  55580;     Gait Training:           mins  10667;     Ultrasound:          mins  33966;    Ionto                                   mins   51919  Self Care                            mins   26368      Un-Timed:  Electrical Stimulation:         mins  73759 ( );  Dry Needling          mins self-pay  Traction          mins 37982  Canalith Repos         mins 74666      Timed Treatment:   40   mins   Total Treatment:     40   mins    PT Signature: Mariam Loera PT, CLT  PT License: [place] Lic # [id number]

## 2022-12-27 ENCOUNTER — TREATMENT (OUTPATIENT)
Dept: PHYSICAL THERAPY | Facility: CLINIC | Age: 77
End: 2022-12-27

## 2022-12-27 DIAGNOSIS — M25.511 ACUTE PAIN OF RIGHT SHOULDER: ICD-10-CM

## 2022-12-27 DIAGNOSIS — R29.898 RIGHT ARM WEAKNESS: ICD-10-CM

## 2022-12-27 DIAGNOSIS — Z96.611 S/P REVERSE TOTAL SHOULDER ARTHROPLASTY, RIGHT: Primary | ICD-10-CM

## 2022-12-27 PROCEDURE — 97140 MANUAL THERAPY 1/> REGIONS: CPT | Performed by: PHYSICAL THERAPIST

## 2022-12-27 PROCEDURE — 97110 THERAPEUTIC EXERCISES: CPT | Performed by: PHYSICAL THERAPIST

## 2022-12-27 NOTE — PROGRESS NOTES
Physical Therapy Daily Treatment Note      Patient: Farheen Gomez   : 1945  Diagnosis/ICD-10 Code:  S/P reverse total shoulder arthroplasty, right [Z96.611]   Problems Addressed this Visit    None  Visit Diagnoses     S/P reverse total shoulder arthroplasty, right    -  Primary    Acute pain of right shoulder        Right arm weakness          Diagnoses       Codes Comments    S/P reverse total shoulder arthroplasty, right    -  Primary ICD-10-CM: Z96.611  ICD-9-CM: V43.61     Acute pain of right shoulder     ICD-10-CM: M25.511  ICD-9-CM: 719.41     Right arm weakness     ICD-10-CM: R29.898  ICD-9-CM: 729.89          Referring practitioner: Boby Medina MD  Date of Initial Visit: Type: THERAPY  Noted: 2022  Today's Date: 2022    VISIT#: 10    Subjective Patient reports Dr. Medina was pleased with progress and instructed him he was good to discontinue PT after visit today.       Objective Dash at 20% impairment     See Exercise, Manual, and Modality Logs for complete treatment.     Assessment/Plan  Reviewed HEP with patient demonstrating proper technique and expressing understanding of importance to continue strengthening for improved stability with R UE.   STG to be met in 3 wk:  - Increase R shoulder flex PROM to 130 deg. - MET  - Pt to demonstrated improved posture with min verbal cues. - MET  - PT to initiate AAROM next visit. - MET  LTG to be met in 12 wk:  - Improve Quick DASH to 25% or less impairment.  - Increase R shoulder flex AROM to 120 deg in order to reach into overhead cabinets. - MET  - Pt to be independent with HEP. - Progressing  - ADLs to be returned to PLOF - Progressing    Plan to DC with HEP per MD instruction          Timed:         Manual Therapy:    15     mins  74453;     Therapeutic Exercise:    25     mins  34929;     Neuromuscular Vanna:        mins  12695;    Therapeutic Activity:          mins  67735;     Gait Training:           mins  85278;     Ultrasound:           mins  82506;    Ionto                                   mins   64500  Self Care                    _____  mins   74180  Canalith Repos                   mins  06741    Un-Timed:  Electrical Stimulation:         mins  29698 ( );  Dry Needling          mins self-pay   Traction          mins 02184    Timed Treatment:   40   mins   Total Treatment:     40   mins    Martin Leung PTA  [place] License [id number]  Physical Therapist Assistant

## 2022-12-30 NOTE — PROGRESS NOTES
Discharge Summary  Discharge Summary from Physical Therapy Report      Dates  PT visit: 11/2/2022 - 12/27/2022  Number of Visits: 10     Discharge Status of Patient: See Progress Note dated 12/27/2022    Goals: Partially Met    Discharge Plan: Continue with current home exercise program as instructed    Comments : Pt was informed by MD that he was doing well and can D/C PT. Pt to continue with HEP to make further strength gains.    Date of Discharge 12/30/2022        Mariam Loera, PT  Physical Therapist  [place] Lic# [id number]

## 2023-01-01 PROBLEM — IMO0002 DEGENERATION OF INTERVERTEBRAL DISC: Status: ACTIVE | Noted: 2023-01-01

## 2023-01-01 NOTE — PATIENT INSTRUCTIONS
Health Maintenance Due   Topic Date Due    COVID-19 Vaccine (5 - Booster for Moderna series) 09/06/2022    12 hour fast before leaves for vacation

## 2023-01-05 ENCOUNTER — OFFICE VISIT (OUTPATIENT)
Dept: FAMILY MEDICINE CLINIC | Facility: CLINIC | Age: 78
End: 2023-01-05
Payer: MEDICARE

## 2023-01-05 VITALS
HEART RATE: 82 BPM | BODY MASS INDEX: 33.47 KG/M2 | HEIGHT: 70 IN | OXYGEN SATURATION: 97 % | WEIGHT: 233.8 LBS | SYSTOLIC BLOOD PRESSURE: 125 MMHG | TEMPERATURE: 95.5 F | DIASTOLIC BLOOD PRESSURE: 78 MMHG

## 2023-01-05 DIAGNOSIS — R00.0 TACHYCARDIA: ICD-10-CM

## 2023-01-05 DIAGNOSIS — E66.09 CLASS 1 OBESITY DUE TO EXCESS CALORIES WITH SERIOUS COMORBIDITY AND BODY MASS INDEX (BMI) OF 33.0 TO 33.9 IN ADULT: ICD-10-CM

## 2023-01-05 DIAGNOSIS — E55.9 VITAMIN D DEFICIENCY: Chronic | ICD-10-CM

## 2023-01-05 DIAGNOSIS — L21.0 SEBORRHEA CAPITIS IN ADULT: ICD-10-CM

## 2023-01-05 DIAGNOSIS — G44.89 OTHER HEADACHE SYNDROME: ICD-10-CM

## 2023-01-05 DIAGNOSIS — G89.29 CHRONIC RIGHT SHOULDER PAIN: ICD-10-CM

## 2023-01-05 DIAGNOSIS — D17.23 LIPOMA OF RIGHT THIGH: ICD-10-CM

## 2023-01-05 DIAGNOSIS — G47.9 SLEEP DISORDER: ICD-10-CM

## 2023-01-05 DIAGNOSIS — I10 HYPERTENSION, UNSPECIFIED TYPE: Primary | Chronic | ICD-10-CM

## 2023-01-05 DIAGNOSIS — R73.9 HYPERGLYCEMIA: ICD-10-CM

## 2023-01-05 DIAGNOSIS — M25.511 CHRONIC RIGHT SHOULDER PAIN: ICD-10-CM

## 2023-01-05 DIAGNOSIS — N40.1 BENIGN PROSTATIC HYPERPLASIA WITH LOWER URINARY TRACT SYMPTOMS, SYMPTOM DETAILS UNSPECIFIED: Chronic | ICD-10-CM

## 2023-01-05 DIAGNOSIS — D64.9 ANEMIA, UNSPECIFIED TYPE: ICD-10-CM

## 2023-01-05 DIAGNOSIS — E78.5 DYSLIPIDEMIA: Chronic | ICD-10-CM

## 2023-01-05 PROCEDURE — 99214 OFFICE O/P EST MOD 30 MIN: CPT | Performed by: PREVENTIVE MEDICINE

## 2023-01-05 RX ORDER — DICLOFENAC POTASSIUM 50 MG/1
50 TABLET, FILM COATED ORAL 2 TIMES DAILY
COMMUNITY

## 2023-01-05 NOTE — PROGRESS NOTES
Subjective   Farheen Gomez is a 77 y.o. male presents for   Chief Complaint   Patient presents with   • Follow-up     3 month f/u     Patient presents today for 3-month follow-up on multiple chronic health conditions most of which are stable he did recently have a right shoulder replacement and does feel as though things are going well with that.Blood pressure was under good control he is watching his carbohydrates and using his CPAP nightly.  Health Maintenance Due   Topic Date Due   • COVID-19 Vaccine (5 - Booster for Moderna series) 09/06/2022       History of Present Illness     Vitals:    01/05/23 0931 01/05/23 0932 01/05/23 0933   BP: 123/79 119/76 125/78   BP Location: Right arm Left arm Left arm   Patient Position: Sitting Sitting Standing   Cuff Size: Large Adult Large Adult Large Adult   Pulse: 82     Temp: 95.5 °F (35.3 °C)     TempSrc: Tympanic     SpO2: 97%     Weight: 106 kg (233 lb 12.8 oz)     Height: 177.8 cm (70\")       Body mass index is 33.55 kg/m².    Current Outpatient Medications on File Prior to Visit   Medication Sig Dispense Refill   • Acetaminophen (TYLENOL ARTHRITIS PAIN PO) Take 2 tablets by mouth 2 (Two) Times a Day.     • amLODIPine (NORVASC) 10 MG tablet Take 1 tablet by mouth Daily. 90 tablet 3   • apixaban (ELIQUIS) 2.5 MG tablet tablet Take 1 tablet by mouth 2 (Two) Times a Day. 20 tablet 0   • calcium carbonate (OS-JUSTA) 600 MG tablet Take 600 mg by mouth Daily. Stop today for surgery     • diclofenac (CATAFLAM) 50 MG tablet Take 50 mg by mouth 2 (Two) Times a Day.     • escitalopram (LEXAPRO) 10 MG tablet Take 1 tablet by mouth once daily 90 tablet 0   • furosemide (Lasix) 20 MG tablet One tablet by mouth every other day (Patient taking differently: Take 20 mg by mouth Daily. One tablet by mouth every other day      HOLD DOS) 30 tablet 1   • ketoconazole (NIZORAL) 2 % cream APPLY THIN LAYER TO AFFECTED AREA(S) TWICE DAILY. MIX 1:1 WITH TRIAMCINOLONE CREAM AND APPLY TOPICALLY TO  THE AFFECTED AREA(S) TWICE DAILY FOR 2 WEEKS/MONTH IF NEEDED,STOP FOR ONE WEEK, AND RESTART TREATMENT AS NEEDED     • lisinopril (PRINIVIL,ZESTRIL) 40 MG tablet Take 1 tablet by mouth once daily (Patient taking differently: Take 40 mg by mouth Daily. HOLD 24 hours before surgery) 90 tablet 0   • Magnesium 250 MG tablet Take 250 mg by mouth Daily. Stop today for surgery     • multivitamin with minerals tablet tablet Take 1 tablet by mouth Daily.     • Myrbetriq 50 MG tablet sustained-release 24 hour 24 hr tablet Take 50 mg by mouth Daily.     • ondansetron (Zofran) 4 MG tablet Take 1 tablet by mouth Every 6 (Six) Hours As Needed for Nausea or Vomiting. 30 tablet 0   • oxyCODONE-acetaminophen (PERCOCET) 7.5-325 MG per tablet TAKE ONE TABLET BY MOUTH EVERY 4-6 HOURS AS NEEDED FOR PAIN 40 tablet 0   • potassium chloride 10 MEQ CR tablet One tablet by mouth when takes Lasix (Patient taking differently: Take 10 mEq by mouth Daily. One tablet by mouth when takes Lasix) 30 tablet 1   • rosuvastatin (Crestor) 10 MG tablet Take 1 tablet by mouth Daily. 90 tablet 3   • triamcinolone (KENALOG) 0.1 % cream MIX WITH KETOCONAZOLE CREAM IN 1:1 RATIO AND APPLY TO AFFECTED AREAS TWICE DAILY FOR UP TO 2 WEEKS THEN TAKE A 1 WEEK BREAK     • vitamin D (ERGOCALCIFEROL) 1.25 MG (04767 UT) capsule capsule Take 1 capsule by mouth once a week 12 capsule 0     No current facility-administered medications on file prior to visit.       The following portions of the patient's history were reviewed and updated as appropriate: allergies, current medications, past family history, past medical history, past social history, past surgical history and problem list.    Review of Systems   Genitourinary: Positive for difficulty urinating.   Musculoskeletal: Positive for arthralgias.       Objective   Physical Exam  Vitals reviewed.   Constitutional:       General: He is not in acute distress.     Appearance: He is well-developed. He is obese. He is not  ill-appearing or toxic-appearing.   HENT:      Head: Normocephalic and atraumatic.      Right Ear: Tympanic membrane, ear canal and external ear normal.      Left Ear: Tympanic membrane, ear canal and external ear normal.      Nose: Nose normal.      Mouth/Throat:      Mouth: Mucous membranes are moist.      Pharynx: No posterior oropharyngeal erythema.   Eyes:      Extraocular Movements: Extraocular movements intact.      Conjunctiva/sclera: Conjunctivae normal.      Pupils: Pupils are equal, round, and reactive to light.   Cardiovascular:      Rate and Rhythm: Normal rate and regular rhythm.      Heart sounds: Normal heart sounds.   Pulmonary:      Effort: Pulmonary effort is normal.      Breath sounds: Normal breath sounds.   Abdominal:      General: Bowel sounds are normal. There is no distension.      Palpations: Abdomen is soft. There is no mass.      Tenderness: There is no abdominal tenderness.   Musculoskeletal:      Cervical back: Neck supple.   Skin:     General: Skin is warm.   Neurological:      General: No focal deficit present.      Mental Status: He is alert and oriented to person, place, and time.   Psychiatric:         Mood and Affect: Mood normal.         Behavior: Behavior normal.       PHQ-9 Total Score:      Assessment & Plan   Diagnoses and all orders for this visit:    1. Hypertension, unspecified type (Primary)  Comments:  Controlled  Orders:  -     CBC Auto Differential; Future  -     Comprehensive Metabolic Panel; Future    2. Benign prostatic hyperplasia with lower urinary tract symptoms, symptom details unspecified  Comments:  Working with Dr. Villa-still dribbling    3. Anemia, unspecified type  Comments:  No  recent bleeding in urine or bowels  Orders:  -     Vitamin B12; Future    4. Dyslipidemia  Comments:  Watching sat fats  Orders:  -     Lipid Panel; Future    5. Hyperglycemia  Comments:  Watching carbs  Orders:  -     Hemoglobin A1c; Future    6. Sleep disorder  Comments:  Uses  CPAP    7. Tachycardia  Comments:  Pulse ok  Orders:  -     Magnesium; Future  -     TSH; Future    8. Other headache syndrome  Comments:  Controlled and none recently    9. Vitamin D deficiency  Comments:  Takes weekly  Orders:  -     Vitamin D,25-Hydroxy; Future    10. Seborrhea capitis in adult  Comments:  Controlled with wash once every 2 weeks    11. Lipoma of right thigh  Comments:  Benign and removed    12. Chronic right shoulder pain  Comments:  Recent replacement and patient feels as though he is doing well.    13. Class 1 obesity due to excess calories with serious comorbidity and body mass index (BMI) of 33.0 to 33.9 in adult        Patient Instructions     Health Maintenance Due   Topic Date Due   • COVID-19 Vaccine (5 - Booster for Moderna series) 09/06/2022    12 hour fast before leaves for vacation

## 2023-01-16 RX ORDER — LISINOPRIL 40 MG/1
TABLET ORAL
Qty: 90 TABLET | Refills: 0 | Status: SHIPPED | OUTPATIENT
Start: 2023-01-16

## 2023-01-16 RX ORDER — POTASSIUM CHLORIDE 750 MG/1
TABLET, FILM COATED, EXTENDED RELEASE ORAL
Qty: 30 TABLET | Refills: 0 | Status: SHIPPED | OUTPATIENT
Start: 2023-01-16 | End: 2023-03-13

## 2023-01-16 RX ORDER — FUROSEMIDE 20 MG/1
TABLET ORAL
Qty: 30 TABLET | Refills: 0 | Status: SHIPPED | OUTPATIENT
Start: 2023-01-16 | End: 2023-03-13

## 2023-02-02 ENCOUNTER — CLINICAL SUPPORT (OUTPATIENT)
Dept: FAMILY MEDICINE CLINIC | Facility: CLINIC | Age: 78
End: 2023-02-02
Payer: MEDICARE

## 2023-02-02 ENCOUNTER — TELEPHONE (OUTPATIENT)
Dept: FAMILY MEDICINE CLINIC | Facility: CLINIC | Age: 78
End: 2023-02-02

## 2023-02-02 DIAGNOSIS — R00.0 TACHYCARDIA: ICD-10-CM

## 2023-02-02 DIAGNOSIS — E55.9 VITAMIN D DEFICIENCY: Chronic | ICD-10-CM

## 2023-02-02 DIAGNOSIS — R73.9 HYPERGLYCEMIA: ICD-10-CM

## 2023-02-02 DIAGNOSIS — E78.5 DYSLIPIDEMIA: Chronic | ICD-10-CM

## 2023-02-02 DIAGNOSIS — D64.9 ANEMIA, UNSPECIFIED TYPE: ICD-10-CM

## 2023-02-02 DIAGNOSIS — I10 HYPERTENSION, UNSPECIFIED TYPE: Chronic | ICD-10-CM

## 2023-02-02 LAB
25(OH)D3 SERPL-MCNC: 73.5 NG/ML (ref 30–100)
ALBUMIN SERPL-MCNC: 4.4 G/DL (ref 3.5–5.2)
ALBUMIN/GLOB SERPL: 1.8 G/DL
ALP SERPL-CCNC: 90 U/L (ref 39–117)
ALT SERPL W P-5'-P-CCNC: 17 U/L (ref 1–41)
ANION GAP SERPL CALCULATED.3IONS-SCNC: 12 MMOL/L (ref 5–15)
AST SERPL-CCNC: 16 U/L (ref 1–40)
BASOPHILS # BLD AUTO: 0.01 10*3/MM3 (ref 0–0.2)
BASOPHILS NFR BLD AUTO: 0.1 % (ref 0–1.5)
BILIRUB SERPL-MCNC: 0.3 MG/DL (ref 0–1.2)
BUN SERPL-MCNC: 21 MG/DL (ref 8–23)
BUN/CREAT SERPL: 21.2 (ref 7–25)
CALCIUM SPEC-SCNC: 9.4 MG/DL (ref 8.6–10.5)
CHLORIDE SERPL-SCNC: 98 MMOL/L (ref 98–107)
CHOLEST SERPL-MCNC: 212 MG/DL (ref 0–200)
CO2 SERPL-SCNC: 27 MMOL/L (ref 22–29)
CREAT SERPL-MCNC: 0.99 MG/DL (ref 0.76–1.27)
DEPRECATED RDW RBC AUTO: 41 FL (ref 37–54)
EGFRCR SERPLBLD CKD-EPI 2021: 78.9 ML/MIN/1.73
EOSINOPHIL # BLD AUTO: 0 10*3/MM3 (ref 0–0.4)
EOSINOPHIL NFR BLD AUTO: 0 % (ref 0.3–6.2)
ERYTHROCYTE [DISTWIDTH] IN BLOOD BY AUTOMATED COUNT: 12.9 % (ref 12.3–15.4)
GLOBULIN UR ELPH-MCNC: 2.5 GM/DL
GLUCOSE SERPL-MCNC: 181 MG/DL (ref 65–99)
HBA1C MFR BLD: 6.2 % (ref 3.5–5.6)
HCT VFR BLD AUTO: 40.6 % (ref 37.5–51)
HDLC SERPL-MCNC: 51 MG/DL (ref 40–60)
HGB BLD-MCNC: 13.4 G/DL (ref 13–17.7)
IMM GRANULOCYTES # BLD AUTO: 0.05 10*3/MM3 (ref 0–0.05)
IMM GRANULOCYTES NFR BLD AUTO: 0.4 % (ref 0–0.5)
LDLC SERPL CALC-MCNC: 137 MG/DL (ref 0–100)
LDLC/HDLC SERPL: 2.62 {RATIO}
LYMPHOCYTES # BLD AUTO: 1.29 10*3/MM3 (ref 0.7–3.1)
LYMPHOCYTES NFR BLD AUTO: 11.2 % (ref 19.6–45.3)
MAGNESIUM SERPL-MCNC: 2.4 MG/DL (ref 1.6–2.4)
MCH RBC QN AUTO: 28.9 PG (ref 26.6–33)
MCHC RBC AUTO-ENTMCNC: 33 G/DL (ref 31.5–35.7)
MCV RBC AUTO: 87.5 FL (ref 79–97)
MONOCYTES # BLD AUTO: 0.35 10*3/MM3 (ref 0.1–0.9)
MONOCYTES NFR BLD AUTO: 3.1 % (ref 5–12)
NEUTROPHILS NFR BLD AUTO: 85.2 % (ref 42.7–76)
NEUTROPHILS NFR BLD AUTO: 9.77 10*3/MM3 (ref 1.7–7)
NRBC BLD AUTO-RTO: 0 /100 WBC (ref 0–0.2)
PLATELET # BLD AUTO: 214 10*3/MM3 (ref 140–450)
PMV BLD AUTO: 9.3 FL (ref 6–12)
POTASSIUM SERPL-SCNC: 4.3 MMOL/L (ref 3.5–5.2)
PROT SERPL-MCNC: 6.9 G/DL (ref 6–8.5)
RBC # BLD AUTO: 4.64 10*6/MM3 (ref 4.14–5.8)
SODIUM SERPL-SCNC: 137 MMOL/L (ref 136–145)
TRIGL SERPL-MCNC: 136 MG/DL (ref 0–150)
TSH SERPL DL<=0.05 MIU/L-ACNC: 0.42 UIU/ML (ref 0.27–4.2)
VIT B12 BLD-MCNC: 836 PG/ML (ref 211–946)
VLDLC SERPL-MCNC: 24 MG/DL (ref 5–40)
WBC NRBC COR # BLD: 11.47 10*3/MM3 (ref 3.4–10.8)

## 2023-02-02 PROCEDURE — 82607 VITAMIN B-12: CPT | Performed by: PREVENTIVE MEDICINE

## 2023-02-02 PROCEDURE — 36415 COLL VENOUS BLD VENIPUNCTURE: CPT | Performed by: PREVENTIVE MEDICINE

## 2023-02-02 PROCEDURE — 82306 VITAMIN D 25 HYDROXY: CPT | Performed by: PREVENTIVE MEDICINE

## 2023-02-02 PROCEDURE — 80053 COMPREHEN METABOLIC PANEL: CPT | Performed by: PREVENTIVE MEDICINE

## 2023-02-02 PROCEDURE — 83036 HEMOGLOBIN GLYCOSYLATED A1C: CPT | Performed by: PREVENTIVE MEDICINE

## 2023-02-02 PROCEDURE — 83735 ASSAY OF MAGNESIUM: CPT | Performed by: PREVENTIVE MEDICINE

## 2023-02-02 PROCEDURE — 84443 ASSAY THYROID STIM HORMONE: CPT | Performed by: PREVENTIVE MEDICINE

## 2023-02-02 PROCEDURE — 80061 LIPID PANEL: CPT | Performed by: PREVENTIVE MEDICINE

## 2023-02-02 PROCEDURE — 85025 COMPLETE CBC W/AUTO DIFF WBC: CPT | Performed by: PREVENTIVE MEDICINE

## 2023-02-02 RX ORDER — ROSUVASTATIN CALCIUM 20 MG/1
20 TABLET, COATED ORAL DAILY
Qty: 90 TABLET | Refills: 3 | Status: SHIPPED | OUTPATIENT
Start: 2023-02-02

## 2023-02-02 NOTE — TELEPHONE ENCOUNTER
HUB TO READ  ----- Message from Penny Hidalgo MD sent at 2/2/2023  5:20 PM EST -----  Concerned that his blood sugar was 181.  This standing alone would put him in the diabetic range.  Is he ready to consider taking medication for diabetes testing his blood sugar every morning and going to diabetic education?  Luckily A1c was only at 6.2 but I feel that with a blood sugar of 181 he should consider the above recommendations.  Total and bad cholesterol was elevated at 212 and 137 he is only on 10 mg of lovastatin and I will consider increasing that to 20 making sure he is taking that at night.  Finally white blood cell count is elevated to 11.47 if he has any signs of infection to include cough cold sore throat stiff neck ear pain pain with urination abdominal pain diarrhea we should probably recheck him call if any other questions or concerns and let us know about the above.

## 2023-02-02 NOTE — PROGRESS NOTES
Venipuncture performed on Left Arm by Harper Bonilla MA  with good hemostasis. Patient tolerated well. 02/02/23 Penny Hidalgo MD

## 2023-02-02 NOTE — PROGRESS NOTES
Concerned that his blood sugar was 181.  This standing alone would put him in the diabetic range.  Is he ready to consider taking medication for diabetes testing his blood sugar every morning and going to diabetic education?  Luckily A1c was only at 6.2 but I feel that with a blood sugar of 181 he should consider the above recommendations.  Total and bad cholesterol was elevated at 212 and 137 he is only on 10 mg of lovastatin and I will consider increasing that to 20 making sure he is taking that at night.  Finally white blood cell count is elevated to 11.47 if he has any signs of infection to include cough cold sore throat stiff neck ear pain pain with urination abdominal pain diarrhea we should probably recheck him call if any other questions or concerns and let us know about the above.

## 2023-02-03 NOTE — TELEPHONE ENCOUNTER
Back ablation the day before no other signs of infection.    He will think about the diabetes education, meds and testing his sugar and let us know.    Fine with increasing lovastatin and taking at night currently

## 2023-02-09 RX ORDER — ERGOCALCIFEROL 1.25 MG/1
CAPSULE ORAL
Qty: 12 CAPSULE | Refills: 0 | Status: SHIPPED | OUTPATIENT
Start: 2023-02-09

## 2023-02-09 RX ORDER — AMLODIPINE BESYLATE 10 MG/1
TABLET ORAL
Qty: 90 TABLET | Refills: 0 | Status: SHIPPED | OUTPATIENT
Start: 2023-02-09

## 2023-03-12 NOTE — TELEPHONE ENCOUNTER
Rx Refill Note  Requested Prescriptions     Pending Prescriptions Disp Refills   • escitalopram (LEXAPRO) 10 MG tablet [Pharmacy Med Name: Escitalopram Oxalate 10 MG Oral Tablet] 90 tablet 0     Sig: Take 1 tablet by mouth once daily   • potassium chloride 10 MEQ CR tablet [Pharmacy Med Name: Potassium Chloride ER 10 MEQ Oral Tablet Extended Release] 30 tablet 0     Sig: TAKE 1 TABLET BY MOUTH WHEN TAKING LASIX   • furosemide (LASIX) 20 MG tablet [Pharmacy Med Name: Furosemide 20 MG Oral Tablet] 30 tablet 0     Sig: TAKE 1 TABLET BY MOUTH EVERY OTHER DAY      Last office visit with prescribing clinician: 1/5/2023      Next office visit with prescribing clinician: Visit date not found   3}  Bernarda Dixon  03/12/23, 16:56 EDT

## 2023-03-13 RX ORDER — ESCITALOPRAM OXALATE 10 MG/1
TABLET ORAL
Qty: 90 TABLET | Refills: 0 | Status: SHIPPED | OUTPATIENT
Start: 2023-03-13

## 2023-03-13 RX ORDER — POTASSIUM CHLORIDE 750 MG/1
TABLET, FILM COATED, EXTENDED RELEASE ORAL
Qty: 30 TABLET | Refills: 0 | Status: SHIPPED | OUTPATIENT
Start: 2023-03-13

## 2023-03-13 RX ORDER — FUROSEMIDE 20 MG/1
TABLET ORAL
Qty: 30 TABLET | Refills: 0 | Status: SHIPPED | OUTPATIENT
Start: 2023-03-13

## 2023-03-21 ENCOUNTER — OFFICE VISIT (OUTPATIENT)
Dept: FAMILY MEDICINE CLINIC | Facility: CLINIC | Age: 78
End: 2023-03-21
Payer: MEDICARE

## 2023-03-21 VITALS
HEART RATE: 98 BPM | DIASTOLIC BLOOD PRESSURE: 84 MMHG | TEMPERATURE: 98.7 F | SYSTOLIC BLOOD PRESSURE: 129 MMHG | BODY MASS INDEX: 32.67 KG/M2 | HEIGHT: 70 IN | WEIGHT: 228.2 LBS | OXYGEN SATURATION: 96 %

## 2023-03-21 DIAGNOSIS — N40.1 BENIGN PROSTATIC HYPERPLASIA WITH LOWER URINARY TRACT SYMPTOMS, SYMPTOM DETAILS UNSPECIFIED: Chronic | ICD-10-CM

## 2023-03-21 DIAGNOSIS — E66.09 CLASS 1 OBESITY DUE TO EXCESS CALORIES WITH SERIOUS COMORBIDITY AND BODY MASS INDEX (BMI) OF 32.0 TO 32.9 IN ADULT: ICD-10-CM

## 2023-03-21 DIAGNOSIS — I10 HYPERTENSION, UNSPECIFIED TYPE: Chronic | ICD-10-CM

## 2023-03-21 DIAGNOSIS — N39.0 URINARY TRACT INFECTION WITHOUT HEMATURIA, SITE UNSPECIFIED: ICD-10-CM

## 2023-03-21 DIAGNOSIS — R30.0 DYSURIA: Primary | ICD-10-CM

## 2023-03-21 LAB
BILIRUB BLD-MCNC: ABNORMAL MG/DL
CLARITY, POC: CLEAR
COLOR UR: ABNORMAL
EXPIRATION DATE: ABNORMAL
GLUCOSE UR STRIP-MCNC: ABNORMAL MG/DL
KETONES UR QL: ABNORMAL
LEUKOCYTE EST, POC: NEGATIVE
Lab: ABNORMAL
NITRITE UR-MCNC: POSITIVE MG/ML
PH UR: 6 [PH] (ref 5–8)
PROT UR STRIP-MCNC: ABNORMAL MG/DL
RBC # UR STRIP: ABNORMAL /UL
SP GR UR: 1.02 (ref 1–1.03)
UROBILINOGEN UR QL: ABNORMAL

## 2023-03-21 PROCEDURE — 87186 SC STD MICRODIL/AGAR DIL: CPT | Performed by: PREVENTIVE MEDICINE

## 2023-03-21 PROCEDURE — 87077 CULTURE AEROBIC IDENTIFY: CPT | Performed by: PREVENTIVE MEDICINE

## 2023-03-21 PROCEDURE — 87086 URINE CULTURE/COLONY COUNT: CPT | Performed by: PREVENTIVE MEDICINE

## 2023-03-21 RX ORDER — TAMSULOSIN HYDROCHLORIDE 0.4 MG/1
1 CAPSULE ORAL DAILY
Qty: 30 CAPSULE | Refills: 0 | Status: SHIPPED | OUTPATIENT
Start: 2023-03-21

## 2023-03-21 RX ORDER — CIPROFLOXACIN 500 MG/1
500 TABLET, FILM COATED ORAL 2 TIMES DAILY
Qty: 14 TABLET | Refills: 0 | Status: SHIPPED | OUTPATIENT
Start: 2023-03-21

## 2023-03-21 NOTE — PATIENT INSTRUCTIONS
Health Maintenance Due   Topic Date Due    COVID-19 Vaccine (5 - Booster for Moderna series) 09/06/2022    Increase fluids and hold magnesium and multivitamin while on Cipro.  If increased abdominal pain fever, chills constipation blood in urine or stops urinating Saji ER

## 2023-03-21 NOTE — PROGRESS NOTES
"Subjective   Farheen Gomez is a 77 y.o. male presents for   Chief Complaint   Patient presents with   • Cystitis     Back pain and pain in his penis       Health Maintenance Due   Topic Date Due   • COVID-19 Vaccine (5 - Booster for Moderna series) 09/06/2022       DAXHPI  Farheen Gomez presents today for dysuria, hypertension, benign prostatic hyperplasia, urinary tract infection and class I obesity.    He complains of urinary frequency every 30 minutes. He notes increased pain with urination. He denies taking his temperature, but he feels lie he had a fever. He has a history of benign prostatic hypertrophy. He last saw a urologist in 2021. He was supposed to follow up with his urologist but missed his appointment.    He is allergic to PENICILIN and PROPRANOLOL.     His blood pressure is 144/78 mmHg and 129/84 mmHg.     He complains of constipation, but confirms the use of a fleet enema.     He states 2 weeks ago he return home from the [place] and he was sick, but did not have any urinary symptoms. He tested negative for COVID-19.   Vitals:    03/21/23 1051 03/21/23 1055   BP: 144/78 129/84   BP Location: Right arm Left arm   Patient Position: Sitting Sitting   Cuff Size: Adult Adult   Pulse: 98    Temp: 98.7 °F (37.1 °C)    TempSrc: Tympanic    SpO2: 96%    Weight: 104 kg (228 lb 3.2 oz)    Height: 177.8 cm (70\")      Body mass index is 32.74 kg/m².    Current Outpatient Medications on File Prior to Visit   Medication Sig Dispense Refill   • Acetaminophen (TYLENOL ARTHRITIS PAIN PO) Take 2 tablets by mouth 2 (Two) Times a Day.     • amLODIPine (NORVASC) 10 MG tablet Take 1 tablet by mouth once daily 90 tablet 0   • apixaban (ELIQUIS) 2.5 MG tablet tablet Take 1 tablet by mouth 2 (Two) Times a Day. 20 tablet 0   • calcium carbonate (OS-JUSTA) 600 MG tablet Take 1 tablet by mouth Daily. Stop today for surgery     • diclofenac (CATAFLAM) 50 MG tablet Take 1 tablet by mouth 2 (Two) Times a Day.     • escitalopram " (LEXAPRO) 10 MG tablet Take 1 tablet by mouth once daily 90 tablet 0   • furosemide (LASIX) 20 MG tablet TAKE 1 TABLET BY MOUTH EVERY OTHER DAY 30 tablet 0   • ketoconazole (NIZORAL) 2 % cream APPLY THIN LAYER TO AFFECTED AREA(S) TWICE DAILY. MIX 1:1 WITH TRIAMCINOLONE CREAM AND APPLY TOPICALLY TO THE AFFECTED AREA(S) TWICE DAILY FOR 2 WEEKS/MONTH IF NEEDED,STOP FOR ONE WEEK, AND RESTART TREATMENT AS NEEDED     • lisinopril (PRINIVIL,ZESTRIL) 40 MG tablet Take 1 tablet by mouth once daily 90 tablet 0   • Magnesium 250 MG tablet Take 1 tablet by mouth Daily. Stop today for surgery     • multivitamin with minerals tablet tablet Take 1 tablet by mouth Daily.     • Myrbetriq 50 MG tablet sustained-release 24 hour 24 hr tablet Take 50 mg by mouth Daily.     • ondansetron (Zofran) 4 MG tablet Take 1 tablet by mouth Every 6 (Six) Hours As Needed for Nausea or Vomiting. 30 tablet 0   • oxyCODONE-acetaminophen (PERCOCET) 7.5-325 MG per tablet TAKE ONE TABLET BY MOUTH EVERY 4-6 HOURS AS NEEDED FOR PAIN 40 tablet 0   • potassium chloride 10 MEQ CR tablet TAKE 1 TABLET BY MOUTH WHEN TAKING LASIX 30 tablet 0   • rosuvastatin (Crestor) 20 MG tablet Take 1 tablet by mouth Daily. 90 tablet 3   • triamcinolone (KENALOG) 0.1 % cream MIX WITH KETOCONAZOLE CREAM IN 1:1 RATIO AND APPLY TO AFFECTED AREAS TWICE DAILY FOR UP TO 2 WEEKS THEN TAKE A 1 WEEK BREAK     • vitamin D (ERGOCALCIFEROL) 1.25 MG (72512 UT) capsule capsule Take 1 capsule by mouth once a week 12 capsule 0     No current facility-administered medications on file prior to visit.       The following portions of the patient's history were reviewed and updated as appropriate: allergies, current medications, past family history, past medical history, past social history, past surgical history and problem list.    Review of Systems   Constitutional: Positive for chills and fever.   Gastrointestinal: Positive for constipation.   Genitourinary: Positive for dysuria.      DAXROS    Objective   Physical Exam  Cardiovascular:      Rate and Rhythm: Normal rate and regular rhythm.      Comments: No murmur  Abdominal:      General: Bowel sounds are normal.      Palpations: There is no mass.      Comments: No organomegaly. Suprapubic tenderness. Right flank pain.       DAXEXAM  PHQ-9 Total Score:      Assessment & Plan   Diagnoses and all orders for this visit:    1. Dysuria (Primary)  -     POC Urinalysis Dipstick, Automated    2. Hypertension, unspecified type    3. Benign prostatic hyperplasia with lower urinary tract symptoms, symptom details unspecified    4. Urinary tract infection without hematuria, site unspecified  -     Urine Culture - Urine, Urine, Clean Catch; Future  -     Urine Culture - Urine, Urine, Clean Catch        -     He will be prescribed Flomax, Mybetriq and .         -     He was advised to increase his fluid intake.         -     He will be referred to urology.         -     His urine will be sent for a culture.         -     He was advised to discontinue his multivitamin and magnesium while taking ciprofloxacin.        -     He may go to the emergency room if he experiences increased pain, fever, chills, constipation or blood in his urine.   5. Class 1 obesity due to excess calories with serious comorbidity and body mass index (BMI) of 32.0 to 32.9 in adult    Other orders  -     ciprofloxacin (Cipro) 500 MG tablet; Take 1 tablet by mouth 2 (Two) Times a Day.  Dispense: 14 tablet; Refill: 0  -     tamsulosin (FLOMAX) 0.4 MG capsule 24 hr capsule; Take 1 capsule by mouth Daily.  Dispense: 30 capsule; Refill: 0        DAXPLAN  DAXRESULTS    Patient Instructions     Health Maintenance Due   Topic Date Due   • COVID-19 Vaccine (5 - Booster for Moderna series) 09/06/2022    Increase fluids and hold magnesium and multivitamin while on Cipro.  If increased abdominal pain fever, chills constipation blood in urine or stops urinating Saji ER         Transcribed from  ambient dictation for Penny Hidalgo MD by Landy Dominique.  03/21/23   11:35 EDT    Patient or patient representative verbalized consent to the visit recording.  I have personally performed the services described in this document as transcribed by the above individual, and it is both accurate and complete.

## 2023-03-23 ENCOUNTER — TELEPHONE (OUTPATIENT)
Dept: FAMILY MEDICINE CLINIC | Facility: CLINIC | Age: 78
End: 2023-03-23
Payer: MEDICARE

## 2023-03-23 LAB — BACTERIA SPEC AEROBE CULT: ABNORMAL

## 2023-03-23 NOTE — TELEPHONE ENCOUNTER
HUB TO READ:  ----- Message from Penny Hidalgo MD sent at 3/23/2023  7:45 AM EDT -----  Urine did culture Enterococcus faecalis that should be sensitive to the Cipro.  Please let us know what went on at the urologist Wednesday also if they have not arranged to reculture your urine 3 days off your antibiotics we need to do so hope you are improved.

## 2023-03-23 NOTE — PROGRESS NOTES
Urine did culture Enterococcus faecalis that should be sensitive to the Cipro.  Please let us know what went on at the urologist Wednesday also if they have not arranged to reculture your urine 3 days off your antibiotics we need to do so hope you are improved.

## 2023-05-06 RX ORDER — LISINOPRIL 40 MG/1
TABLET ORAL
Qty: 90 TABLET | Refills: 1 | Status: SHIPPED | OUTPATIENT
Start: 2023-05-06

## 2023-05-29 RX ORDER — AMLODIPINE BESYLATE 10 MG/1
TABLET ORAL
Qty: 90 TABLET | Refills: 0 | Status: SHIPPED | OUTPATIENT
Start: 2023-05-29

## 2023-05-29 RX ORDER — FUROSEMIDE 20 MG/1
TABLET ORAL
Qty: 30 TABLET | Refills: 0 | Status: SHIPPED | OUTPATIENT
Start: 2023-05-29

## 2023-05-29 RX ORDER — ERGOCALCIFEROL 1.25 MG/1
50000 CAPSULE ORAL WEEKLY
Qty: 12 CAPSULE | Refills: 0 | Status: SHIPPED | OUTPATIENT
Start: 2023-05-29

## 2023-05-29 RX ORDER — POTASSIUM CHLORIDE 750 MG/1
TABLET, FILM COATED, EXTENDED RELEASE ORAL
Qty: 30 TABLET | Refills: 0 | Status: SHIPPED | OUTPATIENT
Start: 2023-05-29

## 2023-07-21 ENCOUNTER — TELEPHONE (OUTPATIENT)
Dept: ORTHOPEDIC SURGERY | Facility: CLINIC | Age: 78
End: 2023-07-21

## 2023-07-21 ENCOUNTER — HOSPITAL ENCOUNTER (EMERGENCY)
Facility: HOSPITAL | Age: 78
Discharge: HOME | End: 2023-07-21
Attending: EMERGENCY MEDICINE | Admitting: EMERGENCY MEDICINE
Payer: MEDICARE

## 2023-07-21 ENCOUNTER — APPOINTMENT (OUTPATIENT)
Dept: GENERAL RADIOLOGY | Facility: HOSPITAL | Age: 78
End: 2023-07-21
Payer: MEDICARE

## 2023-07-21 VITALS
RESPIRATION RATE: 18 BRPM | TEMPERATURE: 98.2 F | WEIGHT: 225 LBS | HEART RATE: 93 BPM | SYSTOLIC BLOOD PRESSURE: 133 MMHG | DIASTOLIC BLOOD PRESSURE: 72 MMHG | BODY MASS INDEX: 32.21 KG/M2 | HEIGHT: 70 IN | OXYGEN SATURATION: 87 %

## 2023-07-21 DIAGNOSIS — S40.012A CONTUSION OF LEFT SHOULDER, INITIAL ENCOUNTER: Primary | ICD-10-CM

## 2023-07-21 DIAGNOSIS — M15.9 PRIMARY OSTEOARTHRITIS INVOLVING MULTIPLE JOINTS: ICD-10-CM

## 2023-07-21 PROCEDURE — 99283 EMERGENCY DEPT VISIT LOW MDM: CPT

## 2023-07-21 PROCEDURE — 73030 X-RAY EXAM OF SHOULDER: CPT

## 2023-07-21 RX ORDER — HYDROCODONE BITARTRATE AND ACETAMINOPHEN 5; 325 MG/1; MG/1
1 TABLET ORAL EVERY 6 HOURS PRN
Qty: 12 TABLET | Refills: 0 | Status: SHIPPED | OUTPATIENT
Start: 2023-07-21

## 2023-07-21 RX ORDER — OXYCODONE HYDROCHLORIDE 5 MG/1
5 TABLET ORAL EVERY 4 HOURS PRN
Status: DISCONTINUED | OUTPATIENT
Start: 2023-07-21 | End: 2023-07-21 | Stop reason: HOSPADM

## 2023-07-21 RX ADMIN — OXYCODONE HYDROCHLORIDE 5 MG: 5 TABLET ORAL at 16:21

## 2023-07-21 NOTE — TELEPHONE ENCOUNTER
Caller: Farheen Gomez    Relationship to patient: Self    Best call back number: [phone]    Patient is needing: PATIENT FELL YESTERDAY AND HE JAMMED HIS LT SHOULDER AND HE IS IN LOT OF PAIN AND UNABLE TO LIFT SHOULDER. PATIENT WOULD LIKE TO BE SEEN TODAY BY DR. BUENO. PLEASE ADVISE

## 2023-07-21 NOTE — ED PROVIDER NOTES
"Subjective   History of Present Illness  77-year-old male with history of multiple orthopedic problems reports that he tripped and fell onto his left shoulder he reports persistent pain to the upper deltoid area.  He reports no clavicular tenderness.  He denies neck pain or discomfort.  The patient reports that he has had no numbness or tingling in his lower arm or hand.    Review of Systems   Musculoskeletal:  Positive for arthralgias and joint swelling. Negative for neck pain and neck stiffness.   Neurological:  Negative for numbness. Speech difficulty: .[id number].  All other systems reviewed and are negative.    Past Medical History:   Diagnosis Date    Arthritis     Arthritis of back     Arthritis of neck     BPH (benign prostatic hyperplasia)     CTS (carpal tunnel syndrome)     Depression     History of bilateral knee arthroplasty     Hypertension     Knee swelling     Low back strain     Periarthritis of shoulder     Rotator cuff syndrome        Allergies   Allergen Reactions    Penicillins Hives and Other (See Comments)     Tongue bled    Annotation - 29Jan2019: \"had reaction as a child - inside of my mouth peeled off and my teeth fell out\"   Tongue bled    Propranolol GI Intolerance     constipation       Past Surgical History:   Procedure Laterality Date    BACK SURGERY      HAND SURGERY Bilateral     carpal tunnel repair    JOINT REPLACEMENT      KNEE SURGERY      meniscus repair     LUMBAR DISCECTOMY Bilateral 04/22/2019    Procedure: left and right  L4-5 lumbar decompression with dura repair;  Surgeon: Edson Khan MD;  Location: [facility] MAIN OR;  Service: Neurosurgery    ROTATOR CUFF REPAIR Right     SHOULDER SURGERY      TOTAL KNEE ARTHROPLASTY Right 10/23/2019    Procedure: TOTAL KNEE ARTHROPLASTY, WITH LEFT KNEE INJECTION;  Surgeon: Boby Medina MD;  Location: [facility] MAIN OR;  Service: Orthopedics    TOTAL KNEE ARTHROPLASTY Left 02/05/2020    Procedure: TOTAL KNEE ARTHROPLASTY;  Surgeon: Carol" MD Boby;  Location: [facility] MAIN OR;  Service: Orthopedics;  Laterality: Left;    TOTAL SHOULDER ARTHROPLASTY W/ DISTAL CLAVICLE EXCISION Right 10/18/2022    Procedure: TOTAL SHOULDER REVERSE ARTHROPLASTY;  Surgeon: Boby Medina MD;  Location: [facility] MAIN OR;  Service: Orthopedics;  Laterality: Right;       Family History   Problem Relation Age of Onset    Stroke Mother     Diabetes Father         I'm    No Known Problems Sister     No Known Problems Brother     Malig Hyperthermia Neg Hx        Social History     Socioeconomic History    Marital status:    Tobacco Use    Smoking status: Former     Packs/day: 0.25     Years: 2.00     Pack years: 0.50     Types: Cigarettes     Quit date: 1995     Years since quittin.5    Smokeless tobacco: Never   Vaping Use    Vaping Use: Never used   Substance and Sexual Activity    Alcohol use: No    Drug use: No    Sexual activity: Yes     Partners: Female     Birth control/protection: None           Objective   Physical Exam  Alert Katherine Coma Scale 15   HEENT: Pupils equal and reactive to light. Conjunctivae are not injected. Normal tympanic membranes. Oropharynx and nares are normal.   Neck: Supple. Midline trachea. No JVD. No goiter.  No posterior midline tenderness  Chest: Clear and equal breath sounds bilaterally, regular rate and rhythm without murmur or rub.   Abdomen: Positive bowel sounds, nontender, nondistended. No rebound or peritoneal signs. No CVA tenderness.  Tenderness is noted in the right shoulder there is no clavicular discomfort  Extremities no clubbing. cyanosis or edema. Motor sensory exam is normal. The full range of motion is intact no clinically apparent AC separation there is some tenderness noted over the long head of the biceps distally neurovascularly intact no clinically apparent AC separation   Skin: Warm and dry, no rashes or petechia.   Lymphatic: No regional lymphadenopathy. No calf pain, swelling or Homans  sign    Procedures       Sling was applied neurovascular intact afterward    ED Course           Labs Reviewed - No data to display  Medications   oxyCODONE (ROXICODONE) immediate release tablet 5 mg (5 mg Oral Given 7/21/23 0291)                                     Medical Decision Making  Patient was advised to continue sling for next 3 days and follow-up with orthopedist.  He already has a follow-up orthopedic appointment pending.  The patient was given a prescription for hydrocodone for pain he was stable at discharge and vocalized understanding of discharge instructions    Amount and/or Complexity of Data Reviewed  Independent Historian: spouse  Radiology: ordered and independent interpretation performed.    Risk  Prescription drug management.        Final diagnoses:   Contusion of left shoulder, initial encounter   Primary osteoarthritis involving multiple joints       ED Disposition  ED Disposition       None            No follow-up provider specified.       Medication List        New Prescriptions      HYDROcodone-acetaminophen 5-325 MG per tablet  Commonly known as: NORCO  Take 1 tablet by mouth Every 6 (Six) Hours As Needed for Moderate Pain or Severe Pain.               Where to Get Your Medications        These medications were sent to [facility] [address] [place] [address] [place] - [address] [facility] [phone] [place] [phone]   [address] [facility] [place] [address]      Phone: [phone]   HYDROcodone-acetaminophen 5-325 MG per tablet            Da Lizarraga MD  07/21/23 9454

## 2023-07-21 NOTE — DISCHARGE INSTRUCTIONS
Use sling for next 3 days  Follow-up with orthopedist  You can also use Tylenol as needed for discomfort

## 2023-07-26 ENCOUNTER — OFFICE VISIT (OUTPATIENT)
Dept: ORTHOPEDIC SURGERY | Facility: CLINIC | Age: 78
End: 2023-07-26
Payer: MEDICARE

## 2023-07-26 DIAGNOSIS — M12.811 ROTATOR CUFF TEAR ARTHROPATHY OF BOTH SHOULDERS: ICD-10-CM

## 2023-07-26 DIAGNOSIS — S49.92XA INJURY OF LEFT SHOULDER, INITIAL ENCOUNTER: Primary | ICD-10-CM

## 2023-07-26 DIAGNOSIS — M75.102 ROTATOR CUFF TEAR ARTHROPATHY OF BOTH SHOULDERS: ICD-10-CM

## 2023-07-26 DIAGNOSIS — Z96.611 STATUS POST REVERSE ARTHROPLASTY OF RIGHT SHOULDER: ICD-10-CM

## 2023-07-26 DIAGNOSIS — M12.812 ROTATOR CUFF TEAR ARTHROPATHY OF BOTH SHOULDERS: ICD-10-CM

## 2023-07-26 DIAGNOSIS — M75.101 ROTATOR CUFF TEAR ARTHROPATHY OF BOTH SHOULDERS: ICD-10-CM

## 2023-07-26 RX ORDER — LEVOFLOXACIN 500 MG/1
500 TABLET, FILM COATED ORAL DAILY
Qty: 7 TABLET | Refills: 0 | Status: SHIPPED | OUTPATIENT
Start: 2023-07-26

## 2023-07-26 RX ORDER — TRAMADOL HYDROCHLORIDE 50 MG/1
50 TABLET ORAL EVERY 12 HOURS PRN
Qty: 40 TABLET | Refills: 0 | Status: SHIPPED | OUTPATIENT
Start: 2023-07-26

## 2023-07-26 NOTE — PROGRESS NOTES
Chief Complaint  Pain of the Left Shoulder    Subjective    History of Present Illness      Farheen Gomez is a 77 y.o. male who presents to [facility] for pain and discomfort of the left shoulder after fall and follow-up on left reverse total shoulder arthroplasty.  History of Present Illness the patient underwent a right reverse total shoulder arthroplasty 10 months ago in October 2022.  He has done extremely well following that shoulder replacement surgery.  He is able to use the shoulder for activities of daily living without any difficulty.  He is pleased with his outcome.  Unfortunately he fell recently and braced his fall with an outstretched upper extremity.  He has sustained an injury to the left shoulder which I fear will be a rotator cuff tear.  He already has some pre-existing glenohumeral arthrosis which will most likely lead to cuff tear arthropathy.  I have discussed the situation with the patient at length.  He has also had bilateral total knee replacements performed by me and seems to be doing very well from that aspect.  He does not take any pain medication for his knees.  His mobility has improved tremendously and he is able to walk for exercise without any difficulty.  Pain Location: LEFT shoulder  Radiation: none  Quality: dull, aching  Intensity/Severity: moderate  Duration: since 07/20/23  Progression of symptoms: yes, progressive worsening  Onset quality: sudden  Timing: intermittent  Aggravating Factors: overhead reaching, reaching backward, reaching forward, reaching across body, rotating motion  Alleviating Factors: NSAIDs, rest, ice  Previous Episodes: none mentioned  Associated Symptoms: pain, decreased ROM, decreased strength  ADLs Affected: grooming/hygiene/toileting/personal care, dressing, work related activities, recreational activities/sports  Previous Treatment: NSAIDs  and ice      Objective   Vital Signs:   There were no vitals taken for this  visit.    Physical Exam  Physical Exam  Vitals signs and nursing note reviewed.   Constitutional:       Appearance: Normal appearance.   Pulmonary:      Effort: Pulmonary effort is normal.   Skin:     General: Skin is warm and dry.      Capillary Refill: Capillary refill takes less than 2 seconds.   Neurological:      General: No focal deficit present.      Mental Status: He is alert and oriented to person, place, and time. Mental status is at baseline.   Psychiatric:         Mood and Affect: Mood normal.         Behavior: Behavior normal.         Thought Content: Thought content normal.         Judgment: Judgment normal.     Ortho Exam   Left shoulder (rct). The shoulder is extremely tender anteriorly. There is tenderness over the insertion of the rotator cuff over the greater tuberosity of the humerus. Slight proximal migration of the humeral head is noted. AC joint is tender. Crossover adduction test is positive. Drop arm sign is positive. Forward flexion is 0-90 degrees, abduction is 0-90 degrees, external rotation is 0-10 degrees. Patient has mild atrophy both of the supra and infraspinatus fossa. Sulcus sign is negative. There is no evidence of multidirectional instability. Neer test is positive on compression. Skin and soft tissue tenderness is noted. Patient's strength in abduction and external rotation are extremely lacking. The pain level is 6.    Right shoulder. Patient is postoperative 9 month(s). Afebrile. Vital signs are stable. Deltopectoral incision is clean and healing well. Axillary nerve function is well preserved. There is no glenohumeral instability. No clicking, popping or catching is noted. Apprehension sign is negative. Axillary nerve function is well preserved. Radial artery pulses are palpable. There is no clinical deformity. Range of motion is flexion 0-120 degrees, abduction 0-120 degrees.            Result Review :  The following data was reviewed by: Boby Medina MD on  07/26/2023:  Radiologic studies - see below for interpretation  LEFT shoulder xrays  weightbearing/standing ap/lateral views were performed at [facility] on 07/20/23. Images were independently viewed and interpreted by myself, my impression as follows:      left Shoulder X-Ray  Indication: Evaluation of pain and discomfort in the left shoulder after a fall.  AP and Lateral  Findings: High riding humeral head with sclerosis over the greater tuberosity of the humerus noted.  There is some narrowing of the joint space.  no bony lesion  Soft tissues within normal limits  decreased joint spaces  Hardware appropriately positioned not applicable      no prior studies available for comparison.    X-RAY was ordered and reviewed by Boby Medina MD         Procedures           Assessment   Assessment and Plan    Diagnoses and all orders for this visit:    1. Injury of left shoulder, initial encounter (Primary)  -     MRI Shoulder Left Without Contrast; Future    2. Rotator cuff tear arthropathy of both shoulders    3. Status post reverse arthroplasty of right shoulder          Follow Up   Schedule an MRI of the left shoulder without contrast.  Calcium and vitamin D for bone health.  Falls and dislocation precautions for the total shoulder arthroplasty on the right side.  , GI and dental procedure prophylaxis with antibiotics to prevent metastatic infection of both the knees and the total shoulder arthroplasty.  If the MRI shows a full-thickness retracted tear of the rotator cuff and there is atrophy of the tendon then he will most likely be a candidate for reverse total shoulder arthroplasty on the left upper extremity.  If he has only a partial-thickness tear then I would treat him conservatively and nonoperatively.  Rest, ice, compression, and elevation (RICE) therapy  Stretching and strengthening exercises of the flexors and abductors of the shoulder to prevent arthrofibrosis.  OTC Alternate Ibuprofen and Tylenol as  needed  Follow up as scheduled  Patient was given instructions and counseling regarding his condition or for health maintenance advice. Please see specific information pulled into the AVS if appropriate.     Boby Medina MD   Date of Encounter: 7/26/2023   Electronically signed by Boby Medina MD, 07/26/23, 9:17 AM EDT.     EMR Dragon/Transcription disclaimer:  Much of this encounter note is an electronic transcription/translation of spoken language to printed text. The electronic translation of spoken language may permit erroneous, or at times, nonsensical words or phrases to be inadvertently transcribed; Although I have reviewed the note for such errors, some may still exist.

## 2023-08-01 RX ORDER — ESCITALOPRAM OXALATE 10 MG/1
TABLET ORAL
Qty: 90 TABLET | Refills: 0 | Status: SHIPPED | OUTPATIENT
Start: 2023-08-01

## 2023-08-08 PROBLEM — Z96.611 STATUS POST REVERSE ARTHROPLASTY OF RIGHT SHOULDER: Status: ACTIVE | Noted: 2023-08-08

## 2023-08-08 PROBLEM — M75.102 ROTATOR CUFF TEAR ARTHROPATHY OF BOTH SHOULDERS: Status: ACTIVE | Noted: 2022-03-23

## 2023-08-08 PROBLEM — S49.92XA INJURY OF LEFT SHOULDER: Status: ACTIVE | Noted: 2023-08-08

## 2023-08-08 PROBLEM — M12.812 ROTATOR CUFF TEAR ARTHROPATHY OF BOTH SHOULDERS: Status: ACTIVE | Noted: 2022-03-23

## 2023-08-13 RX ORDER — ERGOCALCIFEROL 1.25 MG/1
50000 CAPSULE ORAL WEEKLY
Qty: 12 CAPSULE | Refills: 3 | Status: SHIPPED | OUTPATIENT
Start: 2023-08-13

## 2023-08-18 RX ORDER — AMLODIPINE BESYLATE 10 MG/1
TABLET ORAL
Qty: 90 TABLET | Refills: 0 | Status: SHIPPED | OUTPATIENT
Start: 2023-08-18

## 2023-08-23 ENCOUNTER — HOSPITAL ENCOUNTER (OUTPATIENT)
Dept: MRI IMAGING | Facility: HOSPITAL | Age: 78
Discharge: HOME | End: 2023-08-23
Payer: MEDICARE

## 2023-08-23 ENCOUNTER — TELEPHONE (OUTPATIENT)
Dept: ORTHOPEDIC SURGERY | Facility: CLINIC | Age: 78
End: 2023-08-23
Payer: MEDICARE

## 2023-08-23 DIAGNOSIS — S49.92XA INJURY OF LEFT SHOULDER, INITIAL ENCOUNTER: Primary | ICD-10-CM

## 2023-08-23 DIAGNOSIS — S49.92XA INJURY OF LEFT SHOULDER, INITIAL ENCOUNTER: ICD-10-CM

## 2023-08-23 NOTE — TELEPHONE ENCOUNTER
PATIENT CAME IN FROM JUST DOING MRI. HOSPITAL WAS UNSUCCESSFUL IN GETTING PROCEDURE COMPLETED DUE TO SHOULDER TWITCHING THE WHOLE TIME. PATIENT IS ASKING IF THERE IS A STRONGER MED YOU COULD GIVE FOR MRI FOR SHOULDER TO RELAX. ALSO STATED THAT SINCE IT TOOK A MONTH TO GET IN FOR MRI HE IS COMPLETELY OUT OF ALL PAIN MEDS. PATIENT REQUESTED A FOLLOW UP CALL WITH INFORMATION. PLEASE ADVISE

## 2023-08-24 DIAGNOSIS — S49.92XA INJURY OF LEFT SHOULDER, INITIAL ENCOUNTER: ICD-10-CM

## 2023-08-24 RX ORDER — TRAMADOL HYDROCHLORIDE 50 MG/1
50 TABLET ORAL EVERY 12 HOURS PRN
Qty: 40 TABLET | Refills: 0 | Status: SHIPPED | OUTPATIENT
Start: 2023-08-24

## 2023-08-24 RX ORDER — DIAZEPAM 2 MG/1
2 TABLET ORAL
Qty: 1 TABLET | Refills: 0 | Status: SHIPPED | OUTPATIENT
Start: 2023-08-24 | End: 2023-08-24

## 2023-08-30 ENCOUNTER — EXTERNAL PBMM DATA (OUTPATIENT)
Dept: PHARMACY | Facility: OTHER | Age: 78
End: 2023-08-30
Payer: MEDICARE

## 2023-10-11 ENCOUNTER — CLINICAL SUPPORT (OUTPATIENT)
Dept: FAMILY MEDICINE CLINIC | Facility: CLINIC | Age: 78
End: 2023-10-11
Payer: MEDICARE

## 2023-10-11 DIAGNOSIS — Z23 NEED FOR VACCINATION WITH 20-POLYVALENT PNEUMOCOCCAL CONJUGATE VACCINE: Primary | ICD-10-CM

## 2023-10-11 PROBLEM — H16.143 SPK (SUPERFICIAL PUNCTATE KERATITIS), BILATERAL: Status: ACTIVE | Noted: 2023-03-20

## 2023-10-11 PROBLEM — H35.3130 BILATERAL NONEXUDATIVE AGE-RELATED MACULAR DEGENERATION: Status: ACTIVE | Noted: 2018-03-30

## 2023-10-11 PROBLEM — H01.009 BLEPHARITIS: Status: ACTIVE | Noted: 2020-12-02

## 2023-10-11 PROBLEM — Z96.1 PSEUDOPHAKIA, BOTH EYES: Status: ACTIVE | Noted: 2018-03-30

## 2023-10-11 PROBLEM — H04.129 TEAR FILM INSUFFICIENCY: Status: ACTIVE | Noted: 2018-03-30

## 2023-10-11 NOTE — PROGRESS NOTES
Injection  Injection performed in left  Deltoid by Gloria Todd MA. Patient tolerated the procedure well without complications. PREVNAR 20  10/11/23   WAYLON Ross MD

## 2023-10-11 NOTE — PROGRESS NOTES
The ABCs of the Annual Wellness Visit  Subsequent Medicare Wellness Visit    Subjective    History of Present Illness:  Farheen Gomez is a 77 y.o. male who presents for a Subsequent Medicare Wellness Visit.    The following portions of the patient's history were reviewed and   updated as appropriate: allergies, current medications, past family history, past medical history, past social history, past surgical history, and problem list.    Compared to one year ago, the patient feels his physical   health is the same.    Compared to one year ago, the patient feels his mental   health is the same.    Recent Hospitalizations:  He was not admitted to the hospital during the last year.       Current Medical Providers:  Patient Care Team:  Penny Hidalgo MD as PCP - General (Sleep Medicine)  Lex Villa MD as Consulting Physician (Urology)  Adria Gallegos MD as Consulting Physician (Pulmonary Disease)  JASVIR MARTINES    Outpatient Medications Prior to Visit   Medication Sig Dispense Refill    Acetaminophen (TYLENOL ARTHRITIS PAIN PO) Take 2 tablets by mouth 2 (Two) Times a Day.      amLODIPine (NORVASC) 10 MG tablet Take 1 tablet by mouth once daily 90 tablet 0    apixaban (ELIQUIS) 2.5 MG tablet tablet Take 1 tablet by mouth 2 (Two) Times a Day. 20 tablet 0    calcium carbonate (OS-JUSTA) 600 MG tablet Take 1 tablet by mouth Daily. Stop today for surgery      diclofenac (CATAFLAM) 50 MG tablet Take 1 tablet by mouth 2 (Two) Times a Day.      escitalopram (LEXAPRO) 10 MG tablet Take 1 tablet by mouth once daily 90 tablet 0    ketoconazole (NIZORAL) 2 % cream APPLY THIN LAYER TO AFFECTED AREA(S) TWICE DAILY. MIX 1:1 WITH TRIAMCINOLONE CREAM AND APPLY TOPICALLY TO THE AFFECTED AREA(S) TWICE DAILY FOR 2 WEEKS/MONTH IF NEEDED,STOP FOR ONE WEEK, AND RESTART TREATMENT AS NEEDED      lisinopril (PRINIVIL,ZESTRIL) 40 MG tablet Take 1 tablet by mouth once daily 90 tablet 1    Magnesium 250 MG tablet Take  1 tablet by mouth Daily. Stop today for surgery      multivitamin with minerals tablet tablet Take 1 tablet by mouth Daily.      Myrbetriq 50 MG tablet sustained-release 24 hour 24 hr tablet Take 50 mg by mouth Daily.      oxyCODONE-acetaminophen (PERCOCET) 7.5-325 MG per tablet TAKE ONE TABLET BY MOUTH EVERY 4-6 HOURS AS NEEDED FOR PAIN 40 tablet 0    rosuvastatin (Crestor) 20 MG tablet Take 1 tablet by mouth Daily. 90 tablet 3    traMADol (ULTRAM) 50 MG tablet Take 1 tablet by mouth Every 12 (Twelve) Hours As Needed for Moderate Pain. 40 tablet 0    triamcinolone (KENALOG) 0.1 % cream MIX WITH KETOCONAZOLE CREAM IN 1:1 RATIO AND APPLY TO AFFECTED AREAS TWICE DAILY FOR UP TO 2 WEEKS THEN TAKE A 1 WEEK BREAK      vitamin D (ERGOCALCIFEROL) 1.25 MG (09034 UT) capsule capsule Take 1 capsule by mouth once a week 12 capsule 3    ciprofloxacin (Cipro) 500 MG tablet Take 1 tablet by mouth 2 (Two) Times a Day. 14 tablet 0    tamsulosin (FLOMAX) 0.4 MG capsule 24 hr capsule Take 1 capsule by mouth Daily. (Patient not taking: Reported on 10/12/2023) 30 capsule 0    HYDROcodone-acetaminophen (NORCO) 5-325 MG per tablet Take 1 tablet by mouth Every 6 (Six) Hours As Needed for Moderate Pain or Severe Pain. (Patient not taking: Reported on 10/12/2023) 12 tablet 0    levoFLOXacin (Levaquin) 500 MG tablet Take 1 tablet by mouth Daily. 7 tablet 0    ondansetron (Zofran) 4 MG tablet Take 1 tablet by mouth Every 6 (Six) Hours As Needed for Nausea or Vomiting. (Patient not taking: Reported on 10/12/2023) 30 tablet 0     No facility-administered medications prior to visit.       Opioid medication/s are on active medication list.  and I have evaluated his active treatment plan and pain score trends (see table).  Vitals:    10/12/23 0933   PainSc:   5   PainLoc: Generalized     I have reviewed the chart for potential of high risk medication and harmful drug interactions in the elderly.          Aspirin is not on active medication list.   Aspirin use is not indicated based on review of current medical condition/s. Risk of harm outweighs potential benefits.  .  Patient Active Problem List   Diagnosis    Spinal stenosis in cervical region - reveresal of lordosis at C3/4 and C4/5, Modic endpalate cahnges at C7/T1    Hypertension    BPH (benign prostatic hyperplasia)    Anemia    Arthritis    Morbid (severe) obesity due to excess calories    Dyslipidemia    Family history of malignant neoplasm of urinary bladder    Foot pain, right    Ganglion cyst    Hand paresthesia    Hearing problem    History of poliomyelitis    Hyperglycemia    Hypomagnesemia    Chronic pain of both knees    Other hammer toe(s) (acquired), left foot    Plantar fasciitis, left    Sleep disorder    Tachycardia    Thrombocytopenia    Vitamin D deficiency    Amblyopia    Dry eye    Hypertensive retinopathy of both eyes    Lens replaced by other means    AMD (age-related macular degeneration), bilateral    Nonexudative age-related macular degeneration    Other chronic allergic conjunctivitis    Other specified disorders of eyelid    PCO (posterior capsular opacification), bilateral    Presence of intraocular lens    PVD (posterior vitreous detachment), both eyes    Regular astigmatism    History of bilateral knee arthroplasty    Other headache syndrome    Exposure to COVID-19 virus    Status post total knee replacement, right    Hx of total knee replacement, left    Class 1 obesity due to excess calories with serious comorbidity and body mass index (BMI) of 33.0 to 33.9 in adult    Chronic right shoulder pain    Rotator cuff tear arthropathy of both shoulders    Thrombocytopenia due to COVID-19 virus    Chronic midline low back pain    Degeneration of intervertebral disc    Injury of left shoulder    Status post reverse arthroplasty of right shoulder    Bilateral nonexudative age-related macular degeneration    Pseudophakia, both eyes    Nuclear senile cataract    SPK (superficial  "punctate keratitis), bilateral    Tear film insufficiency    Blepharitis    Vitreous degeneration    Fatigue     Advance Care Planning  Advance Directive is on file.  ACP discussion was held with the patient during this visit. Patient has an advance directive in EMR which is still valid.      Objective    Vitals:    10/12/23 0933 10/12/23 0935   BP: 116/74 116/78   BP Location: Right arm Left arm   Patient Position: Sitting Sitting   Cuff Size: Adult Adult   Pulse: 72 68   Temp: 97.7 øF (36.5 øC)    TempSrc: Temporal    SpO2: 98%    Weight: 105 kg (230 lb 9.6 oz)    Height: 177.8 cm (70\")    PainSc:   5    PainLoc: Generalized      Estimated body mass index is 33.09 kg/mý as calculated from the following:    Height as of this encounter: 177.8 cm (70\").    Weight as of this encounter: 105 kg (230 lb 9.6 oz).           Does the patient have evidence of cognitive impairment? No          HEALTH RISK ASSESSMENT    Smoking Status:  Social History     Tobacco Use   Smoking Status Former    Packs/day: 0.25    Years: 2.00    Additional pack years: 0.00    Total pack years: 0.50    Types: Cigarettes    Quit date: 1995    Years since quittin.7   Smokeless Tobacco Never     Alcohol Consumption:  Social History     Substance and Sexual Activity   Alcohol Use No     Fall Risk Screen:    SHERRIE Fall Risk Assessment was completed, and patient is at LOW risk for falls.Assessment completed on:10/12/2023    Depression Screening:      10/12/2023     9:31 AM   PHQ-2/PHQ-9 Depression Screening   Little Interest or Pleasure in Doing Things 0-->not at all   Feeling Down, Depressed or Hopeless 0-->not at all   PHQ-9: Brief Depression Severity Measure Score 0       Health Habits and Functional and Cognitive Screening:      10/12/2023     9:31 AM   Functional & Cognitive Status   Do you have difficulty preparing food and eating? No   Do you have difficulty bathing yourself, getting dressed or grooming yourself? No   Do you have " difficulty using the toilet? No   Do you have difficulty moving around from place to place? No   Do you have trouble with steps or getting out of a bed or a chair? No   Current Diet Low Carb Diet   Dental Exam Up to date   Eye Exam Up to date   Exercise (times per week) 7 times per week   Current Exercises Include Yard Work   Do you need help using the phone?  No   Are you deaf or do you have serious difficulty hearing?  Yes   Do you need help to go to places out of walking distance? No   Do you need help shopping? No   Do you need help preparing meals?  No   Do you need help with housework?  No   Do you need help with laundry? No   Do you need help taking your medications? No   Do you need help managing money? No   Do you ever drive or ride in a car without wearing a seat belt? No   Have you felt unusual stress, anger or loneliness in the last month? No   Who do you live with? Spouse   If you need help, do you have trouble finding someone available to you? No   Have you been bothered in the last four weeks by sexual problems? No   Do you have difficulty concentrating, remembering or making decisions? No       Age-appropriate Screening Schedule:  Refer to the list below for future screening recommendations based on patient's age, sex and/or medical conditions. Orders for these recommended tests are listed in the plan section. The patient has been provided with a written plan.    Health Maintenance   Topic Date Due    INFLUENZA VACCINE  08/01/2023    COVID-19 Vaccine (5 - 2023-24 season) 09/01/2023    BMI FOLLOWUP  10/04/2023    LIPID PANEL  02/02/2024    ANNUAL WELLNESS VISIT  10/12/2024    TDAP/TD VACCINES (3 - Tdap) 08/13/2025    HEPATITIS C SCREENING  Completed    Pneumococcal Vaccine 65+  Completed    ZOSTER VACCINE  Completed    COLORECTAL CANCER SCREENING  Discontinued                CMS Preventative Services Quick Reference  Risk Factors Identified During Encounter  None Identified  The above risks/problems  have been discussed with the patient.  Follow up actions/plans if indicated are seen below in the Assessment/Plan Section.  Pertinent information has been shared with the patient in the After Visit Summary.  An After Visit Summary and PPPS were made available to the patient.    Follow Up:   Next Medicare Wellness visit to be scheduled in 1 year.         Additional E&M Note during same encounter follows:  Patient has multiple medical problems which are significant and separately identifiable that require additional work above and beyond the Medicare Wellness Visit.        Chief Complaint  Medicare Wellness-subsequent (Declines FLU shot./)    Subjective        HPI  Farheen Gomez also presents   Chief Complaint   Patient presents with    Medicare Wellness-subsequent     Declines FLU shot.       The patient consented to the use of ROBBY.   Patient is also here for an age-specific physical and has been advised to wear sunscreen and a seatbelt  The patient presents to the clinic for an age specific physical and annual wellness exam for Medicare. He is also here for follow-up on tachycardia, sleep disorder, seborrhea capitis, headache syndrome, hypertension, hyperglycemia, dyslipidemia, benign prostatic hyperplasia, hypomagnesemia, class I obesity with serious comorbidities and a body mass index of 33.    Healthcare maintenance.   Patient's physical and mental health is the same compared to a year ago. He has not been admitted at the hospital in the past 365 days. He does not take any aspirin. Advanced directive is on file. He received his pneumonia vaccine yesterday. Patient is due for his influenza, RSV, and COVID-19 vaccines. Eye exam is done every 6 months and he had a new pair of glasses 2 months ago. Dental cleaning is done regularly.     Joint replacements.   Patient underwent bilateral total knee arthroplasty and right shoulder arthroplasty by Dr. Medina. He was placed on Eliquis 2.5 mg 2 times a day. However,  patient ran out of his medication and has not been taking it for 6 months. He has an appointment with Dr. Medina on 11/01/2023. Patient does not have a history of blood clots.     Hypertension.   Blood pressure has been stable at home. It is well controlled today at 116/74 mmHg. He is taking amlodipine 10 mg and lisinopril 40 mg once a day.    Hyperlipidemia.   Patient has been monitoring his saturated fat intake.     Benign prostatic hyperplasia.   Patient denies any urinary issues.     Seborrhea capitis.   Patient saw the dermatologist and states that it has all been taken cared of.     Headaches.   His headaches has been stable.     Tachycardia.   Patient denies any more episodes of tachycardia.     Sleep disorder.   Patient has been sleeping better with his CPAP machine.     Back pain.   Patient is scheduled for an ablation on 10/18/2023.     Fatigue.   Patient has been dealing with fatigue for the past year. Denies any fever, chills, or diaphoresis. Denies any family history of thyroid diseases.     Allergies.   Patient is allergic to penicillin and propranolol.     Social history.   Patient wears his seatbelt at all times when in a vehicle and he uses sunscreen when outside. He is a former smoker. Denies the use of alcohol, cigarettes, vaping device, or illicit drugs.   .    Review of Systems   Constitutional:  Positive for fatigue. Negative for chills, diaphoresis and fever.   HENT:  Negative for trouble swallowing.         Hearing loss, wears hearing aides.   Respiratory:  Negative for cough, chest tightness and wheezing.    Cardiovascular:  Negative for chest pain and palpitations.   Gastrointestinal:  Negative for abdominal pain, blood in stool, constipation and diarrhea.   Genitourinary:  Negative for dysuria and hematuria.   Neurological:  Negative for seizures and syncope.       Objective   Vital Signs:  /78 (BP Location: Left arm, Patient Position: Sitting, Cuff Size: Adult)   Pulse 68   Temp  "97.7 øF (36.5 øC) (Temporal)   Ht 177.8 cm (70\")   Wt 105 kg (230 lb 9.6 oz)   SpO2 98%   BMI 33.09 kg/mý     Physical Exam  HENT:      Ears:      Comments: Tympanic membranes are both normal.     Mouth/Throat:      Comments: Throat looks normal.  Eyes:      Comments: Pupils are equal and reactive to light.    Neck:      Comments: No thyromegaly, thyroid masses, cervical or suboccipital adenopathy.   Carotids are free of bruit.   Cardiovascular:      Comments: Heart rate and rhythm are normal. No heart murmurs.   Pulmonary:      Comments: Breath sounds are normal and equal in all six lung fields.  Abdominal:      Comments: Bowel sounds are normal active. No masses, tenderness or organomegaly   Musculoskeletal:      Comments: No pretibial edema.                          Assessment and Plan   Diagnoses and all orders for this visit:    1. Encounter for annual general medical examination with abnormal findings in adult (Primary)    2. Tachycardia  -     TSH; Future    3. Sleep disorder  -     Vitamin B12; Future    4. Seborrhea capitis in adult    5. Other headache syndrome    6. Hypertension, unspecified type  -     CBC Auto Differential; Future  -     Comprehensive Metabolic Panel; Future    7. Hyperglycemia  -     Hemoglobin A1c; Future    8. Dyslipidemia  -     Lipid Panel; Future    9. Benign prostatic hyperplasia with lower urinary tract symptoms, symptom details unspecified    10. Hypomagnesemia  -     Magnesium; Future    11. Class 1 obesity due to excess calories with serious comorbidity and body mass index (BMI) of 33.0 to 33.9 in adult    12. Fatigue, unspecified type  -     C-reactive Protein; Future  -     Sedimentation Rate; Future    Other orders  -     Fluzone High-Dose 65+yrs (6827-6730)        Encounter for annual general medical examination with abnormal findings in adult.  Recommended use of sunscreen and seatbelt at all times. Recommended influenza, COVID-19, and RSV vaccines. Advised patient " not to get the COVID-19 and RSV vaccines at the same day. Recommended regular dental cleaning and eye exam. Patient does not want any additional information on alcohol misuse, chronic pain, depression, drug use, fall risk, glaucoma, hearing problems, immunizations, loneliness, inactivity, polypharmacy, high risk sexual behavior, tobacco use, or urinary incontinence.    Tachycardia.   Tachycardia has been stable. TSH ordered.     Sleep disorder.   Patient noted improvement with CPAP machine use. Advised patient to continue using his CPAP machine.     Headache syndrome.   This has been stable.     Hypertension.   Blood pressure has been stable. Patient will continue with amlodipine 10 mg and lisinopril 40 mg once a day. CBC and CMP were ordered.     Hyperglycemia.   Hemoglobin A1c ordered.     Dyslipidemia.   Lipid panel ordered. Advised patient to continue monitoring his saturated fat intake.     Benign prostatic hyperplasia with lower urinary tract symptoms, symptom details unspecified.  Stable.     Hypomagnesemia.   Magnesium ordered.     Class 1 obesity due to excess calories with serious comorbidity and body mass index (BMI) of 33.0 to 33.9 in adult.  Advised patient to monitor his food portion size and to increase walking.     Fatigue.   CRP, sedimentation rate, and vitamin B12 were ordered.                Follow Up   No follow-ups on file.  Patient was given instructions and counseling regarding his condition or for health maintenance advice. Please see specific information pulled into the AVS if appropriate.     Transcribed from ambient dictation for Penny Hidalgo MD by Carlos Bird.  10/12/23   11:48 EDT    Patient or patient representative verbalized consent to the visit recording.  I have personally performed the services described in this document as transcribed by the above individual, and it is both accurate and complete.

## 2023-10-11 NOTE — PATIENT INSTRUCTIONS
Health Maintenance Due   Topic Date Due    INFLUENZA VACCINE  08/01/2023    COVID-19 Vaccine (5 - 2023-24 season) 09/01/2023    ANNUAL WELLNESS VISIT  10/04/2023    BMI FOLLOWUP  10/04/2023    12 hour fast for labs

## 2023-10-12 ENCOUNTER — OFFICE VISIT (OUTPATIENT)
Dept: FAMILY MEDICINE CLINIC | Facility: CLINIC | Age: 78
End: 2023-10-12
Payer: MEDICARE

## 2023-10-12 VITALS
HEART RATE: 68 BPM | WEIGHT: 230.6 LBS | DIASTOLIC BLOOD PRESSURE: 78 MMHG | TEMPERATURE: 97.7 F | SYSTOLIC BLOOD PRESSURE: 116 MMHG | OXYGEN SATURATION: 98 % | BODY MASS INDEX: 33.01 KG/M2 | HEIGHT: 70 IN

## 2023-10-12 DIAGNOSIS — R53.83 FATIGUE, UNSPECIFIED TYPE: ICD-10-CM

## 2023-10-12 DIAGNOSIS — R00.0 TACHYCARDIA: ICD-10-CM

## 2023-10-12 DIAGNOSIS — E83.42 HYPOMAGNESEMIA: ICD-10-CM

## 2023-10-12 DIAGNOSIS — G47.9 SLEEP DISORDER: ICD-10-CM

## 2023-10-12 DIAGNOSIS — E66.09 CLASS 1 OBESITY DUE TO EXCESS CALORIES WITH SERIOUS COMORBIDITY AND BODY MASS INDEX (BMI) OF 33.0 TO 33.9 IN ADULT: ICD-10-CM

## 2023-10-12 DIAGNOSIS — Z00.01 ENCOUNTER FOR ANNUAL GENERAL MEDICAL EXAMINATION WITH ABNORMAL FINDINGS IN ADULT: Primary | ICD-10-CM

## 2023-10-12 DIAGNOSIS — R73.9 HYPERGLYCEMIA: ICD-10-CM

## 2023-10-12 DIAGNOSIS — L21.0 SEBORRHEA CAPITIS IN ADULT: ICD-10-CM

## 2023-10-12 DIAGNOSIS — I10 HYPERTENSION, UNSPECIFIED TYPE: Chronic | ICD-10-CM

## 2023-10-12 DIAGNOSIS — G44.89 OTHER HEADACHE SYNDROME: ICD-10-CM

## 2023-10-12 DIAGNOSIS — E78.5 DYSLIPIDEMIA: Chronic | ICD-10-CM

## 2023-10-12 DIAGNOSIS — N40.1 BENIGN PROSTATIC HYPERPLASIA WITH LOWER URINARY TRACT SYMPTOMS, SYMPTOM DETAILS UNSPECIFIED: Chronic | ICD-10-CM

## 2023-10-16 ENCOUNTER — CLINICAL SUPPORT (OUTPATIENT)
Dept: FAMILY MEDICINE CLINIC | Facility: CLINIC | Age: 78
End: 2023-10-16
Payer: MEDICARE

## 2023-10-16 DIAGNOSIS — E78.5 DYSLIPIDEMIA: Chronic | ICD-10-CM

## 2023-10-16 DIAGNOSIS — I10 HYPERTENSION, UNSPECIFIED TYPE: Chronic | ICD-10-CM

## 2023-10-16 DIAGNOSIS — R73.9 HYPERGLYCEMIA: ICD-10-CM

## 2023-10-16 DIAGNOSIS — G47.9 SLEEP DISORDER: ICD-10-CM

## 2023-10-16 DIAGNOSIS — E83.42 HYPOMAGNESEMIA: ICD-10-CM

## 2023-10-16 DIAGNOSIS — R53.83 FATIGUE, UNSPECIFIED TYPE: ICD-10-CM

## 2023-10-16 DIAGNOSIS — R00.0 TACHYCARDIA: ICD-10-CM

## 2023-10-16 LAB
ALBUMIN SERPL-MCNC: 4.4 G/DL (ref 3.5–5.2)
ALBUMIN/GLOB SERPL: 2.2 G/DL
ALP SERPL-CCNC: 79 U/L (ref 39–117)
ALT SERPL W P-5'-P-CCNC: 32 U/L (ref 1–41)
ANION GAP SERPL CALCULATED.3IONS-SCNC: 8.4 MMOL/L (ref 5–15)
AST SERPL-CCNC: 24 U/L (ref 1–40)
BASOPHILS # BLD AUTO: 0.04 10*3/MM3 (ref 0–0.2)
BASOPHILS NFR BLD AUTO: 0.8 % (ref 0–1.5)
BILIRUB SERPL-MCNC: 0.2 MG/DL (ref 0–1.2)
BUN SERPL-MCNC: 15 MG/DL (ref 8–23)
BUN/CREAT SERPL: 15.3 (ref 7–25)
CALCIUM SPEC-SCNC: 9.1 MG/DL (ref 8.6–10.5)
CHLORIDE SERPL-SCNC: 104 MMOL/L (ref 98–107)
CHOLEST SERPL-MCNC: 112 MG/DL (ref 0–200)
CO2 SERPL-SCNC: 26.6 MMOL/L (ref 22–29)
CREAT SERPL-MCNC: 0.98 MG/DL (ref 0.76–1.27)
CRP SERPL-MCNC: <0.3 MG/DL (ref 0–0.5)
DEPRECATED RDW RBC AUTO: 41.3 FL (ref 37–54)
EGFRCR SERPLBLD CKD-EPI 2021: 79.9 ML/MIN/1.73
EOSINOPHIL # BLD AUTO: 0.14 10*3/MM3 (ref 0–0.4)
EOSINOPHIL NFR BLD AUTO: 2.9 % (ref 0.3–6.2)
ERYTHROCYTE [DISTWIDTH] IN BLOOD BY AUTOMATED COUNT: 12.7 % (ref 12.3–15.4)
ERYTHROCYTE [SEDIMENTATION RATE] IN BLOOD: 12 MM/HR (ref 0–20)
GLOBULIN UR ELPH-MCNC: 2 GM/DL
GLUCOSE SERPL-MCNC: 122 MG/DL (ref 65–99)
HBA1C MFR BLD: 6.3 % (ref 4.8–5.6)
HCT VFR BLD AUTO: 39.3 % (ref 37.5–51)
HDLC SERPL-MCNC: 36 MG/DL (ref 40–60)
HGB BLD-MCNC: 13.4 G/DL (ref 13–17.7)
IMM GRANULOCYTES # BLD AUTO: 0.01 10*3/MM3 (ref 0–0.05)
IMM GRANULOCYTES NFR BLD AUTO: 0.2 % (ref 0–0.5)
LDLC SERPL CALC-MCNC: 44 MG/DL (ref 0–100)
LDLC/HDLC SERPL: 1.01 {RATIO}
LYMPHOCYTES # BLD AUTO: 2.16 10*3/MM3 (ref 0.7–3.1)
LYMPHOCYTES NFR BLD AUTO: 44.4 % (ref 19.6–45.3)
MAGNESIUM SERPL-MCNC: 2.3 MG/DL (ref 1.6–2.4)
MCH RBC QN AUTO: 30.7 PG (ref 26.6–33)
MCHC RBC AUTO-ENTMCNC: 34.1 G/DL (ref 31.5–35.7)
MCV RBC AUTO: 90.1 FL (ref 79–97)
MONOCYTES # BLD AUTO: 0.48 10*3/MM3 (ref 0.1–0.9)
MONOCYTES NFR BLD AUTO: 9.9 % (ref 5–12)
NEUTROPHILS NFR BLD AUTO: 2.04 10*3/MM3 (ref 1.7–7)
NEUTROPHILS NFR BLD AUTO: 41.8 % (ref 42.7–76)
NRBC BLD AUTO-RTO: 0 /100 WBC (ref 0–0.2)
PLATELET # BLD AUTO: 169 10*3/MM3 (ref 140–450)
PMV BLD AUTO: 9.1 FL (ref 6–12)
POTASSIUM SERPL-SCNC: 4.4 MMOL/L (ref 3.5–5.2)
PROT SERPL-MCNC: 6.4 G/DL (ref 6–8.5)
RBC # BLD AUTO: 4.36 10*6/MM3 (ref 4.14–5.8)
SODIUM SERPL-SCNC: 139 MMOL/L (ref 136–145)
TRIGL SERPL-MCNC: 198 MG/DL (ref 0–150)
TSH SERPL DL<=0.05 MIU/L-ACNC: 1.16 UIU/ML (ref 0.27–4.2)
VIT B12 BLD-MCNC: 784 PG/ML (ref 211–946)
VLDLC SERPL-MCNC: 32 MG/DL (ref 5–40)
WBC NRBC COR # BLD: 4.87 10*3/MM3 (ref 3.4–10.8)

## 2023-10-16 PROCEDURE — 80053 COMPREHEN METABOLIC PANEL: CPT | Performed by: PREVENTIVE MEDICINE

## 2023-10-16 PROCEDURE — 36415 COLL VENOUS BLD VENIPUNCTURE: CPT | Performed by: PREVENTIVE MEDICINE

## 2023-10-16 PROCEDURE — 82607 VITAMIN B-12: CPT | Performed by: PREVENTIVE MEDICINE

## 2023-10-16 PROCEDURE — 84443 ASSAY THYROID STIM HORMONE: CPT | Performed by: PREVENTIVE MEDICINE

## 2023-10-16 PROCEDURE — 85025 COMPLETE CBC W/AUTO DIFF WBC: CPT | Performed by: PREVENTIVE MEDICINE

## 2023-10-16 PROCEDURE — 83735 ASSAY OF MAGNESIUM: CPT | Performed by: PREVENTIVE MEDICINE

## 2023-10-16 PROCEDURE — 85652 RBC SED RATE AUTOMATED: CPT | Performed by: PREVENTIVE MEDICINE

## 2023-10-16 PROCEDURE — 86140 C-REACTIVE PROTEIN: CPT | Performed by: PREVENTIVE MEDICINE

## 2023-10-16 PROCEDURE — 83036 HEMOGLOBIN GLYCOSYLATED A1C: CPT | Performed by: PREVENTIVE MEDICINE

## 2023-10-16 PROCEDURE — 80061 LIPID PANEL: CPT | Performed by: PREVENTIVE MEDICINE

## 2023-10-16 NOTE — PROGRESS NOTES
Venipuncture performed on Left Arm by Harper Bonilla MA  with good hemostasis. Patient tolerated well. 10/16/23  Penny Hidalgo MD

## 2023-10-17 ENCOUNTER — TELEPHONE (OUTPATIENT)
Dept: FAMILY MEDICINE CLINIC | Facility: CLINIC | Age: 78
End: 2023-10-17
Payer: MEDICARE

## 2023-10-17 NOTE — TELEPHONE ENCOUNTER
----- Message from Penny Hidalgo MD sent at 10/17/2023  7:35 AM EDT -----  Glucose was 122 and A1c shows excess risk for diabetes at 6.3.  Continue to watch carbohydrates and increase your walking.  Inflammatory labs and the rest of the labs were normal.  Try to see if you can increase your activity during the daytime and not nap if that does not improve then I would suggest we recheck you again.  Call if any other questions or concerns

## 2023-10-17 NOTE — TELEPHONE ENCOUNTER
"Relay     \"Glucose was 122 and A1c shows excess risk for diabetes at 6.3.  Continue to watch carbohydrates and increase your walking.  Inflammatory labs and the rest of the labs were normal.  Try to see if you can increase your activity during the daytime and not nap if that does not improve then I would suggest we recheck you again.  Call if any other questions or concerns\"                "

## 2023-10-17 NOTE — PROGRESS NOTES
Glucose was 122 and A1c shows excess risk for diabetes at 6.3.  Continue to watch carbohydrates and increase your walking.  Inflammatory labs and the rest of the labs were normal.  Try to see if you can increase your activity during the daytime and not nap if that does not improve then I would suggest we recheck you again.  Call if any other questions or concerns

## 2023-10-23 RX ORDER — ESCITALOPRAM OXALATE 10 MG/1
TABLET ORAL
Qty: 90 TABLET | Refills: 0 | Status: SHIPPED | OUTPATIENT
Start: 2023-10-23 | End: 2023-10-24

## 2023-10-24 RX ORDER — ESCITALOPRAM OXALATE 10 MG/1
TABLET ORAL
Qty: 90 TABLET | Refills: 0 | Status: SHIPPED | OUTPATIENT
Start: 2023-10-24

## 2023-11-01 ENCOUNTER — OFFICE VISIT (OUTPATIENT)
Dept: ORTHOPEDIC SURGERY | Facility: CLINIC | Age: 78
End: 2023-11-01
Payer: MEDICARE

## 2023-11-01 VITALS — OXYGEN SATURATION: 98 % | RESPIRATION RATE: 20 BRPM | WEIGHT: 230 LBS | HEIGHT: 70 IN | BODY MASS INDEX: 32.93 KG/M2

## 2023-11-01 DIAGNOSIS — M75.102 ROTATOR CUFF TEAR ARTHROPATHY OF BOTH SHOULDERS: Primary | ICD-10-CM

## 2023-11-01 DIAGNOSIS — M75.101 ROTATOR CUFF TEAR ARTHROPATHY OF BOTH SHOULDERS: Primary | ICD-10-CM

## 2023-11-01 DIAGNOSIS — M12.811 ROTATOR CUFF TEAR ARTHROPATHY OF BOTH SHOULDERS: Primary | ICD-10-CM

## 2023-11-01 DIAGNOSIS — Z96.611 STATUS POST REVERSE ARTHROPLASTY OF RIGHT SHOULDER: ICD-10-CM

## 2023-11-01 DIAGNOSIS — M12.812 ROTATOR CUFF TEAR ARTHROPATHY OF BOTH SHOULDERS: Primary | ICD-10-CM

## 2023-11-01 PROCEDURE — 1159F MED LIST DOCD IN RCRD: CPT | Performed by: ORTHOPAEDIC SURGERY

## 2023-11-01 PROCEDURE — 1160F RVW MEDS BY RX/DR IN RCRD: CPT | Performed by: ORTHOPAEDIC SURGERY

## 2023-11-01 PROCEDURE — 99213 OFFICE O/P EST LOW 20 MIN: CPT | Performed by: ORTHOPAEDIC SURGERY

## 2023-11-01 NOTE — PROGRESS NOTES
"Chief Complaint  Follow-up of the Left Shoulder (Pain-7 )    Subjective    History of Present Illness      Farheen Gomez is a 78 y.o. male who presents to [facility] for follow-up on bilateral total knee arthroplasty and right reverse total shoulder arthroplasty as well as left shoulder pain.  History of Present Illness the patient had total knee replacements performed by me 4 years ago.  He has done extremely well with those surgical interventions.  He is able to walk for exercise without any difficulty.  The patient does not use any narcotics for pain control.  He is very pleased with his postsurgical outcome.  The patient also had a right reverse total shoulder arthroplasty in October 2022.  He is now 13 months out from that surgical intervention.  He has done well in terms of improved activities of daily living and is able to use the shoulder without any difficulty.  There is no clicking popping or catching from that shoulder joint.  He is very pleased with his outcome and states \"my new shoulder, it is great \".  It is now his left shoulder that has been bothering him for the past 2 years or so.  The patient has pain and discomfort with activities of daily living and also has some nighttime pain and discomfort.  We had tried to obtain a MRI but he states that he could not get the MRI done.  He states that his shoulder is not too bad at this point and he would like to defer surgical intervention as long as possible.  Pain Location: LEFT shoulder  Radiation: none  Quality: dull, aching  Intensity/Severity: moderate  Duration:  2 years  Progression of symptoms: yes, progressive worsening  Onset quality: gradual   Timing: intermittent  Aggravating Factors: reaching forward, reaching across body, rotating motion, repetitive motion  Alleviating Factors: Tylenol, NSAIDs  Previous Episodes: yes  Associated Symptoms: pain, swelling  ADLs Affected: grooming/hygiene/toileting/personal " "care  Previous Treatment: NSAIDs       Objective   Vital Signs:   Resp 20   Ht 177.8 cm (70\")   Wt 104 kg (230 lb)   SpO2 98%   BMI 33.00 kg/m²     Physical Exam  Physical Exam  Vitals signs and nursing note reviewed.   Constitutional:       Appearance: Normal appearance.   Pulmonary:      Effort: Pulmonary effort is normal.   Skin:     General: Skin is warm and dry.      Capillary Refill: Capillary refill takes less than 2 seconds.   Neurological:      General: No focal deficit present.      Mental Status: He is alert and oriented to person, place, and time. Mental status is at baseline.   Psychiatric:         Mood and Affect: Mood normal.         Behavior: Behavior normal.         Thought Content: Thought content normal.         Judgment: Judgment normal.     Ortho Exam   Left shoulder (cta). Rotator cuff function is extremely impaired. There is a high riding humeral head with articulation of the humerus to the undersurface of the acromiohumeral articulation. AC joint is exquisitely tender and painful for patient. Axillary nerve function is well preserved. There is significant winging of the scapula with hollowing of the fossae over the proximal and distal aspects of the spine of the scapula. Forward flexion is 0-30 degrees, active abduction is 0-60 degrees. Drop arm sign is positive. External rotation against resistance is extremely painful and limited for the patient. Skin and soft tissues are essentially normal other than some amounts of swelling. The pain level is 4    Bilateral knee.The patient is status post total knee arthroplasty postoperative 4 year(s). Incision is clean. Calf is soft and nontender. Homans sign is negative. There is no clicking, popping or catching. Anterior and posterior drawer signs are negative.  There is no instability of the components. Appropriate amounts of swelling and bruising are noted. Dorsalis pedis and posterior tibial artery pulses are palpable. Common peroneal nerve " function is well preserved. Range of motion is from 0-1 25 degrees of flexion. Gait is cautious but otherwise fairly normal. There is no evidence of a deep seated joint infection.  Right shoulder. Patient is postoperative 13 month(s). Afebrile. Vital signs are stable. Deltopectoral incision is clean and healing well. Axillary nerve function is well preserved. There is no glenohumeral instability. No clicking, popping or catching is noted. Apprehension sign is negative. Axillary nerve function is well preserved. Radial artery pulses are palpable. There is no clinical deformity. Range of motion is flexion 0-125 degrees, abduction 0-125 degrees.          Result Review :  The following data was reviewed by: Boby Medina MD on 11/01/2023:                Procedures           Assessment   Assessment and Plan    Diagnoses and all orders for this visit:    1. Rotator cuff tear arthropathy of both shoulders (Primary)    2. Status post reverse arthroplasty of right shoulder          Follow Up   Compression/brace to the knees to prevent the knees from buckling and giving out.  Calcium and vitamin D for bone health.  , GI and dental procedure prophylaxis with antibiotics to prevent metastatic infection of the shoulder and the knee arthroplasty implants.  Falls and dislocation precautions for the right reverse total shoulder arthroplasty implants.  Discussed with the patient about steroid injection to the shoulder to help manage the symptoms.  There is no doubt in my mind that this patient is going to need a reverse total shoulder arthroplasty on the nonoperative shoulder and we have discussed that with him at length.  He will let me know when he is ready for that form of intervention.  Rest, ice, compression, and elevation (RICE) therapy  Stretching and strengthening exercises  OTC Tylenol 500-1000mg by mouth every 6 hours as needed for pain   Follow up in 12 month(s)  Patient was given instructions and counseling regarding  his condition or for health maintenance advice. Please see specific information pulled into the AVS if appropriate.     Boby Medina MD   Date of Encounter: 11/1/2023    EMR Dragon/Transcription disclaimer:  Much of this encounter note is an electronic transcription/translation of spoken language to printed text. The electronic translation of spoken language may permit erroneous, or at times, nonsensical words or phrases to be inadvertently transcribed; Although I have reviewed the note for such errors, some may still exist.

## 2023-11-13 RX ORDER — LISINOPRIL 40 MG/1
TABLET ORAL
Qty: 90 TABLET | Refills: 0 | Status: SHIPPED | OUTPATIENT
Start: 2023-11-13

## 2023-11-27 ENCOUNTER — EXTERNAL PBMM DATA (OUTPATIENT)
Dept: PHARMACY | Facility: OTHER | Age: 78
End: 2023-11-27
Payer: MEDICARE

## 2023-12-13 ENCOUNTER — OFFICE VISIT (OUTPATIENT)
Dept: ORTHOPEDIC SURGERY | Facility: CLINIC | Age: 78
End: 2023-12-13
Payer: MEDICARE

## 2023-12-13 VITALS — HEIGHT: 70 IN | WEIGHT: 230 LBS | BODY MASS INDEX: 32.93 KG/M2

## 2023-12-13 DIAGNOSIS — Z96.611 STATUS POST REVERSE ARTHROPLASTY OF RIGHT SHOULDER: ICD-10-CM

## 2023-12-13 DIAGNOSIS — Z96.651 STATUS POST TOTAL KNEE REPLACEMENT, RIGHT: ICD-10-CM

## 2023-12-13 DIAGNOSIS — Z96.652 STATUS POST TOTAL KNEE REPLACEMENT, LEFT: ICD-10-CM

## 2023-12-13 DIAGNOSIS — M19.012 PRIMARY OSTEOARTHRITIS OF LEFT SHOULDER: Primary | ICD-10-CM

## 2023-12-13 PROCEDURE — 99214 OFFICE O/P EST MOD 30 MIN: CPT | Performed by: ORTHOPAEDIC SURGERY

## 2023-12-13 NOTE — PROGRESS NOTES
Chief Complaint  Follow-up of the Left Shoulder and Follow-up of the Right Shoulder    Subjective    History of Present Illness      Farheen Gomez is a 78 y.o. male who presents to [facility] for follow-up on bilateral total knee arthroplasty and right reverse total shoulder arthroplasty.  He is also complaining of severe left shoulder pain.  History of Present Illness the patient has done exceedingly well following bilateral total knee replacement surgery.  He is now 5 years out from both his knees being replaced.  That has improved his quality of life very significantly for the better.  He was able to travel internationally on some animal safaris after the knee replacement and is very thankful for that.  The patient had a right reverse total shoulder arthroplasty performed by me a year ago for advanced cuff tear arthropathy.  He is doing very well with that shoulder at this point.  Range of motion has improved consistently with physical therapy.  The patient is able to utilize his upper extremity for activities of daily living without any dysfunction.  It is now his left shoulder that is bothering him significantly.  He has difficulty with ambulation and activities of daily living.  Swinging his shoulders back-and-forth is also painful for the patient.  We had wanted him to do an MRI but he was unsuccessful in doing that because of claustrophobia.  The patient states that he will let me know when he is ready for surgical intervention.  He and I both have no doubts that he is going to need reverse total shoulder arthroplasty for advanced cuff tear arthropathy of the left  Pain Location: LEFT shoulder  Radiation: none  Quality: dull  Intensity/Severity: moderate to severe  Duration:  Several years  Progression of symptoms: yes, progressive worsening  Onset quality: gradual   Timing: intermittent  Aggravating Factors: reaching across body, rotating motion, repetitive  "motion  Alleviating Factors: NSAIDs  Previous Episodes: yes  Associated Symptoms: pain, swelling, decreased ROM  ADLs Affected: recreational activities/sports  Previous Treatment: NSAIDs       Objective   Vital Signs:   Ht 177.8 cm (70\")   Wt 104 kg (230 lb)   BMI 33.00 kg/m²     Physical Exam  Physical Exam  Vitals signs and nursing note reviewed.   Constitutional:       Appearance: Normal appearance.   Pulmonary:      Effort: Pulmonary effort is normal.   Skin:     General: Skin is warm and dry.      Capillary Refill: Capillary refill takes less than 2 seconds.   Neurological:      General: No focal deficit present.      Mental Status: He is alert and oriented to person, place, and time. Mental status is at baseline.   Psychiatric:         Mood and Affect: Mood normal.         Behavior: Behavior normal.         Thought Content: Thought content normal.         Judgment: Judgment normal.     Ortho Exam   Left shoulder (cta). Rotator cuff function is extremely impaired. There is a high riding humeral head with articulation of the humerus to the undersurface of the acromiohumeral articulation. AC joint is exquisitely tender and painful for patient. Axillary nerve function is well preserved. There is significant winging of the scapula with hollowing of the fossae over the proximal and distal aspects of the spine of the scapula. Forward flexion is 0-30 degrees, active abduction is 0-60 degrees. Drop arm sign is positive. External rotation against resistance is extremely painful and limited for the patient. Skin and soft tissues are essentially normal other than some amounts of swelling. The pain level is 5    Bilateral knee.The patient is status post total knee arthroplasty postoperative 5 year(s). Incision is clean. Calf is soft and nontender. Homans sign is negative. There is no clicking, popping or catching. Anterior and posterior drawer signs are negative.  There is no instability of the components. Appropriate " amounts of swelling and bruising are noted. Dorsalis pedis and posterior tibial artery pulses are palpable. Common peroneal nerve function is well preserved. Range of motion is from 0-125 degrees of flexion. Gait is cautious but otherwise fairly normal. There is no evidence of a deep seated joint infection.  Right shoulder. Patient is postoperative 1 year(s). Afebrile. Vital signs are stable. Deltopectoral incision is clean and healing well. Axillary nerve function is well preserved. There is no glenohumeral instability. No clicking, popping or catching is noted. Apprehension sign is negative. Axillary nerve function is well preserved. Radial artery pulses are palpable. There is no clinical deformity. Range of motion is flexion 0-120 degrees, abduction 0-120 degrees.            Result Review :  The following data was reviewed by: Boby Medina MD on 12/13/2023:                Procedures           Assessment   Assessment and Plan    Diagnoses and all orders for this visit:    1. Primary osteoarthritis of left shoulder (Primary)    2. Status post total knee replacement, left    3. Status post total knee replacement, right    4. Status post reverse arthroplasty of right shoulder          Follow Up   Compression/brace to the knee to prevent it from buckling and giving out when he is walking on uneven surfaces.  Calcium and vitamin D for bone health.  Falls and dislocation precautions for the total shoulder arthroplasty discussed with the patient.  , GI and dental procedure prophylaxis with antibiotics to prevent metastatic infection of the shoulder arthroplasty in the knee arthroplasty implants.  Risk and benefits of left reverse total shoulder arthroplasty discussed with patient at length and he understands and accepts that situation.  He will let me know when he is ready for that formal surgical intervention.  After evaluation in the office, x-rays and history, we have diagnosed rotator cuff arthropathy of the  shoulder.  This is a degenerative condition with a chronic tear of the rotator cuff that is not repairable with loss of cartilage in the glenohumeral joint. The only definitive treatment for this condition is total shoulder arthroplasty or joint replacement.  Conservative measures have been used, including cortisone injections, NSAIDS, activity modification and physical therapy.  This will require 3-4 months of time for recovery. It starts with 4-6 weeks in a sling, followed by rehabilitation at physical therapy and exercises to be done at home.  With this type of implant, you will need to take oral antibiotics prior to all dental visits and for some procedures (for example: colonoscopy, skin lesion removal, etc).  This will be a single dose one hour prior to the procedure.      Risks and benefits of surgery were discussed with the patient in detail.  Risks include but are not limited to infection, myocardial infarction, stroke, DVT, pulmonary embolism, death, dislocation, neurovascular compromise, limb length discrepancy, revision surgery, limited range of motion, wound healing problems and scar tissue build-up.  Patient understands and agrees to proceed with surgery.   Rest, ice, compression, and elevation (RICE) therapy  Stretching and strengthening exercises  OTC Tylenol 500-1000mg by mouth every 6 hours as needed for pain   Follow up in 3 month(s)  Patient was given instructions and counseling regarding his condition or for health maintenance advice. Please see specific information pulled into the AVS if appropriate.     Boby Medina MD   Date of Encounter: 12/13/2023    EMR Dragon/Transcription disclaimer:  Much of this encounter note is an electronic transcription/translation of spoken language to printed text. The electronic translation of spoken language may permit erroneous, or at times, nonsensical words or phrases to be inadvertently transcribed; Although I have reviewed the note for such errors, some  may still exist.

## 2023-12-15 RX ORDER — AMLODIPINE BESYLATE 10 MG/1
TABLET ORAL
Qty: 90 TABLET | Refills: 0 | Status: SHIPPED | OUTPATIENT
Start: 2023-12-15

## 2023-12-21 ENCOUNTER — TELEPHONE (OUTPATIENT)
Dept: ORTHOPEDIC SURGERY | Facility: CLINIC | Age: 78
End: 2023-12-21
Payer: MEDICARE

## 2023-12-21 PROBLEM — Z96.652 STATUS POST TOTAL KNEE REPLACEMENT, LEFT: Status: ACTIVE | Noted: 2021-03-30

## 2023-12-21 PROBLEM — M19.012 PRIMARY OSTEOARTHRITIS OF LEFT SHOULDER: Status: ACTIVE | Noted: 2023-12-21

## 2023-12-21 NOTE — TELEPHONE ENCOUNTER
Caller: ELVIRA VELIZ     Relationship to patient: PATIENT     Best call back number: [phone]    Chief complaint: LEFT SHOULDER     Type of visit: READY TO SET UP SX     Requested date: AFTER FIRST OF YEAR.  PATIENT CHANGED HIS INSURANCE TO [employer]      If rescheduling, when is the original appointment: N/A     Additional notes:LAST VISIT  12/13/23 PN INDICATE PATIENT WILL CALL BACK TO GO FORWARD WITH SX

## 2023-12-21 NOTE — TELEPHONE ENCOUNTER
DR. BUENO WAS ON CALL WHEN THIS PATIENT WENT TO THE ER. THEY DISCUSSED TOTAL SHOULDER REPLACEMENT. PATIENT LAST SEEN DR. BUENO ON 12/13. AND IS CALLING BACK TO SCHEDULE SURGERY. DR. BUENO HAS NO MORE OR TIME WITH VIVIAN BONE OR VIVIAN CUNHA. ARE YOU OK WITH TAKING OVER THIS PATIENTS CARE AND DOING SURGERY? IF SO, DO YOU WANT TO SEE BACK IN OFFICE OR JUST PUT ORDERS IN?   1.4

## 2023-12-27 NOTE — TELEPHONE ENCOUNTER
PATIENT IS CURRENTLY ON VACATION OUT OF STATE AND WILL CALL BACK IN A COUPLE DAYS WITH HIS DECISION IF HE WOULD LIKE TO TRANSFER CARE TO DR. CHUNG OR FOLLOW DR. BUENO TO HIS NEW LOCATION TO HAVE SURGERY.

## 2023-12-27 NOTE — TELEPHONE ENCOUNTER
Need to see him first and he is supposed to have an MRI if he could ideally get that before seeing me but not mandatory

## 2024-01-31 RX ORDER — ROSUVASTATIN CALCIUM 20 MG/1
20 TABLET, COATED ORAL DAILY
Qty: 90 TABLET | Refills: 0 | Status: SHIPPED | OUTPATIENT
Start: 2024-01-31

## 2024-02-13 ENCOUNTER — OFFICE VISIT (OUTPATIENT)
Dept: ORTHOPEDIC SURGERY | Facility: CLINIC | Age: 79
End: 2024-02-13
Payer: COMMERCIAL

## 2024-02-13 VITALS — HEIGHT: 70 IN | HEART RATE: 71 BPM | OXYGEN SATURATION: 95 % | WEIGHT: 230 LBS | BODY MASS INDEX: 32.93 KG/M2

## 2024-02-13 DIAGNOSIS — M19.012 PRIMARY OSTEOARTHRITIS OF LEFT SHOULDER: Primary | ICD-10-CM

## 2024-02-13 DIAGNOSIS — S49.92XA INJURY OF LEFT SHOULDER, INITIAL ENCOUNTER: ICD-10-CM

## 2024-02-13 DIAGNOSIS — M25.312 ROTATOR CUFF INSUFFICIENCY, LEFT: ICD-10-CM

## 2024-02-13 RX ORDER — DIAZEPAM 2 MG/1
2 TABLET ORAL
Qty: 1 TABLET | Refills: 0 | Status: SHIPPED | OUTPATIENT
Start: 2024-02-13 | End: 2024-02-13

## 2024-03-08 RX ORDER — LISINOPRIL 40 MG/1
TABLET ORAL
Qty: 90 TABLET | Refills: 0 | Status: SHIPPED | OUTPATIENT
Start: 2024-03-08

## 2024-03-08 NOTE — TELEPHONE ENCOUNTER
Rx Refill Note  Requested Prescriptions     Pending Prescriptions Disp Refills   • lisinopril (PRINIVIL,ZESTRIL) 40 MG tablet [Pharmacy Med Name: Lisinopril 40 MG Oral Tablet] 90 tablet 0     Sig: Take 1 tablet by mouth once daily      Last office visit with prescribing clinician: 10/12/2023      Next office visit with prescribing clinician: 4/12/2024   3}  Bernarda Dixon  03/08/24, 07:52 EST

## 2024-03-10 RX ORDER — AMLODIPINE BESYLATE 10 MG/1
TABLET ORAL
Qty: 90 TABLET | Refills: 0 | Status: SHIPPED | OUTPATIENT
Start: 2024-03-10

## 2024-04-02 ENCOUNTER — HOSPITAL ENCOUNTER (OUTPATIENT)
Dept: MRI IMAGING | Facility: HOSPITAL | Age: 79
Discharge: HOME | End: 2024-04-02
Admitting: STUDENT IN AN ORGANIZED HEALTH CARE EDUCATION/TRAINING PROGRAM
Payer: COMMERCIAL

## 2024-04-02 DIAGNOSIS — S49.92XA INJURY OF LEFT SHOULDER, INITIAL ENCOUNTER: ICD-10-CM

## 2024-04-02 DIAGNOSIS — M19.012 PRIMARY OSTEOARTHRITIS OF LEFT SHOULDER: ICD-10-CM

## 2024-04-02 DIAGNOSIS — M25.312 ROTATOR CUFF INSUFFICIENCY, LEFT: ICD-10-CM

## 2024-04-02 PROCEDURE — 73221 MRI JOINT UPR EXTREM W/O DYE: CPT

## 2024-04-05 ENCOUNTER — TELEPHONE (OUTPATIENT)
Dept: ORTHOPEDIC SURGERY | Facility: CLINIC | Age: 79
End: 2024-04-05
Payer: COMMERCIAL

## 2024-04-05 NOTE — TELEPHONE ENCOUNTER
----- Message from Richard Michelle DO sent at 4/5/2024  9:32 AM EDT -----  MRI showing rotator cuff tears and shoulder arthritis. Would recommend follow up visit to discuss results and further treatment plan. Can we please call patient to schedule follow up visit.

## 2024-04-12 RX ORDER — ESCITALOPRAM OXALATE 10 MG/1
TABLET ORAL
Qty: 90 TABLET | Refills: 0 | Status: SHIPPED | OUTPATIENT
Start: 2024-04-12

## 2024-04-26 RX ORDER — ROSUVASTATIN CALCIUM 20 MG/1
20 TABLET, COATED ORAL DAILY
Qty: 90 TABLET | Refills: 0 | Status: SHIPPED | OUTPATIENT
Start: 2024-04-26

## 2024-05-09 ENCOUNTER — PREP FOR SURGERY (OUTPATIENT)
Dept: OTHER | Facility: HOSPITAL | Age: 79
End: 2024-05-09
Payer: MEDICARE

## 2024-05-09 ENCOUNTER — OFFICE VISIT (OUTPATIENT)
Dept: ORTHOPEDIC SURGERY | Facility: CLINIC | Age: 79
End: 2024-05-09
Payer: MEDICARE

## 2024-05-09 VITALS — WEIGHT: 230 LBS | HEART RATE: 78 BPM | HEIGHT: 70 IN | BODY MASS INDEX: 32.93 KG/M2

## 2024-05-09 DIAGNOSIS — R79.9 ABNORMAL FINDING OF BLOOD CHEMISTRY, UNSPECIFIED: ICD-10-CM

## 2024-05-09 DIAGNOSIS — R79.1 ABNORMAL COAGULATION PROFILE: ICD-10-CM

## 2024-05-09 DIAGNOSIS — R94.5 ABNORMAL RESULTS OF LIVER FUNCTION STUDIES: ICD-10-CM

## 2024-05-09 DIAGNOSIS — M19.012 OSTEOARTHRITIS OF LEFT GLENOHUMERAL JOINT: Primary | ICD-10-CM

## 2024-05-09 DIAGNOSIS — R94.120 ABNORMAL AUDITORY FUNCTION STUDY: ICD-10-CM

## 2024-05-09 RX ORDER — PREGABALIN 75 MG/1
150 CAPSULE ORAL ONCE
OUTPATIENT
Start: 2024-05-09 | End: 2024-05-09

## 2024-05-09 RX ORDER — OXYCODONE HCL 10 MG/1
10 TABLET, FILM COATED, EXTENDED RELEASE ORAL ONCE
OUTPATIENT
Start: 2024-05-09 | End: 2024-05-09

## 2024-05-09 RX ORDER — MELOXICAM 15 MG/1
15 TABLET ORAL ONCE
OUTPATIENT
Start: 2024-05-09 | End: 2024-05-09

## 2024-05-17 PROBLEM — M19.012 OSTEOARTHRITIS OF LEFT GLENOHUMERAL JOINT: Status: ACTIVE | Noted: 2024-05-09

## 2024-06-05 RX ORDER — LISINOPRIL 40 MG/1
TABLET ORAL
Qty: 90 TABLET | Refills: 0 | Status: SHIPPED | OUTPATIENT
Start: 2024-06-05

## 2024-06-20 ENCOUNTER — LAB (OUTPATIENT)
Dept: FAMILY MEDICINE CLINIC | Facility: CLINIC | Age: 79
End: 2024-06-20
Payer: MEDICARE

## 2024-06-20 ENCOUNTER — OFFICE VISIT (OUTPATIENT)
Dept: FAMILY MEDICINE CLINIC | Facility: CLINIC | Age: 79
End: 2024-06-20
Payer: MEDICARE

## 2024-06-20 VITALS
DIASTOLIC BLOOD PRESSURE: 79 MMHG | SYSTOLIC BLOOD PRESSURE: 123 MMHG | TEMPERATURE: 97 F | HEIGHT: 70 IN | HEART RATE: 74 BPM | WEIGHT: 226.6 LBS | BODY MASS INDEX: 32.44 KG/M2 | OXYGEN SATURATION: 95 %

## 2024-06-20 DIAGNOSIS — E78.5 DYSLIPIDEMIA: Chronic | ICD-10-CM

## 2024-06-20 DIAGNOSIS — E55.9 VITAMIN D DEFICIENCY: Chronic | ICD-10-CM

## 2024-06-20 DIAGNOSIS — E83.42 HYPOMAGNESEMIA: ICD-10-CM

## 2024-06-20 DIAGNOSIS — G47.9 SLEEP DISORDER: ICD-10-CM

## 2024-06-20 DIAGNOSIS — G44.89 OTHER HEADACHE SYNDROME: ICD-10-CM

## 2024-06-20 DIAGNOSIS — R53.83 FATIGUE, UNSPECIFIED TYPE: ICD-10-CM

## 2024-06-20 DIAGNOSIS — N40.1 BENIGN PROSTATIC HYPERPLASIA WITH LOWER URINARY TRACT SYMPTOMS, SYMPTOM DETAILS UNSPECIFIED: Chronic | ICD-10-CM

## 2024-06-20 DIAGNOSIS — R00.0 TACHYCARDIA: ICD-10-CM

## 2024-06-20 DIAGNOSIS — R73.9 HYPERGLYCEMIA: ICD-10-CM

## 2024-06-20 DIAGNOSIS — I10 HYPERTENSION, UNSPECIFIED TYPE: Primary | Chronic | ICD-10-CM

## 2024-06-20 DIAGNOSIS — E66.09 CLASS 1 OBESITY DUE TO EXCESS CALORIES WITH SERIOUS COMORBIDITY AND BODY MASS INDEX (BMI) OF 32.0 TO 32.9 IN ADULT: ICD-10-CM

## 2024-06-20 DIAGNOSIS — I10 HYPERTENSION, UNSPECIFIED TYPE: Chronic | ICD-10-CM

## 2024-06-20 PROBLEM — H35.363 MACULAR DRUSEN, BILATERAL: Status: ACTIVE | Noted: 2023-09-22

## 2024-06-20 LAB
25(OH)D3 SERPL-MCNC: 67.5 NG/ML (ref 30–100)
ALBUMIN SERPL-MCNC: 4.6 G/DL (ref 3.5–5.2)
ALBUMIN/GLOB SERPL: 2.1 G/DL
ALP SERPL-CCNC: 77 U/L (ref 39–117)
ALT SERPL W P-5'-P-CCNC: 32 U/L (ref 1–41)
ANION GAP SERPL CALCULATED.3IONS-SCNC: 11.1 MMOL/L (ref 5–15)
AST SERPL-CCNC: 19 U/L (ref 1–40)
BASOPHILS # BLD AUTO: 0.04 10*3/MM3 (ref 0–0.2)
BASOPHILS NFR BLD AUTO: 0.8 % (ref 0–1.5)
BILIRUB SERPL-MCNC: 0.3 MG/DL (ref 0–1.2)
BUN SERPL-MCNC: 14 MG/DL (ref 8–23)
BUN/CREAT SERPL: 15.1 (ref 7–25)
CALCIUM SPEC-SCNC: 9.2 MG/DL (ref 8.6–10.5)
CHLORIDE SERPL-SCNC: 104 MMOL/L (ref 98–107)
CHOLEST SERPL-MCNC: 93 MG/DL (ref 0–200)
CO2 SERPL-SCNC: 25.9 MMOL/L (ref 22–29)
CREAT SERPL-MCNC: 0.93 MG/DL (ref 0.76–1.27)
DEPRECATED RDW RBC AUTO: 40.5 FL (ref 37–54)
EGFRCR SERPLBLD CKD-EPI 2021: 84.6 ML/MIN/1.73
EOSINOPHIL # BLD AUTO: 0.08 10*3/MM3 (ref 0–0.4)
EOSINOPHIL NFR BLD AUTO: 1.6 % (ref 0.3–6.2)
ERYTHROCYTE [DISTWIDTH] IN BLOOD BY AUTOMATED COUNT: 12.4 % (ref 12.3–15.4)
GLOBULIN UR ELPH-MCNC: 2.2 GM/DL
GLUCOSE SERPL-MCNC: 114 MG/DL (ref 65–99)
HBA1C MFR BLD: 6.6 % (ref 4.8–5.6)
HCT VFR BLD AUTO: 40.6 % (ref 37.5–51)
HDLC SERPL-MCNC: 35 MG/DL (ref 40–60)
HGB BLD-MCNC: 13.9 G/DL (ref 13–17.7)
IMM GRANULOCYTES # BLD AUTO: 0.02 10*3/MM3 (ref 0–0.05)
IMM GRANULOCYTES NFR BLD AUTO: 0.4 % (ref 0–0.5)
LDLC SERPL CALC-MCNC: 35 MG/DL (ref 0–100)
LDLC/HDLC SERPL: 0.9 {RATIO}
LYMPHOCYTES # BLD AUTO: 1.63 10*3/MM3 (ref 0.7–3.1)
LYMPHOCYTES NFR BLD AUTO: 32.1 % (ref 19.6–45.3)
MAGNESIUM SERPL-MCNC: 1.9 MG/DL (ref 1.6–2.4)
MCH RBC QN AUTO: 31 PG (ref 26.6–33)
MCHC RBC AUTO-ENTMCNC: 34.2 G/DL (ref 31.5–35.7)
MCV RBC AUTO: 90.6 FL (ref 79–97)
MONOCYTES # BLD AUTO: 0.44 10*3/MM3 (ref 0.1–0.9)
MONOCYTES NFR BLD AUTO: 8.7 % (ref 5–12)
NEUTROPHILS NFR BLD AUTO: 2.86 10*3/MM3 (ref 1.7–7)
NEUTROPHILS NFR BLD AUTO: 56.4 % (ref 42.7–76)
NRBC BLD AUTO-RTO: 0 /100 WBC (ref 0–0.2)
PLATELET # BLD AUTO: 164 10*3/MM3 (ref 140–450)
PMV BLD AUTO: 9.4 FL (ref 6–12)
POTASSIUM SERPL-SCNC: 4.2 MMOL/L (ref 3.5–5.2)
PROT SERPL-MCNC: 6.8 G/DL (ref 6–8.5)
RBC # BLD AUTO: 4.48 10*6/MM3 (ref 4.14–5.8)
SODIUM SERPL-SCNC: 141 MMOL/L (ref 136–145)
TRIGL SERPL-MCNC: 133 MG/DL (ref 0–150)
TSH SERPL DL<=0.05 MIU/L-ACNC: 0.7 UIU/ML (ref 0.27–4.2)
VIT B12 BLD-MCNC: 946 PG/ML (ref 211–946)
VLDLC SERPL-MCNC: 23 MG/DL (ref 5–40)
WBC NRBC COR # BLD AUTO: 5.07 10*3/MM3 (ref 3.4–10.8)

## 2024-06-20 PROCEDURE — 85025 COMPLETE CBC W/AUTO DIFF WBC: CPT | Performed by: PREVENTIVE MEDICINE

## 2024-06-20 PROCEDURE — 83036 HEMOGLOBIN GLYCOSYLATED A1C: CPT | Performed by: PREVENTIVE MEDICINE

## 2024-06-20 PROCEDURE — 1159F MED LIST DOCD IN RCRD: CPT | Performed by: PREVENTIVE MEDICINE

## 2024-06-20 PROCEDURE — 1170F FXNL STATUS ASSESSED: CPT | Performed by: PREVENTIVE MEDICINE

## 2024-06-20 PROCEDURE — 80053 COMPREHEN METABOLIC PANEL: CPT | Performed by: PREVENTIVE MEDICINE

## 2024-06-20 PROCEDURE — 1125F AMNT PAIN NOTED PAIN PRSNT: CPT | Performed by: PREVENTIVE MEDICINE

## 2024-06-20 PROCEDURE — G2211 COMPLEX E/M VISIT ADD ON: HCPCS | Performed by: PREVENTIVE MEDICINE

## 2024-06-20 PROCEDURE — 82607 VITAMIN B-12: CPT | Performed by: PREVENTIVE MEDICINE

## 2024-06-20 PROCEDURE — 99214 OFFICE O/P EST MOD 30 MIN: CPT | Performed by: PREVENTIVE MEDICINE

## 2024-06-20 PROCEDURE — 36415 COLL VENOUS BLD VENIPUNCTURE: CPT

## 2024-06-20 PROCEDURE — 3074F SYST BP LT 130 MM HG: CPT | Performed by: PREVENTIVE MEDICINE

## 2024-06-20 PROCEDURE — 83735 ASSAY OF MAGNESIUM: CPT | Performed by: PREVENTIVE MEDICINE

## 2024-06-20 PROCEDURE — 82306 VITAMIN D 25 HYDROXY: CPT | Performed by: PREVENTIVE MEDICINE

## 2024-06-20 PROCEDURE — 80061 LIPID PANEL: CPT | Performed by: PREVENTIVE MEDICINE

## 2024-06-20 PROCEDURE — 3078F DIAST BP <80 MM HG: CPT | Performed by: PREVENTIVE MEDICINE

## 2024-06-20 PROCEDURE — 1160F RVW MEDS BY RX/DR IN RCRD: CPT | Performed by: PREVENTIVE MEDICINE

## 2024-06-20 PROCEDURE — 84443 ASSAY THYROID STIM HORMONE: CPT | Performed by: PREVENTIVE MEDICINE

## 2024-06-20 RX ORDER — TRAZODONE HYDROCHLORIDE 50 MG/1
50 TABLET ORAL NIGHTLY
Qty: 60 TABLET | Refills: 1 | Status: SHIPPED | OUTPATIENT
Start: 2024-06-20

## 2024-06-20 RX ORDER — MIRABEGRON 50 MG/1
50 TABLET, FILM COATED, EXTENDED RELEASE ORAL DAILY
Qty: 90 TABLET | Refills: 1 | Status: SHIPPED | OUTPATIENT
Start: 2024-06-20

## 2024-06-20 RX ORDER — AMLODIPINE BESYLATE 10 MG/1
10 TABLET ORAL DAILY
Qty: 90 TABLET | Refills: 3 | Status: SHIPPED | OUTPATIENT
Start: 2024-06-20

## 2024-06-20 NOTE — PATIENT INSTRUCTIONS
Health Maintenance Due   Topic Date Due    BMI FOLLOWUP  10/04/2023    COVID-19 Vaccine (6 - 2023-24 season) 05/03/2024

## 2024-06-20 NOTE — ASSESSMENT & PLAN NOTE
Patient's (There is no height or weight on file to calculate BMI.) indicates that they are obese (BMI >30) with health conditions that include dyslipidemias and GERD . Weight is unchanged. BMI  is above average; BMI management plan is completed. We discussed portion control and increasing exercise.

## 2024-06-21 ENCOUNTER — TELEPHONE (OUTPATIENT)
Dept: FAMILY MEDICINE CLINIC | Facility: CLINIC | Age: 79
End: 2024-06-21
Payer: MEDICARE

## 2024-06-21 NOTE — PROGRESS NOTES
Glucose was 114 but A1c is escalated to 6.6 which does put you in the diabetic range.  Would you consider starting a medication to help lower your blood sugar checking your sugars every morning and attending diabetic classes?  Please let me know as soon as you get this message.  Call if any other questions or concerns I have personally seen and examined this patient.  I have fully participated in the care of this patient. I have reviewed all pertinent clinical information, including history, physical exam, plan and the Resident’s note and agree except as noted.

## 2024-06-21 NOTE — TELEPHONE ENCOUNTER
"Relay     \"Glucose was 114 but A1c is escalated to 6.6 which does put you in the diabetic range.  Would you consider starting a medication to help lower your blood sugar checking your sugars every morning and attending diabetic classes?  Please let me know as soon as you get this message.  Call if any other questions or concerns\"                "

## 2024-06-21 NOTE — PROGRESS NOTES
Subjective   Farheen Gomez is a 78 y.o. male presents for   Chief Complaint   Patient presents with    Medicare Wellness-subsequent     Patient is fasting.    Hypertension    Shoulder Pain     Having surgery Sept. 4       Health Maintenance Due   Topic Date Due    COVID-19 Vaccine (6 - 2023-24 season) 05/03/2024       Hypertension  Associated symptoms include shortness of breath. Pertinent negatives include no palpitations.      History of Present Illness  The patient is a 78-year-old male who is here today for hypertension, hyperglycemia, fatigue, dyslipidemia, benign prostatic hyperplasia with lower urinary tract symptoms, class I obesity due to excess calories with serious comorbidities and a body mass index of 32, vitamin D deficiency, tachycardia, sleep disorder, other headache syndrome, and hypomagnesemia.    The patient is scheduled for a complete left shoulder replacement on 09/04/2023, a procedure he has previously undergone for his right shoulder. He reports no alterations in his auditory or visual capabilities since our last consultation. His dental and ophthalmological check-ups are up-to-date. He denies experiencing dysphagia, hemoptysis, or sputum. He also denies any wheezing, chest pressure, or palpitations. His bowel movements are regular, managed with prune juice and low-calorie diet. He consumes a cup of coffee with a quarter teaspoon of turmeric [employer] for inflammation, which he reports as beneficial. His daily blood pressure, taken in the morning, typically ranges between 70 and 75. He uses a CPAP machine for sleep disorder, which has been adjusted within the last year. His headaches have resolved. He monitors his carbohydrate intake. His fatigue has not improved. He typically retires to bed at 10:00 PM, although he slept until 3:00 AM the previous night. His sleep specialist is aware of his sleep issues. He rarely wakes up for at least an hour during the night. His sleep specialist  "continues to adjust his CPAP machine. He has tried over-the-counter timed-release melatonin, which did not provide relief. He was previously on trazodone, but he does not recall if it alleviated his fatigue or kept him awake. He has exhausted his supply of amlodipine and requests a refill of Myrbetriq. He has previously consulted a urologist who prescribed Myrbetriq, but he felt that he did not require it. He requests a 90-day supply of Myrbetriq. He consumes a large amount of water, especially in the heat, and urinates frequently. He occasionally dribbles and wears a pad, which he reports as beneficial.    Vitals:    06/20/24 1133 06/20/24 1134   BP: 129/80 123/79   BP Location: Left arm Right arm   Patient Position: Sitting Sitting   Cuff Size: Adult Adult   Pulse: 75 74   Temp: 97 °F (36.1 °C)    TempSrc: Temporal    SpO2: 95%    Weight: 103 kg (226 lb 9.6 oz)    Height: 177.8 cm (70\")      Body mass index is 32.51 kg/m².    Current Outpatient Medications on File Prior to Visit   Medication Sig Dispense Refill    Acetaminophen (TYLENOL ARTHRITIS PAIN PO) Take 2 tablets by mouth 2 (Two) Times a Day.      escitalopram (LEXAPRO) 10 MG tablet Take 1 tablet by mouth once daily 90 tablet 0    lisinopril (PRINIVIL,ZESTRIL) 40 MG tablet Take 1 tablet by mouth once daily 90 tablet 0    multivitamin with minerals tablet tablet Take 1 tablet by mouth Daily.      rosuvastatin (CRESTOR) 20 MG tablet Take 1 tablet by mouth once daily 90 tablet 0    vitamin D (ERGOCALCIFEROL) 1.25 MG (47932 UT) capsule capsule Take 1 capsule by mouth once a week 12 capsule 3     No current facility-administered medications on file prior to visit.       The following portions of the patient's history were reviewed and updated as appropriate: allergies, current medications, past family history, past medical history, past social history, past surgical history, and problem list.    Review of Systems   Constitutional:  Positive for fatigue.   HENT:  " Positive for hearing loss.    Respiratory:  Positive for shortness of breath.    Cardiovascular:  Negative for palpitations.   Genitourinary:  Negative for difficulty urinating.   Psychiatric/Behavioral:  Positive for sleep disturbance.        Objective   Physical Exam  Vitals reviewed.   Constitutional:       General: He is not in acute distress.     Appearance: He is well-developed. He is obese. He is not ill-appearing or toxic-appearing.   HENT:      Head: Normocephalic and atraumatic.      Right Ear: Tympanic membrane, ear canal and external ear normal.      Left Ear: Tympanic membrane, ear canal and external ear normal.      Nose: Nose normal.      Mouth/Throat:      Mouth: Mucous membranes are moist.      Pharynx: No posterior oropharyngeal erythema.   Eyes:      Extraocular Movements: Extraocular movements intact.      Conjunctiva/sclera: Conjunctivae normal.      Pupils: Pupils are equal, round, and reactive to light.   Cardiovascular:      Rate and Rhythm: Normal rate and regular rhythm.      Heart sounds: Normal heart sounds.   Pulmonary:      Effort: Pulmonary effort is normal.      Breath sounds: Normal breath sounds.   Abdominal:      General: Bowel sounds are normal. There is no distension.      Palpations: Abdomen is soft. There is no mass.      Tenderness: There is no abdominal tenderness.   Musculoskeletal:         General: Normal range of motion.      Cervical back: Neck supple.   Skin:     General: Skin is warm.   Neurological:      General: No focal deficit present.      Mental Status: He is alert and oriented to person, place, and time.   Psychiatric:         Mood and Affect: Mood normal.         Behavior: Behavior normal.       Physical Exam  Throat appears normal.  Carotid arteries are free of bruit.  Lungs sound normal.  Heart has a normal rate and rhythm.    Vital Signs  Blood pressure measures 129/81 and 123/79. Body mass index is 32.51. Heart rate is 75.    PHQ-9 Total Score:  0  Results           Assessment & Plan   Diagnoses and all orders for this visit:    1. Hypertension, unspecified type (Primary)  -     CBC Auto Differential; Future  -     Comprehensive Metabolic Panel; Future    2. Vitamin D deficiency  -     Vitamin D,25-Hydroxy; Future    3. Tachycardia    4. Sleep disorder    5. Other headache syndrome    6. Hypomagnesemia  -     Magnesium; Future    7. Hyperglycemia  -     Hemoglobin A1c; Future    8. Fatigue, unspecified type  -     Vitamin B12; Future  -     TSH Rfx On Abnormal To Free T4; Future    9. Dyslipidemia  -     Lipid Panel; Future    10. Benign prostatic hyperplasia with lower urinary tract symptoms, symptom details unspecified    11. Class 1 obesity due to excess calories with serious comorbidity and body mass index (BMI) of 32.0 to 32.9 in adult  Assessment & Plan:  Patient's (There is no height or weight on file to calculate BMI.) indicates that they are obese (BMI >30) with health conditions that include dyslipidemias and GERD . Weight is unchanged. BMI  is above average; BMI management plan is completed. We discussed portion control and increasing exercise.       Other orders  -     amLODIPine (NORVASC) 10 MG tablet; Take 1 tablet by mouth Daily.  Dispense: 90 tablet; Refill: 3  -     Myrbetriq 50 MG tablet sustained-release 24 hour 24 hr tablet; Take 50 mg by mouth Daily.  Dispense: 90 tablet; Refill: 1  -     traZODone (DESYREL) 50 MG tablet; Take 1 tablet by mouth Every Night.  Dispense: 60 tablet; Refill: 1      Assessment & Plan  1. Routine follow-up.  The patient's blood pressure, pulse rate, and oxygen saturation are within normal limits. A slight weight loss has been noted. Laboratory tests will be conducted today.    Patient Instructions     Health Maintenance Due   Topic Date Due    BMI FOLLOWUP  10/04/2023    COVID-19 Vaccine (6 - 2023-24 season) 05/03/2024           Patient or patient representative verbalized consent for the use of Ambient  Listening during the visit with  Penny Hidalgo MD for chart documentation. 6/21/2024  17:25 EDT

## 2024-06-21 NOTE — LETTER
"  June 24, 2024      Farheen JEAN Jason    [address] [facility] [place] [address]        Dear Mr. Gomez    Below are the results from your recent visit:      \"Glucose was 114 but A1c is escalated to 6.6 which does put you in the diabetic range.  Would you consider starting a medication to help lower your blood sugar checking your sugars every morning and attending diabetic classes?  Please let me know as soon as you get this message.  Call if any other questions or concerns\"             Sincerely,      Penny Hidalgo MD  "

## 2024-06-27 NOTE — TELEPHONE ENCOUNTER
Name: Farheen Gomez      Relationship: Self      Best Callback Number: [phone] (Home)       HUB PROVIDED THE RELAY MESSAGE FROM THE OFFICE      PATIENT: VOICED UNDERSTANDING AND HAS NO FURTHER QUESTIONS AT THIS TIME    ADDITIONAL INFORMATION:  HE SAID THAT HE WILL THINK ABOUT IT AND LET YOU KNOW

## 2024-07-08 ENCOUNTER — LAB (OUTPATIENT)
Dept: FAMILY MEDICINE CLINIC | Facility: CLINIC | Age: 79
End: 2024-07-08
Payer: MEDICARE

## 2024-07-08 ENCOUNTER — OFFICE VISIT (OUTPATIENT)
Dept: FAMILY MEDICINE CLINIC | Facility: CLINIC | Age: 79
End: 2024-07-08
Payer: MEDICARE

## 2024-07-08 VITALS
BODY MASS INDEX: 32.38 KG/M2 | SYSTOLIC BLOOD PRESSURE: 116 MMHG | DIASTOLIC BLOOD PRESSURE: 73 MMHG | OXYGEN SATURATION: 97 % | HEART RATE: 75 BPM | HEIGHT: 70 IN | TEMPERATURE: 97.8 F | WEIGHT: 226.2 LBS

## 2024-07-08 DIAGNOSIS — E66.09 CLASS 1 OBESITY DUE TO EXCESS CALORIES WITH SERIOUS COMORBIDITY AND BODY MASS INDEX (BMI) OF 32.0 TO 32.9 IN ADULT: ICD-10-CM

## 2024-07-08 DIAGNOSIS — E11.65 TYPE 2 DIABETES MELLITUS WITH HYPERGLYCEMIA, WITHOUT LONG-TERM CURRENT USE OF INSULIN: ICD-10-CM

## 2024-07-08 DIAGNOSIS — R73.9 HYPERGLYCEMIA: ICD-10-CM

## 2024-07-08 DIAGNOSIS — R73.9 HYPERGLYCEMIA: Primary | ICD-10-CM

## 2024-07-08 DIAGNOSIS — E78.5 DYSLIPIDEMIA: Chronic | ICD-10-CM

## 2024-07-08 DIAGNOSIS — I10 HYPERTENSION, UNSPECIFIED TYPE: Chronic | ICD-10-CM

## 2024-07-08 LAB — HBA1C MFR BLD: 6.1 % (ref 4.8–5.6)

## 2024-07-08 PROCEDURE — 3074F SYST BP LT 130 MM HG: CPT | Performed by: PREVENTIVE MEDICINE

## 2024-07-08 PROCEDURE — 1170F FXNL STATUS ASSESSED: CPT | Performed by: PREVENTIVE MEDICINE

## 2024-07-08 PROCEDURE — 1126F AMNT PAIN NOTED NONE PRSNT: CPT | Performed by: PREVENTIVE MEDICINE

## 2024-07-08 PROCEDURE — 1159F MED LIST DOCD IN RCRD: CPT | Performed by: PREVENTIVE MEDICINE

## 2024-07-08 PROCEDURE — 36415 COLL VENOUS BLD VENIPUNCTURE: CPT

## 2024-07-08 PROCEDURE — 83036 HEMOGLOBIN GLYCOSYLATED A1C: CPT | Performed by: PREVENTIVE MEDICINE

## 2024-07-08 PROCEDURE — 99214 OFFICE O/P EST MOD 30 MIN: CPT | Performed by: PREVENTIVE MEDICINE

## 2024-07-08 PROCEDURE — 3078F DIAST BP <80 MM HG: CPT | Performed by: PREVENTIVE MEDICINE

## 2024-07-08 PROCEDURE — G2211 COMPLEX E/M VISIT ADD ON: HCPCS | Performed by: PREVENTIVE MEDICINE

## 2024-07-08 PROCEDURE — 1160F RVW MEDS BY RX/DR IN RCRD: CPT | Performed by: PREVENTIVE MEDICINE

## 2024-07-08 RX ORDER — PROCHLORPERAZINE 25 MG/1
SUPPOSITORY RECTAL
Qty: 6 EACH | Refills: 1 | Status: SHIPPED | OUTPATIENT
Start: 2024-07-08

## 2024-07-08 RX ORDER — DIPHENHYDRAMINE HYDROCHLORIDE 25 MG/1
1 CAPSULE, LIQUID FILLED ORAL DAILY
Qty: 1 EACH | Refills: 0 | Status: SHIPPED | OUTPATIENT
Start: 2024-07-08

## 2024-07-08 RX ORDER — PROCHLORPERAZINE 25 MG/1
4 SUPPOSITORY RECTAL WEEKLY
Qty: 7 EACH | Refills: 1 | Status: SHIPPED | OUTPATIENT
Start: 2024-07-08

## 2024-07-08 RX ORDER — BLOOD PRESSURE TEST KIT
90 KIT MISCELLANEOUS DAILY
Qty: 90 EACH | Refills: 3 | Status: SHIPPED | OUTPATIENT
Start: 2024-07-08

## 2024-07-08 RX ORDER — PROCHLORPERAZINE 25 MG/1
1 SUPPOSITORY RECTAL DAILY
Qty: 4 EACH | Refills: 1 | Status: SHIPPED | OUTPATIENT
Start: 2024-07-08

## 2024-07-08 NOTE — PROGRESS NOTES
"Subjective   Farheen Gomez is a 78 y.o. male presents for   Chief Complaint   Patient presents with    Medicare Wellness-subsequent     Patient is fasting.   Having Shoulder surgery on September 4th with Dr. Tiarra Pa     Discuss what is needed to change due to last lab work.       Health Maintenance Due   Topic Date Due    URINE MICROALBUMIN  Never done    COVID-19 Vaccine (6 - 2023-24 season) 05/03/2024       Diabetes  Pertinent negatives for diabetes include no polydipsia, no polyphagia and no polyuria.      History of Present Illness  The patient is a 78-year-old male who is here today for hyperglycemia, hypertension, dyslipidemia, class I obesity due to excess calories with serious comorbidities and now type 2 diabetes.    The patient expresses a desire for a blood test today. He has abstained from starches or carbohydrates for approximately 1.5 weeks, maintains a high water intake, and incorporates salads into his diet. His occupation involves prolonged periods of standing, and he has experienced a weight loss of 10 pounds.   He is allergic to PENICILLIN and PROPRANOLOL.    Vitals:    07/08/24 1025 07/08/24 1028   BP: 128/76 116/73   BP Location: Left arm Right arm   Patient Position: Sitting Sitting   Cuff Size: Adult Adult   Pulse: 75 75   Temp: 97.8 °F (36.6 °C)    TempSrc: Temporal    SpO2: 97%    Weight: 103 kg (226 lb 3.2 oz)    Height: 177.8 cm (70\")      Body mass index is 32.46 kg/m².    Current Outpatient Medications on File Prior to Visit   Medication Sig Dispense Refill    Acetaminophen (TYLENOL ARTHRITIS PAIN PO) Take 2 tablets by mouth 2 (Two) Times a Day.      amLODIPine (NORVASC) 10 MG tablet Take 1 tablet by mouth Daily. 90 tablet 3    escitalopram (LEXAPRO) 10 MG tablet Take 1 tablet by mouth once daily 90 tablet 0    lisinopril (PRINIVIL,ZESTRIL) 40 MG tablet Take 1 tablet by mouth once daily 90 tablet 0    multivitamin with minerals tablet tablet Take 1 tablet by mouth Daily.   "    Myrbetriq 50 MG tablet sustained-release 24 hour 24 hr tablet Take 50 mg by mouth Daily. 90 tablet 1    rosuvastatin (CRESTOR) 20 MG tablet Take 1 tablet by mouth once daily 90 tablet 0    traZODone (DESYREL) 50 MG tablet Take 1 tablet by mouth Every Night. 60 tablet 1    vitamin D (ERGOCALCIFEROL) 1.25 MG (87422 UT) capsule capsule Take 1 capsule by mouth once a week 12 capsule 3     No current facility-administered medications on file prior to visit.       The following portions of the patient's history were reviewed and updated as appropriate: allergies, current medications, past family history, past medical history, past social history, past surgical history, and problem list.    Review of Systems   Endocrine: Negative for polydipsia, polyphagia and polyuria.       Objective   Physical Exam  Vitals reviewed.   Constitutional:       General: He is not in acute distress.     Appearance: Normal appearance. He is well-developed. He is not ill-appearing or toxic-appearing.   HENT:      Head: Normocephalic and atraumatic.      Nose: Nose normal.   Eyes:      Extraocular Movements: Extraocular movements intact.      Conjunctiva/sclera: Conjunctivae normal.      Pupils: Pupils are equal, round, and reactive to light.   Cardiovascular:      Rate and Rhythm: Normal rate.      Pulses:           Dorsalis pedis pulses are 1+ on the right side and 1+ on the left side.        Posterior tibial pulses are 1+ on the right side and 1+ on the left side.   Pulmonary:      Effort: Pulmonary effort is normal.   Abdominal:      General: There is no distension.      Palpations: There is no mass.      Tenderness: There is no abdominal tenderness.   Feet:      Right foot:      Protective Sensation: 10 sites tested.  8 sites sensed.      Skin integrity: Skin integrity normal. Callus present.      Toenail Condition: Right toenails are normal.      Left foot:      Protective Sensation: 10 sites tested.  9 sites sensed.      Skin  integrity: Skin integrity normal. Callus present.      Toenail Condition: Left toenails are normal.      Comments: Diabetic Foot Exam Performed and Monofilament Test Performed    Neurological:      Mental Status: He is alert and oriented to person, place, and time.   Psychiatric:         Mood and Affect: Mood normal.         Behavior: Behavior normal.       Physical Exam      PHQ-9 Total Score: 0  Results  Laboratory Studies  A1c was 6.6. Blood sugar was 114. Total cholesterol was 93. LDL was 35.         Assessment & Plan   Diagnoses and all orders for this visit:    1. Hyperglycemia (Primary)  -     Hemoglobin A1c; Future    2. Hypertension, unspecified type    3. Dyslipidemia    4. Class 1 obesity due to excess calories with serious comorbidity and body mass index (BMI) of 32.0 to 32.9 in adult    5. Type 2 diabetes mellitus with hyperglycemia, without long-term current use of insulin  -     Hemoglobin A1c; Future    Other orders  -     Blood Glucose Monitoring Suppl (Blood Glucose Monitor System) w/Device kit; Use 1 kit Daily.  Dispense: 1 each; Refill: 0  -     Continuous Glucose  (Dexcom G6 ) device; Use 1 each Daily.  Dispense: 4 each; Refill: 1  -     Continuous Glucose Transmitter (Dexcom G6 Transmitter) misc; Use 4 each 1 (One) Time Per Week.  Dispense: 7 each; Refill: 1  -     Continuous Glucose Sensor (Dexcom G6 Sensor); Use Every 10 (Ten) Days.  Dispense: 6 each; Refill: 1  -     Alcohol Swabs pads; Use 90 each Daily.  Dispense: 90 each; Refill: 3      Assessment & Plan  1. Type 2 diabetes.  The patient's A1c level is currently at 6.6, indicating a diagnosis of diabetes. A blood glucose monitor system with strips will be sent to the patient. Additionally, a hemoglobin A1c test will be conducted today.    Follow-up  A follow-up appointment is scheduled for 3 months from now.    Patient Instructions     Health Maintenance Due   Topic Date Due    COVID-19 Vaccine (6 - 2023-24 season)  05/03/2024           Patient or patient representative verbalized consent for the use of Ambient Listening during the visit with  Penny Hidalgo MD for chart documentation. 7/8/2024  17:18 EDT

## 2024-07-08 NOTE — TELEPHONE ENCOUNTER
PATIENT STOPPED BY AND NEEDS PRESCRIPTION SENT IN FOR TEST STRIPS AND LANCETS FOR HIS MONITOR SYSTEM.

## 2024-07-09 ENCOUNTER — PRIOR AUTHORIZATION (OUTPATIENT)
Dept: FAMILY MEDICINE CLINIC | Facility: CLINIC | Age: 79
End: 2024-07-09
Payer: MEDICARE

## 2024-07-09 ENCOUNTER — TELEPHONE (OUTPATIENT)
Dept: FAMILY MEDICINE CLINIC | Facility: CLINIC | Age: 79
End: 2024-07-09
Payer: MEDICARE

## 2024-07-09 NOTE — PROGRESS NOTES
You have got your A1c down to 6.1 with diet and exercise this is where it was 2 years ago so keep up the good work

## 2024-07-09 NOTE — TELEPHONE ENCOUNTER
----- Message from Penny Hidalgo sent at 7/9/2024  8:35 AM EDT -----  You have got your A1c down to 6.1 with diet and exercise this is where it was 2 years ago so keep up the good work

## 2024-07-09 NOTE — TELEPHONE ENCOUNTER
"Relay     \"You have got your A1c down to 6.1 with diet and exercise this is where it was 2 years ago so keep up the good work\"                "

## 2024-07-09 NOTE — TELEPHONE ENCOUNTER
Farheen Gomez notified and voiced comprehension and understanding.    Needs test strips and lancets ordered from the pharmacy. None was sent in with the machine.

## 2024-07-14 RX ORDER — ESCITALOPRAM OXALATE 10 MG/1
TABLET ORAL
Qty: 90 TABLET | Refills: 0 | Status: SHIPPED | OUTPATIENT
Start: 2024-07-14

## 2024-07-16 ENCOUNTER — OFFICE VISIT (OUTPATIENT)
Dept: FAMILY MEDICINE CLINIC | Facility: CLINIC | Age: 79
End: 2024-07-16
Payer: MEDICARE

## 2024-07-16 VITALS
BODY MASS INDEX: 32.33 KG/M2 | OXYGEN SATURATION: 96 % | DIASTOLIC BLOOD PRESSURE: 74 MMHG | SYSTOLIC BLOOD PRESSURE: 114 MMHG | TEMPERATURE: 97.8 F | HEIGHT: 70 IN | HEART RATE: 78 BPM | WEIGHT: 225.8 LBS

## 2024-07-16 DIAGNOSIS — E11.65 TYPE 2 DIABETES MELLITUS WITH HYPERGLYCEMIA, WITHOUT LONG-TERM CURRENT USE OF INSULIN: ICD-10-CM

## 2024-07-16 DIAGNOSIS — Z00.01 ENCOUNTER FOR ANNUAL GENERAL MEDICAL EXAMINATION WITH ABNORMAL FINDINGS IN ADULT: Primary | ICD-10-CM

## 2024-07-16 DIAGNOSIS — I10 HYPERTENSION, UNSPECIFIED TYPE: Chronic | ICD-10-CM

## 2024-07-16 DIAGNOSIS — L60.0 INGROWN TOENAIL: ICD-10-CM

## 2024-07-16 DIAGNOSIS — E66.09 CLASS 1 OBESITY DUE TO EXCESS CALORIES WITH SERIOUS COMORBIDITY AND BODY MASS INDEX (BMI) OF 32.0 TO 32.9 IN ADULT: ICD-10-CM

## 2024-07-16 PROCEDURE — 3074F SYST BP LT 130 MM HG: CPT | Performed by: PREVENTIVE MEDICINE

## 2024-07-16 PROCEDURE — 1125F AMNT PAIN NOTED PAIN PRSNT: CPT | Performed by: PREVENTIVE MEDICINE

## 2024-07-16 PROCEDURE — 3078F DIAST BP <80 MM HG: CPT | Performed by: PREVENTIVE MEDICINE

## 2024-07-16 PROCEDURE — G0439 PPPS, SUBSEQ VISIT: HCPCS | Performed by: PREVENTIVE MEDICINE

## 2024-07-16 PROCEDURE — 1170F FXNL STATUS ASSESSED: CPT | Performed by: PREVENTIVE MEDICINE

## 2024-07-16 PROCEDURE — 99397 PER PM REEVAL EST PAT 65+ YR: CPT | Performed by: PREVENTIVE MEDICINE

## 2024-07-16 PROCEDURE — 1160F RVW MEDS BY RX/DR IN RCRD: CPT | Performed by: PREVENTIVE MEDICINE

## 2024-07-16 PROCEDURE — 1159F MED LIST DOCD IN RCRD: CPT | Performed by: PREVENTIVE MEDICINE

## 2024-07-16 RX ORDER — DOXYCYCLINE 100 MG/1
100 CAPSULE ORAL 2 TIMES DAILY
Qty: 20 CAPSULE | Refills: 0 | Status: SHIPPED | OUTPATIENT
Start: 2024-07-16

## 2024-07-16 NOTE — PROGRESS NOTES
Subjective   The ABCs of the Annual Wellness Visit  Medicare Wellness Visit      Farheen Gomez is a 78 y.o. patient who presents for a Medicare Wellness Visit.    The following portions of the patient's history were reviewed and   updated as appropriate: allergies, current medications, past family history, past medical history, past social history, past surgical history, and problem list.    Compared to one year ago, the patient's physical   health is better.  Compared to one year ago, the patient's mental   health is the same.    Recent Hospitalizations:  He was not admitted to the hospital during the last year.     Current Medical Providers:  Patient Care Team:  Penny Hidalgo MD as PCP - General (Sleep Medicine)  Lex Villa MD as Consulting Physician (Urology)  Adria Gallegos MD as Consulting Physician (Pulmonary Disease)  JASVIR MARTINES Kristopher Todd, MD as Consulting Physician (Orthopedic Surgery)    Outpatient Medications Prior to Visit   Medication Sig Dispense Refill    Acetaminophen (TYLENOL ARTHRITIS PAIN PO) Take 2 tablets by mouth 2 (Two) Times a Day.      Alcohol Swabs pads Use 90 each Daily. 90 each 3    amLODIPine (NORVASC) 10 MG tablet Take 1 tablet by mouth Daily. 90 tablet 3    Blood Glucose Monitoring Suppl (Blood Glucose Monitor System) w/Device kit Use 1 kit Daily. 1 each 0    Continuous Glucose  (Dexcom G6 ) device Use 1 each Daily. 4 each 1    Continuous Glucose Sensor (Dexcom G6 Sensor) Use Every 10 (Ten) Days. 6 each 1    Continuous Glucose Transmitter (Dexcom G6 Transmitter) misc Use 4 each 1 (One) Time Per Week. 7 each 1    escitalopram (LEXAPRO) 10 MG tablet Take 1 tablet by mouth once daily 90 tablet 0    glucose blood test strip 1 each by Other route Daily. Use as instructed 100 each 12    Lancets 30G misc Use 1 each 4 (Four) Times a Day Before Meals & at Bedtime. 100 each 12    lisinopril (PRINIVIL,ZESTRIL) 40 MG tablet Take 1  tablet by mouth once daily 90 tablet 0    multivitamin with minerals tablet tablet Take 1 tablet by mouth Daily.      Myrbetriq 50 MG tablet sustained-release 24 hour 24 hr tablet Take 50 mg by mouth Daily. 90 tablet 1    rosuvastatin (CRESTOR) 20 MG tablet Take 1 tablet by mouth once daily 90 tablet 0    traZODone (DESYREL) 50 MG tablet Take 1 tablet by mouth Every Night. 60 tablet 1    vitamin D (ERGOCALCIFEROL) 1.25 MG (67422 UT) capsule capsule Take 1 capsule by mouth once a week 12 capsule 3     No facility-administered medications prior to visit.     No opioid medication identified on active medication list. I have reviewed chart for other potential  high risk medication/s and harmful drug interactions in the elderly.      Aspirin is not on active medication list.  Aspirin use is not indicated based on review of current medical condition/s. Risk of harm outweighs potential benefits.  .    Patient Active Problem List   Diagnosis    Spinal stenosis in cervical region - reveresal of lordosis at C3/4 and C4/5, Modic endpalate cahnges at C7/T1    Hypertension    BPH (benign prostatic hyperplasia)    Anemia    Arthritis    Morbid (severe) obesity due to excess calories    Dyslipidemia    Family history of malignant neoplasm of urinary bladder    Foot pain, right    Ganglion cyst    Hand paresthesia    Hearing problem    History of poliomyelitis    Hyperglycemia    Hypomagnesemia    Other hammer toe(s) (acquired), left foot    Plantar fasciitis, left    Sleep disorder    Tachycardia    Thrombocytopenia    Vitamin D deficiency    Amblyopia    Dry eye    Hypertensive retinopathy of both eyes    Lens replaced by other means    AMD (age-related macular degeneration), bilateral    Nonexudative age-related macular degeneration    Other chronic allergic conjunctivitis    Other specified disorders of eyelid    PCO (posterior capsular opacification), bilateral    Presence of intraocular lens    PVD (posterior vitreous  "detachment), both eyes    Regular astigmatism    History of bilateral knee arthroplasty    Other headache syndrome    Exposure to COVID-19 virus    Status post total knee replacement, left    Class 1 obesity due to excess calories with serious comorbidity and body mass index (BMI) of 32.0 to 32.9 in adult    Rotator cuff tear arthropathy of both shoulders    Thrombocytopenia due to COVID-19 virus    Chronic midline low back pain    Status post reverse arthroplasty of right shoulder    Pseudophakia, both eyes    Nuclear senile cataract    SPK (superficial punctate keratitis), bilateral    Tear film insufficiency    Blepharitis    Vitreous degeneration    Fatigue    Primary osteoarthritis of left shoulder    Type 2 diabetes mellitus with hyperglycemia, without long-term current use of insulin    Ingrown toenail     Advance Care Planning (Click this link to access ACP Navigator)  Advance Directive is on file.  ACP discussion was held with the patient during this visit. Patient has an advance directive in EMR which is still valid.         Objective   Vitals:    07/16/24 1304 07/16/24 1313   BP: 116/76 114/74   BP Location: Left arm Right arm   Patient Position: Sitting Sitting   Cuff Size: Large Adult Large Adult   Pulse: 78    Temp: 97.8 °F (36.6 °C)    TempSrc: Tympanic    SpO2: 96%    Weight: 102 kg (225 lb 12.8 oz)    Height: 177.8 cm (70\")    PainSc:    4   PainLoc:  Back       Estimated body mass index is 32.4 kg/m² as calculated from the following:    Height as of this encounter: 177.8 cm (70\").    Weight as of this encounter: 102 kg (225 lb 12.8 oz).             Does the patient have evidence of cognitive impairment? No  Lab Results   Component Value Date    TRIG 133 06/20/2024    HDL 35 (L) 06/20/2024    LDL 35 06/20/2024    VLDL 23 06/20/2024    HGBA1C 6.10 (H) 07/08/2024                                                                                                Health  Risk Assessment    Smoking " Status:  Social History     Tobacco Use   Smoking Status Former    Current packs/day: 0.00    Average packs/day: 0.3 packs/day for 2.0 years (0.5 ttl pk-yrs)    Types: Cigarettes    Start date: 1993    Quit date: 1995    Years since quittin.5   Smokeless Tobacco Never     Alcohol Consumption:  Social History     Substance and Sexual Activity   Alcohol Use No     Fall Risk Screen:  RYANADI Fall Risk Assessment was completed, and patient is at LOW risk for falls.Assessment completed on:2024    Depression Screenin/16/2024     1:07 PM   PHQ-2/PHQ-9 Depression Screening   Little Interest or Pleasure in Doing Things 0-->not at all   Feeling Down, Depressed or Hopeless 0-->not at all   PHQ-9: Brief Depression Severity Measure Score 0     Health Habits and Functional and Cognitive Screenin/16/2024     1:07 PM   Functional & Cognitive Status   Do you have difficulty preparing food and eating? No   Do you have difficulty bathing yourself, getting dressed or grooming yourself? No   Do you have difficulty using the toilet? No   Do you have difficulty moving around from place to place? No   Do you have trouble with steps or getting out of a bed or a chair? No   Current Diet Low Carb Diet   Dental Exam Up to date   Eye Exam Up to date   Exercise (times per week) 5 times per week   Current Exercises Include Gardening;Yard Work;Walking   Do you need help using the phone?  No   Are you deaf or do you have serious difficulty hearing?  Yes   Do you need help to go to places out of walking distance? No   Do you need help shopping? No   Do you need help preparing meals?  No   Do you need help with housework?  No   Do you need help with laundry? No   Do you need help taking your medications? No   Do you need help managing money? No   Do you ever drive or ride in a car without wearing a seat belt? No   Have you felt unusual stress, anger or loneliness in the last month? No   Who do you live with? Spouse    If you need help, do you have trouble finding someone available to you? No   Have you been bothered in the last four weeks by sexual problems? No   Do you have difficulty concentrating, remembering or making decisions? No             Age-appropriate Screening Schedule:  Refer to the list below for future screening recommendations based on patient's age, sex and/or medical conditions. Orders for these recommended tests are listed in the plan section. The patient has been provided with a written plan.    Health Maintenance List  Health Maintenance   Topic Date Due    URINE MICROALBUMIN  Never done    COVID-19 Vaccine (6 - 2023-24 season) 05/03/2024    INFLUENZA VACCINE  08/01/2024    HEMOGLOBIN A1C  01/08/2025    DIABETIC EYE EXAM  03/27/2025    LIPID PANEL  06/20/2025    BMI FOLLOWUP  06/20/2025    ANNUAL WELLNESS VISIT  07/16/2025    TDAP/TD VACCINES (4 - Td or Tdap) 06/20/2034    HEPATITIS C SCREENING  Completed    RSV Vaccine - Adults  Completed    Pneumococcal Vaccine 65+  Completed    ZOSTER VACCINE  Completed    COLORECTAL CANCER SCREENING  Discontinued                                                                                                                                                CMS Preventative Services Quick Reference  Risk Factors Identified During Encounter  None Identified    The above risks/problems have been discussed with the patient.  Pertinent information has been shared with the patient in the After Visit Summary.  An After Visit Summary and PPPS were made available to the patient.    Follow Up:   Next Medicare Wellness visit to be scheduled in 1 year.         Additional E&M Note during same encounter follows:  Patient has additional, significant, and separately identifiable condition(s)/problem(s) that require work above and beyond the Medicare Wellness Visit     Chief Complaint  Medicare Wellness-subsequent and Ingrown Toenail (Great toe of left foot)    Subjective   "  HPI    Patient was also here for an age-specific physical and he was advised to wear sunscreen and a seatbelt.  Patient was also here to follow-up on an infected left great toenail.  This has been going on for the last couple of days he has had no fever or chills and he does not know what his blood sugar has been doing as he just picked up all of his glucometer and supplies.     The patient is a 78-year-old male who is here today for ingrown toenail, type 2 diabetes with hyperglycemia without long term use of insulin, hypertension, and class I obesity due to excess calories with serious comorbidities and a body mass index of 32.  Review of Systems   Musculoskeletal:  Positive for arthralgias, gait problem and joint swelling.   Skin:  Positive for wound.          Objective   Vital Signs:  /74 (BP Location: Right arm, Patient Position: Sitting, Cuff Size: Large Adult)   Pulse 78   Temp 97.8 °F (36.6 °C) (Tympanic)   Ht 177.8 cm (70\")   Wt 102 kg (225 lb 12.8 oz)   SpO2 96%   BMI 32.40 kg/m²   Physical Exam  Vitals reviewed.   Constitutional:       General: He is not in acute distress.     Appearance: Normal appearance. He is well-developed. He is not ill-appearing or toxic-appearing.   HENT:      Head: Normocephalic and atraumatic.      Nose: Nose normal.   Eyes:      Extraocular Movements: Extraocular movements intact.      Conjunctiva/sclera: Conjunctivae normal.      Pupils: Pupils are equal, round, and reactive to light.   Cardiovascular:      Rate and Rhythm: Normal rate.   Pulmonary:      Effort: Pulmonary effort is normal.   Abdominal:      General: There is no distension.      Palpations: There is no mass.      Tenderness: There is no abdominal tenderness.   Skin:     General: Skin is warm.      Findings: Erythema and rash present.      Comments: Mild erythema and swelling.  No lymphangitis gross pus or malodor to the left great toenail which does seem ingrown.   Neurological:      Mental Status: " He is alert and oriented to person, place, and time.   Psychiatric:         Mood and Affect: Mood normal.         Behavior: Behavior normal.                        Assessment and Plan               Orders Placed This Encounter   Procedures    Ambulatory Referral to Podiatry     Referral Priority:   Routine     Referral Type:   Consultation     Referral Reason:   Specialty Services Required     Referred to Provider:   David Kamara Jr., MICHAELM     Requested Specialty:   Podiatry     Number of Visits Requested:   1     New Medications Ordered This Visit   Medications    doxycycline (MONODOX) 100 MG capsule     Sig: Take 1 capsule by mouth 2 (Two) Times a Day.     Dispense:  20 capsule     Refill:  0          Follow Up   No follow-ups on file.  Patient was given instructions and counseling regarding his condition or for health maintenance advice. Please see specific information pulled into the AVS if appropriate.  Patient or patient representative verbalized consent for the use of Ambient Listening during the visit with  Penny Hidalgo MD for chart documentation. 7/16/2024  16:50 EDT

## 2024-07-24 RX ORDER — ROSUVASTATIN CALCIUM 20 MG/1
20 TABLET, COATED ORAL DAILY
Qty: 90 TABLET | Refills: 0 | Status: SHIPPED | OUTPATIENT
Start: 2024-07-24

## 2024-07-30 PROBLEM — M19.012 OSTEOARTHRITIS OF LEFT GLENOHUMERAL JOINT: Status: ACTIVE | Noted: 2024-05-09

## 2024-08-19 ENCOUNTER — TELEPHONE (OUTPATIENT)
Dept: FAMILY MEDICINE CLINIC | Facility: CLINIC | Age: 79
End: 2024-08-19
Payer: MEDICARE

## 2024-08-19 NOTE — TELEPHONE ENCOUNTER
Info from [facility] Pharmacist Maida on med adjusements with Paxlovid    Looks like patient needs to stop rosuvastatin and trazodone while taking paxlovid because it can increase serum concentrations of both (and potential side effects associated with each). The only other one is the amlodipine but it doesn't need to be stopped the patient just needs to monitor for low BP. It is not as strong of an interaction as with the other two. Dr. Hidalgo could also lower amlodipine dose by 50% while patient is taking paxlovid if she is worried about BP going too low.

## 2024-08-19 NOTE — TELEPHONE ENCOUNTER
Caller: Pippa Gomez    Relationship: Emergency Contact    Best call back number: [phone]    What medication are you requesting: PCP RECOMMENDATION     What are your current symptoms: COVID +   COUGH, FEVER, BODY ACHES     How long have you been experiencing symptoms: 3-4 DAYS     If a prescription is needed, what is your preferred pharmacy and phone number: [facility] [address] [place], [place] - [address] [facility] [phone] [facility] [phone]

## 2024-08-19 NOTE — TELEPHONE ENCOUNTER
Please let patient know to stop the Ursula statin and trazodone and to decrease the dose of amlodipine to half if his blood pressure gets to be below 110/70.  I have sent the Paxlovid to the pharmacy call if he has any other questions or concerns and if his oxygen dips below 90 for half an hour he should call 911 and get to the emergency room.

## 2024-08-23 ENCOUNTER — HOSPITAL ENCOUNTER (OUTPATIENT)
Dept: CARDIOLOGY | Facility: HOSPITAL | Age: 79
Discharge: HOME | End: 2024-08-23
Payer: MEDICARE

## 2024-08-23 ENCOUNTER — LAB (OUTPATIENT)
Dept: LAB | Facility: HOSPITAL | Age: 79
End: 2024-08-23
Payer: MEDICARE

## 2024-08-23 DIAGNOSIS — R94.5 ABNORMAL RESULTS OF LIVER FUNCTION STUDIES: ICD-10-CM

## 2024-08-23 DIAGNOSIS — M19.012 OSTEOARTHRITIS OF LEFT GLENOHUMERAL JOINT: ICD-10-CM

## 2024-08-23 DIAGNOSIS — R79.1 ABNORMAL COAGULATION PROFILE: ICD-10-CM

## 2024-08-23 DIAGNOSIS — R79.9 ABNORMAL FINDING OF BLOOD CHEMISTRY, UNSPECIFIED: ICD-10-CM

## 2024-08-23 DIAGNOSIS — R94.120 ABNORMAL AUDITORY FUNCTION STUDY: ICD-10-CM

## 2024-08-23 LAB
ABO GROUP BLD: NORMAL
ANION GAP SERPL CALCULATED.3IONS-SCNC: 12 MMOL/L (ref 5–15)
APTT PPP: 30.2 SECONDS (ref 24–31)
BASOPHILS # BLD AUTO: 0.02 10*3/MM3 (ref 0–0.2)
BASOPHILS NFR BLD AUTO: 0.3 % (ref 0–1.5)
BLD GP AB SCN SERPL QL: NEGATIVE
BUN SERPL-MCNC: 17 MG/DL (ref 8–23)
BUN/CREAT SERPL: 16.7 (ref 7–25)
CALCIUM SPEC-SCNC: 9.4 MG/DL (ref 8.6–10.5)
CHLORIDE SERPL-SCNC: 101 MMOL/L (ref 98–107)
CO2 SERPL-SCNC: 25 MMOL/L (ref 22–29)
CREAT SERPL-MCNC: 1.02 MG/DL (ref 0.76–1.27)
DEPRECATED RDW RBC AUTO: 40.3 FL (ref 37–54)
EGFRCR SERPLBLD CKD-EPI 2021: 75.7 ML/MIN/1.73
EOSINOPHIL # BLD AUTO: 0.08 10*3/MM3 (ref 0–0.4)
EOSINOPHIL NFR BLD AUTO: 1.3 % (ref 0.3–6.2)
ERYTHROCYTE [DISTWIDTH] IN BLOOD BY AUTOMATED COUNT: 12.2 % (ref 12.3–15.4)
GLUCOSE SERPL-MCNC: 112 MG/DL (ref 65–99)
HBA1C MFR BLD: 6.2 % (ref 4.8–5.6)
HCT VFR BLD AUTO: 39.8 % (ref 37.5–51)
HGB BLD-MCNC: 13.3 G/DL (ref 13–17.7)
IMM GRANULOCYTES # BLD AUTO: 0.01 10*3/MM3 (ref 0–0.05)
IMM GRANULOCYTES NFR BLD AUTO: 0.2 % (ref 0–0.5)
INR PPP: 0.94 (ref 0.93–1.1)
LYMPHOCYTES # BLD AUTO: 1.57 10*3/MM3 (ref 0.7–3.1)
LYMPHOCYTES NFR BLD AUTO: 25.9 % (ref 19.6–45.3)
MCH RBC QN AUTO: 30.6 PG (ref 26.6–33)
MCHC RBC AUTO-ENTMCNC: 33.4 G/DL (ref 31.5–35.7)
MCV RBC AUTO: 91.7 FL (ref 79–97)
MONOCYTES # BLD AUTO: 0.55 10*3/MM3 (ref 0.1–0.9)
MONOCYTES NFR BLD AUTO: 9.1 % (ref 5–12)
MRSA DNA SPEC QL NAA+PROBE: NORMAL
NEUTROPHILS NFR BLD AUTO: 3.83 10*3/MM3 (ref 1.7–7)
NEUTROPHILS NFR BLD AUTO: 63.2 % (ref 42.7–76)
NRBC BLD AUTO-RTO: 0 /100 WBC (ref 0–0.2)
PLATELET # BLD AUTO: 165 10*3/MM3 (ref 140–450)
PMV BLD AUTO: 9 FL (ref 6–12)
POTASSIUM SERPL-SCNC: 4.5 MMOL/L (ref 3.5–5.2)
PROTHROMBIN TIME: 10.3 SECONDS (ref 9.6–11.7)
RBC # BLD AUTO: 4.34 10*6/MM3 (ref 4.14–5.8)
RH BLD: POSITIVE
SODIUM SERPL-SCNC: 138 MMOL/L (ref 136–145)
T&S EXPIRATION DATE: NORMAL
WBC NRBC COR # BLD AUTO: 6.06 10*3/MM3 (ref 3.4–10.8)

## 2024-08-23 PROCEDURE — 85730 THROMBOPLASTIN TIME PARTIAL: CPT

## 2024-08-23 PROCEDURE — 83036 HEMOGLOBIN GLYCOSYLATED A1C: CPT

## 2024-08-23 PROCEDURE — 87641 MR-STAPH DNA AMP PROBE: CPT

## 2024-08-23 PROCEDURE — 85025 COMPLETE CBC W/AUTO DIFF WBC: CPT

## 2024-08-23 PROCEDURE — 86901 BLOOD TYPING SEROLOGIC RH(D): CPT

## 2024-08-23 PROCEDURE — 80048 BASIC METABOLIC PNL TOTAL CA: CPT

## 2024-08-23 PROCEDURE — 85610 PROTHROMBIN TIME: CPT

## 2024-08-23 PROCEDURE — 86900 BLOOD TYPING SEROLOGIC ABO: CPT

## 2024-08-23 PROCEDURE — 86850 RBC ANTIBODY SCREEN: CPT

## 2024-08-23 PROCEDURE — 36415 COLL VENOUS BLD VENIPUNCTURE: CPT

## 2024-08-23 PROCEDURE — 93005 ELECTROCARDIOGRAM TRACING: CPT | Performed by: ORTHOPAEDIC SURGERY

## 2024-08-26 LAB
QT INTERVAL: 366 MS
QTC INTERVAL: 409 MS

## 2024-09-04 ENCOUNTER — HOSPITAL ENCOUNTER (OUTPATIENT)
Facility: HOSPITAL | Age: 79
Discharge: HOME | End: 2024-09-05
Attending: ORTHOPAEDIC SURGERY | Admitting: ORTHOPAEDIC SURGERY
Payer: MEDICARE

## 2024-09-04 ENCOUNTER — ANESTHESIA (OUTPATIENT)
Dept: PERIOP | Facility: HOSPITAL | Age: 79
End: 2024-09-04
Payer: MEDICARE

## 2024-09-04 ENCOUNTER — EXTERNAL PBMM DATA (OUTPATIENT)
Dept: PHARMACY | Facility: OTHER | Age: 79
End: 2024-09-04
Payer: MEDICARE

## 2024-09-04 ENCOUNTER — APPOINTMENT (OUTPATIENT)
Dept: GENERAL RADIOLOGY | Facility: HOSPITAL | Age: 79
End: 2024-09-04
Payer: MEDICARE

## 2024-09-04 ENCOUNTER — ANESTHESIA EVENT (OUTPATIENT)
Dept: PERIOP | Facility: HOSPITAL | Age: 79
End: 2024-09-04
Payer: MEDICARE

## 2024-09-04 DIAGNOSIS — M19.012 OSTEOARTHRITIS OF LEFT GLENOHUMERAL JOINT: ICD-10-CM

## 2024-09-04 DIAGNOSIS — M19.012 PRIMARY OSTEOARTHRITIS OF LEFT SHOULDER: Primary | ICD-10-CM

## 2024-09-04 LAB
GLUCOSE BLDC GLUCOMTR-MCNC: 107 MG/DL (ref 70–105)
GLUCOSE BLDC GLUCOMTR-MCNC: 125 MG/DL (ref 70–105)

## 2024-09-04 PROCEDURE — 25010000002 DEXAMETHASONE PER 1 MG: Performed by: NURSE ANESTHETIST, CERTIFIED REGISTERED

## 2024-09-04 PROCEDURE — 25010000002 PROPOFOL 200 MG/20ML EMULSION: Performed by: NURSE ANESTHETIST, CERTIFIED REGISTERED

## 2024-09-04 PROCEDURE — 25010000002 SUGAMMADEX 200 MG/2ML SOLUTION: Performed by: NURSE ANESTHETIST, CERTIFIED REGISTERED

## 2024-09-04 PROCEDURE — 25010000002 BUPIVACAINE (PF) 0.5 % SOLUTION: Performed by: ANESTHESIOLOGY

## 2024-09-04 PROCEDURE — 25010000002 CEFAZOLIN PER 500 MG: Performed by: ORTHOPAEDIC SURGERY

## 2024-09-04 PROCEDURE — A9270 NON-COVERED ITEM OR SERVICE: HCPCS | Performed by: ORTHOPAEDIC SURGERY

## 2024-09-04 PROCEDURE — 25010000002 PHENYLEPHRINE 10 MG/ML SOLUTION 5 ML VIAL: Performed by: NURSE ANESTHETIST, CERTIFIED REGISTERED

## 2024-09-04 PROCEDURE — 25810000003 LACTATED RINGERS PER 1000 ML: Performed by: NURSE ANESTHETIST, CERTIFIED REGISTERED

## 2024-09-04 PROCEDURE — 63710000001 MELOXICAM 15 MG TABLET: Performed by: ORTHOPAEDIC SURGERY

## 2024-09-04 PROCEDURE — 25810000003 SODIUM CHLORIDE 0.9 % SOLUTION 250 ML FLEX CONT: Performed by: NURSE ANESTHETIST, CERTIFIED REGISTERED

## 2024-09-04 PROCEDURE — 25010000002 CEFTRIAXONE PER 250 MG: Performed by: ORTHOPAEDIC SURGERY

## 2024-09-04 PROCEDURE — 25010000002 VANCOMYCIN 1 G RECONSTITUTED SOLUTION 1 EACH VIAL: Performed by: ORTHOPAEDIC SURGERY

## 2024-09-04 PROCEDURE — C1776 JOINT DEVICE (IMPLANTABLE): HCPCS | Performed by: ORTHOPAEDIC SURGERY

## 2024-09-04 PROCEDURE — 25810000003 SODIUM CHLORIDE 0.9 % SOLUTION: Performed by: ORTHOPAEDIC SURGERY

## 2024-09-04 PROCEDURE — C1713 ANCHOR/SCREW BN/BN,TIS/BN: HCPCS | Performed by: ORTHOPAEDIC SURGERY

## 2024-09-04 PROCEDURE — C9290 INJ, BUPIVACAINE LIPOSOME: HCPCS | Performed by: ANESTHESIOLOGY

## 2024-09-04 PROCEDURE — 63710000001 OXYCODONE 5 MG TABLET: Performed by: ORTHOPAEDIC SURGERY

## 2024-09-04 PROCEDURE — 0 BUPIVACAINE LIPOSOME 1.3 % SUSPENSION: Performed by: ANESTHESIOLOGY

## 2024-09-04 PROCEDURE — 73030 X-RAY EXAM OF SHOULDER: CPT

## 2024-09-04 PROCEDURE — 23472 RECONSTRUCT SHOULDER JOINT: CPT | Performed by: PHYSICIAN ASSISTANT

## 2024-09-04 PROCEDURE — 25010000002 FENTANYL CITRATE (PF) 50 MCG/ML SOLUTION: Performed by: ANESTHESIOLOGY

## 2024-09-04 PROCEDURE — 25010000002 BUPIVACAINE (PF) 0.25 % SOLUTION 30 ML VIAL: Performed by: ORTHOPAEDIC SURGERY

## 2024-09-04 PROCEDURE — 63710000001 PREGABALIN 75 MG CAPSULE: Performed by: ORTHOPAEDIC SURGERY

## 2024-09-04 PROCEDURE — 23472 RECONSTRUCT SHOULDER JOINT: CPT | Performed by: ORTHOPAEDIC SURGERY

## 2024-09-04 PROCEDURE — 63710000001 HYDROCODONE-ACETAMINOPHEN 5-325 MG TABLET: Performed by: NURSE ANESTHETIST, CERTIFIED REGISTERED

## 2024-09-04 PROCEDURE — 63710000001 ROSUVASTATIN 10 MG TABLET: Performed by: ORTHOPAEDIC SURGERY

## 2024-09-04 PROCEDURE — 25010000002 ONDANSETRON PER 1 MG: Performed by: NURSE ANESTHETIST, CERTIFIED REGISTERED

## 2024-09-04 PROCEDURE — 63710000001 OXYCODONE 10 MG TABLET EXTENDED-RELEASE 12 HOUR: Performed by: ORTHOPAEDIC SURGERY

## 2024-09-04 PROCEDURE — 82948 REAGENT STRIP/BLOOD GLUCOSE: CPT

## 2024-09-04 PROCEDURE — 82948 REAGENT STRIP/BLOOD GLUCOSE: CPT | Performed by: NURSE ANESTHETIST, CERTIFIED REGISTERED

## 2024-09-04 PROCEDURE — 63710000001 TRAZODONE 50 MG TABLET: Performed by: ORTHOPAEDIC SURGERY

## 2024-09-04 PROCEDURE — 25010000002 CLINDAMYCIN PER 300 MG: Performed by: ORTHOPAEDIC SURGERY

## 2024-09-04 PROCEDURE — G0378 HOSPITAL OBSERVATION PER HR: HCPCS

## 2024-09-04 PROCEDURE — A9270 NON-COVERED ITEM OR SERVICE: HCPCS | Performed by: NURSE ANESTHETIST, CERTIFIED REGISTERED

## 2024-09-04 DEVICE — STEM HUM REV NONPOR 13X130MM: Type: IMPLANTABLE DEVICE | Site: SHOULDER | Status: FUNCTIONAL

## 2024-09-04 DEVICE — SPACR HUM REV TM PLS 9MM: Type: IMPLANTABLE DEVICE | Site: SHOULDER | Status: FUNCTIONAL

## 2024-09-04 DEVICE — INVERSE/REVERSE SCREW SYSTEM, 4.5-30
Type: IMPLANTABLE DEVICE | Site: SHOULDER | Status: FUNCTIONAL
Brand: INVERSE/REVERSE

## 2024-09-04 DEVICE — INVERSE/REVERSE SCREW SYSTEM, 4.5-42
Type: IMPLANTABLE DEVICE | Site: SHOULDER | Status: FUNCTIONAL
Brand: INVERSE/REVERSE

## 2024-09-04 DEVICE — GLENSPHR TRABECULARMETAL REV 40MM: Type: IMPLANTABLE DEVICE | Site: SHOULDER | Status: FUNCTIONAL

## 2024-09-04 DEVICE — SUT NONABS MAXBRAID/PE NMBR2 HC5 38IN BLU 900334: Type: IMPLANTABLE DEVICE | Site: SHOULDER | Status: FUNCTIONAL

## 2024-09-04 DEVICE — LINER HUM/SHLDR TRABECULARMETAL REV POLY 60DEG 40MM PLS0: Type: IMPLANTABLE DEVICE | Site: SHOULDER | Status: FUNCTIONAL

## 2024-09-04 DEVICE — BASEPLT GLEN TRABECULARMETAL REV 15MM: Type: IMPLANTABLE DEVICE | Site: SHOULDER | Status: FUNCTIONAL

## 2024-09-04 DEVICE — TOTL SHLDER REV: Type: IMPLANTABLE DEVICE | Site: SHOULDER | Status: FUNCTIONAL

## 2024-09-04 RX ORDER — ROCURONIUM BROMIDE 10 MG/ML
INJECTION, SOLUTION INTRAVENOUS AS NEEDED
Status: DISCONTINUED | OUTPATIENT
Start: 2024-09-04 | End: 2024-09-04 | Stop reason: SURG

## 2024-09-04 RX ORDER — HYDRALAZINE HYDROCHLORIDE 20 MG/ML
5 INJECTION INTRAMUSCULAR; INTRAVENOUS
Status: DISCONTINUED | OUTPATIENT
Start: 2024-09-04 | End: 2024-09-04 | Stop reason: HOSPADM

## 2024-09-04 RX ORDER — MELOXICAM 15 MG/1
15 TABLET ORAL DAILY
Status: DISCONTINUED | OUTPATIENT
Start: 2024-09-05 | End: 2024-09-05 | Stop reason: HOSPADM

## 2024-09-04 RX ORDER — ROSUVASTATIN CALCIUM 10 MG/1
20 TABLET, COATED ORAL NIGHTLY
Status: DISCONTINUED | OUTPATIENT
Start: 2024-09-04 | End: 2024-09-05 | Stop reason: HOSPADM

## 2024-09-04 RX ORDER — LIDOCAINE HYDROCHLORIDE 20 MG/ML
INJECTION, SOLUTION EPIDURAL; INFILTRATION; INTRACAUDAL; PERINEURAL AS NEEDED
Status: DISCONTINUED | OUTPATIENT
Start: 2024-09-04 | End: 2024-09-04 | Stop reason: SURG

## 2024-09-04 RX ORDER — OXYCODONE HCL 10 MG/1
10 TABLET, FILM COATED, EXTENDED RELEASE ORAL ONCE
Status: COMPLETED | OUTPATIENT
Start: 2024-09-04 | End: 2024-09-04

## 2024-09-04 RX ORDER — SODIUM CHLORIDE, SODIUM LACTATE, POTASSIUM CHLORIDE, CALCIUM CHLORIDE 600; 310; 30; 20 MG/100ML; MG/100ML; MG/100ML; MG/100ML
INJECTION, SOLUTION INTRAVENOUS CONTINUOUS PRN
Status: DISCONTINUED | OUTPATIENT
Start: 2024-09-04 | End: 2024-09-04 | Stop reason: SURG

## 2024-09-04 RX ORDER — FENTANYL CITRATE 50 UG/ML
25 INJECTION, SOLUTION INTRAMUSCULAR; INTRAVENOUS
Status: DISCONTINUED | OUTPATIENT
Start: 2024-09-04 | End: 2024-09-04 | Stop reason: HOSPADM

## 2024-09-04 RX ORDER — NALOXONE HCL 0.4 MG/ML
0.4 VIAL (ML) INJECTION AS NEEDED
Status: DISCONTINUED | OUTPATIENT
Start: 2024-09-04 | End: 2024-09-04 | Stop reason: HOSPADM

## 2024-09-04 RX ORDER — PROMETHAZINE HYDROCHLORIDE 25 MG/1
25 TABLET ORAL ONCE AS NEEDED
Status: DISCONTINUED | OUTPATIENT
Start: 2024-09-04 | End: 2024-09-04 | Stop reason: HOSPADM

## 2024-09-04 RX ORDER — PROMETHAZINE HYDROCHLORIDE 25 MG/1
25 SUPPOSITORY RECTAL ONCE AS NEEDED
Status: DISCONTINUED | OUTPATIENT
Start: 2024-09-04 | End: 2024-09-04 | Stop reason: HOSPADM

## 2024-09-04 RX ORDER — FENTANYL CITRATE 50 UG/ML
INJECTION, SOLUTION INTRAMUSCULAR; INTRAVENOUS
Status: COMPLETED | OUTPATIENT
Start: 2024-09-04 | End: 2024-09-04

## 2024-09-04 RX ORDER — ACETAMINOPHEN 650 MG/1
650 SUPPOSITORY RECTAL EVERY 4 HOURS PRN
Status: DISCONTINUED | OUTPATIENT
Start: 2024-09-04 | End: 2024-09-04 | Stop reason: HOSPADM

## 2024-09-04 RX ORDER — TRAMADOL HYDROCHLORIDE 50 MG/1
50 TABLET ORAL EVERY 6 HOURS PRN
Status: DISCONTINUED | OUTPATIENT
Start: 2024-09-04 | End: 2024-09-05 | Stop reason: HOSPADM

## 2024-09-04 RX ORDER — LISINOPRIL 20 MG/1
40 TABLET ORAL DAILY
Status: DISCONTINUED | OUTPATIENT
Start: 2024-09-05 | End: 2024-09-05 | Stop reason: HOSPADM

## 2024-09-04 RX ORDER — CLINDAMYCIN PHOSPHATE 900 MG/50ML
900 INJECTION, SOLUTION INTRAVENOUS EVERY 8 HOURS
Status: COMPLETED | OUTPATIENT
Start: 2024-09-04 | End: 2024-09-05

## 2024-09-04 RX ORDER — SODIUM CHLORIDE 9 MG/ML
75 INJECTION, SOLUTION INTRAVENOUS CONTINUOUS
Status: DISCONTINUED | OUTPATIENT
Start: 2024-09-04 | End: 2024-09-05 | Stop reason: HOSPADM

## 2024-09-04 RX ORDER — MELOXICAM 15 MG/1
15 TABLET ORAL ONCE
Status: COMPLETED | OUTPATIENT
Start: 2024-09-04 | End: 2024-09-04

## 2024-09-04 RX ORDER — MORPHINE SULFATE 2 MG/ML
2 INJECTION, SOLUTION INTRAMUSCULAR; INTRAVENOUS EVERY 4 HOURS PRN
Status: DISCONTINUED | OUTPATIENT
Start: 2024-09-04 | End: 2024-09-05 | Stop reason: HOSPADM

## 2024-09-04 RX ORDER — ACETAMINOPHEN 325 MG/1
650 TABLET ORAL EVERY 4 HOURS PRN
Status: DISCONTINUED | OUTPATIENT
Start: 2024-09-04 | End: 2024-09-05 | Stop reason: HOSPADM

## 2024-09-04 RX ORDER — ONDANSETRON 2 MG/ML
4 INJECTION INTRAMUSCULAR; INTRAVENOUS EVERY 6 HOURS PRN
Status: DISCONTINUED | OUTPATIENT
Start: 2024-09-04 | End: 2024-09-05 | Stop reason: HOSPADM

## 2024-09-04 RX ORDER — LABETALOL HYDROCHLORIDE 5 MG/ML
5 INJECTION, SOLUTION INTRAVENOUS
Status: DISCONTINUED | OUTPATIENT
Start: 2024-09-04 | End: 2024-09-04 | Stop reason: HOSPADM

## 2024-09-04 RX ORDER — ASPIRIN 325 MG
325 TABLET, DELAYED RELEASE (ENTERIC COATED) ORAL EVERY 12 HOURS SCHEDULED
Status: DISCONTINUED | OUTPATIENT
Start: 2024-09-05 | End: 2024-09-05 | Stop reason: HOSPADM

## 2024-09-04 RX ORDER — DROPERIDOL 2.5 MG/ML
0.62 INJECTION, SOLUTION INTRAMUSCULAR; INTRAVENOUS ONCE AS NEEDED
Status: DISCONTINUED | OUTPATIENT
Start: 2024-09-04 | End: 2024-09-04 | Stop reason: HOSPADM

## 2024-09-04 RX ORDER — NALOXONE HCL 0.4 MG/ML
0.4 VIAL (ML) INJECTION
Status: DISCONTINUED | OUTPATIENT
Start: 2024-09-04 | End: 2024-09-05 | Stop reason: HOSPADM

## 2024-09-04 RX ORDER — DIPHENHYDRAMINE HCL 25 MG
25 CAPSULE ORAL NIGHTLY PRN
Status: DISCONTINUED | OUTPATIENT
Start: 2024-09-04 | End: 2024-09-05 | Stop reason: HOSPADM

## 2024-09-04 RX ORDER — ACETAMINOPHEN 650 MG/1
650 SUPPOSITORY RECTAL EVERY 4 HOURS PRN
Status: DISCONTINUED | OUTPATIENT
Start: 2024-09-04 | End: 2024-09-05 | Stop reason: HOSPADM

## 2024-09-04 RX ORDER — ESCITALOPRAM OXALATE 10 MG/1
10 TABLET ORAL DAILY
Status: DISCONTINUED | OUTPATIENT
Start: 2024-09-05 | End: 2024-09-05 | Stop reason: HOSPADM

## 2024-09-04 RX ORDER — DIPHENHYDRAMINE HYDROCHLORIDE 50 MG/ML
12.5 INJECTION INTRAMUSCULAR; INTRAVENOUS ONCE AS NEEDED
Status: DISCONTINUED | OUTPATIENT
Start: 2024-09-04 | End: 2024-09-04 | Stop reason: HOSPADM

## 2024-09-04 RX ORDER — OXYCODONE HYDROCHLORIDE 5 MG/1
10 TABLET ORAL EVERY 4 HOURS PRN
Status: DISCONTINUED | OUTPATIENT
Start: 2024-09-04 | End: 2024-09-05 | Stop reason: HOSPADM

## 2024-09-04 RX ORDER — DIPHENHYDRAMINE HYDROCHLORIDE 50 MG/ML
25 INJECTION INTRAMUSCULAR; INTRAVENOUS NIGHTLY PRN
Status: DISCONTINUED | OUTPATIENT
Start: 2024-09-04 | End: 2024-09-05 | Stop reason: HOSPADM

## 2024-09-04 RX ORDER — TRANEXAMIC ACID 10 MG/ML
INJECTION, SOLUTION INTRAVENOUS AS NEEDED
Status: DISCONTINUED | OUTPATIENT
Start: 2024-09-04 | End: 2024-09-04 | Stop reason: SURG

## 2024-09-04 RX ORDER — ONDANSETRON 2 MG/ML
INJECTION INTRAMUSCULAR; INTRAVENOUS AS NEEDED
Status: DISCONTINUED | OUTPATIENT
Start: 2024-09-04 | End: 2024-09-04 | Stop reason: SURG

## 2024-09-04 RX ORDER — ONDANSETRON 2 MG/ML
INJECTION INTRAMUSCULAR; INTRAVENOUS AS NEEDED
Status: DISCONTINUED | OUTPATIENT
Start: 2024-09-04 | End: 2024-09-04

## 2024-09-04 RX ORDER — DIPHENHYDRAMINE HCL 25 MG
25 CAPSULE ORAL EVERY 6 HOURS PRN
Status: DISCONTINUED | OUTPATIENT
Start: 2024-09-04 | End: 2024-09-05 | Stop reason: HOSPADM

## 2024-09-04 RX ORDER — ONDANSETRON 2 MG/ML
4 INJECTION INTRAMUSCULAR; INTRAVENOUS ONCE AS NEEDED
Status: DISCONTINUED | OUTPATIENT
Start: 2024-09-04 | End: 2024-09-04 | Stop reason: HOSPADM

## 2024-09-04 RX ORDER — BUPIVACAINE HYDROCHLORIDE 5 MG/ML
INJECTION, SOLUTION EPIDURAL; INTRACAUDAL
Status: COMPLETED | OUTPATIENT
Start: 2024-09-04 | End: 2024-09-04

## 2024-09-04 RX ORDER — TRAZODONE HYDROCHLORIDE 50 MG/1
50 TABLET, FILM COATED ORAL NIGHTLY
Status: DISCONTINUED | OUTPATIENT
Start: 2024-09-04 | End: 2024-09-05 | Stop reason: HOSPADM

## 2024-09-04 RX ORDER — DIPHENHYDRAMINE HYDROCHLORIDE 50 MG/ML
12.5 INJECTION INTRAMUSCULAR; INTRAVENOUS
Status: DISCONTINUED | OUTPATIENT
Start: 2024-09-04 | End: 2024-09-04 | Stop reason: HOSPADM

## 2024-09-04 RX ORDER — BISACODYL 10 MG
10 SUPPOSITORY, RECTAL RECTAL DAILY PRN
Status: DISCONTINUED | OUTPATIENT
Start: 2024-09-04 | End: 2024-09-05 | Stop reason: HOSPADM

## 2024-09-04 RX ORDER — HYDROCODONE BITARTRATE AND ACETAMINOPHEN 5; 325 MG/1; MG/1
1 TABLET ORAL ONCE AS NEEDED
Status: COMPLETED | OUTPATIENT
Start: 2024-09-04 | End: 2024-09-04

## 2024-09-04 RX ORDER — PROPOFOL 10 MG/ML
INJECTION, EMULSION INTRAVENOUS AS NEEDED
Status: DISCONTINUED | OUTPATIENT
Start: 2024-09-04 | End: 2024-09-04 | Stop reason: SURG

## 2024-09-04 RX ORDER — MEPERIDINE HYDROCHLORIDE 25 MG/ML
12.5 INJECTION INTRAMUSCULAR; INTRAVENOUS; SUBCUTANEOUS
Status: DISCONTINUED | OUTPATIENT
Start: 2024-09-04 | End: 2024-09-04 | Stop reason: HOSPADM

## 2024-09-04 RX ORDER — OXYBUTYNIN CHLORIDE 5 MG/1
5 TABLET, EXTENDED RELEASE ORAL DAILY
Status: DISCONTINUED | OUTPATIENT
Start: 2024-09-05 | End: 2024-09-05 | Stop reason: HOSPADM

## 2024-09-04 RX ORDER — DEXAMETHASONE SODIUM PHOSPHATE 4 MG/ML
INJECTION, SOLUTION INTRA-ARTICULAR; INTRALESIONAL; INTRAMUSCULAR; INTRAVENOUS; SOFT TISSUE AS NEEDED
Status: DISCONTINUED | OUTPATIENT
Start: 2024-09-04 | End: 2024-09-04 | Stop reason: SURG

## 2024-09-04 RX ORDER — ALBUTEROL SULFATE 0.83 MG/ML
2.5 SOLUTION RESPIRATORY (INHALATION) ONCE AS NEEDED
Status: DISCONTINUED | OUTPATIENT
Start: 2024-09-04 | End: 2024-09-04 | Stop reason: HOSPADM

## 2024-09-04 RX ORDER — ONDANSETRON 4 MG/1
4 TABLET, ORALLY DISINTEGRATING ORAL EVERY 6 HOURS PRN
Status: DISCONTINUED | OUTPATIENT
Start: 2024-09-04 | End: 2024-09-05 | Stop reason: HOSPADM

## 2024-09-04 RX ORDER — ACETAMINOPHEN 325 MG/1
650 TABLET ORAL ONCE AS NEEDED
Status: DISCONTINUED | OUTPATIENT
Start: 2024-09-04 | End: 2024-09-04 | Stop reason: HOSPADM

## 2024-09-04 RX ORDER — PREGABALIN 75 MG/1
150 CAPSULE ORAL ONCE
Status: COMPLETED | OUTPATIENT
Start: 2024-09-04 | End: 2024-09-04

## 2024-09-04 RX ORDER — AMLODIPINE BESYLATE 5 MG/1
10 TABLET ORAL DAILY
Status: DISCONTINUED | OUTPATIENT
Start: 2024-09-05 | End: 2024-09-05 | Stop reason: HOSPADM

## 2024-09-04 RX ADMIN — SODIUM CHLORIDE, SODIUM LACTATE, POTASSIUM CHLORIDE, AND CALCIUM CHLORIDE: .6; .31; .03; .02 INJECTION, SOLUTION INTRAVENOUS at 13:43

## 2024-09-04 RX ADMIN — BUPIVACAINE HYDROCHLORIDE 10 ML: 5 INJECTION, SOLUTION EPIDURAL; INTRACAUDAL; PERINEURAL at 13:06

## 2024-09-04 RX ADMIN — PREGABALIN 150 MG: 75 CAPSULE ORAL at 11:59

## 2024-09-04 RX ADMIN — ONDANSETRON 4 MG: 2 INJECTION INTRAMUSCULAR; INTRAVENOUS at 15:19

## 2024-09-04 RX ADMIN — CLINDAMYCIN PHOSPHATE 900 MG: 900 INJECTION, SOLUTION INTRAVENOUS at 23:00

## 2024-09-04 RX ADMIN — TRANEXAMIC ACID 1000 MG: 10 INJECTION, SOLUTION INTRAVENOUS at 13:56

## 2024-09-04 RX ADMIN — SODIUM CHLORIDE 2000 MG: 900 INJECTION INTRAVENOUS at 13:35

## 2024-09-04 RX ADMIN — ROCURONIUM BROMIDE 50 MG: 10 INJECTION, SOLUTION INTRAVENOUS at 13:48

## 2024-09-04 RX ADMIN — MELOXICAM 15 MG: 15 TABLET ORAL at 11:59

## 2024-09-04 RX ADMIN — DEXAMETHASONE SODIUM PHOSPHATE 8 MG: 4 INJECTION, SOLUTION INTRAMUSCULAR; INTRAVENOUS at 13:58

## 2024-09-04 RX ADMIN — ROSUVASTATIN 20 MG: 10 TABLET, FILM COATED ORAL at 22:40

## 2024-09-04 RX ADMIN — HYDROCODONE BITARTRATE AND ACETAMINOPHEN 1 TABLET: 5; 325 TABLET ORAL at 16:24

## 2024-09-04 RX ADMIN — OXYCODONE HYDROCHLORIDE 10 MG: 10 TABLET, FILM COATED, EXTENDED RELEASE ORAL at 11:59

## 2024-09-04 RX ADMIN — PROPOFOL 200 MG: 10 INJECTION, EMULSION INTRAVENOUS at 13:48

## 2024-09-04 RX ADMIN — PHENYLEPHRINE HYDROCHLORIDE 1 MCG/KG/MIN: 10 INJECTION INTRAVENOUS at 13:58

## 2024-09-04 RX ADMIN — SODIUM CHLORIDE 75 ML/HR: 9 INJECTION, SOLUTION INTRAVENOUS at 22:40

## 2024-09-04 RX ADMIN — FENTANYL CITRATE 50 MCG: 50 INJECTION, SOLUTION INTRAMUSCULAR; INTRAVENOUS at 13:00

## 2024-09-04 RX ADMIN — BUPIVACAINE 10 ML: 13.3 INJECTION, SUSPENSION, LIPOSOMAL INFILTRATION at 13:06

## 2024-09-04 RX ADMIN — SUGAMMADEX 100 MG: 100 INJECTION, SOLUTION INTRAVENOUS at 14:04

## 2024-09-04 RX ADMIN — LIDOCAINE HYDROCHLORIDE 100 MG: 20 INJECTION, SOLUTION EPIDURAL; INFILTRATION; INTRACAUDAL; PERINEURAL at 13:48

## 2024-09-04 RX ADMIN — OXYCODONE HYDROCHLORIDE 10 MG: 5 TABLET ORAL at 22:50

## 2024-09-04 RX ADMIN — PROPOFOL 150 MCG/KG/MIN: 10 INJECTION, EMULSION INTRAVENOUS at 13:52

## 2024-09-04 RX ADMIN — TRAZODONE HYDROCHLORIDE 50 MG: 50 TABLET ORAL at 22:40

## 2024-09-04 NOTE — ANESTHESIA POSTPROCEDURE EVALUATION
Patient: Farheen Gomez    Procedure Summary       Date: 09/04/24 Room / Location: [facility] OR 11 / [facility] MAIN OR    Anesthesia Start: 1343 Anesthesia Stop: 1536    Procedure: TOTAL SHOULDER REVERSE ARTHROPLASTY (Left: Shoulder) Diagnosis:       Osteoarthritis of left glenohumeral joint      (Osteoarthritis of left glenohumeral joint [M19.012])    Surgeons: Devin Mayen MD Provider: Saba Alvarado CRNA    Anesthesia Type: general with block, ERAS Protocol ASA Status: 3            Anesthesia Type: general with block, ERAS Protocol    Vitals  Vitals Value Taken Time   /80 09/04/24 1810   Temp 97.7 °F (36.5 °C) 09/04/24 1530   Pulse 93 09/04/24 1820   Resp 12 09/04/24 1730   SpO2 95 % 09/04/24 1820   Vitals shown include unfiled device data.        Post Anesthesia Care and Evaluation    Patient location during evaluation: PACU  Patient participation: complete - patient participated  Level of consciousness: awake  Pain scale: See nurse's notes for pain score.  Pain management: adequate    Airway patency: patent  Anesthetic complications: No anesthetic complications  PONV Status: none  Cardiovascular status: acceptable  Respiratory status: acceptable and spontaneous ventilation  Hydration status: acceptable    Comments: Patient seen and examined postoperatively; vital signs stable; SpO2 greater than or equal to 90%; cardiopulmonary status stable; nausea/vomiting adequately controlled; pain adequately controlled; no apparent anesthesia complications; patient discharged from anesthesia care when discharge criteria were met

## 2024-09-04 NOTE — ANESTHESIA PROCEDURE NOTES
Peripheral Block      Patient reassessed immediately prior to procedure    Patient location during procedure: pre-op  Start time: 9/4/2024 1:00 PM  Stop time: 9/4/2024 1:06 PM  Reason for block: at surgeon's request and post-op pain management  Performed by  Anesthesiologist: Char Arguello MD  Preanesthetic Checklist  Completed: patient identified, IV checked, site marked, risks and benefits discussed, surgical consent, monitors and equipment checked, pre-op evaluation and timeout performed  Prep:  Pt Position: supine  Sterile barriers:alcohol skin prep, cap, gloves, mask and washed/disinfected hands  Prep: ChloraPrep  Patient monitoring: blood pressure monitoring, continuous pulse oximetry and EKG  Procedure    Sedation: yes  Performed under: local infiltration  Guidance:ultrasound guided    ULTRASOUND INTERPRETATION.  Using ultrasound guidance a 20 G gauge needle was placed in close proximity to the brachial plexus nerve, at which point, under ultrasound guidance anesthetic was injected in the area of the nerve and spread of the anesthesia was seen on ultrasound in close proximity thereto.  There were no abnormalities seen on ultrasound; a digital image was taken; and the patient tolerated the procedure with no complications. Images:still images obtained, printed/placed on chart    Laterality:left  Block Type:interscalene  Injection Technique:single-shot  Needle Type:echogenic  Needle Gauge:20 G    Sedation medications used: fentaNYL citrate (PF) (SUBLIMAZE) injection - Intravenous   50 mcg - 9/4/2024 1:00:00 PM  Medications Used: bupivacaine liposome (EXPAREL) injection 1.3% - Perineural   10 mL - 9/4/2024 1:06:00 PM  bupivacaine PF (MARCAINE) injection 0.5% - Perineural   10 mL - 9/4/2024 1:06:00 PM      Post Assessment  Injection Assessment: negative aspiration for heme, no paresthesia on injection and incremental injection  Patient Tolerance:comfortable throughout block  Complications:no  Additional  Notes  Skin anesthetized with 1% Lidocaine.  Using 20 G, 4 inch stimuplex echogenic needle,  Local was injected with serial aspirations under continuous ultrasound guidance around brachial plexus.  No heme aspirated.  Needle tip visualized throughout.  Good perineural spread noted.  No complications.  No bleeding noted.  Performed by: Char Arguello MD

## 2024-09-04 NOTE — ANESTHESIA PROCEDURE NOTES
Airway  Urgency: elective    Date/Time: 9/4/2024 1:51 PM  Airway not difficult    General Information and Staff    Patient location during procedure: OR  CRNA/CAA: Saba Alvarado CRNA    Indications and Patient Condition  Indications for airway management: airway protection    Preoxygenated: yes  MILS not maintained throughout  Mask difficulty assessment: 1 - vent by mask    Final Airway Details  Final airway type: endotracheal airway      Successful airway: ETT  Cuffed: yes   Successful intubation technique: direct laryngoscopy and video laryngoscopy  Facilitating devices/methods: Bougie and anterior pressure/BURP  Endotracheal tube insertion site: oral  Blade: Burnett  Blade size: 4  ETT size (mm): 7.5  Cormack-Lehane Classification: grade IIb - view of arytenoids or posterior of glottis only  Placement verified by: chest auscultation and capnometry   Cuff volume (mL): 7  Measured from: lips  ETT/EBT  to lips (cm): 22  Number of attempts at approach: 2  Assessment: lips, teeth, and gum same as pre-op and atraumatic intubation    Additional Comments  Pt preoxygenated prior to induction, easy mask airway, atraumatic intubation,+ ETCO2, + bs bilat,  ETT secured and connected to ventilator.

## 2024-09-04 NOTE — ANESTHESIA PREPROCEDURE EVALUATION
Anesthesia Evaluation     Patient summary reviewed and Nursing notes reviewed   no history of anesthetic complications:   NPO Solid Status: > 8 hours  NPO Liquid Status: > 6 hours           Airway   Mallampati: II  TM distance: >3 FB  Neck ROM: full  Anterior  Dental - normal exam     Pulmonary - normal exam   (+) ,sleep apnea  Cardiovascular - normal exam    ECG reviewed    (+) hypertension, hyperlipidemia      Neuro/Psych  (+) headaches, psychiatric history Depression  GI/Hepatic/Renal/Endo    (+) obesity, GERD well controlled, renal disease-, diabetes mellitus type 2    Musculoskeletal     (+) neck pain  Abdominal  - normal exam    Bowel sounds: normal.   Substance History - negative use     OB/GYN negative ob/gyn ROS         Other   arthritis,         Phys Exam Other: Glidescope needed for previous intubation per chart review              Anesthesia Plan    ASA 3     general with block and ERAS Protocol   total IV anesthesia  (ERAS protocol.  PO meds ordered per surgeon. )  intravenous induction     Anesthetic plan, risks, benefits, and alternatives have been provided, discussed and informed consent has been obtained with: patient.    Plan discussed with CAA and CRNA.

## 2024-09-05 VITALS
OXYGEN SATURATION: 93 % | TEMPERATURE: 98.6 F | HEART RATE: 89 BPM | HEIGHT: 70 IN | SYSTOLIC BLOOD PRESSURE: 143 MMHG | WEIGHT: 222.4 LBS | DIASTOLIC BLOOD PRESSURE: 81 MMHG | RESPIRATION RATE: 19 BRPM | BODY MASS INDEX: 31.84 KG/M2

## 2024-09-05 LAB
ANION GAP SERPL CALCULATED.3IONS-SCNC: 11.3 MMOL/L (ref 5–15)
BUN SERPL-MCNC: 20 MG/DL (ref 8–23)
BUN/CREAT SERPL: 16.1 (ref 7–25)
CALCIUM SPEC-SCNC: 8.9 MG/DL (ref 8.6–10.5)
CHLORIDE SERPL-SCNC: 100 MMOL/L (ref 98–107)
CO2 SERPL-SCNC: 22.7 MMOL/L (ref 22–29)
CREAT SERPL-MCNC: 1.24 MG/DL (ref 0.76–1.27)
EGFRCR SERPLBLD CKD-EPI 2021: 59.9 ML/MIN/1.73
GLUCOSE SERPL-MCNC: 283 MG/DL (ref 65–99)
HCT VFR BLD AUTO: 34.1 % (ref 37.5–51)
HGB BLD-MCNC: 11.7 G/DL (ref 13–17.7)
POTASSIUM SERPL-SCNC: 5.2 MMOL/L (ref 3.5–5.2)
SODIUM SERPL-SCNC: 134 MMOL/L (ref 136–145)

## 2024-09-05 PROCEDURE — 63710000001 LISINOPRIL 20 MG TABLET: Performed by: ORTHOPAEDIC SURGERY

## 2024-09-05 PROCEDURE — 63710000001 OXYCODONE 5 MG TABLET: Performed by: ORTHOPAEDIC SURGERY

## 2024-09-05 PROCEDURE — 63710000001 ESCITALOPRAM 10 MG TABLET: Performed by: ORTHOPAEDIC SURGERY

## 2024-09-05 PROCEDURE — A9270 NON-COVERED ITEM OR SERVICE: HCPCS | Performed by: ORTHOPAEDIC SURGERY

## 2024-09-05 PROCEDURE — 97166 OT EVAL MOD COMPLEX 45 MIN: CPT

## 2024-09-05 PROCEDURE — 63710000001 AMLODIPINE 5 MG TABLET: Performed by: ORTHOPAEDIC SURGERY

## 2024-09-05 PROCEDURE — 85018 HEMOGLOBIN: CPT | Performed by: ORTHOPAEDIC SURGERY

## 2024-09-05 PROCEDURE — 63710000001 MELOXICAM 15 MG TABLET: Performed by: ORTHOPAEDIC SURGERY

## 2024-09-05 PROCEDURE — 80048 BASIC METABOLIC PNL TOTAL CA: CPT | Performed by: ORTHOPAEDIC SURGERY

## 2024-09-05 PROCEDURE — 63710000001 ASPIRIN 325 MG TABLET DELAYED-RELEASE: Performed by: ORTHOPAEDIC SURGERY

## 2024-09-05 PROCEDURE — 63710000001 OXYBUTYNIN XL 5 MG TABLET SUSTAINED-RELEASE 24 HOUR: Performed by: ORTHOPAEDIC SURGERY

## 2024-09-05 PROCEDURE — 25010000002 CLINDAMYCIN PER 300 MG: Performed by: ORTHOPAEDIC SURGERY

## 2024-09-05 PROCEDURE — 97161 PT EVAL LOW COMPLEX 20 MIN: CPT

## 2024-09-05 PROCEDURE — 85014 HEMATOCRIT: CPT | Performed by: ORTHOPAEDIC SURGERY

## 2024-09-05 RX ORDER — PROMETHAZINE HYDROCHLORIDE 12.5 MG/1
12.5 TABLET ORAL EVERY 6 HOURS PRN
Qty: 21 TABLET | Refills: 1 | Status: SHIPPED | OUTPATIENT
Start: 2024-09-05

## 2024-09-05 RX ORDER — OXYCODONE AND ACETAMINOPHEN 5; 325 MG/1; MG/1
1 TABLET ORAL EVERY 6 HOURS PRN
Qty: 28 TABLET | Refills: 0 | Status: SHIPPED | OUTPATIENT
Start: 2024-09-05

## 2024-09-05 RX ADMIN — CLINDAMYCIN PHOSPHATE 900 MG: 900 INJECTION, SOLUTION INTRAVENOUS at 05:23

## 2024-09-05 RX ADMIN — ASPIRIN 325 MG: 325 TABLET, COATED ORAL at 08:12

## 2024-09-05 RX ADMIN — AMLODIPINE BESYLATE 10 MG: 5 TABLET ORAL at 08:12

## 2024-09-05 RX ADMIN — OXYBUTYNIN CHLORIDE 5 MG: 5 TABLET, EXTENDED RELEASE ORAL at 08:12

## 2024-09-05 RX ADMIN — MELOXICAM 15 MG: 15 TABLET ORAL at 08:12

## 2024-09-05 RX ADMIN — OXYCODONE HYDROCHLORIDE 10 MG: 5 TABLET ORAL at 05:23

## 2024-09-05 RX ADMIN — LISINOPRIL 40 MG: 20 TABLET ORAL at 08:12

## 2024-09-05 RX ADMIN — ESCITALOPRAM OXALATE 10 MG: 10 TABLET ORAL at 08:12

## 2024-09-05 NOTE — DISCHARGE SUMMARY
"  Date of Discharge:  9/5/2024    Discharge Diagnosis: Degenerative joint disease of left shoulder    Presenting Problem/History of Present Illness  Active Hospital Problems    Diagnosis  POA    DJD of left shoulder [M19.012]  Yes    Osteoarthritis of left glenohumeral joint [M19.012]  Unknown      Resolved Hospital Problems   No resolved problems to display.        Hospital Course  Patient is a 78 y.o. male presented with left shoulder degenerative joint disease.  They underwent shoulder arthroplasty during admission and postop day 1 were tolerating diet and oral medication and pain was controlled.  -Upon entering the room the patient was sitting upright in his bed having a conversation with his nurse, Troy.  He was in no acute distress.    Procedures Performed: Left reverse total shoulder      Consults:   Consults       No orders found for last 30 day(s).              Condition on Discharge:  Improved    Vital Signs  Vitals:    09/04/24 2004 09/04/24 2031 09/05/24 0015 09/05/24 0443   BP: 134/81 133/82 117/76 150/79   BP Location: Right arm Right arm Right arm Right arm   Patient Position: Lying Lying Lying Lying   Pulse: 104 105 104 89   Resp: 18 17 15 17   Temp: 98.3 °F (36.8 °C) 97.9 °F (36.6 °C) 98.2 °F (36.8 °C) 97.8 °F (36.6 °C)   TempSrc: Oral Oral Oral Oral   SpO2: 92% 94% 92% 93%   Weight:       Height:  177.8 cm (70\")         Physical Exam:  Dry dressing, Sensory and motor exam are intact all distributions, albeit decreased sensation to light touch in the first 2 digits secondary to yesterday's limb block.. Radial pulse is palpable and capillary refill is less than two seconds to all digits       Discharge Disposition  Home    Discharge Medications     Discharge Medications        New Medications        Instructions Start Date   oxyCODONE-acetaminophen 5-325 MG per tablet  Commonly known as: Percocet   1 tablet, Oral, Every 6 Hours PRN      promethazine 12.5 MG tablet  Commonly known as: PHENERGAN   12.5 " mg, Oral, Every 6 Hours PRN             Changes to Medications        Instructions Start Date   rosuvastatin 20 MG tablet  Commonly known as: CRESTOR  What changed: when to take this   20 mg, Oral, Daily      vitamin D 1.25 MG (17295 UT) capsule capsule  Commonly known as: ERGOCALCIFEROL  What changed: additional instructions   50,000 Units, Oral, Weekly             Continue These Medications        Instructions Start Date   Alcohol Swabs pads   90 each, Does not apply, Daily      amLODIPine 10 MG tablet  Commonly known as: NORVASC   10 mg, Oral, Daily      Blood Glucose Monitor System w/Device kit   1 kit, Does not apply, Daily      Dexcom G6  device   1 each, Does not apply, Daily      Dexcom G6 Sensor   Does not apply, Every 10 Days      Dexcom G6 Transmitter misc   4 each, Does not apply, Weekly      doxycycline 100 MG capsule  Commonly known as: MONODOX   100 mg, Oral, 2 Times Daily      escitalopram 10 MG tablet  Commonly known as: LEXAPRO   Take 1 tablet by mouth once daily      glucose blood test strip   1 each, Other, Daily, Use as instructed      Lancets 30G misc   1 each, Does not apply, 4 Times Daily Before Meals & Nightly      lisinopril 40 MG tablet  Commonly known as: PRINIVIL,ZESTRIL   Take 1 tablet by mouth once daily      multivitamin with minerals tablet tablet   1 tablet, Oral, Daily      Myrbetriq 50 MG tablet sustained-release 24 hour 24 hr tablet  Generic drug: Mirabegron ER   50 mg, Oral, Daily      traZODone 50 MG tablet  Commonly known as: DESYREL   50 mg, Oral, Nightly      TYLENOL ARTHRITIS PAIN PO   2 tablets, Oral, 2 Times Daily             Discharge instructions:  Continue wearing sling.  Keep dressing on.  Okay to shower and perform home exercises.  Continue polar care.  Perform range of motion at the elbow, wrist and digits 3-5 times daily.  Proceed with following up with Dr. Mayen in 2 weeks    Follow-up Appointments  Future Appointments   Date Time Provider Department  [place]   9/19/2024  2:00 PM Devin Mayen MD [facility]   10/8/2024 10:45 AM Penny Hidalgo MD [facility]   12/19/2024  8:30 AM Penny Hidalgo MD [facility]                  Leobardo Perez PA-C  09/05/24  08:00 EDT      Disclaimer: Part of this note may be an electronic transcription/translation of spoken language to printed text using the Dragon Dictation System

## 2024-09-05 NOTE — DISCHARGE INSTR - ACTIVITY
Continue wearing sling.  Keep dressing on.  Okay to shower and perform home exercises.  Continue polar care.  Perform range of motion at the elbow, wrist and digits 3-5 times daily.  Proceed with following up with Dr. Mayen in 2 weeks

## 2024-09-05 NOTE — DISCHARGE PLACEMENT REQUEST
"Farheen Gomez (78 y.o. Male)       Date of Birth   1945    Social Security Number       Address   [address] [place] [address]    Home Phone   [phone]    MRN   [id number]       Scientologist   [employer]    Marital Status                               Admission Date   9/4/24    Admission Type   Elective    Admitting Provider   Devin Mayen MD    Attending Provider   Devin Mayen MD    Department, Room/Bed   [facility] SURGICAL INPATIENT, 4112/1       Discharge Date       Discharge Disposition   Home or Self Care    Discharge Destination                                 Attending Provider: Devin Mayen MD    Allergies: Penicillins, Propranolol    Isolation: None   Infection: None   Code Status: CPR    Ht: 177.8 cm (70\")   Wt: 101 kg (222 lb 6.4 oz)    Admission Cmt: None   Principal Problem: Osteoarthritis of left glenohumeral joint [M19.012]                   Active Insurance as of 9/4/2024       Primary Coverage       Payor Plan Insurance Group Employer/Plan Group    ANTHEM MEDICARE REPLACEMENT ANTHEM MEDICARE ADVANTAGE [id number]       Payor Plan Address Payor Plan Phone Number Payor Plan Fax Number Effective Dates    [address] [phone]  1/1/2024 - None Entered    [facility] [id number]         Subscriber Name Subscriber Birth Date Member ID       FARHEEN GOMEZ 1945 [id number]                     Emergency Contacts        (Rel.) Home Phone Work Phone Mobile Phone    Pippa Gomez (Spouse) -- -- [phone]                "

## 2024-09-05 NOTE — CASE MANAGEMENT/SOCIAL WORK
Continued Stay Note  FAISAL Lennon     Patient Name: Farheen Gomez  MRN: [id number]  Today's Date: 9/5/2024    Admit Date: 9/4/2024    Plan: HOME, FAMILY CAN TRANSPORT AT DISCHARGE   Discharge Plan       Row Name 09/05/24 1646       Plan    Plan HOME, FAMILY CAN TRANSPORT AT DISCHARGE    Plan Comments DISCHARGED PRIOR TO CASE MANAGEMENT SEEING PATIENT. SET UP WITH OP PT PER ORTHO NAVIGATOR                 Expected Discharge Date and Time       Expected Discharge Date Expected Discharge Time    Sep 5, 2024           Linda Duarte RN    S[facility] 1  [email or web address]  Office [phone]  Cell [phone]

## 2024-09-05 NOTE — PLAN OF CARE
Goal Outcome Evaluation:                 Pt is resting quietly abed with eyes closed at this time. No s/sx of any distress are noted at this time. Pt is AOx4 and is able to make wants and needs known. Bed is in low position with call light in reach. Care plan is ongoing.

## 2024-09-05 NOTE — THERAPY EVALUATION
Patient Name: Farheen Gomez  : 1945    MRN: [id number]                              Today's Date: 2024       Admit Date: 2024    Visit Dx:     ICD-10-CM ICD-9-CM   1. Primary osteoarthritis of left shoulder  M19.012 715.11   2. Osteoarthritis of left glenohumeral joint  M19.012 715.91     Patient Active Problem List   Diagnosis    Spinal stenosis in cervical region - reveresal of lordosis at C3/4 and C4/5, Modic endpalate cahnges at C7/T1    Hypertension    BPH (benign prostatic hyperplasia)    Anemia    Arthritis    Morbid (severe) obesity due to excess calories    Dyslipidemia    Family history of malignant neoplasm of urinary bladder    Foot pain, right    Ganglion cyst    Hand paresthesia    Hearing problem    History of poliomyelitis    Hyperglycemia    Hypomagnesemia    Other hammer toe(s) (acquired), left foot    Plantar fasciitis, left    Sleep disorder    Tachycardia    Thrombocytopenia    Vitamin D deficiency    Amblyopia    Dry eye    Hypertensive retinopathy of both eyes    Lens replaced by other means    AMD (age-related macular degeneration), bilateral    Nonexudative age-related macular degeneration    Other chronic allergic conjunctivitis    Other specified disorders of eyelid    PCO (posterior capsular opacification), bilateral    Presence of intraocular lens    PVD (posterior vitreous detachment), both eyes    Regular astigmatism    History of bilateral knee arthroplasty    Other headache syndrome    Exposure to COVID-19 virus    Status post total knee replacement, left    Class 1 obesity due to excess calories with serious comorbidity and body mass index (BMI) of 32.0 to 32.9 in adult    Rotator cuff tear arthropathy of both shoulders    Thrombocytopenia due to COVID-19 virus    Chronic midline low back pain    Status post reverse arthroplasty of right shoulder    Pseudophakia, both eyes    Nuclear senile cataract    SPK (superficial punctate keratitis), bilateral    Tear film  insufficiency    Blepharitis    Vitreous degeneration    Fatigue    Primary osteoarthritis of left shoulder    Type 2 diabetes mellitus with hyperglycemia, without long-term current use of insulin    Ingrown toenail    Osteoarthritis of left glenohumeral joint    DJD of left shoulder     Past Medical History:   Diagnosis Date    Anesthesia complication     had trouble urinating after last anesthesia    Arthritis     BPH (benign prostatic hyperplasia)     CTS (carpal tunnel syndrome)     Depression     History of bilateral knee arthroplasty     Hypertension     pre-Diabetes mellitus     Sleep apnea     uses CPAP     Past Surgical History:   Procedure Laterality Date    BACK SURGERY      HAND SURGERY Bilateral     carpal tunnel repair    JOINT REPLACEMENT      KNEE SURGERY      meniscus repair     LUMBAR DISCECTOMY Bilateral 04/22/2019    Procedure: left and right  L4-5 lumbar decompression with dura repair;  Surgeon: Edson Khan MD;  Location: [facility] MAIN OR;  Service: Neurosurgery    ROTATOR CUFF REPAIR Right     SHOULDER SURGERY      TOTAL KNEE ARTHROPLASTY Right 10/23/2019    Procedure: TOTAL KNEE ARTHROPLASTY, WITH LEFT KNEE INJECTION;  Surgeon: Boby Medina MD;  Location: [facility] MAIN OR;  Service: Orthopedics    TOTAL KNEE ARTHROPLASTY Left 02/05/2020    Procedure: TOTAL KNEE ARTHROPLASTY;  Surgeon: Boby Medina MD;  Location: [facility] MAIN OR;  Service: Orthopedics;  Laterality: Left;    TOTAL SHOULDER ARTHROPLASTY W/ DISTAL CLAVICLE EXCISION Right 10/18/2022    Procedure: TOTAL SHOULDER REVERSE ARTHROPLASTY;  Surgeon: Boby Medina MD;  Location: [facility] MAIN OR;  Service: Orthopedics;  Laterality: Right;      General Information       Row Name 09/05/24 1238          Physical Therapy Time and Intention    Document Type evaluation  -BR     Mode of Treatment physical therapy  -BR       Row Name 09/05/24 5039          General Information    Patient Profile Reviewed yes  -BR     Prior Level of Function  independent:;community mobility;all household mobility;gait;transfer;yard work  -BR     Existing Precautions/Restrictions left;shoulder  -BR       Row Name 09/05/24 1238          Living Environment    People in Home spouse  -BR       Row Name 09/05/24 1238          Home Main Entrance    Number of Stairs, Main Entrance two  -BR       Row Name 09/05/24 1238          Stairs Within Home, Primary    Number of Stairs, Within Home, Primary none  -BR       Row Name 09/05/24 1238          Cognition    Orientation Status (Cognition) oriented x 4  -BR       Row Name 09/05/24 1238          Safety Issues, Functional Mobility    Impairments Affecting Function (Mobility) endurance/activity tolerance  -BR               User Key  (r) = Recorded By, (t) = Taken By, (c) = Cosigned By      Initials Name Provider Type    Helga Joel PT Physical Therapist                   Mobility       Row Name 09/05/24 1239          Bed Mobility    Bed Mobility bed mobility (all) activities  -BR     All Activities, [place] (Bed Mobility) supervision  -BR       Row Name 09/05/24 1239          Sit-Stand Transfer    Sit-Stand [place] (Transfers) supervision  -BR       Row Name 09/05/24 1239          Gait/Stairs (Locomotion)    [place]uel Level (Gait) contact guard  -BR     Patient was able to Ambulate yes  -BR     Distance in Feet (Gait) 400  -BR     [place]uel Level (Stairs) contact guard  -BR     Ascending Technique (Stairs) step-over-step  -BR     Descending Technique (Stairs) step-over-step  -BR       Row Name 09/05/24 1239          Mobility    Extremity Weight-bearing Status left upper extremity  -BR     Left Upper Extremity (Weight-bearing Status) non weight-bearing (NWB)  -BR               User Key  (r) = Recorded By, (t) = Taken By, (c) = Cosigned By      Initials Name Provider Type    Helga Joel PT Physical Therapist                   Obj/Interventions       Row Name 09/05/24 1240          Range of Motion  Comprehensive    General Range of Motion bilateral lower extremity ROM WFL  -BR       Row Name 09/05/24 1240          Strength Comprehensive (MMT)    Comment, General Manual Muscle Testing (MMT) Assessment BLE strength grossly 4/5  -BR       Row Name 09/05/24 1240          Balance    Balance Assessment sitting dynamic balance;standing static balance  -BR     Dynamic Sitting Balance independent  -BR     Position, Sitting Balance unsupported;sitting in chair  -BR     Static Standing Balance supervision  -BR     Position/Device Used, Standing Balance unsupported  -BR       Row Name 09/05/24 1240          Sensory Assessment (Somatosensory)    Sensory Assessment (Somatosensory) LE sensation intact  -BR               User Key  (r) = Recorded By, (t) = Taken By, (c) = Cosigned By      Initials Name Provider Type    BR Helga Villagran, PT Physical Therapist                   Goals/Plan    No documentation.                  Clinical Impression       Row Name 09/05/24 1240          Pain    Pretreatment Pain Rating 0/10 - no pain  -BR     Posttreatment Pain Rating 0/10 - no pain  -BR       Row Name 09/05/24 1240          Plan of Care Review    Plan of Care Reviewed With patient  -BR     Outcome Evaluation Pt presents as a 77 y/o M POD # 1 for reverse L TSA per Tiarra. Procedure was tolerated well. Pt has L shoulder immobilizer and is NWB LUE. Pt has hx spinal stenosis and anemia. Pt A and Ox 4 with 0/10 pain. At baseline, pt lives with spouse and is very active and independent in community; home has 2 RYAN. This date, pt transfers with SBA and ambulated 400 feet with SBA of 1. Pt ascended and descended 4 steps with CGA of 1. Pt has no gait deficits and PT will sign-off. PT recommendation is Home with Assist.  -BR       Row Name 09/05/24 1240          Therapy Assessment/Plan (PT)    Criteria for Skilled Interventions Met (PT) no;does not meet criteria for skilled intervention  -BR     Therapy Frequency (PT) evaluation only  -BR        Row Name 09/05/24 1240          Vital Signs    Intratreatment Heart Rate (beats/min) 122  -BR     Posttreatment Heart Rate (beats/min) 110  -BR     O2 Delivery Pre Treatment room air  -BR     Pre Patient Position Supine  -BR     Intra Patient Position Standing  -BR     Post Patient Position Sitting  -BR       Row Name 09/05/24 1240          Positioning and Restraints    Pre-Treatment Position in bed  -BR     Post Treatment Position chair  -BR     In Chair notified nsg;reclined;call light within reach;encouraged to call for assist  -BR               User Key  (r) = Recorded By, (t) = Taken By, (c) = Cosigned By      Initials Name Provider Type    Helga Joel PT Physical Therapist                   Outcome Measures       Row Name 09/05/24 1245 09/05/24 0701       How much help from another person do you currently need...    Turning from your back to your side while in flat bed without using bedrails? 4  -BR 4  -EH    Moving from lying on back to sitting on the side of a flat bed without bedrails? 3  -BR 3  -EH    Moving to and from a bed to a chair (including a wheelchair)? 3  -BR 3  -EH    Standing up from a chair using your arms (e.g., wheelchair, bedside chair)? 4  -BR 3  -EH    Climbing 3-5 steps with a railing? 4  -BR 3  -EH    To walk in hospital room? 4  -BR 3  -EH    AM-PAC 6 Clicks Score (PT) 22  -BR 19  -EH    Highest Level of Mobility Goal 7 --> Walk 25 feet or more  -BR 6 --> Walk 10 steps or more  -EH      Row Name 09/05/24 1245          Functional Assessment    Outcome Measure Options AM-PAC 6 Clicks Basic Mobility (PT)  -BR               User Key  (r) = Recorded By, (t) = Taken By, (c) = Cosigned By      Initials Name Provider Type    Helga Joel, PEGGY Physical Therapist    EH Troy Rebolledo, RN Registered Nurse                                 Physical Therapy Education       Title: PT OT SLP Therapies (Done)       Topic: Physical Therapy (Done)       Point: Mobility training (Done)        Learning Progress Summary             Patient Acceptance, E,D, VU,DU by BR at 9/5/2024 1246                         Point: Body mechanics (Done)       Learning Progress Summary             Patient Acceptance, E,D, VU,DU by BR at 9/5/2024 1246                         Point: Precautions (Done)       Learning Progress Summary             Patient Acceptance, E,D, VU,DU by BR at 9/5/2024 1246                                         User Key       Initials Effective Dates Name Provider Type Discipline     02/01/22 -  Helga Villagran PT Physical Therapist PT                  PT Recommendation and Plan     Plan of Care Reviewed With: patient  Outcome Evaluation: Pt presents as a 77 y/o M POD # 1 for reverse L TSA per Tiarra. Procedure was tolerated well. Pt has L shoulder immobilizer and is NWB LUE. Pt has hx spinal stenosis and anemia. Pt A and Ox 4 with 0/10 pain. At baseline, pt lives with spouse and is very active and independent in community; home has 2 RYAN. This date, pt transfers with SBA and ambulated 400 feet with SBA of 1. Pt ascended and descended 4 steps with CGA of 1. Pt has no gait deficits and PT will sign-off. PT recommendation is Home with Assist.     Time Calculation:         PT Charges       Row Name 09/05/24 1246             Time Calculation    Start Time 0810  -BR      Stop Time 0831  -BR      Time Calculation (min) 21 min  -BR      PT Received On 09/05/24  -BR         Time Calculation- PT    Total Timed Code Minutes- PT 0 minute(s)  -BR                User Key  (r) = Recorded By, (t) = Taken By, (c) = Cosigned By      Initials Name Provider Type    BR Helga Villagran PT Physical Therapist                  Therapy Charges for Today       Code Description Service Date Service Provider Modifiers Qty    [id number] HC PT EVAL LOW COMPLEXITY 3 9/5/2024 Helga Villagran, PT GP 1            PT G-Codes  Outcome Measure Options: AM-PAC 6 Clicks Basic Mobility (PT)  AM-PAC 6 Clicks Score (PT):  22  PT Discharge Summary  Anticipated Discharge Disposition (PT): home with assist    Helga Villagran, PT  9/5/2024

## 2024-09-05 NOTE — THERAPY EVALUATION
Patient Name: Farheen Gomez  : 1945    MRN: [id number]                              Today's Date: 2024       Admit Date: 2024    Visit Dx:     ICD-10-CM ICD-9-CM   1. Primary osteoarthritis of left shoulder  M19.012 715.11   2. Osteoarthritis of left glenohumeral joint  M19.012 715.91     Patient Active Problem List   Diagnosis    Spinal stenosis in cervical region - reveresal of lordosis at C3/4 and C4/5, Modic endpalate cahnges at C7/T1    Hypertension    BPH (benign prostatic hyperplasia)    Anemia    Arthritis    Morbid (severe) obesity due to excess calories    Dyslipidemia    Family history of malignant neoplasm of urinary bladder    Foot pain, right    Ganglion cyst    Hand paresthesia    Hearing problem    History of poliomyelitis    Hyperglycemia    Hypomagnesemia    Other hammer toe(s) (acquired), left foot    Plantar fasciitis, left    Sleep disorder    Tachycardia    Thrombocytopenia    Vitamin D deficiency    Amblyopia    Dry eye    Hypertensive retinopathy of both eyes    Lens replaced by other means    AMD (age-related macular degeneration), bilateral    Nonexudative age-related macular degeneration    Other chronic allergic conjunctivitis    Other specified disorders of eyelid    PCO (posterior capsular opacification), bilateral    Presence of intraocular lens    PVD (posterior vitreous detachment), both eyes    Regular astigmatism    History of bilateral knee arthroplasty    Other headache syndrome    Exposure to COVID-19 virus    Status post total knee replacement, left    Class 1 obesity due to excess calories with serious comorbidity and body mass index (BMI) of 32.0 to 32.9 in adult    Rotator cuff tear arthropathy of both shoulders    Thrombocytopenia due to COVID-19 virus    Chronic midline low back pain    Status post reverse arthroplasty of right shoulder    Pseudophakia, both eyes    Nuclear senile cataract    SPK (superficial punctate keratitis), bilateral    Tear film  insufficiency    Blepharitis    Vitreous degeneration    Fatigue    Primary osteoarthritis of left shoulder    Type 2 diabetes mellitus with hyperglycemia, without long-term current use of insulin    Ingrown toenail    Osteoarthritis of left glenohumeral joint    DJD of left shoulder     Past Medical History:   Diagnosis Date    Anesthesia complication     had trouble urinating after last anesthesia    Arthritis     BPH (benign prostatic hyperplasia)     CTS (carpal tunnel syndrome)     Depression     History of bilateral knee arthroplasty     Hypertension     pre-Diabetes mellitus     Sleep apnea     uses CPAP     Past Surgical History:   Procedure Laterality Date    BACK SURGERY      HAND SURGERY Bilateral     carpal tunnel repair    JOINT REPLACEMENT      KNEE SURGERY      meniscus repair     LUMBAR DISCECTOMY Bilateral 04/22/2019    Procedure: left and right  L4-5 lumbar decompression with dura repair;  Surgeon: Edson Khan MD;  Location: [facility] MAIN OR;  Service: Neurosurgery    ROTATOR CUFF REPAIR Right     SHOULDER SURGERY      TOTAL KNEE ARTHROPLASTY Right 10/23/2019    Procedure: TOTAL KNEE ARTHROPLASTY, WITH LEFT KNEE INJECTION;  Surgeon: Boby Medina MD;  Location: [facility] MAIN OR;  Service: Orthopedics    TOTAL KNEE ARTHROPLASTY Left 02/05/2020    Procedure: TOTAL KNEE ARTHROPLASTY;  Surgeon: Boby Medina MD;  Location: [facility] MAIN OR;  Service: Orthopedics;  Laterality: Left;    TOTAL SHOULDER ARTHROPLASTY W/ DISTAL CLAVICLE EXCISION Right 10/18/2022    Procedure: TOTAL SHOULDER REVERSE ARTHROPLASTY;  Surgeon: Boby Medina MD;  Location: [facility] MAIN OR;  Service: Orthopedics;  Laterality: Right;    TOTAL SHOULDER ARTHROPLASTY W/ DISTAL CLAVICLE EXCISION Left 9/4/2024    Procedure: TOTAL SHOULDER REVERSE ARTHROPLASTY;  Surgeon: Devin Mayen MD;  Location: [facility] MAIN OR;  Service: Orthopedics;  Laterality: Left;      General Information       Row Name 09/05/24 2937          OT Time  and Intention    Document Type evaluation  -MS     Mode of Treatment occupational therapy  -MS       Row Name 09/05/24 1527          General Information    Patient Profile Reviewed yes  -MS     Prior Level of Function independent:;ADL's;driving;all household mobility;community mobility  -MS     Existing Precautions/Restrictions left;shoulder  -MS     Barriers to Rehab none identified  -MS       Row Name 09/05/24 1527          Occupational Profile    Reason for Services/Referral (Occupational Profile) Pt is a 79 y/o M admitted to [facility] 9/4/24, POD#1 L rTSA by Dr. Mayen. PMHx significant for depression, HTN, hearing loss, and DMII. At baseline pt resides with spouse in [facility] with 3 RYAN. Pt typically (I) with ADLs, (I) with mobility and is an active . Pt typically active and tends to 25 acres of land (I).  -MS     Environmental Supports and Barriers (Occupational Profile) supportive family  -MS       Row Name 09/05/24 1527          Living Environment    People in Home spouse  -MS       Row Name 09/05/24 1527          Home Main Entrance    Number of Stairs, Main Entrance three  -MS     Stair Railings, Main Entrance none  -MS       Row Name 09/05/24 1527          Stairs Within Home, Primary    Number of Stairs, Within Home, Primary none  -MS       Row Name 09/05/24 1527          Cognition    Orientation Status (Cognition) oriented x 4  -MS       Row Name 09/05/24 1527          Safety Issues, Functional Mobility    Impairments Affecting Function (Mobility) endurance/activity tolerance;pain;range of motion (ROM);strength  -MS               User Key  (r) = Recorded By, (t) = Taken By, (c) = Cosigned By      Initials Name Provider Type    MS Harper Fuentes OT Occupational Therapist                     Mobility/ADL's       Row Name 09/05/24 1528          Bed Mobility    Bed Mobility bed mobility (all) activities  -MS     All Activities, [place] (Bed Mobility) unable to assess  -MS     Comment, (Bed Mobility) sitting  up in chair upon arrival  -MS       Row Name 09/05/24 1528          Transfers    Transfers sit-stand transfer;toilet transfer  -MS       Row Name 09/05/24 1528          Sit-Stand Transfer    Sit-Stand [place] (Transfers) supervision  -MS       Row Name 09/05/24 1528          Toilet Transfer    Type (Toilet Transfer) sit-stand;stand-sit  -MS     [place] Level (Toilet Transfer) supervision  -MS     Assistive Device (Toilet Transfer) commode  -MS       Row Name 09/05/24 1528          Functional Mobility    Patient was able to Ambulate yes  -MS       Row Name 09/05/24 1528          Activities of Daily Living    BADL Assessment/Intervention upper body dressing;lower body dressing;toileting  -MS       Row Name 09/05/24 1528          Mobility    Extremity Weight-bearing Status left lower extremity  -MS     Left Upper Extremity (Weight-bearing Status) non weight-bearing (NWB)  -MS       Row Name 09/05/24 1528          Upper Body Dressing Assessment/Training    [place] Level (Upper Body Dressing) don;front opening garment;moderate assist (50% patient effort)  -MS     Position (Upper Body Dressing) supported sitting  -MS     Comment, (Upper Body Dressing) don shirt and abductor sling  -MS       Row Name 09/05/24 1528          Lower Body Dressing Assessment/Training    [place] Level (Lower Body Dressing) don;pants/bottoms;moderate assist (50% patient effort)  -MS     Position (Lower Body Dressing) supported sitting;unsupported standing  -MS       Row Name 09/05/24 1528          Toileting Assessment/Training    [place] Level (Toileting) adjust/manage clothing;perform perineal hygiene;supervision  -MS     Assistive Devices (Toileting) commode  -MS     Position (Toileting) unsupported standing  -MS               User Key  (r) = Recorded By, (t) = Taken By, (c) = Cosigned By      Initials Name Provider Type    Harper Loco OT Occupational Therapist                   Obj/Interventions       Row Name  09/05/24 1530          Sensory Assessment (Somatosensory)    Sensory Assessment (Somatosensory) UE sensation intact  -MS       Row Name 09/05/24 1530          Vision Assessment/Intervention    Visual Impairment/Limitations WNL  -MS       Row Name 09/05/24 1530          Range of Motion Comprehensive    Comment, General Range of Motion RUE WNL, LUE not assessed d/t rTSA  -MS       Row Name 09/05/24 1530          Strength Comprehensive (MMT)    Comment, General Manual Muscle Testing (MMT) Assessment RUE grossly 4/5, LUE not assessed d/t rTSA  -MS       Row Name 09/05/24 1530          Balance    Balance Assessment sitting static balance;sitting dynamic balance;standing static balance;standing dynamic balance  -MS     Static Sitting Balance modified independence  -MS     Dynamic Sitting Balance modified independence  -MS     Position, Sitting Balance supported;sitting in chair  -MS     Static Standing Balance supervision  -MS     Dynamic Standing Balance supervision  -MS     Position/Device Used, Standing Balance unsupported  -MS               User Key  (r) = Recorded By, (t) = Taken By, (c) = Cosigned By      Initials Name Provider Type    Harper Loco, OT Occupational Therapist                   Goals/Plan       Row Name 09/05/24 1533          Dressing Goal 1 (OT)    Activity/Device (Dressing Goal 1, OT) dressing skills, all  -MS     [place]/Cues Needed (Dressing Goal 1, OT) modified independence  -MS     Time Frame (Dressing Goal 1, OT) long term goal (LTG);2 weeks  -MS     Progress/Outcome (Dressing Goal 1, OT) new goal  -MS       Row Name 09/05/24 1533          Toileting Goal 1 (OT)    Activity/Device (Toileting Goal 1, OT) toileting skills, all  -MS     [place] Level/Cues Needed (Toileting Goal 1, OT) modified independence  -MS     Time Frame (Toileting Goal 1, OT) long term goal (LTG);2 weeks  -MS     Progress/Outcome (Toileting Goal 1, OT) new goal  -MS       Row Name 09/05/24 1533           Grooming Goal 1 (OT)    Activity/Device (Grooming Goal 1, OT) grooming skills, all  -MS     [place] (Grooming Goal 1, OT) modified independence  -MS     Time Frame (Grooming Goal 1, OT) long term goal (LTG);2 weeks  -MS     Progress/Outcome (Grooming Goal 1, OT) new goal  -MS       Row Name 09/05/24 1533          Therapy Assessment/Plan (OT)    Planned Therapy Interventions (OT) activity tolerance training;adaptive equipment training;BADL retraining;functional balance retraining;IADL retraining;neuromuscular control/coordination retraining;occupation/activity based interventions;passive ROM/stretching;patient/caregiver education/training;ROM/therapeutic exercise;strengthening exercise;transfer/mobility retraining  -MS               User Key  (r) = Recorded By, (t) = Taken By, (c) = Cosigned By      Initials Name Provider Type    MS Harper Fuentes, OT Occupational Therapist                   Clinical Impression       Row Name 09/05/24 1531          Pain Assessment    Pretreatment Pain Rating 0/10 - no pain  -MS     Posttreatment Pain Rating 0/10 - no pain  -MS       Row Name 09/05/24 1531          Plan of Care Review    Plan of Care Reviewed With patient  -MS     Progress no change  -MS     Outcome Evaluation Pt is a 79 y/o M admitted to [facility] 9/4/24, POD#1 L rTSA by Dr. Mayen. At baseline pt resides with spouse in [facility] with 3 RYAN. Pt typically (I) with ADLs, (I) with mobility and is an active . Pt typically active and tends to 25 acres of land (I). This date pt A&Ox4 on RA sitting up in chair, c/o 0/10 pain in LUE. Pt educated on rTSA post-op precautions, LUE HEP, compensatory strategies and AE, verbalized understanding. Pt completes HEP and demo good understanding. Pt requires mod A to don UB clothing, abductor sling and LB clothing and requires supervision for toileting task. Anticipate pt to progress well, likely safe to dc home with family assist and follow up with OPOT when cleared by surgeon. OT will  follow within acute setting.  -MS       Row Name 09/05/24 1531          Therapy Assessment/Plan (OT)    Rehab Potential (OT) good, to achieve stated therapy goals  -MS     Criteria for Skilled Therapeutic Interventions Met (OT) yes;meets criteria;skilled treatment is necessary  -MS     Therapy Frequency (OT) 5 times/wk  -MS     Predicted Duration of Therapy Intervention (OT) until d/c  -MS       Row Name 09/05/24 1531          Therapy Plan Review/Discharge Plan (OT)    Anticipated Discharge Disposition (OT) home with assist;home with outpatient therapy services  -MS       Row Name 09/05/24 1531          Vital Signs    O2 Delivery Pre Treatment room air  -MS     O2 Delivery Intra Treatment room air  -MS     O2 Delivery Post Treatment room air  -MS     Pre Patient Position Sitting  -MS     Intra Patient Position Standing  -MS     Post Patient Position Sitting  -MS       Row Name 09/05/24 1531          Positioning and Restraints    Pre-Treatment Position sitting in chair/recliner  -MS     Post Treatment Position chair  -MS     In Chair notified nsg;sitting;call light within reach;encouraged to call for assist  -MS               User Key  (r) = Recorded By, (t) = Taken By, (c) = Cosigned By      Initials Name Provider Type    Harper Loco, OT Occupational Therapist                   Outcome Measures       Row Name 09/05/24 1534          How much help from another is currently needed...    Putting on and taking off regular lower body clothing? 3  -MS     Bathing (including washing, rinsing, and drying) 3  -MS     Toileting (which includes using toilet bed pan or urinal) 3  -MS     Putting on and taking off regular upper body clothing 3  -MS     Taking care of personal grooming (such as brushing teeth) 3  -MS     Eating meals 3  -MS     AM-PAC 6 Clicks Score (OT) 18  -MS       Row Name 09/05/24 1245 09/05/24 0701       How much help from another person do you currently need...    Turning from your back to your side  while in flat bed without using bedrails? 4  -BR 4  -EH    Moving from lying on back to sitting on the side of a flat bed without bedrails? 3  -BR 3  -EH    Moving to and from a bed to a chair (including a wheelchair)? 3  -BR 3  -EH    Standing up from a chair using your arms (e.g., wheelchair, bedside chair)? 4  -BR 3  -EH    Climbing 3-5 steps with a railing? 4  -BR 3  -EH    To walk in hospital room? 4  -BR 3  -EH    AM-PAC 6 Clicks Score (PT) 22  -BR 19  -EH    Highest Level of Mobility Goal 7 --> Walk 25 feet or more  -BR 6 --> Walk 10 steps or more  -EH      Row Name 09/05/24 1534 09/05/24 1245       Functional Assessment    Outcome Measure Options AM-PAC 6 Clicks Daily Activity (OT)  -MS AM-PAC 6 Clicks Basic Mobility (PT)  -BR              User Key  (r) = Recorded By, (t) = Taken By, (c) = Cosigned By      Initials Name Provider Type    Harper Loco, OT Occupational Therapist    BR Helga Villagran, PT Physical Therapist    EH Troy Rebolledo, RN Registered Nurse                    Occupational Therapy Education       Title: PT OT SLP Therapies (Done)       Topic: Occupational Therapy (Done)       Point: ADL training (Done)       Description:   Instruct learner(s) on proper safety adaptation and remediation techniques during self care or transfers.   Instruct in proper use of assistive devices.                  Learning Progress Summary             Patient Acceptance, E,TB, VU by MS at 9/5/2024 1534                         Point: Home exercise program (Done)       Description:   Instruct learner(s) on appropriate technique for monitoring, assisting and/or progressing therapeutic exercises/activities.                  Learning Progress Summary             Patient Acceptance, E,TB, VU by MS at 9/5/2024 1534                         Point: Precautions (Done)       Description:   Instruct learner(s) on prescribed precautions during self-care and functional transfers.                  Learning Progress Summary              Patient Acceptance, E,TB, VU by MS at 9/5/2024 1534                         Point: Body mechanics (Done)       Description:   Instruct learner(s) on proper positioning and spine alignment during self-care, functional mobility activities and/or exercises.                  Learning Progress Summary             Patient Acceptance, E,TB, VU by MS at 9/5/2024 1534                                         User Key       Initials Effective Dates Name Provider Type Discipline    MS 07/13/22 -  Harper Fuentes OT Occupational Therapist OT                  OT Recommendation and Plan  Planned Therapy Interventions (OT): activity tolerance training, adaptive equipment training, BADL retraining, functional balance retraining, IADL retraining, neuromuscular control/coordination retraining, occupation/activity based interventions, passive ROM/stretching, patient/caregiver education/training, ROM/therapeutic exercise, strengthening exercise, transfer/mobility retraining  Therapy Frequency (OT): 5 times/wk  Plan of Care Review  Plan of Care Reviewed With: patient  Progress: no change  Outcome Evaluation: Pt is a 79 y/o M admitted to [facility] 9/4/24, POD#1 L rTSA by Dr. Mayen. At baseline pt resides with spouse in [facility] with 3 RYAN. Pt typically (I) with ADLs, (I) with mobility and is an active . Pt typically active and tends to [place] acres of land (I). This date pt A&Ox4 on RA sitting up in chair, c/o 0/10 pain in LUE. Pt educated on rTSA post-op precautions, LUE HEP, compensatory strategies and AE, verbalized understanding. Pt completes HEP and demo good understanding. Pt requires mod A to don UB clothing, abductor sling and LB clothing and requires supervision for toileting task. Anticipate pt to progress well, likely safe to dc home with family assist and follow up with OPOT when cleared by surgeon. OT will follow within acute setting.     Time Calculation:                   Harper Fuentes OT  9/5/2024

## 2024-09-05 NOTE — PLAN OF CARE
Goal Outcome Evaluation:  Plan of Care Reviewed With: patient        Progress: no change  Outcome Evaluation: Pt is a 79 y/o M admitted to [facility] 9/4/24, POD#1 L rTSA by Dr. Mayen. At baseline pt resides with spouse in [facility] with 3 RYAN. Pt typically (I) with ADLs, (I) with mobility and is an active . Pt typically active and tends to [place] acres of land (I). This date pt A&Ox4 on RA sitting up in chair, c/o 0/10 pain in LUE. Pt educated on rTSA post-op precautions, LUE HEP, compensatory strategies and AE, verbalized understanding. Pt completes HEP and demo good understanding. Pt requires mod A to don UB clothing, abductor sling and LB clothing and requires supervision for toileting task. Anticipate pt to progress well, likely safe to dc home with family assist and follow up with OPOT when cleared by surgeon. OT will follow within acute setting.      Anticipated Discharge Disposition (OT): home with assist, home with outpatient therapy services

## 2024-09-05 NOTE — OP NOTE
TOTAL SHOULDER REVERSE ARTHROPLASTY  Procedure Report    Patient Name:  Farheen Gomez  YOB: 1945    Date of Surgery:  9/4/2024     Indications: This is a 78 y.o. male with left shoulder pain.  Imaging demonstrated degenerative joint disease.  Treatment options were discussed.  They desired to proceed with reverse shoulder arthroplasty after discussing the risks including bleeding, scarring, infection, stiffness, nerve damage, tendon damage, artery damage, continued  pain, DVT, loss of life or limb, fracture, dislocation, and a need for further surgery.      Pre-op Diagnosis:   Osteoarthritis of left glenohumeral joint [M19.012]       Post-op Diagnosis:    Post-Op Diagnosis Codes:     * Osteoarthritis of left glenohumeral joint [M19.012]    Procedure/CPT® Codes: 52340    Procedure(s): Left  TOTAL SHOULDER REVERSE ARTHROPLASTY    Staff: Leobardo Perez physician assistant    was responsible for performing the following activities: Retraction, Suction, Irrigation, Suturing, Closing, and Placing Dressing and their skilled assistance was necessary for the success of this case.       Anesthesia: General with Block    Estimated Blood Loss: 100ml    Implants:    Implant Name Type Inv. Item Serial No.  Lot No. LRB No. Used Action   SUT NONABS MAXBRAID/PE NMBR2 HC5 38IN LEV [id number] - [id number] Implant SUT NONABS MAXBRAID/PE NMBR2 HC5 38IN LEV [id number]  [employer] [id number] Left 3 Implanted   GLENSPHR TRABECULARMETAL REV 40MM - [id number] Implant GLENSPHR TRABECULARMETAL REV 40MM  [employer] [id number] Left 1 Implanted   BASEPLT KASSI TRABECULARMETAL REV 15MM - [id number] Implant BASEPLT KASSI TRABECULARMETAL REV 15MM  [employer] [id number] Left 1 Implanted   SCRW CANC INV/REV 4.5X30MM - [id number] Implant SCRW CANC INV/REV 4.5X30MM  [employer] [id number] Left 1 Implanted   SCRW CANC INV/REV 4.5X42MM - [id number] Implant SCRW CANC INV/REV 4.5X42MM  [employer] [id number] Left 1 Implanted   STEM  HUM REV NONPOR 64V673AM - [id number] Implant STEM HUM REV NONPOR [id number]  [employer] [id number] Left 1 Implanted   SPACR HUM REV TM PLS 9MM - [id number] Implant SPACR HUM REV TM PLS 9MM  [employer] [id number] Left 1 Implanted   LINER HUM/SHLDR TRABECULARMETAL REV POLY 60DEG 40MM PLS0 - [id number] Implant LINER HUM/SHLDR TRABECULARMETAL REV POLY 60DEG 40MM PLS0  [employer] [id number] Left 1 Implanted       IVF: see anesthesia      Complications: None    Specimens:none    Description of Procedure: The patient's operative site was marked in the  preoperative holding area.  They received regional anesthesia and were brought to  the operating room and placed supine on a well-padded operating room table.  General anesthesia was administered.  Antibiotics were dosed.  A timeout was  taken, confirming the correct operative site and procedure.  They were placed in  the beach chair position.  The left shoulder was prepped and draped in the  standard surgical fashion.  SCDs were placed. A deltopectoral incision was marked and injected with local anesthetic.  The skin was opened.  Flaps were raised.  The interval was opened and the cephalic vein was protected.  Adhesions were released from the deltoid.  The circumflex vessels were identified and ligated with suture and cautery.  The rotator interval was opened and a retractor was placed.  The subscapularis was  Tenotomized and the capsule was released down to the 6 o'clock position on the  humeral head protecting the axillary nerve.  A Fukuda retractor was placed and an inferior and anterior capsular release was performed palpating and protecting the axillary  nerve with my finger at all times.  The shoulder was dislocated.  The biceps  was tenodesed to the upper biceps groove and circumferential osteophytes  were removed to identify the native head-neck junction.  Reaming was started  behind the biceps groove up to a size 13.  The cut was made in 20  degrees of  retroversion and the final two reamers were used to prepare the  proximal humerus.  A trial was placed.  The glenoid was exposed after placing retractors.  The soft tissue was removed circumferentially.  The center point was marked and the glenoid was prepared in standard fashion.  The base plate was placed with good press-fit.  Two screws were placed with good purchase.  The locking caps were  placed.  The glenosphere was placed with good purchase.  The shoulder was  dislocated and the trial was removed from the canal and irrigated.  The true  stem was placed with good press-fit and trialing demonstrated 9/0 thickness to offer the best restoration of joint stability, muscle tension and range of motion.  The true polyethylene was placed with similar range of motion and stability.  A 3-minute Betadine wash performed.  The wound was closed in layers with absorbable suture and skin glue.  A sterile dressing and sling were applied.  They were awakened and taken to the recovery room.  There were no complications.  I was present for all portions.  All counts were correct.   Good capillary refill was noted to the hand.      Devin Mayen MD     Date: 9/5/2024  Time: 08:07 EDT

## 2024-09-05 NOTE — CASE MANAGEMENT/SOCIAL WORK
Case Management Discharge Note      Final Note: HOME         Selected Continued Care - Discharged on 9/5/2024 Admission date: 9/4/2024 - Discharge disposition: Home or Self Care          Therapy Coordination complete.      Service Provider Selected Services Address Phone Fax Patient Preferred    [facility]AP[facility] [address] [place] [address] [place] [address] [phone] --                    Transportation Services  Private: Car    Final Discharge Disposition Code: 01 - home or self-care

## 2024-09-05 NOTE — PLAN OF CARE
Goal Outcome Evaluation:  Plan of Care Reviewed With: patient  Pt presents as a 79 y/o M POD # 1 for reverse L TSA per Tiarra. Procedure was tolerated well. Pt has L shoulder immobilizer and is NWB LUE. Pt has hx spinal stenosis and anemia. Pt A and Ox 4 with 0/10 pain. At baseline, pt lives with spouse and is very active and independent in community; home has 2 RYAN. This date, pt transfers with SBA and ambulated 400 feet with SBA of 1. Pt ascended and descended 4 steps with CGA of 1. Pt has no gait deficits and PT will sign-off. PT recommendation is Home with Assist.                 Anticipated Discharge Disposition (PT): home with assist

## 2024-09-07 RX ORDER — LISINOPRIL 40 MG/1
TABLET ORAL
Qty: 90 TABLET | Refills: 0 | Status: SHIPPED | OUTPATIENT
Start: 2024-09-07

## 2024-09-09 ENCOUNTER — TREATMENT (OUTPATIENT)
Dept: PHYSICAL THERAPY | Facility: CLINIC | Age: 79
End: 2024-09-09
Payer: MEDICARE

## 2024-09-09 DIAGNOSIS — M19.012 PRIMARY OSTEOARTHRITIS OF LEFT SHOULDER: Primary | ICD-10-CM

## 2024-09-09 DIAGNOSIS — R29.898 WEAKNESS OF LEFT SHOULDER: ICD-10-CM

## 2024-09-09 DIAGNOSIS — M25.619 LIMITED RANGE OF MOTION OF SHOULDER: ICD-10-CM

## 2024-09-09 DIAGNOSIS — M25.512 ACUTE PAIN OF LEFT SHOULDER: ICD-10-CM

## 2024-09-09 DIAGNOSIS — Z96.612 S/P REVERSE TOTAL SHOULDER ARTHROPLASTY, LEFT: ICD-10-CM

## 2024-09-09 PROCEDURE — 97535 SELF CARE MNGMENT TRAINING: CPT | Performed by: PHYSICAL THERAPIST

## 2024-09-09 PROCEDURE — 97110 THERAPEUTIC EXERCISES: CPT | Performed by: PHYSICAL THERAPIST

## 2024-09-09 PROCEDURE — 97140 MANUAL THERAPY 1/> REGIONS: CPT | Performed by: PHYSICAL THERAPIST

## 2024-09-09 PROCEDURE — 97161 PT EVAL LOW COMPLEX 20 MIN: CPT | Performed by: PHYSICAL THERAPIST

## 2024-09-09 NOTE — PROGRESS NOTES
Physical Therapy Initial Evaluation and Plan of Care  [address] [facility], [address], [place]  [address]    Patient: Farheen Gomez   : 1945  Diagnosis/ICD-10 Code:  Primary osteoarthritis of left shoulder [M19.012]  Referring practitioner: Devin Mayen, *  Date of Initial Visit: 2024  Today's Date: 2024  Patient seen for 1 sessions           Subjective Questionnaire: QuickDASH: 55% impairment      Subjective Evaluation    History of Present Illness  Mechanism of injury: Patient is a 78 y.o. M who presents s/p L rTSA on 24.  He lives with his wife of 55 years, loves to travel, woodworking, and mowing.  Pain is 3/10, described as soreness not pain.  He is taking Tylenol to address discomfort PRN and using ice PRN.  Patient is already sleeping well and hasn't driven yet.  Personal goals:  return to mowinBookmycab (zero turn), wood working, travel, driving, get out of sling in 3 weeks.  Returns to Dr. Mayen on 24.      Patient Goals  Patient goals for therapy: decreased pain, decreased edema, increased motion, increased strength, return to sport/leisure activities and independence with ADLs/IADLs             Objective QuickDASH function score indicates 55% impairment with limitations in reaching overhead, behind back, grooming, dressing, bathing.  PROM L shoulder: flex = 100 deg, abd = 85 deg, IR = 45 deg, ER = 35 deg.  MMT deferred.  Patient/family education for don/doff sling and AAROM.        Assessment & Plan       Assessment  Impairments: abnormal muscle tone, abnormal or restricted ROM, activity intolerance, impaired physical strength, lacks appropriate home exercise program, pain with function and weight-bearing intolerance   Functional limitations: carrying objects, lifting, pulling, pushing, uncomfortable because of pain, moving in bed, sitting, standing, reaching behind back and reaching overhead     Goals  Plan Goals: Patient independent with HEP in 2 weeks  Tolerate 10 min  Nustep/UBE in 2 weeks  Able to return to driving in 2 weeks  Increase R shoulder PROM flexion/scaption to 120 deg in 2 weeks  Able to ambulate independently without AD and normal arm swing in 2 weeks  Improve function as evidenced by QuickDASH score of 20% or less in 12 weeks (55% impairment initially)  Able to return to mowing and travel in 12 weeks  Perform 10 reps of repeated sit to stand without arms in 30 seconds in 12 weeks       Plan  Therapy options: will be seen for skilled therapy services  Planned modality interventions: cryotherapy  Other planned modality interventions: physical modalities as needed  Planned therapy interventions: flexibility, functional ROM exercises, gait training, home exercise program, joint mobilization, manual therapy, neuromuscular re-education, postural training, strengthening, stretching and therapeutic activities  Frequency: 2x week  Duration in weeks: 12  Treatment plan discussed with: patient and family  Plan details: Wife was present for evaluation and family teaching.        Timed:         Manual Therapy:    15     mins  82377;     Therapeutic Exercise:    15     mins  95707;     Neuromuscular Vanna:        mins  80988;    Therapeutic Activity:          mins  81999;     Gait Training:           mins  80971;     Ultrasound:          mins  92286;    Self-care  __8__ mins 72255    Un-Timed:  Electrical Stimulation:         mins  93463 ( );  Dry Needling          mins self-pay 35421  Traction          mins 49192  Low Eval     22     Mins  03226  Mod Eval          Mins  00204  Canal repositioning _____ mins  13684        Timed Treatment:   38   mins   Total Treatment:     60   mins    PT SIGNATURE: Robin A Sprigler, PT   [place] license # [id number]  Electronically signed by Robin A Sprigler, PT, 09/09/24, 5:05 PM EDT    Initial Certification  Certification Period: 9/9/2024 through 12/7/2024  I certify that the therapy services are furnished while this patient is under my care.   The services outlined above are required by this patient, and will be reviewed every 90 days.     PHYSICIAN: Devin Mayen, MD ______________________________________________________________________________________________     DATE: _________________________________________________________________________________________________________________________________    Please sign and return via fax to [phone]. Thank you, [facility].

## 2024-09-12 ENCOUNTER — TREATMENT (OUTPATIENT)
Dept: PHYSICAL THERAPY | Facility: CLINIC | Age: 79
End: 2024-09-12
Payer: MEDICARE

## 2024-09-12 DIAGNOSIS — R29.898 WEAKNESS OF LEFT SHOULDER: ICD-10-CM

## 2024-09-12 DIAGNOSIS — Z96.612 S/P REVERSE TOTAL SHOULDER ARTHROPLASTY, LEFT: ICD-10-CM

## 2024-09-12 DIAGNOSIS — M25.619 LIMITED RANGE OF MOTION OF SHOULDER: ICD-10-CM

## 2024-09-12 DIAGNOSIS — M25.512 ACUTE PAIN OF LEFT SHOULDER: ICD-10-CM

## 2024-09-12 DIAGNOSIS — M19.012 PRIMARY OSTEOARTHRITIS OF LEFT SHOULDER: Primary | ICD-10-CM

## 2024-09-12 NOTE — PROGRESS NOTES
Physical Therapy Daily Treatment Note    Patient: Farheen Gomez   : 1945  Diagnosis/ICD-10 Code:  Primary osteoarthritis of left shoulder [M19.012]  Referring practitioner: Devin Mayen, *  Date of Initial Visit: Type: THERAPY  Noted: 2024  Today's Date: 2024  Patient seen for 2 sessions             Subjective Patient reports he's sleeping ok for him.  He reports only discomfort, no real pain.  Elbow flexion is the most discomfort so far. Patient ordered shoulder pulley for home but it hasn't come in yet.      Objective   See Exercise, Manual, and Modality Logs for complete treatment. QuickDASH function score indicates 55% impairment with limitations in reaching overhead, behind back, grooming, dressing, bathing.  PROM L shoulder: flex = 130 deg, abd = 85 deg, IR = 45 deg, ER = 40 deg.  MMT deferred.  Reviewed and updated HEP, adding rope and pulley.       Assessment/Plan  Patient independent with HEP in 2 weeks - NOT MET  Tolerate 10 min Nustep/UBE in 2 weeks - NOT MET  Able to return to driving in 2 weeks - MET  Increase R shoulder PROM flexion/scaption to 120 deg in 2 weeks - MET  Able to ambulate independently without AD and normal arm swing in 2 weeks - NOT MET  Improve function as evidenced by QuickDASH score of 20% or less in 12 weeks (55% impairment initially) - NOT MET  Able to return to mowing and travel in 12 weeks - NOT MET  Perform 10 reps of repeated sit to stand without arms in 30 seconds in 12 weeks - NOT MET     Progress per Plan of Care  exp 24           Timed:         Manual Therapy:    15     mins  35475;     Therapeutic Exercise:    15     mins  15562;     Neuromuscular Vanna:   8     mins  45521;    Therapeutic Activity:          mins  44034;     Gait Training:           mins  39513;     Ultrasound:          mins  66704;    Ionto                                   mins   95122  Self Care                            mins   33282      Un-Timed:  Electrical  Stimulation:         mins  28450 ( );  Dry Needling          mins self-pay  Traction          mins 30926  Low Eval          Mins  00928  Mod Eval          Mins  24131  High Eval                            Mins  36013  Canalith Repos                   mins  95361    Timed Treatment:   38   mins   Total Treatment:     38   mins    Robin A Sprigler, PT  Physical Therapist

## 2024-09-16 ENCOUNTER — TREATMENT (OUTPATIENT)
Dept: PHYSICAL THERAPY | Facility: CLINIC | Age: 79
End: 2024-09-16
Payer: MEDICARE

## 2024-09-16 DIAGNOSIS — M25.619 LIMITED RANGE OF MOTION OF SHOULDER: ICD-10-CM

## 2024-09-16 DIAGNOSIS — R29.898 WEAKNESS OF LEFT SHOULDER: ICD-10-CM

## 2024-09-16 DIAGNOSIS — M25.512 ACUTE PAIN OF LEFT SHOULDER: ICD-10-CM

## 2024-09-16 DIAGNOSIS — M19.012 PRIMARY OSTEOARTHRITIS OF LEFT SHOULDER: Primary | ICD-10-CM

## 2024-09-16 DIAGNOSIS — Z96.612 S/P REVERSE TOTAL SHOULDER ARTHROPLASTY, LEFT: ICD-10-CM

## 2024-09-16 PROCEDURE — 97112 NEUROMUSCULAR REEDUCATION: CPT | Performed by: PHYSICAL THERAPIST

## 2024-09-16 PROCEDURE — 97110 THERAPEUTIC EXERCISES: CPT | Performed by: PHYSICAL THERAPIST

## 2024-09-18 ENCOUNTER — TREATMENT (OUTPATIENT)
Dept: PHYSICAL THERAPY | Facility: CLINIC | Age: 79
End: 2024-09-18
Payer: MEDICARE

## 2024-09-18 DIAGNOSIS — M25.619 LIMITED RANGE OF MOTION OF SHOULDER: ICD-10-CM

## 2024-09-18 DIAGNOSIS — Z96.612 S/P REVERSE TOTAL SHOULDER ARTHROPLASTY, LEFT: ICD-10-CM

## 2024-09-18 DIAGNOSIS — M25.512 ACUTE PAIN OF LEFT SHOULDER: ICD-10-CM

## 2024-09-18 DIAGNOSIS — R29.898 WEAKNESS OF LEFT SHOULDER: ICD-10-CM

## 2024-09-18 DIAGNOSIS — M19.012 PRIMARY OSTEOARTHRITIS OF LEFT SHOULDER: Primary | ICD-10-CM

## 2024-09-18 PROCEDURE — 97140 MANUAL THERAPY 1/> REGIONS: CPT

## 2024-09-18 PROCEDURE — 97110 THERAPEUTIC EXERCISES: CPT

## 2024-09-19 ENCOUNTER — OFFICE VISIT (OUTPATIENT)
Dept: ORTHOPEDIC SURGERY | Facility: CLINIC | Age: 79
End: 2024-09-19
Payer: MEDICARE

## 2024-09-19 VITALS — BODY MASS INDEX: 31.78 KG/M2 | HEIGHT: 70 IN | WEIGHT: 222 LBS | HEART RATE: 96 BPM

## 2024-09-19 DIAGNOSIS — Z47.89 ORTHOPEDIC AFTERCARE: Primary | ICD-10-CM

## 2024-09-19 PROCEDURE — 99024 POSTOP FOLLOW-UP VISIT: CPT | Performed by: ORTHOPAEDIC SURGERY

## 2024-09-23 ENCOUNTER — TREATMENT (OUTPATIENT)
Dept: PHYSICAL THERAPY | Facility: CLINIC | Age: 79
End: 2024-09-23
Payer: MEDICARE

## 2024-09-23 DIAGNOSIS — M19.012 PRIMARY OSTEOARTHRITIS OF LEFT SHOULDER: Primary | ICD-10-CM

## 2024-09-23 DIAGNOSIS — R29.898 WEAKNESS OF LEFT SHOULDER: ICD-10-CM

## 2024-09-23 DIAGNOSIS — M25.619 LIMITED RANGE OF MOTION OF SHOULDER: ICD-10-CM

## 2024-09-23 DIAGNOSIS — Z96.612 S/P REVERSE TOTAL SHOULDER ARTHROPLASTY, LEFT: ICD-10-CM

## 2024-09-23 DIAGNOSIS — M25.512 ACUTE PAIN OF LEFT SHOULDER: ICD-10-CM

## 2024-09-23 PROCEDURE — 97140 MANUAL THERAPY 1/> REGIONS: CPT | Performed by: PHYSICAL THERAPIST

## 2024-09-23 PROCEDURE — 97110 THERAPEUTIC EXERCISES: CPT | Performed by: PHYSICAL THERAPIST

## 2024-09-26 ENCOUNTER — TREATMENT (OUTPATIENT)
Dept: PHYSICAL THERAPY | Facility: CLINIC | Age: 79
End: 2024-09-26
Payer: MEDICARE

## 2024-09-26 DIAGNOSIS — R29.898 WEAKNESS OF LEFT SHOULDER: ICD-10-CM

## 2024-09-26 DIAGNOSIS — M25.512 ACUTE PAIN OF LEFT SHOULDER: ICD-10-CM

## 2024-09-26 DIAGNOSIS — M25.619 LIMITED RANGE OF MOTION OF SHOULDER: ICD-10-CM

## 2024-09-26 DIAGNOSIS — M19.012 PRIMARY OSTEOARTHRITIS OF LEFT SHOULDER: Primary | ICD-10-CM

## 2024-09-26 DIAGNOSIS — Z96.612 S/P REVERSE TOTAL SHOULDER ARTHROPLASTY, LEFT: ICD-10-CM

## 2024-09-30 ENCOUNTER — TREATMENT (OUTPATIENT)
Dept: PHYSICAL THERAPY | Facility: CLINIC | Age: 79
End: 2024-09-30
Payer: MEDICARE

## 2024-09-30 DIAGNOSIS — R29.898 WEAKNESS OF LEFT SHOULDER: ICD-10-CM

## 2024-09-30 DIAGNOSIS — M19.012 PRIMARY OSTEOARTHRITIS OF LEFT SHOULDER: Primary | ICD-10-CM

## 2024-09-30 DIAGNOSIS — M25.619 LIMITED RANGE OF MOTION OF SHOULDER: ICD-10-CM

## 2024-09-30 DIAGNOSIS — Z96.612 S/P REVERSE TOTAL SHOULDER ARTHROPLASTY, LEFT: ICD-10-CM

## 2024-09-30 DIAGNOSIS — M25.512 ACUTE PAIN OF LEFT SHOULDER: ICD-10-CM

## 2024-09-30 PROCEDURE — 97110 THERAPEUTIC EXERCISES: CPT

## 2024-09-30 PROCEDURE — 97140 MANUAL THERAPY 1/> REGIONS: CPT

## 2024-09-30 NOTE — PROGRESS NOTES
Physical Therapy Daily Treatment Note      Patient: Farheen Gomez   : 1945  Diagnosis/ICD-10 Code:  Primary osteoarthritis of left shoulder [M19.012]  Referring practitioner: Devin Mayen, *  Date of Initial Visit: Type: THERAPY  Noted: 2024  Today's Date: 2024  Patient seen for 7 sessions         Farheen Gomez reports: he has had no new pains or discomfort Pt. States he feels the arm is getting more and more normal feeling each day.    Objective   See Exercise, Manual, and Modality Logs for complete treatment.     Assessment/Plan  Pt. Will begin strengthening next visit and has met all needed requirements of motion to do so.    Patient independent with HEP in 2 weeks - NOT MET  Tolerate 10 min Nustep/UBE in 2 weeks - NOT MET  Able to return to driving in 2 weeks - MET  Increase R shoulder PROM flexion/scaption to 120 deg in 2 weeks - MET  Able to ambulate independently without AD and normal arm swing in 2 weeks - NOT MET  Improve function as evidenced by QuickDASH score of 20% or less in 12 weeks (55% impairment initially) - NOT MET  Able to return to mowing and travel in 12 weeks - NOT MET  Perform 10 reps of repeated sit to stand without arms in 30 seconds in 12 weeks - NOT MET    Progress strengthening /stabilization /functional activity           Timed:         Manual Therapy:    15     mins  09699;     Therapeutic Exercise:    15     mins  79737;         Timed Treatment:   30   mins   Total Treatment:     30   mins    Nona Lemons PTA  Physical Therapist Assistant License #[id number]

## 2024-10-03 ENCOUNTER — TREATMENT (OUTPATIENT)
Dept: PHYSICAL THERAPY | Facility: CLINIC | Age: 79
End: 2024-10-03
Payer: MEDICARE

## 2024-10-03 DIAGNOSIS — M19.012 PRIMARY OSTEOARTHRITIS OF LEFT SHOULDER: Primary | ICD-10-CM

## 2024-10-03 DIAGNOSIS — Z96.612 S/P REVERSE TOTAL SHOULDER ARTHROPLASTY, LEFT: ICD-10-CM

## 2024-10-03 DIAGNOSIS — M25.619 LIMITED RANGE OF MOTION OF SHOULDER: ICD-10-CM

## 2024-10-03 DIAGNOSIS — R29.898 WEAKNESS OF LEFT SHOULDER: ICD-10-CM

## 2024-10-03 DIAGNOSIS — M25.512 ACUTE PAIN OF LEFT SHOULDER: ICD-10-CM

## 2024-10-03 PROCEDURE — 97112 NEUROMUSCULAR REEDUCATION: CPT

## 2024-10-03 PROCEDURE — 97110 THERAPEUTIC EXERCISES: CPT

## 2024-10-03 PROCEDURE — 97140 MANUAL THERAPY 1/> REGIONS: CPT

## 2024-10-03 NOTE — PROGRESS NOTES
Physical Therapy Daily Treatment Note      Patient: Farheen Gomez   : 1945  Diagnosis/ICD-10 Code:  Primary osteoarthritis of left shoulder [M19.012]  Referring practitioner: Devin Mayen, *  Date of Initial Visit: Type: THERAPY  Noted: 2024  Today's Date: 10/3/2024  Patient seen for 8 sessions         Farheen Gomez reports: he is doing exceptionally well still he feels better every day. Pt. States he feels the two remaining visits he has will be enough for him and he intends to manage his strengthening at home.    Objective   See Exercise, Manual, and Modality Logs for complete treatment.     Assessment/Plan  Pt. Tolerates increase in strengthening this date and stretches are still doing good into full ranges. Pt. Has a bit of discomfort if applying overpressure into flexion.    Patient independent with HEP in 2 weeks - NOT MET  Tolerate 10 min Nustep/UBE in 2 weeks - NOT MET  Able to return to driving in 2 weeks - MET  Increase R shoulder PROM flexion/scaption to 120 deg in 2 weeks - MET  Able to ambulate independently without AD and normal arm swing in 2 weeks - NOT MET  Improve function as evidenced by QuickDASH score of 20% or less in 12 weeks (55% impairment initially) - NOT MET  Able to return to mowing and travel in 12 weeks - NOT MET  Perform 10 reps of repeated sit to stand without arms in 30 seconds in 12 weeks - NOT MET    Progress strengthening /stabilization /functional activity           Timed:         Manual Therapy:    10     mins  63958;     Therapeutic Exercise:    15     mins  84909;     Neuromuscular Vanna:    15    mins  96089;        Timed Treatment:   40   mins   Total Treatment:     40   mins    Nona Lemons PTA  Physical Therapist Assistant License #[id number]

## 2024-10-07 ENCOUNTER — TREATMENT (OUTPATIENT)
Dept: PHYSICAL THERAPY | Facility: CLINIC | Age: 79
End: 2024-10-07
Payer: MEDICARE

## 2024-10-07 DIAGNOSIS — Z96.612 S/P REVERSE TOTAL SHOULDER ARTHROPLASTY, LEFT: ICD-10-CM

## 2024-10-07 DIAGNOSIS — M19.012 PRIMARY OSTEOARTHRITIS OF LEFT SHOULDER: ICD-10-CM

## 2024-10-07 DIAGNOSIS — M25.619 LIMITED RANGE OF MOTION OF SHOULDER: ICD-10-CM

## 2024-10-07 DIAGNOSIS — M25.512 ACUTE PAIN OF LEFT SHOULDER: ICD-10-CM

## 2024-10-07 DIAGNOSIS — R29.898 WEAKNESS OF LEFT SHOULDER: Primary | ICD-10-CM

## 2024-10-07 RX ORDER — FLOMAX 0.4 MG/1
CAPSULE ORAL
COMMUNITY

## 2024-10-07 NOTE — PATIENT INSTRUCTIONS
Health Maintenance Due   Topic Date Due    URINE MICROALBUMIN  Never done    INFLUENZA VACCINE  08/01/2024    COVID-19 Vaccine (6 - 2023-24 season) 09/01/2024    Advise Covid and flu prior to prostate procedure.

## 2024-10-07 NOTE — PROGRESS NOTES
Discharge Summary  Discharge Summary from Physical Therapy Report          Goals: All Met    Discharge Plan: Continue with current home exercise program as instructed    Comments Patient voices readiness for discharge and has met all goals, needs to continue strengthening HEP.    Date of Discharge 10/7/24        Robin A Sprigler, PT  Physical Therapist        Physical Therapy Progress Note / Reassessment  [address] [place] [address], [address], [place]  [address]    Patient: Farheen Gomez   : 1945  Diagnosis/ICD-10 Code:  Weakness of left shoulder [R29.898]  Referring practitioner: Devin Mayen, *  Date of Initial Evaluation:  Type: THERAPY  Noted: 2024  Patient seen for 9 sessions      Subjective:   Visit Diagnoses:    ICD-10-CM ICD-9-CM   1. Weakness of left shoulder  R29.898 781.99   2. Primary osteoarthritis of left shoulder  M19.012 715.11   3. Acute pain of left shoulder  M25.512 719.41   4. S/P reverse total shoulder arthroplasty, left  Z96.612 V43.61   5. Limited range of motion of shoulder  M25.619 719.51         Farheen Gomez reports he's doing great, requests discharge to self-management and HEP  Subjective Questionnaire: QuickDASH: 14% impairment  Clinical Progress: improved  Home Program Compliance: Yes  Treatment has included: therapeutic exercise, neuromuscular re-education, manual therapy, and therapeutic activity      Subjective Patient is out of his sling and has returned to mowing, traveling and woodshop.  He voices readiness for discharge.    Objective QuickDASH function score indicates 14% impairment, improved from 55% impairment with limitations in reaching behind back, lifting.  PROM L shoulder: flex = 150 deg, abd = 150 deg, IR = 45 deg (back pocket), ER = 75 deg (opposite ear).  MMT L shoulder: 4/5 flexion, IR, ER; 4+/5 abduction.  Reviewed and updated HEP, adding resisted motion with blue and red theraband.      Assessment/Plan  Patient independent with HEP in 2 weeks -  MET  Tolerate 10 min Nustep/UBE in 2 weeks - MET  Able to return to driving in 2 weeks - MET  Increase R shoulder PROM flexion/scaption to 120 deg in 2 weeks - MET  Able to ambulate independently without AD and normal arm swing in 2 weeks - MET  Improve function as evidenced by QuickDASH score of 20% or less in 12 weeks (55% impairment initially) - MET, 14% on 10/7/24  Able to return to mowing and travel in 12 weeks - MET  Perform 10 reps of repeated sit to stand without arms in 30 seconds in 12 weeks - MET        Recommendations: Discharge  Timeframe:  N/A  Prognosis to achieve goals: good      Timed:         Manual Therapy:    8     mins  77258;     Therapeutic Exercise:    15     mins  79094;     Neuromuscular Vanna:    15    mins  31873;      Un-Timed:  Re-eval:   15      mins  14792     Timed Treatment:   38   mins   Total Treatment:     53   mins    PT Signature: Robin A Sprigler, PT  [place] License: [id number]

## 2024-10-08 ENCOUNTER — LAB (OUTPATIENT)
Dept: FAMILY MEDICINE CLINIC | Facility: CLINIC | Age: 79
End: 2024-10-08
Payer: MEDICARE

## 2024-10-08 ENCOUNTER — OFFICE VISIT (OUTPATIENT)
Dept: FAMILY MEDICINE CLINIC | Facility: CLINIC | Age: 79
End: 2024-10-08
Payer: MEDICARE

## 2024-10-08 VITALS
HEIGHT: 70 IN | HEART RATE: 78 BPM | SYSTOLIC BLOOD PRESSURE: 125 MMHG | WEIGHT: 228.4 LBS | DIASTOLIC BLOOD PRESSURE: 77 MMHG | OXYGEN SATURATION: 95 % | TEMPERATURE: 98.4 F | BODY MASS INDEX: 32.7 KG/M2

## 2024-10-08 DIAGNOSIS — R53.83 FATIGUE, UNSPECIFIED TYPE: ICD-10-CM

## 2024-10-08 DIAGNOSIS — E11.65 TYPE 2 DIABETES MELLITUS WITH HYPERGLYCEMIA, WITHOUT LONG-TERM CURRENT USE OF INSULIN: ICD-10-CM

## 2024-10-08 DIAGNOSIS — E83.42 HYPOMAGNESEMIA: ICD-10-CM

## 2024-10-08 DIAGNOSIS — G44.89 OTHER HEADACHE SYNDROME: ICD-10-CM

## 2024-10-08 DIAGNOSIS — I10 HYPERTENSION, UNSPECIFIED TYPE: Chronic | ICD-10-CM

## 2024-10-08 DIAGNOSIS — E66.811 CLASS 1 OBESITY DUE TO EXCESS CALORIES WITH SERIOUS COMORBIDITY AND BODY MASS INDEX (BMI) OF 32.0 TO 32.9 IN ADULT: ICD-10-CM

## 2024-10-08 DIAGNOSIS — E78.5 DYSLIPIDEMIA: Chronic | ICD-10-CM

## 2024-10-08 DIAGNOSIS — R00.0 TACHYCARDIA: ICD-10-CM

## 2024-10-08 DIAGNOSIS — G47.9 SLEEP DISORDER: ICD-10-CM

## 2024-10-08 DIAGNOSIS — E66.09 CLASS 1 OBESITY DUE TO EXCESS CALORIES WITH SERIOUS COMORBIDITY AND BODY MASS INDEX (BMI) OF 32.0 TO 32.9 IN ADULT: ICD-10-CM

## 2024-10-08 DIAGNOSIS — N40.1 BENIGN PROSTATIC HYPERPLASIA WITH LOWER URINARY TRACT SYMPTOMS, SYMPTOM DETAILS UNSPECIFIED: Chronic | ICD-10-CM

## 2024-10-08 DIAGNOSIS — E11.65 TYPE 2 DIABETES MELLITUS WITH HYPERGLYCEMIA, WITHOUT LONG-TERM CURRENT USE OF INSULIN: Primary | ICD-10-CM

## 2024-10-08 DIAGNOSIS — M19.012 PRIMARY OSTEOARTHRITIS OF LEFT SHOULDER: ICD-10-CM

## 2024-10-08 LAB
ALBUMIN SERPL-MCNC: 4.2 G/DL (ref 3.5–5.2)
ALBUMIN UR-MCNC: <1.2 MG/DL
ALBUMIN/GLOB SERPL: 1.4 G/DL
ALP SERPL-CCNC: 79 U/L (ref 39–117)
ALT SERPL W P-5'-P-CCNC: 17 U/L (ref 1–41)
ANION GAP SERPL CALCULATED.3IONS-SCNC: 12.5 MMOL/L (ref 5–15)
AST SERPL-CCNC: 18 U/L (ref 1–40)
BASOPHILS # BLD AUTO: 0.04 10*3/MM3 (ref 0–0.2)
BASOPHILS NFR BLD AUTO: 0.8 % (ref 0–1.5)
BILIRUB SERPL-MCNC: 0.3 MG/DL (ref 0–1.2)
BUN SERPL-MCNC: 16 MG/DL (ref 8–23)
BUN/CREAT SERPL: 17.4 (ref 7–25)
CALCIUM SPEC-SCNC: 9.3 MG/DL (ref 8.6–10.5)
CHLORIDE SERPL-SCNC: 102 MMOL/L (ref 98–107)
CHOLEST SERPL-MCNC: 101 MG/DL (ref 0–200)
CO2 SERPL-SCNC: 25.5 MMOL/L (ref 22–29)
CREAT SERPL-MCNC: 0.92 MG/DL (ref 0.76–1.27)
CREAT UR-MCNC: 82.7 MG/DL
DEPRECATED RDW RBC AUTO: 41.1 FL (ref 37–54)
EGFRCR SERPLBLD CKD-EPI 2021: 85.7 ML/MIN/1.73
EOSINOPHIL # BLD AUTO: 0.12 10*3/MM3 (ref 0–0.4)
EOSINOPHIL NFR BLD AUTO: 2.3 % (ref 0.3–6.2)
ERYTHROCYTE [DISTWIDTH] IN BLOOD BY AUTOMATED COUNT: 12.3 % (ref 12.3–15.4)
GLOBULIN UR ELPH-MCNC: 3.1 GM/DL
GLUCOSE SERPL-MCNC: 115 MG/DL (ref 65–99)
HBA1C MFR BLD: 6.3 % (ref 4.8–5.6)
HCT VFR BLD AUTO: 37.1 % (ref 37.5–51)
HDLC SERPL-MCNC: 40 MG/DL (ref 40–60)
HGB BLD-MCNC: 12.3 G/DL (ref 13–17.7)
IMM GRANULOCYTES # BLD AUTO: 0.01 10*3/MM3 (ref 0–0.05)
IMM GRANULOCYTES NFR BLD AUTO: 0.2 % (ref 0–0.5)
LDLC SERPL CALC-MCNC: 39 MG/DL (ref 0–100)
LDLC/HDLC SERPL: 0.9 {RATIO}
LYMPHOCYTES # BLD AUTO: 1.45 10*3/MM3 (ref 0.7–3.1)
LYMPHOCYTES NFR BLD AUTO: 27.9 % (ref 19.6–45.3)
MAGNESIUM SERPL-MCNC: 2.1 MG/DL (ref 1.6–2.4)
MCH RBC QN AUTO: 30.5 PG (ref 26.6–33)
MCHC RBC AUTO-ENTMCNC: 33.2 G/DL (ref 31.5–35.7)
MCV RBC AUTO: 92.1 FL (ref 79–97)
MICROALBUMIN/CREAT UR: NORMAL MG/G{CREAT}
MONOCYTES # BLD AUTO: 0.44 10*3/MM3 (ref 0.1–0.9)
MONOCYTES NFR BLD AUTO: 8.5 % (ref 5–12)
NEUTROPHILS NFR BLD AUTO: 3.14 10*3/MM3 (ref 1.7–7)
NEUTROPHILS NFR BLD AUTO: 60.3 % (ref 42.7–76)
NRBC BLD AUTO-RTO: 0 /100 WBC (ref 0–0.2)
PLATELET # BLD AUTO: 188 10*3/MM3 (ref 140–450)
PMV BLD AUTO: 8.9 FL (ref 6–12)
POTASSIUM SERPL-SCNC: 4.2 MMOL/L (ref 3.5–5.2)
PROT SERPL-MCNC: 7.3 G/DL (ref 6–8.5)
RBC # BLD AUTO: 4.03 10*6/MM3 (ref 4.14–5.8)
SODIUM SERPL-SCNC: 140 MMOL/L (ref 136–145)
TRIGL SERPL-MCNC: 126 MG/DL (ref 0–150)
TSH SERPL DL<=0.05 MIU/L-ACNC: 0.69 UIU/ML (ref 0.27–4.2)
VIT B12 BLD-MCNC: 991 PG/ML (ref 211–946)
VLDLC SERPL-MCNC: 22 MG/DL (ref 5–40)
WBC NRBC COR # BLD AUTO: 5.2 10*3/MM3 (ref 3.4–10.8)

## 2024-10-08 PROCEDURE — 3074F SYST BP LT 130 MM HG: CPT | Performed by: PREVENTIVE MEDICINE

## 2024-10-08 PROCEDURE — 83036 HEMOGLOBIN GLYCOSYLATED A1C: CPT | Performed by: PREVENTIVE MEDICINE

## 2024-10-08 PROCEDURE — 82570 ASSAY OF URINE CREATININE: CPT | Performed by: PREVENTIVE MEDICINE

## 2024-10-08 PROCEDURE — 1159F MED LIST DOCD IN RCRD: CPT | Performed by: PREVENTIVE MEDICINE

## 2024-10-08 PROCEDURE — 82043 UR ALBUMIN QUANTITATIVE: CPT | Performed by: PREVENTIVE MEDICINE

## 2024-10-08 PROCEDURE — 3078F DIAST BP <80 MM HG: CPT | Performed by: PREVENTIVE MEDICINE

## 2024-10-08 PROCEDURE — 83735 ASSAY OF MAGNESIUM: CPT | Performed by: PREVENTIVE MEDICINE

## 2024-10-08 PROCEDURE — G2211 COMPLEX E/M VISIT ADD ON: HCPCS | Performed by: PREVENTIVE MEDICINE

## 2024-10-08 PROCEDURE — 1160F RVW MEDS BY RX/DR IN RCRD: CPT | Performed by: PREVENTIVE MEDICINE

## 2024-10-08 PROCEDURE — 80061 LIPID PANEL: CPT | Performed by: PREVENTIVE MEDICINE

## 2024-10-08 PROCEDURE — 82607 VITAMIN B-12: CPT | Performed by: PREVENTIVE MEDICINE

## 2024-10-08 PROCEDURE — 80053 COMPREHEN METABOLIC PANEL: CPT | Performed by: PREVENTIVE MEDICINE

## 2024-10-08 PROCEDURE — 36415 COLL VENOUS BLD VENIPUNCTURE: CPT

## 2024-10-08 PROCEDURE — 84443 ASSAY THYROID STIM HORMONE: CPT | Performed by: PREVENTIVE MEDICINE

## 2024-10-08 PROCEDURE — 85025 COMPLETE CBC W/AUTO DIFF WBC: CPT | Performed by: PREVENTIVE MEDICINE

## 2024-10-08 PROCEDURE — 99214 OFFICE O/P EST MOD 30 MIN: CPT | Performed by: PREVENTIVE MEDICINE

## 2024-10-08 PROCEDURE — 1126F AMNT PAIN NOTED NONE PRSNT: CPT | Performed by: PREVENTIVE MEDICINE

## 2024-10-08 RX ORDER — ERGOCALCIFEROL 1.25 MG/1
50000 CAPSULE, LIQUID FILLED ORAL WEEKLY
Qty: 12 CAPSULE | Refills: 3 | Status: SHIPPED | OUTPATIENT
Start: 2024-10-08

## 2024-10-08 RX ORDER — DOXYCYCLINE 100 MG/1
100 CAPSULE ORAL 2 TIMES DAILY
Qty: 20 CAPSULE | Refills: 0 | Status: SHIPPED | OUTPATIENT
Start: 2024-10-08

## 2024-10-08 RX ORDER — AZITHROMYCIN 250 MG/1
TABLET, FILM COATED ORAL
Qty: 6 TABLET | Refills: 0 | Status: SHIPPED | OUTPATIENT
Start: 2024-10-08

## 2024-10-08 NOTE — PROGRESS NOTES
Subjective   Farheen Gomez is a 78 y.o. male presents for   Chief Complaint   Patient presents with    Hyperglycemia     3 month follow up  Patient unsure regarding vaccinations    Hypertension       Health Maintenance Due   Topic Date Due    URINE MICROALBUMIN  Never done    INFLUENZA VACCINE  08/01/2024    COVID-19 Vaccine (6 - 2023-24 season) 09/01/2024       Hyperglycemia  Associated symptoms include congestion and fatigue. Pertinent negatives include no fever.   Hypertension       History of Present Illness  The patient is a 78-year-old male here today for a follow-up on type 2 diabetes with hyperglycemia, tachycardia, headache syndrome, sleep disorder, hypomagnesemia, hypertension, fatigue, dyslipidemia, class 1 obesity with a body mass index of 32, and benign prostatic hyperplasia with lower urinary tract symptoms.    He has been monitoring his blood sugar levels, which have remained between 100 and 200. His reading today was 109. The highest recorded level was 159 during a back ablation procedure, and the lowest was 101. His average readings are 119, 124, 110, 116, 118, 125, and 114.    He reports a sensation of fullness in his head and ears, affecting his hearing for the past 6 weeks. There is no pain in the right ear. His headaches have worsened, and his sleep has been disrupted due to congestion, even while using a CPAP machine.    He experienced a cough producing clear yellow sputum about 4 weeks ago, which has since resolved. He underwent shoulder surgery 5 weeks ago and has completed physical therapy, though he reports some soreness with certain movements.    He mentions an enlarged prostate and has had episodes of urinary incontinence. He is scheduled for a procedure to reduce the blood supply to the prostate in November 2024.    He contracted COVID-19 in January 2024 and was treated with Paxlovid. He experienced fatigue after recovering from COVID-19 and gained a few pounds while recovering from  "shoulder surgery. He maintains a low bread diet and avoids pasta.    He takes magnesium as part of a multivitamin and requests a refill of his vitamin D prescription. He reports feeling well overall, except for the congestion.    ALLERGIES  He is allergic to PENICILLIN and PROPRANOLOL.    Vitals:    10/08/24 0958 10/08/24 1005   BP: 127/84 125/77   BP Location: Right arm Right arm   Patient Position: Sitting Sitting   Cuff Size: Large Adult Large Adult   Pulse: 78    Temp: 98.4 °F (36.9 °C)    TempSrc: Tympanic    SpO2: 95%    Weight: 104 kg (228 lb 6.4 oz)    Height: 177.8 cm (70\")      Body mass index is 32.77 kg/m².    Current Outpatient Medications on File Prior to Visit   Medication Sig Dispense Refill    Acetaminophen (TYLENOL ARTHRITIS PAIN PO) Take 2 tablets by mouth 2 (Two) Times a Day.      Alcohol Swabs pads Use 90 each Daily. 90 each 3    amLODIPine (NORVASC) 10 MG tablet Take 1 tablet by mouth Daily. 90 tablet 3    Blood Glucose Monitoring Suppl (Blood Glucose Monitor System) w/Device kit Use 1 kit Daily. 1 each 0    Continuous Glucose  (Dexcom G6 ) device Use 1 each Daily. 4 each 1    Continuous Glucose Sensor (Dexcom G6 Sensor) Use Every 10 (Ten) Days. 6 each 1    Continuous Glucose Transmitter (Dexcom G6 Transmitter) misc Use 4 each 1 (One) Time Per Week. 7 each 1    escitalopram (LEXAPRO) 10 MG tablet Take 1 tablet by mouth once daily 90 tablet 0    Flomax 0.4 MG capsule 24 hr capsule       glucose blood test strip 1 each by Other route Daily. Use as instructed 100 each 12    Lancets 30G misc Use 1 each 4 (Four) Times a Day Before Meals & at Bedtime. 100 each 12    lisinopril (PRINIVIL,ZESTRIL) 40 MG tablet Take 1 tablet by mouth once daily 90 tablet 0    multivitamin with minerals tablet tablet Take 1 tablet by mouth Daily.      Myrbetriq 50 MG tablet sustained-release 24 hour 24 hr tablet Take 50 mg by mouth Daily. 90 tablet 1    rosuvastatin (CRESTOR) 20 MG tablet Take 1 tablet by " mouth once daily (Patient taking differently: Take 1 tablet by mouth Every Night.) 90 tablet 0    traZODone (DESYREL) 50 MG tablet Take 1 tablet by mouth Every Night. 60 tablet 1    [DISCONTINUED] vitamin D (ERGOCALCIFEROL) 1.25 MG (73752 UT) capsule capsule Take 1 capsule by mouth once a week 12 capsule 3    oxyCODONE-acetaminophen (Percocet) 5-325 MG per tablet Take 1 tablet by mouth Every 6 (Six) Hours As Needed for Moderate Pain. (Patient not taking: Reported on 9/19/2024) 28 tablet 0    promethazine (PHENERGAN) 12.5 MG tablet Take 1 tablet by mouth Every 6 (Six) Hours As Needed for Nausea or Vomiting. (Patient not taking: Reported on 9/19/2024) 21 tablet 1    [DISCONTINUED] doxycycline (MONODOX) 100 MG capsule Take 1 capsule by mouth 2 (Two) Times a Day. (Patient not taking: Reported on 10/8/2024) 20 capsule 0     No current facility-administered medications on file prior to visit.       The following portions of the patient's history were reviewed and updated as appropriate: allergies, current medications, past family history, past medical history, past social history, past surgical history, and problem list.    Review of Systems   Constitutional:  Positive for fatigue. Negative for fever.   HENT:  Positive for congestion and sinus pressure.    Genitourinary:  Positive for frequency, urgency and urinary incontinence.   Skin:  Positive for wound.   Neurological:  Positive for headache.   Psychiatric/Behavioral:  Positive for sleep disturbance.        Objective   Physical Exam  Vitals reviewed.   Constitutional:       General: He is not in acute distress.     Appearance: He is well-developed. He is obese. He is not ill-appearing or toxic-appearing.   HENT:      Head: Normocephalic and atraumatic.      Right Ear: Tympanic membrane, ear canal and external ear normal.      Left Ear: Tympanic membrane, ear canal and external ear normal.      Nose: Nose normal.      Mouth/Throat:      Mouth: Mucous membranes are  moist.      Pharynx: No posterior oropharyngeal erythema.   Eyes:      Extraocular Movements: Extraocular movements intact.      Conjunctiva/sclera: Conjunctivae normal.      Pupils: Pupils are equal, round, and reactive to light.   Neck:      Vascular: No carotid bruit.   Cardiovascular:      Rate and Rhythm: Normal rate and regular rhythm.      Heart sounds: Normal heart sounds.   Pulmonary:      Effort: Pulmonary effort is normal.      Breath sounds: Normal breath sounds.   Abdominal:      General: Bowel sounds are normal. There is no distension.      Palpations: Abdomen is soft. There is no mass.      Tenderness: There is no abdominal tenderness. There is no right CVA tenderness or left CVA tenderness.   Musculoskeletal:         General: No tenderness.      Cervical back: Neck supple. No tenderness.   Lymphadenopathy:      Cervical: No cervical adenopathy.   Skin:     General: Skin is warm.      Comments: Operation site on the left shoulder is healing well   Neurological:      General: No focal deficit present.      Mental Status: He is alert and oriented to person, place, and time.   Psychiatric:         Mood and Affect: Mood normal.         Behavior: Behavior normal.       Physical Exam      PHQ-9 Total Score:    Results  Laboratory Studies  Blood sugar 109. Blood sugar 159. Blood sugar 101. Average blood sugar 119, 124, 110, 116, 118, 125, 114.         Assessment & Plan   Diagnoses and all orders for this visit:    1. Type 2 diabetes mellitus with hyperglycemia, without long-term current use of insulin (Primary)  -     Microalbumin / Creatinine Urine Ratio - Urine, Clean Catch  -     Comprehensive Metabolic Panel; Future  -     Hemoglobin A1c; Future    2. Tachycardia    3. Other headache syndrome    4. Sleep disorder    5. Hypomagnesemia  -     Magnesium; Future    6. Hypertension, unspecified type  -     CBC Auto Differential; Future    7. Fatigue, unspecified type  -     TSH Rfx On Abnormal To Free T4;  Future  -     Vitamin B12; Future    8. Dyslipidemia  -     Lipid Panel; Future    9. Class 1 obesity due to excess calories with serious comorbidity and body mass index (BMI) of 32.0 to 32.9 in adult    10. Benign prostatic hyperplasia with lower urinary tract symptoms, symptom details unspecified    11. Primary osteoarthritis of left shoulder    Other orders  -     azithromycin (Zithromax Z-Estuardo) 250 MG tablet; Take 2 tablets the first day, then 1 tablet daily for 4 days.  Dispense: 6 tablet; Refill: 0  -     doxycycline (MONODOX) 100 MG capsule; Take 1 capsule by mouth 2 (Two) Times a Day.  Dispense: 20 capsule; Refill: 0  -     vitamin D (ERGOCALCIFEROL) 1.25 MG (92795 UT) capsule capsule; Take 1 capsule by mouth 1 (One) Time Per Week.  Dispense: 12 capsule; Refill: 3      Assessment & Plan  1. Upper respiratory infection.  He reports feeling congested for the past 6 weeks. Examination reveals some ear wax but no significant ear pain. He will be prescribed doxycycline and a Z-Estuardo. He is advised to use sunscreen when outdoors due to potential photosensitivity from doxycycline. He should discontinue his multivitamin and mineral supplements while on doxycycline. If the antibiotics do not alleviate his congestion, he should inform the clinic.    2. Type 2 Diabetes Mellitus.  His blood sugar levels are well-controlled, with readings between 101 and 159 over the past 6 weeks. He should continue monitoring his blood sugar levels.    3. Tachycardia.  His pulse rate is within normal limits today, indicating no current tachycardia. No further action is required at this time.    4. Headache syndrome.  He reports that his headaches have been exacerbated by his congestion. No new treatment is initiated at this time.    5. Sleep disorder.  His sleep has been disrupted due to congestion, despite using a CPAP machine. No new treatment is initiated at this time.    6. Hypomagnesemia.  He does not currently take magnesium  supplements other than in his multivitamin. No new treatment is initiated at this time.    7. Hypertension.  No specific issues with blood pressure were discussed today. He should continue his current management plan.    8. Hyperglycemia.  His blood sugar levels are well-controlled, with readings between 101 and 159 over the past 6 weeks. He should continue monitoring his blood sugar levels.    9. Fatigue.  He reports that fatigue was an issue after his COVID-19 infection and shoulder surgery but has since improved. No new treatment is initiated at this time.    10. Dyslipidemia.  He is advised to continue watching his saturated fat intake. No new treatment is initiated at this time.    11. Class I obesity.  He is advised to continue his current dietary habits, including minimal bread and pasta intake.    12. Benign prostatic hyperplasia with lower urinary tract symptoms.  He reports episodes of incontinence during a recent vacation. He has scheduled a procedure in November to address this issue.    13. Medication Management.  A refill for his vitamin D supplement has been sent.    14. Health Maintenance.  He is advised to receive the COVID-19 and influenza vaccines prior to his prostate procedure.    Follow-up  Return in 3 months for follow-up.    Patient Instructions     Health Maintenance Due   Topic Date Due    URINE MICROALBUMIN  Never done    INFLUENZA VACCINE  08/01/2024    COVID-19 Vaccine (6 - 2023-24 season) 09/01/2024    Advise Covid and flu prior to prostate procedure.       Patient or patient representative verbalized consent for the use of Ambient Listening during the visit with  Penny Hidalgo MD for chart documentation. 10/8/2024  17:58 EDT

## 2024-10-08 NOTE — PROGRESS NOTES
"Subjective   Farheen Gomez is a 78 y.o. male presents for   Chief Complaint   Patient presents with    Hyperglycemia     3 month follow up  Patient unsure regarding vaccinations    Hypertension       Health Maintenance Due   Topic Date Due    URINE MICROALBUMIN  Never done    INFLUENZA VACCINE  08/01/2024    COVID-19 Vaccine (6 - 2023-24 season) 09/01/2024       Hyperglycemia    Hypertension       History of Present Illness      Vitals:    10/08/24 0958 10/08/24 1005   BP: 127/84 125/77   BP Location: Right arm Right arm   Patient Position: Sitting Sitting   Cuff Size: Large Adult Large Adult   Pulse: 78    Temp: 98.4 °F (36.9 °C)    TempSrc: Tympanic    SpO2: 95%    Weight: 104 kg (228 lb 6.4 oz)    Height: 177.8 cm (70\")      Body mass index is 32.77 kg/m².    Current Outpatient Medications on File Prior to Visit   Medication Sig Dispense Refill    Acetaminophen (TYLENOL ARTHRITIS PAIN PO) Take 2 tablets by mouth 2 (Two) Times a Day.      Alcohol Swabs pads Use 90 each Daily. 90 each 3    amLODIPine (NORVASC) 10 MG tablet Take 1 tablet by mouth Daily. 90 tablet 3    Blood Glucose Monitoring Suppl (Blood Glucose Monitor System) w/Device kit Use 1 kit Daily. 1 each 0    Continuous Glucose  (Dexcom G6 ) device Use 1 each Daily. 4 each 1    Continuous Glucose Sensor (Dexcom G6 Sensor) Use Every 10 (Ten) Days. 6 each 1    Continuous Glucose Transmitter (Dexcom G6 Transmitter) misc Use 4 each 1 (One) Time Per Week. 7 each 1    escitalopram (LEXAPRO) 10 MG tablet Take 1 tablet by mouth once daily 90 tablet 0    Flomax 0.4 MG capsule 24 hr capsule       glucose blood test strip 1 each by Other route Daily. Use as instructed 100 each 12    Lancets 30G misc Use 1 each 4 (Four) Times a Day Before Meals & at Bedtime. 100 each 12    lisinopril (PRINIVIL,ZESTRIL) 40 MG tablet Take 1 tablet by mouth once daily 90 tablet 0    multivitamin with minerals tablet tablet Take 1 tablet by mouth Daily.      Myrbetriq " 50 MG tablet sustained-release 24 hour 24 hr tablet Take 50 mg by mouth Daily. 90 tablet 1    rosuvastatin (CRESTOR) 20 MG tablet Take 1 tablet by mouth once daily (Patient taking differently: Take 1 tablet by mouth Every Night.) 90 tablet 0    traZODone (DESYREL) 50 MG tablet Take 1 tablet by mouth Every Night. 60 tablet 1    [DISCONTINUED] vitamin D (ERGOCALCIFEROL) 1.25 MG (40833 UT) capsule capsule Take 1 capsule by mouth once a week 12 capsule 3    oxyCODONE-acetaminophen (Percocet) 5-325 MG per tablet Take 1 tablet by mouth Every 6 (Six) Hours As Needed for Moderate Pain. (Patient not taking: Reported on 9/19/2024) 28 tablet 0    promethazine (PHENERGAN) 12.5 MG tablet Take 1 tablet by mouth Every 6 (Six) Hours As Needed for Nausea or Vomiting. (Patient not taking: Reported on 9/19/2024) 21 tablet 1    [DISCONTINUED] doxycycline (MONODOX) 100 MG capsule Take 1 capsule by mouth 2 (Two) Times a Day. (Patient not taking: Reported on 10/8/2024) 20 capsule 0     No current facility-administered medications on file prior to visit.       The following portions of the patient's history were reviewed and updated as appropriate: allergies, current medications, past family history, past medical history, past social history, past surgical history, and problem list.    Review of Systems    Objective   Physical Exam  Cardiovascular:      Pulses:           Dorsalis pedis pulses are 1+ on the right side and 1+ on the left side.        Posterior tibial pulses are 1+ on the right side and 1+ on the left side.   Feet:      Right foot:      Protective Sensation: 10 sites tested.  6 sites sensed.      Skin integrity: Skin integrity normal. Callus present.      Toenail Condition: Right toenails are normal.      Left foot:      Protective Sensation: 10 sites tested.  7 sites sensed.      Skin integrity: Skin integrity normal. Callus present.      Toenail Condition: Left toenails are normal.      Comments: Diabetic Foot Exam  Performed and Monofilament Test Performed        Physical Exam      PHQ-9 Total Score:    Results           Assessment & Plan   Diagnoses and all orders for this visit:    1. Type 2 diabetes mellitus with hyperglycemia, without long-term current use of insulin (Primary)  -     Microalbumin / Creatinine Urine Ratio - Urine, Clean Catch  -     Comprehensive Metabolic Panel; Future  -     Hemoglobin A1c; Future    2. Tachycardia    3. Other headache syndrome    4. Sleep disorder    5. Hypomagnesemia  -     Magnesium; Future    6. Hypertension, unspecified type  -     CBC Auto Differential; Future    7. Fatigue, unspecified type  -     TSH Rfx On Abnormal To Free T4; Future  -     Vitamin B12; Future    8. Dyslipidemia  -     Lipid Panel; Future    9. Class 1 obesity due to excess calories with serious comorbidity and body mass index (BMI) of 32.0 to 32.9 in adult    10. Benign prostatic hyperplasia with lower urinary tract symptoms, symptom details unspecified    11. Primary osteoarthritis of left shoulder    Other orders  -     azithromycin (Zithromax Z-Estuardo) 250 MG tablet; Take 2 tablets the first day, then 1 tablet daily for 4 days.  Dispense: 6 tablet; Refill: 0  -     doxycycline (MONODOX) 100 MG capsule; Take 1 capsule by mouth 2 (Two) Times a Day.  Dispense: 20 capsule; Refill: 0  -     vitamin D (ERGOCALCIFEROL) 1.25 MG (10315 UT) capsule capsule; Take 1 capsule by mouth 1 (One) Time Per Week.  Dispense: 12 capsule; Refill: 3      Assessment & Plan      Patient Instructions     Health Maintenance Due   Topic Date Due    URINE MICROALBUMIN  Never done    INFLUENZA VACCINE  08/01/2024    COVID-19 Vaccine (6 - 2023-24 season) 09/01/2024    Advise Covid and flu prior to prostate procedure.       Patient or patient representative verbalized consent for the use of Ambient Listening during the visit with  Penny Hidalgo MD for chart documentation. 10/8/2024  18:02 EDT

## 2024-10-09 ENCOUNTER — TELEPHONE (OUTPATIENT)
Dept: FAMILY MEDICINE CLINIC | Facility: CLINIC | Age: 79
End: 2024-10-09
Payer: MEDICARE

## 2024-10-09 NOTE — TELEPHONE ENCOUNTER
HUB TO RELAY    Anemia is improving after your surgery but is not yet back to normal so continue to eat foods that are rich in iron and vitamin C. The vitamin B12 is still slightly elevated so you can decrease a day or 2/week blood sugar was 115 with A1c showing control at 6.3 call if any other questions or concerns. ----- Message from Penny Hidalgo sent at 10/9/2024  8:23 AM EDT -----        Labs look normal- keep up good work

## 2024-10-09 NOTE — TELEPHONE ENCOUNTER
HUB TO RELAY----- Message from Penny Hidalgo sent at 10/9/2024  8:25 AM EDT -----  Anemia is improving after your surgery but is not yet back to normal so continue to eat foods that are rich in iron and vitamin C.  The vitamin B12 is still slightly elevated so you can decrease a day or 2/week blood sugar was 115 with A1c showing control at 6.3 call if any other questions or concerns.

## 2024-10-09 NOTE — PROGRESS NOTES
Anemia is improving after your surgery but is not yet back to normal so continue to eat foods that are rich in iron and vitamin C.  The vitamin B12 is still slightly elevated so you can decrease a day or 2/week blood sugar was 115 with A1c showing control at 6.3 call if any other questions or concerns.

## 2024-10-10 RX ORDER — TRAZODONE HYDROCHLORIDE 50 MG/1
50 TABLET, FILM COATED ORAL NIGHTLY
Qty: 60 TABLET | Refills: 0 | Status: SHIPPED | OUTPATIENT
Start: 2024-10-10

## 2024-10-11 ENCOUNTER — POP HEALTH PHARMACY (OUTPATIENT)
Dept: PHARMACY | Facility: OTHER | Age: 79
End: 2024-10-11
Payer: MEDICARE

## 2024-10-16 ENCOUNTER — TELEPHONE (OUTPATIENT)
Dept: ORTHOPEDIC SURGERY | Facility: CLINIC | Age: 79
End: 2024-10-16
Payer: MEDICARE

## 2024-10-16 RX ORDER — ESCITALOPRAM OXALATE 10 MG/1
TABLET ORAL
Qty: 90 TABLET | Refills: 0 | Status: SHIPPED | OUTPATIENT
Start: 2024-10-16

## 2024-10-16 NOTE — TELEPHONE ENCOUNTER
ANDRES FROM [facility] CALLED. SHE IS WANTING TO KNOW IF THE PATIENTS NEEDS ANY PRE-MEDS BEFORE CLEANING. PATIENT APPT IS TOMORROW MORNING. CALL BACK NUMBER [phone]

## 2024-10-16 NOTE — TELEPHONE ENCOUNTER
I called and left Renetta a message that Dr Mayen doesn't recommend any dental treatment until 3 months past surgery and will need pre-medication until 3 yrs out.

## 2024-10-17 ENCOUNTER — OFFICE VISIT (OUTPATIENT)
Dept: ORTHOPEDIC SURGERY | Facility: CLINIC | Age: 79
End: 2024-10-17
Payer: MEDICARE

## 2024-10-17 VITALS — HEART RATE: 96 BPM | BODY MASS INDEX: 32.64 KG/M2 | HEIGHT: 70 IN | WEIGHT: 228 LBS

## 2024-10-17 DIAGNOSIS — Z47.89 ORTHOPEDIC AFTERCARE: Primary | ICD-10-CM

## 2024-10-17 PROCEDURE — 99024 POSTOP FOLLOW-UP VISIT: CPT | Performed by: ORTHOPAEDIC SURGERY

## 2024-10-17 NOTE — PROGRESS NOTES
"     Patient ID: Farheen Gomez is a 78 y.o. male.  9/4/24 left reverse total shoulder  Pain minimal done with therapy      Objective:    Pulse 96   Ht 177.8 cm (70\")   Wt 103 kg (228 lb)   BMI 32.71 kg/m²     Physical Examination:    Left shoulder healed incision no tenderness active elevation 160 abduction 130 external rotation 40    Imaging:      Assessment:  Doing well after shoulder arthroplasty    Plan:    Activity as tolerated x-ray in 4 months  "

## 2024-12-05 ENCOUNTER — POP HEALTH PHARMACY (OUTPATIENT)
Dept: PHARMACY | Facility: OTHER | Age: 79
End: 2024-12-05
Payer: MEDICARE

## 2024-12-05 RX ORDER — ROSUVASTATIN CALCIUM 20 MG/1
20 TABLET, COATED ORAL NIGHTLY
Qty: 90 TABLET | Refills: 0 | Status: SHIPPED | OUTPATIENT
Start: 2024-12-05

## 2024-12-05 RX ORDER — MIRABEGRON 50 MG/1
50 TABLET, FILM COATED, EXTENDED RELEASE ORAL DAILY
Qty: 90 TABLET | Refills: 0 | Status: SHIPPED | OUTPATIENT
Start: 2024-12-05

## 2024-12-05 RX ORDER — LISINOPRIL 40 MG/1
TABLET ORAL
Qty: 90 TABLET | Refills: 0 | Status: SHIPPED | OUTPATIENT
Start: 2024-12-05

## 2024-12-09 RX ORDER — TRAZODONE HYDROCHLORIDE 50 MG/1
50 TABLET, FILM COATED ORAL NIGHTLY
Qty: 60 TABLET | Refills: 0 | Status: SHIPPED | OUTPATIENT
Start: 2024-12-09

## 2024-12-26 ENCOUNTER — TELEPHONE (OUTPATIENT)
Dept: ORTHOPEDIC SURGERY | Facility: CLINIC | Age: 79
End: 2024-12-26

## 2024-12-26 RX ORDER — CLINDAMYCIN HYDROCHLORIDE 300 MG/1
600 CAPSULE ORAL ONCE
Qty: 2 CAPSULE | Refills: 0 | Status: SHIPPED | OUTPATIENT
Start: 2024-12-26 | End: 2024-12-26

## 2024-12-26 NOTE — TELEPHONE ENCOUNTER
Caller: EraFarheen perez    Relationship to patient: Self    Best call back number: [phone] (home)     Patient is needing: MR VELIZ IS HAVING TO CROWNS PUT IN ON 1/21/24 AND NEEDS TO BE PRESCRIBED ANTIBIOTICS BEFORE HE HAS THAT DONE    [facility] [address] [place] [address] - [phone] [facility] - [phone] [place] [phone] [facility] [phone]

## 2025-01-09 NOTE — PATIENT INSTRUCTIONS
Health Maintenance Due   Topic Date Due    INFLUENZA VACCINE  07/01/2024    COVID-19 Vaccine (6 - 2024-25 season) 09/01/2024    HEMOGLOBIN A1C  04/08/2025

## 2025-01-10 ENCOUNTER — OFFICE VISIT (OUTPATIENT)
Dept: FAMILY MEDICINE CLINIC | Facility: CLINIC | Age: 80
End: 2025-01-10
Payer: MEDICARE

## 2025-01-10 ENCOUNTER — LAB (OUTPATIENT)
Dept: FAMILY MEDICINE CLINIC | Facility: CLINIC | Age: 80
End: 2025-01-10
Payer: MEDICARE

## 2025-01-10 VITALS
HEIGHT: 70 IN | TEMPERATURE: 98 F | SYSTOLIC BLOOD PRESSURE: 124 MMHG | OXYGEN SATURATION: 95 % | DIASTOLIC BLOOD PRESSURE: 78 MMHG | WEIGHT: 233.4 LBS | HEART RATE: 85 BPM | BODY MASS INDEX: 33.41 KG/M2

## 2025-01-10 DIAGNOSIS — R53.83 FATIGUE, UNSPECIFIED TYPE: ICD-10-CM

## 2025-01-10 DIAGNOSIS — N40.1 BENIGN PROSTATIC HYPERPLASIA WITH LOWER URINARY TRACT SYMPTOMS, SYMPTOM DETAILS UNSPECIFIED: Chronic | ICD-10-CM

## 2025-01-10 DIAGNOSIS — D69.6 THROMBOCYTOPENIA: ICD-10-CM

## 2025-01-10 DIAGNOSIS — E66.811 CLASS 1 OBESITY DUE TO EXCESS CALORIES WITH SERIOUS COMORBIDITY AND BODY MASS INDEX (BMI) OF 33.0 TO 33.9 IN ADULT: ICD-10-CM

## 2025-01-10 DIAGNOSIS — Z00.01 ENCOUNTER FOR ANNUAL GENERAL MEDICAL EXAMINATION WITH ABNORMAL FINDINGS IN ADULT: Primary | ICD-10-CM

## 2025-01-10 DIAGNOSIS — E78.5 DYSLIPIDEMIA: Chronic | ICD-10-CM

## 2025-01-10 DIAGNOSIS — G44.89 OTHER HEADACHE SYNDROME: ICD-10-CM

## 2025-01-10 DIAGNOSIS — E83.42 HYPOMAGNESEMIA: ICD-10-CM

## 2025-01-10 DIAGNOSIS — G47.9 SLEEP DISORDER: ICD-10-CM

## 2025-01-10 DIAGNOSIS — E11.65 TYPE 2 DIABETES MELLITUS WITH HYPERGLYCEMIA, WITHOUT LONG-TERM CURRENT USE OF INSULIN: ICD-10-CM

## 2025-01-10 DIAGNOSIS — Z23 NEED FOR INFLUENZA VACCINATION: ICD-10-CM

## 2025-01-10 DIAGNOSIS — Z23 NEED FOR COVID-19 VACCINE: ICD-10-CM

## 2025-01-10 DIAGNOSIS — E66.09 CLASS 1 OBESITY DUE TO EXCESS CALORIES WITH SERIOUS COMORBIDITY AND BODY MASS INDEX (BMI) OF 33.0 TO 33.9 IN ADULT: ICD-10-CM

## 2025-01-10 DIAGNOSIS — R00.0 TACHYCARDIA: ICD-10-CM

## 2025-01-10 DIAGNOSIS — I10 HYPERTENSION, UNSPECIFIED TYPE: Chronic | ICD-10-CM

## 2025-01-10 LAB
ALBUMIN SERPL-MCNC: 4.5 G/DL (ref 3.5–5.2)
ALBUMIN UR-MCNC: 2.3 MG/DL
ALBUMIN/GLOB SERPL: 1.7 G/DL
ALP SERPL-CCNC: 77 U/L (ref 39–117)
ALT SERPL W P-5'-P-CCNC: 28 U/L (ref 1–41)
ANION GAP SERPL CALCULATED.3IONS-SCNC: 7 MMOL/L (ref 5–15)
AST SERPL-CCNC: 24 U/L (ref 1–40)
BASOPHILS # BLD AUTO: 0.04 10*3/MM3 (ref 0–0.2)
BASOPHILS NFR BLD AUTO: 0.8 % (ref 0–1.5)
BILIRUB SERPL-MCNC: 0.3 MG/DL (ref 0–1.2)
BUN SERPL-MCNC: 14 MG/DL (ref 8–23)
BUN/CREAT SERPL: 13.9 (ref 7–25)
CALCIUM SPEC-SCNC: 9.6 MG/DL (ref 8.6–10.5)
CHLORIDE SERPL-SCNC: 105 MMOL/L (ref 98–107)
CHOLEST SERPL-MCNC: 107 MG/DL (ref 0–200)
CO2 SERPL-SCNC: 29 MMOL/L (ref 22–29)
CREAT SERPL-MCNC: 1.01 MG/DL (ref 0.76–1.27)
CREAT UR-MCNC: 188.8 MG/DL
DEPRECATED RDW RBC AUTO: 41.3 FL (ref 37–54)
EGFRCR SERPLBLD CKD-EPI 2021: 76.1 ML/MIN/1.73
EOSINOPHIL # BLD AUTO: 0.09 10*3/MM3 (ref 0–0.4)
EOSINOPHIL NFR BLD AUTO: 1.8 % (ref 0.3–6.2)
ERYTHROCYTE [DISTWIDTH] IN BLOOD BY AUTOMATED COUNT: 12.9 % (ref 12.3–15.4)
GLOBULIN UR ELPH-MCNC: 2.6 GM/DL
GLUCOSE SERPL-MCNC: 124 MG/DL (ref 65–99)
HBA1C MFR BLD: 6.2 % (ref 4.8–5.6)
HCT VFR BLD AUTO: 40.9 % (ref 37.5–51)
HDLC SERPL-MCNC: 42 MG/DL (ref 40–60)
HGB BLD-MCNC: 14.3 G/DL (ref 13–17.7)
IMM GRANULOCYTES # BLD AUTO: 0.01 10*3/MM3 (ref 0–0.05)
IMM GRANULOCYTES NFR BLD AUTO: 0.2 % (ref 0–0.5)
LDLC SERPL CALC-MCNC: 45 MG/DL (ref 0–100)
LDLC/HDLC SERPL: 1.04 {RATIO}
LYMPHOCYTES # BLD AUTO: 1.61 10*3/MM3 (ref 0.7–3.1)
LYMPHOCYTES NFR BLD AUTO: 32.6 % (ref 19.6–45.3)
MAGNESIUM SERPL-MCNC: 2 MG/DL (ref 1.6–2.4)
MCH RBC QN AUTO: 31 PG (ref 26.6–33)
MCHC RBC AUTO-ENTMCNC: 35 G/DL (ref 31.5–35.7)
MCV RBC AUTO: 88.5 FL (ref 79–97)
MICROALBUMIN/CREAT UR: 12.2 MG/G (ref 0–29)
MONOCYTES # BLD AUTO: 0.42 10*3/MM3 (ref 0.1–0.9)
MONOCYTES NFR BLD AUTO: 8.5 % (ref 5–12)
NEUTROPHILS NFR BLD AUTO: 2.77 10*3/MM3 (ref 1.7–7)
NEUTROPHILS NFR BLD AUTO: 56.1 % (ref 42.7–76)
NRBC BLD AUTO-RTO: 0 /100 WBC (ref 0–0.2)
PLATELET # BLD AUTO: 155 10*3/MM3 (ref 140–450)
PMV BLD AUTO: 9.1 FL (ref 6–12)
POTASSIUM SERPL-SCNC: 4.1 MMOL/L (ref 3.5–5.2)
PROT SERPL-MCNC: 7.1 G/DL (ref 6–8.5)
RBC # BLD AUTO: 4.62 10*6/MM3 (ref 4.14–5.8)
SODIUM SERPL-SCNC: 141 MMOL/L (ref 136–145)
TRIGL SERPL-MCNC: 107 MG/DL (ref 0–150)
TSH SERPL DL<=0.05 MIU/L-ACNC: 0.92 UIU/ML (ref 0.27–4.2)
VIT B12 BLD-MCNC: 1219 PG/ML (ref 211–946)
VLDLC SERPL-MCNC: 20 MG/DL (ref 5–40)
WBC NRBC COR # BLD AUTO: 4.94 10*3/MM3 (ref 3.4–10.8)

## 2025-01-10 PROCEDURE — 91320 SARSCV2 VAC 30MCG TRS-SUC IM: CPT | Performed by: PREVENTIVE MEDICINE

## 2025-01-10 PROCEDURE — G0008 ADMIN INFLUENZA VIRUS VAC: HCPCS | Performed by: PREVENTIVE MEDICINE

## 2025-01-10 PROCEDURE — 99397 PER PM REEVAL EST PAT 65+ YR: CPT | Performed by: PREVENTIVE MEDICINE

## 2025-01-10 PROCEDURE — 1170F FXNL STATUS ASSESSED: CPT | Performed by: PREVENTIVE MEDICINE

## 2025-01-10 PROCEDURE — 80053 COMPREHEN METABOLIC PANEL: CPT | Performed by: PREVENTIVE MEDICINE

## 2025-01-10 PROCEDURE — 1160F RVW MEDS BY RX/DR IN RCRD: CPT | Performed by: PREVENTIVE MEDICINE

## 2025-01-10 PROCEDURE — 83036 HEMOGLOBIN GLYCOSYLATED A1C: CPT | Performed by: PREVENTIVE MEDICINE

## 2025-01-10 PROCEDURE — 83735 ASSAY OF MAGNESIUM: CPT | Performed by: PREVENTIVE MEDICINE

## 2025-01-10 PROCEDURE — G0439 PPPS, SUBSEQ VISIT: HCPCS | Performed by: PREVENTIVE MEDICINE

## 2025-01-10 PROCEDURE — 84443 ASSAY THYROID STIM HORMONE: CPT | Performed by: PREVENTIVE MEDICINE

## 2025-01-10 PROCEDURE — 82043 UR ALBUMIN QUANTITATIVE: CPT | Performed by: PREVENTIVE MEDICINE

## 2025-01-10 PROCEDURE — 90662 IIV NO PRSV INCREASED AG IM: CPT | Performed by: PREVENTIVE MEDICINE

## 2025-01-10 PROCEDURE — 90480 ADMN SARSCOV2 VAC 1/ONLY CMP: CPT | Performed by: PREVENTIVE MEDICINE

## 2025-01-10 PROCEDURE — 82570 ASSAY OF URINE CREATININE: CPT | Performed by: PREVENTIVE MEDICINE

## 2025-01-10 PROCEDURE — 3074F SYST BP LT 130 MM HG: CPT | Performed by: PREVENTIVE MEDICINE

## 2025-01-10 PROCEDURE — 3078F DIAST BP <80 MM HG: CPT | Performed by: PREVENTIVE MEDICINE

## 2025-01-10 PROCEDURE — 85025 COMPLETE CBC W/AUTO DIFF WBC: CPT | Performed by: PREVENTIVE MEDICINE

## 2025-01-10 PROCEDURE — 1126F AMNT PAIN NOTED NONE PRSNT: CPT | Performed by: PREVENTIVE MEDICINE

## 2025-01-10 PROCEDURE — 36415 COLL VENOUS BLD VENIPUNCTURE: CPT

## 2025-01-10 PROCEDURE — 82607 VITAMIN B-12: CPT | Performed by: PREVENTIVE MEDICINE

## 2025-01-10 PROCEDURE — 80061 LIPID PANEL: CPT | Performed by: PREVENTIVE MEDICINE

## 2025-01-10 PROCEDURE — 1159F MED LIST DOCD IN RCRD: CPT | Performed by: PREVENTIVE MEDICINE

## 2025-01-10 NOTE — PROGRESS NOTES
Subjective   The ABCs of the Annual Wellness Visit  Medicare Wellness Visit      Farheen Gomez is a 79 y.o. patient who presents for a Medicare Wellness Visit.    The following portions of the patient's history were reviewed and   updated as appropriate: allergies, current medications, past family history, past medical history, past social history, past surgical history, and problem list.    Compared to one year ago, the patient's physical   health is the same.  Compared to one year ago, the patient's mental   health is the same.    Recent Hospitalizations:  He was admitted within the past 365 days at [facility].     Current Medical Providers:  Patient Care Team:  Penny Hidalgo MD as PCP - General (Sleep Medicine)  Lex Villa MD as Consulting Physician (Urology)  Adria Gallegos MD as Consulting Physician (Pulmonary Disease)  JASVIR MARTINES Kristopher Todd, MD as Consulting Physician (Orthopedic Surgery)    Outpatient Medications Prior to Visit   Medication Sig Dispense Refill    Acetaminophen (TYLENOL ARTHRITIS PAIN PO) Take 2 tablets by mouth 2 (Two) Times a Day.      Alcohol Swabs pads Use 90 each Daily. 90 each 3    amLODIPine (NORVASC) 10 MG tablet Take 1 tablet by mouth Daily. 90 tablet 3    Blood Glucose Monitoring Suppl (Blood Glucose Monitor System) w/Device kit Use 1 kit Daily. 1 each 0    escitalopram (LEXAPRO) 10 MG tablet Take 1 tablet by mouth once daily 90 tablet 0    Flomax 0.4 MG capsule 24 hr capsule       glucose blood test strip 1 each by Other route Daily. Use as instructed 100 each 12    Lancets 30G misc Use 1 each 4 (Four) Times a Day Before Meals & at Bedtime. 100 each 12    lisinopril (PRINIVIL,ZESTRIL) 40 MG tablet Take 1 tablet by mouth once daily 90 tablet 0    multivitamin with minerals tablet tablet Take 1 tablet by mouth Daily.      Myrbetriq 50 MG tablet sustained-release 24 hour 24 hr tablet Take 1 tablet by mouth once daily 90  tablet 0    rosuvastatin (CRESTOR) 20 MG tablet Take 1 tablet by mouth Every Night. 90 tablet 0    traZODone (DESYREL) 50 MG tablet TAKE 1 TABLET BY MOUTH ONCE DAILY AT NIGHT 60 tablet 0    vitamin D (ERGOCALCIFEROL) 1.25 MG (14936 UT) capsule capsule Take 1 capsule by mouth 1 (One) Time Per Week. 12 capsule 3    Continuous Glucose  (Dexcom G6 ) device Use 1 each Daily. 4 each 1    Continuous Glucose Sensor (Dexcom G6 Sensor) Use Every 10 (Ten) Days. 6 each 1    Continuous Glucose Transmitter (Dexcom G6 Transmitter) misc Use 4 each 1 (One) Time Per Week. 7 each 1    azithromycin (Zithromax Z-Estuardo) 250 MG tablet Take 2 tablets the first day, then 1 tablet daily for 4 days. 6 tablet 0    doxycycline (MONODOX) 100 MG capsule Take 1 capsule by mouth 2 (Two) Times a Day. 20 capsule 0    oxyCODONE-acetaminophen (Percocet) 5-325 MG per tablet Take 1 tablet by mouth Every 6 (Six) Hours As Needed for Moderate Pain. (Patient not taking: Reported on 10/17/2024) 28 tablet 0    promethazine (PHENERGAN) 12.5 MG tablet Take 1 tablet by mouth Every 6 (Six) Hours As Needed for Nausea or Vomiting. (Patient not taking: Reported on 10/17/2024) 21 tablet 1     No facility-administered medications prior to visit.     No opioid medication identified on active medication list. I have reviewed chart for other potential  high risk medication/s and harmful drug interactions in the elderly.      Aspirin is not on active medication list.  Aspirin use is not indicated based on review of current medical condition/s. Risk of harm outweighs potential benefits.  .    Patient Active Problem List   Diagnosis    Spinal stenosis in cervical region - reveresal of lordosis at C3/4 and C4/5, Modic endpalate cahnges at C7/T1    Hypertension    BPH (benign prostatic hyperplasia)    Anemia    Arthritis    Morbid (severe) obesity due to excess calories    Dyslipidemia    Family history of malignant neoplasm of urinary bladder    Foot pain, right     Ganglion cyst    Hand paresthesia    Hearing problem    History of poliomyelitis    Hypomagnesemia    Other hammer toe(s) (acquired), left foot    Plantar fasciitis, left    Sleep disorder    Tachycardia    Thrombocytopenia    Vitamin D deficiency    Amblyopia    Dry eye    Hypertensive retinopathy of both eyes    Lens replaced by other means    AMD (age-related macular degeneration), bilateral    Nonexudative age-related macular degeneration    Other chronic allergic conjunctivitis    Other specified disorders of eyelid    PCO (posterior capsular opacification), bilateral    Presence of intraocular lens    PVD (posterior vitreous detachment), both eyes    Regular astigmatism    History of bilateral knee arthroplasty    Other headache syndrome    Exposure to COVID-19 virus    Status post total knee replacement, left    Class 1 obesity due to excess calories with serious comorbidity and body mass index (BMI) of 33.0 to 33.9 in adult    Rotator cuff tear arthropathy of both shoulders    Thrombocytopenia due to COVID-19 virus    Chronic midline low back pain    Status post reverse arthroplasty of right shoulder    Pseudophakia, both eyes    Nuclear senile cataract    SPK (superficial punctate keratitis), bilateral    Tear film insufficiency    Blepharitis    Vitreous degeneration    Fatigue    Primary osteoarthritis of left shoulder    Type 2 diabetes mellitus with hyperglycemia, without long-term current use of insulin    Ingrown toenail    Osteoarthritis of left glenohumeral joint     Advance Care Planning Advance Directive is on file.  ACP discussion was held with the patient during this visit. Patient has an advance directive in EMR which is still valid.             Objective   Vitals:    01/10/25 1011 01/10/25 1012   BP: 128/84 124/78   BP Location: Left arm Right arm   Patient Position: Sitting Sitting   Cuff Size: Adult Adult   Pulse: 81 85   Temp: 98 °F (36.7 °C)    TempSrc: Temporal    SpO2: 95%    Weight:  "106 kg (233 lb 6.4 oz)    Height: 177.8 cm (70\")    PainSc: 0-No pain        Estimated body mass index is 33.49 kg/m² as calculated from the following:    Height as of this encounter: 177.8 cm (70\").    Weight as of this encounter: 106 kg (233 lb 6.4 oz).                Does the patient have evidence of cognitive impairment? Yes                                                                                                Health  Risk Assessment    Smoking Status:  Social History     Tobacco Use   Smoking Status Former    Current packs/day: 0.00    Average packs/day: 0.3 packs/day for 2.0 years (0.5 ttl pk-yrs)    Types: Cigarettes    Start date: 1993    Quit date: 1995    Years since quittin.0   Smokeless Tobacco Never     Alcohol Consumption:  Social History     Substance and Sexual Activity   Alcohol Use No       Fall Risk Screen  STEADI Fall Risk Assessment was completed, and patient is at LOW risk for falls.Assessment completed on:1/10/2025    Depression Screening   Little interest or pleasure in doing things? Not at all   Feeling down, depressed, or hopeless? Not at all   PHQ-2 Total Score 0      Health Habits and Functional and Cognitive Screenin/10/2025    10:13 AM   Functional & Cognitive Status   Do you have difficulty preparing food and eating? No   Do you have difficulty bathing yourself, getting dressed or grooming yourself? No   Do you have difficulty using the toilet? No   Do you have difficulty moving around from place to place? No   Do you have trouble with steps or getting out of a bed or a chair? No   Current Diet Well Balanced Diet   Dental Exam Up to date   Eye Exam Up to date   Exercise (times per week) 7 times per week   Current Exercises Include Yard Work   Do you need help using the phone?  No   Are you deaf or do you have serious difficulty hearing?  Yes   Do you need help to go to places out of walking distance? No   Do you need help shopping? No   Do you need help " preparing meals?  No   Do you need help with housework?  No   Do you need help with laundry? No   Do you need help taking your medications? No   Do you need help managing money? No   Do you ever drive or ride in a car without wearing a seat belt? No   Have you felt unusual stress, anger or loneliness in the last month? No   Who do you live with? Spouse   If you need help, do you have trouble finding someone available to you? No   Have you been bothered in the last four weeks by sexual problems? No   Do you have difficulty concentrating, remembering or making decisions? No           Age-appropriate Screening Schedule:  Refer to the list below for future screening recommendations based on patient's age, sex and/or medical conditions. Orders for these recommended tests are listed in the plan section. The patient has been provided with a written plan.    Health Maintenance List  Health Maintenance   Topic Date Due    HEMOGLOBIN A1C  04/08/2025    BMI FOLLOWUP  09/05/2025    DIABETIC EYE EXAM  09/27/2025    DIABETIC FOOT EXAM  10/08/2025    LIPID PANEL  10/08/2025    ANNUAL WELLNESS VISIT  01/10/2026    TDAP/TD VACCINES (4 - Td or Tdap) 06/20/2034    HEPATITIS C SCREENING  Completed    COVID-19 Vaccine  Completed    RSV Vaccine - Adults  Completed    INFLUENZA VACCINE  Completed    Pneumococcal Vaccine 65+  Completed    ZOSTER VACCINE  Completed    URINE MICROALBUMIN  Discontinued    COLORECTAL CANCER SCREENING  Discontinued                                                                                                                                                CMS Preventative Services Quick Reference  Risk Factors Identified During Encounter  Fall Risk-High or Moderate: Discussed Fall Prevention in the home    The above risks/problems have been discussed with the patient.  Pertinent information has been shared with the patient in the After Visit Summary.  An After Visit Summary and PPPS were made available to the  patient.    Follow Up:   Next Medicare Wellness visit to be scheduled in 1 year.         Additional E&M Note during same encounter follows:  Patient has additional, significant, and separately identifiable condition(s)/problem(s) that require work above and beyond the Medicare Wellness Visit     Chief Complaint  Medicare Wellness-subsequent (Patient is fasting/No concerns today)    Subjective    HPI         The patient is a 79-year-old male who presents today for his age-specific physical and annual wellness exam for Medicare. He has type 2 diabetes with hyperglycemia without long-term recurrent use of insulin, thrombocytopenia, tachycardia, sleep disorder, headache syndrome, hypomagnesemia, hypertension, fatigue, dyslipidemia, class I obesity due to excess calories with serious comorbidities and a body mass index of 33, benign prostatic hyperplasia with lower urinary tract symptoms, and need for COVID-19 and influenza vaccinations.    He reports no significant health issues at present, although he experienced nasal congestion this morning. He has had ophthalmological and dental examinations within the past year and is scheduled for dental crown placement in the coming weeks. He reports no dysphagia or indigestion. He is not experiencing hemoptysis, wheezing, or headaches. He reports no chest discomfort or palpitations. His bowel movements are regular, and he reports no dysuria or hematuria. He has a history of constipation but currently manages it well. He reports no persistent diarrhea or melena. His average blood glucose level over the past 82 days has been 119.8. He perceives his physical and mental health to be stable compared to the previous year. He has an advanced directive in place and reports no changes to it. He has received both the influenza and COVID-19 vaccines today. He is aware of the importance of portion control, regular walking, and monitoring cholesterol and saturated fat intake. He reports  "increased sedentary behavior during this time of year but maintains good sleep quality. He has not experienced any recent headaches. He reports elevated blood pressure and heart rate today, with home readings typically ranging between 115 and 119/74 and a heart rate of 71 to 74 beats per minute.    He reports urinary dribbling and is scheduled for a UroLift procedure in approximately 10 days to 2 weeks.    Supplemental Information  He underwent shoulder surgery at [facility] within the past year, performed by Dr. Maldonado, and was advised to wear a sling for 3 months postoperatively. However, he discontinued its use after 3 weeks and has since been doing well. He completed physical therapy during his hospital stay and continues to perform the exercises 3 times daily at home.    ALLERGIES  The patient is allergic to PENICILLIN and PROPRANOLOL.    IMMUNIZATIONS  He received both the influenza vaccine and the COVID-19 vaccine today.  Review of Systems   Constitutional:  Positive for fatigue. Negative for fever.   Genitourinary:  Positive for difficulty urinating.   Neurological:  Positive for weakness.   Psychiatric/Behavioral:  Positive for sleep disturbance.           Objective   Vital Signs:  /78 (BP Location: Right arm, Patient Position: Sitting, Cuff Size: Adult)   Pulse 85   Temp 98 °F (36.7 °C) (Temporal)   Ht 177.8 cm (70\")   Wt 106 kg (233 lb 6.4 oz)   SpO2 95%   BMI 33.49 kg/m²   Physical Exam  Vitals reviewed.   Constitutional:       General: He is not in acute distress.     Appearance: He is well-developed. He is obese. He is not ill-appearing or toxic-appearing.   HENT:      Head: Normocephalic and atraumatic.      Right Ear: Tympanic membrane, ear canal and external ear normal.      Left Ear: Tympanic membrane, ear canal and external ear normal.      Nose: Nose normal.      Mouth/Throat:      Mouth: Mucous membranes are moist.      Pharynx: No posterior oropharyngeal erythema.   Eyes:      " Extraocular Movements: Extraocular movements intact.      Conjunctiva/sclera: Conjunctivae normal.      Pupils: Pupils are equal, round, and reactive to light.   Neck:      Vascular: No carotid bruit.   Cardiovascular:      Rate and Rhythm: Normal rate and regular rhythm.      Heart sounds: Normal heart sounds.   Pulmonary:      Effort: Pulmonary effort is normal.      Breath sounds: Normal breath sounds.   Abdominal:      General: Bowel sounds are normal. There is no distension.      Palpations: Abdomen is soft. There is no mass.      Tenderness: There is no abdominal tenderness. There is no right CVA tenderness or left CVA tenderness.   Musculoskeletal:         General: Normal range of motion.      Cervical back: Neck supple. No tenderness.      Right lower leg: No edema.      Left lower leg: No edema.   Lymphadenopathy:      Cervical: No cervical adenopathy.   Skin:     General: Skin is warm.   Neurological:      General: No focal deficit present.      Mental Status: He is alert and oriented to person, place, and time.   Psychiatric:         Mood and Affect: Mood normal.         Behavior: Behavior normal.           Both eardrums appear normal. Oral exam was performed.  Carotids in the neck sound normal.  Lungs sound normal.  Pulse rate is 80 and rhythm is regular.    Vital Signs  Blood pressure readings are 124/78 and 128/84. Heart rate is 81.            Results  Laboratory Studies  Average blood sugar over the last 82 days is 119.8.              Assessment and Plan        1. Annual wellness examination.  His blood pressure readings today were within the normal range, measuring 124/78 and 128/84, with a heart rate of 81. He reports no issues with swallowing, digestion, or bowel movements, although he struggles with constipation but is managing well. He has not experienced any chest pressure, heart flutters, or headaches. He received both the flu shot and the COVID-19 vaccine today. A comprehensive set of  laboratory tests will be ordered, including complete blood count, kidney function test, liver function test, A1c, cholesterol, magnesium, microalbumin, thyroid, and B12. He is advised to continue wearing sunscreen and a seat belt, and to hold onto railings when navigating ice and snow.    2. Type 2 diabetes mellitus with hyperglycemia.  His average blood sugar over the last 82 days is 119.8. An A1c test will be conducted today to monitor his diabetes management. He is advised to continue monitoring his blood sugar levels and maintain his current regimen. If his A1c is below the diabetic level, the follow-up will be extended to 6 months.    3. Benign prostatic hyperplasia with lower urinary tract symptoms.  He reports dribbling and is scheduled for a procedure in 10 days to 2 weeks to place microbeads in the prostate. He is advised to follow up with his urologist as scheduled.    4. Thrombocytopenia.  A complete blood count will be ordered to monitor his platelet levels.    5. Tachycardia.  His heart rate is currently stable at 81.    6. Sleep disorder.  He reports that his sleep quality is good.    7. Headache syndrome.  He has not experienced any headaches recently.    8. Hypomagnesemia.  A magnesium level test will be ordered to monitor his condition.    9. Hypertension.  His blood pressure readings today were within the normal range. He is advised to continue monitoring his blood pressure and maintain his current regimen.    10. Fatigue.  He reports feeling fatigued, especially during this time of year when he is less active. He is advised to try to increase his physical activity as tolerated.    11. Dyslipidemia.  A lipid panel will be ordered to monitor his cholesterol levels. He is advised to continue watching his saturated fat intake and maintain a healthy diet.    12. Obesity.  He has a body mass index of 33. He is advised to continue monitoring his portion sizes and maintain regular physical  activity.    Follow-up  The patient is scheduled for a follow-up visit in 3 months.    PROCEDURE  The patient underwent shoulder surgery at [facility] within the past year, performed by Dr. Maldonado.            Follow Up   Return in about 3 months (around 4/10/2025).  Patient was given instructions and counseling regarding his condition or for health maintenance advice. Please see specific information pulled into the AVS if appropriate.  Patient or patient representative verbalized consent for the use of Ambient Listening during the visit with  Penny Hidalgo MD for chart documentation. 1/10/2025  12:35 EST

## 2025-01-11 ENCOUNTER — TELEPHONE (OUTPATIENT)
Dept: FAMILY MEDICINE CLINIC | Facility: CLINIC | Age: 80
End: 2025-01-11
Payer: MEDICARE

## 2025-01-11 NOTE — TELEPHONE ENCOUNTER
HUB TO RELAY ----- Message from Penny Hidalgo sent at 1/11/2025  8:54 AM EST -----  Blood sugar was 124 but A1c shows improvement at 6.2.  Continue to watch her carbs and keep monitoring and walk when possible.  Vitamin B12 is elevated if you are taking by mouth you can decrease the dose a couple of days per week.  Have a safe trip and call if any other questions or concerns

## 2025-01-11 NOTE — PROGRESS NOTES
Blood sugar was 124 but A1c shows improvement at 6.2.  Continue to watch her carbs and keep monitoring and walk when possible.  Vitamin B12 is elevated if you are taking by mouth you can decrease the dose a couple of days per week.  Have a safe trip and call if any other questions or concerns

## 2025-01-13 RX ORDER — ESCITALOPRAM OXALATE 10 MG/1
TABLET ORAL
Qty: 90 TABLET | Refills: 0 | Status: SHIPPED | OUTPATIENT
Start: 2025-01-13

## 2025-02-04 RX ORDER — TRAZODONE HYDROCHLORIDE 50 MG/1
50 TABLET, FILM COATED ORAL NIGHTLY
Qty: 60 TABLET | Refills: 0 | Status: SHIPPED | OUTPATIENT
Start: 2025-02-04

## 2025-02-06 RX ORDER — ESCITALOPRAM OXALATE 10 MG/1
TABLET ORAL
Qty: 90 TABLET | Refills: 0 | Status: SHIPPED | OUTPATIENT
Start: 2025-02-06

## 2025-03-05 RX ORDER — LISINOPRIL 40 MG/1
TABLET ORAL
Qty: 90 TABLET | Refills: 0 | Status: SHIPPED | OUTPATIENT
Start: 2025-03-05

## 2025-03-05 RX ORDER — MIRABEGRON 50 MG/1
50 TABLET, FILM COATED, EXTENDED RELEASE ORAL DAILY
Qty: 90 TABLET | Refills: 0 | Status: SHIPPED | OUTPATIENT
Start: 2025-03-05

## 2025-03-10 RX ORDER — ROSUVASTATIN CALCIUM 20 MG/1
20 TABLET, COATED ORAL NIGHTLY
Qty: 90 TABLET | Refills: 0 | Status: SHIPPED | OUTPATIENT
Start: 2025-03-10

## 2025-04-10 NOTE — PATIENT INSTRUCTIONS
[facility] called stating pt was being discharged this weekend, labs will not be drawn at their facility and patient should have done with us.    Patient scheduled 5/6/25 at [facility] for labs     Review of HEP and precautions

## 2025-04-11 ENCOUNTER — LAB (OUTPATIENT)
Dept: FAMILY MEDICINE CLINIC | Facility: CLINIC | Age: 80
End: 2025-04-11
Payer: MEDICARE

## 2025-04-11 ENCOUNTER — OFFICE VISIT (OUTPATIENT)
Dept: FAMILY MEDICINE CLINIC | Facility: CLINIC | Age: 80
End: 2025-04-11
Payer: MEDICARE

## 2025-04-11 VITALS
TEMPERATURE: 98.2 F | HEIGHT: 70 IN | OXYGEN SATURATION: 97 % | SYSTOLIC BLOOD PRESSURE: 126 MMHG | DIASTOLIC BLOOD PRESSURE: 74 MMHG | WEIGHT: 238.8 LBS | BODY MASS INDEX: 34.19 KG/M2 | HEART RATE: 74 BPM

## 2025-04-11 DIAGNOSIS — E83.42 HYPOMAGNESEMIA: ICD-10-CM

## 2025-04-11 DIAGNOSIS — N40.1 BENIGN PROSTATIC HYPERPLASIA WITH LOWER URINARY TRACT SYMPTOMS, SYMPTOM DETAILS UNSPECIFIED: Chronic | ICD-10-CM

## 2025-04-11 DIAGNOSIS — E78.5 DYSLIPIDEMIA: Chronic | ICD-10-CM

## 2025-04-11 DIAGNOSIS — E55.9 VITAMIN D DEFICIENCY: Chronic | ICD-10-CM

## 2025-04-11 DIAGNOSIS — E11.65 TYPE 2 DIABETES MELLITUS WITH HYPERGLYCEMIA, WITHOUT LONG-TERM CURRENT USE OF INSULIN: ICD-10-CM

## 2025-04-11 DIAGNOSIS — L98.9 SKIN LESION OF CHEEK: ICD-10-CM

## 2025-04-11 DIAGNOSIS — R00.0 TACHYCARDIA: ICD-10-CM

## 2025-04-11 DIAGNOSIS — I10 HYPERTENSION, UNSPECIFIED TYPE: Primary | Chronic | ICD-10-CM

## 2025-04-11 DIAGNOSIS — E66.811 CLASS 1 OBESITY DUE TO EXCESS CALORIES WITH SERIOUS COMORBIDITY AND BODY MASS INDEX (BMI) OF 33.0 TO 33.9 IN ADULT: ICD-10-CM

## 2025-04-11 DIAGNOSIS — E66.09 CLASS 1 OBESITY DUE TO EXCESS CALORIES WITH SERIOUS COMORBIDITY AND BODY MASS INDEX (BMI) OF 33.0 TO 33.9 IN ADULT: ICD-10-CM

## 2025-04-11 DIAGNOSIS — M16.0 BILATERAL HIP JOINT ARTHRITIS: ICD-10-CM

## 2025-04-11 DIAGNOSIS — G47.9 SLEEP DISORDER: ICD-10-CM

## 2025-04-11 DIAGNOSIS — G44.89 OTHER HEADACHE SYNDROME: ICD-10-CM

## 2025-04-11 DIAGNOSIS — R53.83 FATIGUE, UNSPECIFIED TYPE: ICD-10-CM

## 2025-04-11 DIAGNOSIS — G89.29 CHRONIC MIDLINE LOW BACK PAIN WITHOUT SCIATICA: ICD-10-CM

## 2025-04-11 DIAGNOSIS — I10 HYPERTENSION, UNSPECIFIED TYPE: Chronic | ICD-10-CM

## 2025-04-11 DIAGNOSIS — M54.50 CHRONIC MIDLINE LOW BACK PAIN WITHOUT SCIATICA: ICD-10-CM

## 2025-04-11 LAB
25(OH)D3 SERPL-MCNC: 53.7 NG/ML (ref 30–100)
ALBUMIN SERPL-MCNC: 4.4 G/DL (ref 3.5–5.2)
ALBUMIN UR-MCNC: 1.9 MG/DL
ALBUMIN/GLOB SERPL: 1.8 G/DL
ALP SERPL-CCNC: 77 U/L (ref 39–117)
ALT SERPL W P-5'-P-CCNC: 25 U/L (ref 1–41)
ANION GAP SERPL CALCULATED.3IONS-SCNC: 10.2 MMOL/L (ref 5–15)
AST SERPL-CCNC: 22 U/L (ref 1–40)
BASOPHILS # BLD AUTO: 0.03 10*3/MM3 (ref 0–0.2)
BASOPHILS NFR BLD AUTO: 0.6 % (ref 0–1.5)
BILIRUB SERPL-MCNC: 0.4 MG/DL (ref 0–1.2)
BUN SERPL-MCNC: 15 MG/DL (ref 8–23)
BUN/CREAT SERPL: 16.5 (ref 7–25)
CALCIUM SPEC-SCNC: 9.2 MG/DL (ref 8.6–10.5)
CHLORIDE SERPL-SCNC: 104 MMOL/L (ref 98–107)
CHOLEST SERPL-MCNC: 104 MG/DL (ref 0–200)
CO2 SERPL-SCNC: 25.8 MMOL/L (ref 22–29)
CREAT SERPL-MCNC: 0.91 MG/DL (ref 0.76–1.27)
CREAT UR-MCNC: 160.8 MG/DL
DEPRECATED RDW RBC AUTO: 42 FL (ref 37–54)
EGFRCR SERPLBLD CKD-EPI 2021: 86.3 ML/MIN/1.73
EOSINOPHIL # BLD AUTO: 0.11 10*3/MM3 (ref 0–0.4)
EOSINOPHIL NFR BLD AUTO: 2.1 % (ref 0.3–6.2)
ERYTHROCYTE [DISTWIDTH] IN BLOOD BY AUTOMATED COUNT: 12.7 % (ref 12.3–15.4)
GLOBULIN UR ELPH-MCNC: 2.5 GM/DL
GLUCOSE SERPL-MCNC: 115 MG/DL (ref 65–99)
HBA1C MFR BLD: 6.2 % (ref 4.8–5.6)
HCT VFR BLD AUTO: 39.8 % (ref 37.5–51)
HDLC SERPL-MCNC: 40 MG/DL (ref 40–60)
HGB BLD-MCNC: 13.7 G/DL (ref 13–17.7)
IMM GRANULOCYTES # BLD AUTO: 0 10*3/MM3 (ref 0–0.05)
IMM GRANULOCYTES NFR BLD AUTO: 0 % (ref 0–0.5)
LDLC SERPL CALC-MCNC: 40 MG/DL (ref 0–100)
LDLC/HDLC SERPL: 0.92 {RATIO}
LYMPHOCYTES # BLD AUTO: 1.46 10*3/MM3 (ref 0.7–3.1)
LYMPHOCYTES NFR BLD AUTO: 28.3 % (ref 19.6–45.3)
MAGNESIUM SERPL-MCNC: 2.1 MG/DL (ref 1.6–2.4)
MCH RBC QN AUTO: 31.2 PG (ref 26.6–33)
MCHC RBC AUTO-ENTMCNC: 34.4 G/DL (ref 31.5–35.7)
MCV RBC AUTO: 90.7 FL (ref 79–97)
MICROALBUMIN/CREAT UR: 11.8 MG/G (ref 0–29)
MONOCYTES # BLD AUTO: 0.42 10*3/MM3 (ref 0.1–0.9)
MONOCYTES NFR BLD AUTO: 8.2 % (ref 5–12)
NEUTROPHILS NFR BLD AUTO: 3.13 10*3/MM3 (ref 1.7–7)
NEUTROPHILS NFR BLD AUTO: 60.8 % (ref 42.7–76)
NRBC BLD AUTO-RTO: 0 /100 WBC (ref 0–0.2)
PLATELET # BLD AUTO: 162 10*3/MM3 (ref 140–450)
PMV BLD AUTO: 9.1 FL (ref 6–12)
POTASSIUM SERPL-SCNC: 4.2 MMOL/L (ref 3.5–5.2)
PROT SERPL-MCNC: 6.9 G/DL (ref 6–8.5)
RBC # BLD AUTO: 4.39 10*6/MM3 (ref 4.14–5.8)
SODIUM SERPL-SCNC: 140 MMOL/L (ref 136–145)
TRIGL SERPL-MCNC: 137 MG/DL (ref 0–150)
TSH SERPL DL<=0.05 MIU/L-ACNC: 0.83 UIU/ML (ref 0.27–4.2)
VIT B12 BLD-MCNC: 854 PG/ML (ref 211–946)
VLDLC SERPL-MCNC: 24 MG/DL (ref 5–40)
WBC NRBC COR # BLD AUTO: 5.15 10*3/MM3 (ref 3.4–10.8)

## 2025-04-11 PROCEDURE — 82570 ASSAY OF URINE CREATININE: CPT | Performed by: PREVENTIVE MEDICINE

## 2025-04-11 PROCEDURE — 80061 LIPID PANEL: CPT | Performed by: PREVENTIVE MEDICINE

## 2025-04-11 PROCEDURE — 85025 COMPLETE CBC W/AUTO DIFF WBC: CPT | Performed by: PREVENTIVE MEDICINE

## 2025-04-11 PROCEDURE — 80053 COMPREHEN METABOLIC PANEL: CPT | Performed by: PREVENTIVE MEDICINE

## 2025-04-11 PROCEDURE — 83036 HEMOGLOBIN GLYCOSYLATED A1C: CPT | Performed by: PREVENTIVE MEDICINE

## 2025-04-11 PROCEDURE — 82043 UR ALBUMIN QUANTITATIVE: CPT | Performed by: PREVENTIVE MEDICINE

## 2025-04-11 PROCEDURE — 82607 VITAMIN B-12: CPT | Performed by: PREVENTIVE MEDICINE

## 2025-04-11 PROCEDURE — 84443 ASSAY THYROID STIM HORMONE: CPT | Performed by: PREVENTIVE MEDICINE

## 2025-04-11 PROCEDURE — 83735 ASSAY OF MAGNESIUM: CPT | Performed by: PREVENTIVE MEDICINE

## 2025-04-11 PROCEDURE — 82306 VITAMIN D 25 HYDROXY: CPT | Performed by: PREVENTIVE MEDICINE

## 2025-04-11 PROCEDURE — 36415 COLL VENOUS BLD VENIPUNCTURE: CPT

## 2025-04-11 NOTE — PROGRESS NOTES
Subjective   Farheen Gomez is a 79 y.o. male presents for   Chief Complaint   Patient presents with    Primary Care Follow-Up     Is fasting    Diabetes       There are no preventive care reminders to display for this patient.      Primary Care Follow-UpAssociated symptoms include: myalgias. Pertinent negatives include no chest pain, no palpitations and no fatigue.   Diabetes  Pertinent negatives for diabetes include no chest pain and no fatigue.      History of Present Illness  The patient is a 79-year-old male who is here today to follow up on hypertension, benign prostatic hyperplasia, dyslipidemia, hypomagnesemia, sleep disorder, tachycardia, vitamin D deficiency, headache, class 1 obesity, fatigue, and type 2 diabetes with hyperglycemia without long-term recurrent use of insulin.    He reports no new allergies since his last visit. He has been diligently monitoring his blood glucose levels at home, which have consistently remained below 130 and never exceeded 200. He has recently consulted an ophthalmologist and acquired new prescription glasses. He reports no alterations in his auditory or visual acuity since his last visit. He is currently fasting in preparation for laboratory tests. He reports no difficulty in swallowing or digesting food. He reports no chest discomfort or palpitations. He reports no gastrointestinal issues and notes a significant improvement in his urinary function following a recent prostate procedure. He reports no systemic symptoms such as chills, night sweats, or weight loss. He is currently on a regimen of vitamin D supplementation.    He has a persistent skin lesion on his cheek that has been present for approximately 6 months and has not shown signs of healing. He has an upcoming appointment with a dermatologist for further evaluation.    He has a history of back issues and underwent an MRI scan of his back under the care of Dr. Ayon. He had back surgery approximately 8 to 10  "years ago to alleviate a pinched nerve. The MRI scan revealed a recurrence of severe nerve impingement. However, no intervention is planned unless the pain radiates down his legs. He receives ablation therapy every 4 months, which provides temporary relief.    He has a history of bilateral knee and shoulder replacements.    He is scheduled to undergo bilateral hip replacement surgery, with the right hip being addressed first. He has a consultation with Dr. Henry on 04/16/2025.    ALLERGIES  The patient has no known allergies.    MEDICATIONS  Current: Vitamin D    Vitals:    04/11/25 1017 04/11/25 1019   BP: 115/77 126/74   BP Location: Left arm Right arm   Patient Position: Sitting Sitting   Cuff Size: Large Adult Large Adult   Pulse: 74    Temp: 98.2 °F (36.8 °C)    TempSrc: Infrared    SpO2: 97%    Weight: 108 kg (238 lb 12.8 oz)    Height: 177.8 cm (70\")      Body mass index is 34.26 kg/m².    Current Outpatient Medications on File Prior to Visit   Medication Sig Dispense Refill    Acetaminophen (TYLENOL ARTHRITIS PAIN PO) Take 2 tablets by mouth 2 (Two) Times a Day.      Alcohol Swabs pads Use 90 each Daily. 90 each 3    amLODIPine (NORVASC) 10 MG tablet Take 1 tablet by mouth Daily. 90 tablet 3    Blood Glucose Monitoring Suppl (Blood Glucose Monitor System) w/Device kit Use 1 kit Daily. 1 each 0    escitalopram (LEXAPRO) 10 MG tablet Take 1 tablet by mouth once daily 90 tablet 0    Flomax 0.4 MG capsule 24 hr capsule       glucose blood test strip 1 each by Other route Daily. Use as instructed 100 each 12    Lancets 30G misc Use 1 each 4 (Four) Times a Day Before Meals & at Bedtime. 100 each 12    lisinopril (PRINIVIL,ZESTRIL) 40 MG tablet Take 1 tablet by mouth once daily 90 tablet 0    multivitamin with minerals tablet tablet Take 1 tablet by mouth Daily.      Myrbetriq 50 MG tablet sustained-release 24 hour 24 hr tablet Take 1 tablet by mouth once daily 90 tablet 0    rosuvastatin (CRESTOR) 20 MG tablet " TAKE 1 TABLET BY MOUTH ONCE DAILY AT NIGHT 90 tablet 0    traZODone (DESYREL) 50 MG tablet TAKE 1 TABLET BY MOUTH ONCE DAILY AT NIGHT 60 tablet 0    vitamin D (ERGOCALCIFEROL) 1.25 MG (52066 UT) capsule capsule Take 1 capsule by mouth 1 (One) Time Per Week. 12 capsule 3     No current facility-administered medications on file prior to visit.       The following portions of the patient's history were reviewed and updated as appropriate: allergies, current medications, past family history, past medical history, past social history, past surgical history, and problem list.    Review of Systems   Constitutional:  Negative for fatigue.   Cardiovascular:  Negative for chest pain, palpitations and leg swelling.   Musculoskeletal:  Positive for arthralgias, back pain, gait problem, joint swelling and myalgias.   Skin:  Positive for skin lesions.   Neurological:  Negative for headache.   Psychiatric/Behavioral:  Negative for sleep disturbance.        Objective   Physical Exam  Vitals reviewed.   Constitutional:       General: He is not in acute distress.     Appearance: He is well-developed. He is obese. He is not ill-appearing or toxic-appearing.   HENT:      Head: Normocephalic and atraumatic.      Right Ear: Tympanic membrane, ear canal and external ear normal.      Left Ear: Tympanic membrane, ear canal and external ear normal.      Nose: Nose normal.      Mouth/Throat:      Mouth: Mucous membranes are moist.      Pharynx: No posterior oropharyngeal erythema.   Eyes:      Extraocular Movements: Extraocular movements intact.      Conjunctiva/sclera: Conjunctivae normal.      Pupils: Pupils are equal, round, and reactive to light.   Neck:      Vascular: No carotid bruit.   Cardiovascular:      Rate and Rhythm: Normal rate and regular rhythm.      Pulses:           Dorsalis pedis pulses are 1+ on the right side and 1+ on the left side.        Posterior tibial pulses are 1+ on the right side and 1+ on the left side.       Heart sounds: Normal heart sounds.   Pulmonary:      Effort: Pulmonary effort is normal.      Breath sounds: Normal breath sounds.   Abdominal:      General: Bowel sounds are normal. There is no distension.      Palpations: Abdomen is soft. There is no mass.      Tenderness: There is no abdominal tenderness. There is no right CVA tenderness or left CVA tenderness.   Musculoskeletal:         General: Tenderness present. No signs of injury.      Cervical back: Neck supple. No tenderness.      Right lower leg: No edema.      Left lower leg: No edema.      Comments: Ill-defined tenderness over the left trochanter bursa   Feet:      Right foot:      Protective Sensation: 10 sites tested.  6 sites sensed.      Skin integrity: Skin integrity normal. Callus present.      Toenail Condition: Right toenails are normal.      Left foot:      Protective Sensation: 10 sites tested.  10 sites sensed.      Skin integrity: Skin integrity normal. Callus present.      Toenail Condition: Left toenails are normal.      Comments: Diabetic Foot Exam Performed and Monofilament Test Performed    Lymphadenopathy:      Cervical: No cervical adenopathy.   Skin:     General: Skin is warm.      Findings: Lesion present.      Comments: 3 mm scabbed area on the left cheek with raised border failed to heal for 6 months   Neurological:      General: No focal deficit present.      Mental Status: He is alert and oriented to person, place, and time.   Psychiatric:         Mood and Affect: Mood normal.         Behavior: Behavior normal.       Physical Exam  Throat appears normal.  Carotid arteries are normal.  Lungs sound normal.  Heart rhythm and rate are normal at 74.  Feet were examined.    Vital Signs  Blood pressure readings were 115/77 and 126/74.    PHQ-9 Total Score:    Results  Imaging  MRI indicated severely pinched nerve in the back.         Assessment & Plan   Diagnoses and all orders for this visit:    1. Hypertension, unspecified type  (Primary)  -     CBC Auto Differential; Future  -     Comprehensive Metabolic Panel; Future    2. Benign prostatic hyperplasia with lower urinary tract symptoms, symptom details unspecified    3. Dyslipidemia  -     Lipid Panel; Future    4. Hypomagnesemia  -     Magnesium; Future    5. Sleep disorder    6. Tachycardia    7. Vitamin D deficiency  -     Vitamin D,25-Hydroxy; Future    8. Other headache syndrome    9. Class 1 obesity due to excess calories with serious comorbidity and body mass index (BMI) of 33.0 to 33.9 in adult    10. Fatigue, unspecified type    11. Type 2 diabetes mellitus with hyperglycemia, without long-term current use of insulin  -     Hemoglobin A1c; Future  -     Microalbumin / Creatinine Urine Ratio - Urine, Clean Catch; Future  -     TSH Rfx On Abnormal To Free T4; Future  -     Vitamin B12; Future    12. Skin lesion of cheek    13. Bilateral hip joint arthritis    14. Chronic midline low back pain without sciatica      Assessment & Plan  1. Type 2 Diabetes Mellitus.  His blood glucose levels have been well-controlled, never exceeding 130 mg/dL. He monitors his blood sugar daily. A1c levels will be checked today as he is fasting.    2. Hypertension.  His blood pressure readings today were within the normal range at 115/77 and 126/74 mmHg. He should continue his current antihypertensive regimen.    3. Benign Prostatic Hyperplasia.  He reported significant improvement in urinary symptoms following a recent procedure where beads were placed in his prostate. No further intervention is required at this time.    4. Dyslipidemia.  He is advised to continue monitoring his saturated fat intake.    5. Hypomagnesemia.  He does not currently take a magnesium supplement. Magnesium levels will be checked today.    6. Sleep Disorder.  He uses a CPAP machine nightly, which effectively manages his symptoms.    7. Tachycardia.  His heart rate was normal today at 74 bpm. No further action is required unless  symptoms reappear.    8. Vitamin D Deficiency.  He is currently taking 50,000 IU of vitamin D weekly. He should continue this regimen.    9. Headache.  He reported no recent headaches. No further action is required unless symptoms reappear.    10. Class I Obesity.  He is advised to continue monitoring portion sizes and maintain regular physical activity.    11. Fatigue.  He reported that his fatigue levels are manageable. No further action is required unless symptoms worsen.    12. Bilateral Hip Arthritis.  He is scheduled to meet with Dr. Henry on the 16th to discuss hip replacement surgery. The right hip will likely be done first.    13. Chronic Midline Back Pain.  An MRI indicated a severely pinched nerve. He receives ablations every four months, which provide temporary relief. No further action is planned until after hip surgery.    14. Skin Lesion on Cheek.  A referral to a dermatologist has been made for further evaluation of the non-healing skin lesion on his cheek.    PROCEDURE  The patient underwent a prostate procedure involving the insertion of beads.    Patient Instructions     Health Maintenance Due   Topic Date Due    HEMOGLOBIN A1C  07/10/2025           Patient or patient representative verbalized consent for the use of Ambient Listening during the visit with  Penny Hidalgo MD for chart documentation. 4/12/2025  08:07 EDT

## 2025-04-12 ENCOUNTER — RESULTS FOLLOW-UP (OUTPATIENT)
Dept: FAMILY MEDICINE CLINIC | Facility: CLINIC | Age: 80
End: 2025-04-12
Payer: MEDICARE

## 2025-04-12 NOTE — PROGRESS NOTES
Hemoglobin A1c is down to 6.2 blood sugar was 115 still still excess risk for diabetes but you are doing very well with your diet and exercise.  Keep up the great work and call if you have any other questions or concerns

## 2025-06-03 RX ORDER — LISINOPRIL 40 MG/1
40 TABLET ORAL DAILY
Qty: 90 TABLET | Refills: 0 | Status: SHIPPED | OUTPATIENT
Start: 2025-06-03

## 2025-06-12 RX ORDER — AMLODIPINE BESYLATE 10 MG/1
10 TABLET ORAL DAILY
Qty: 90 TABLET | Refills: 0 | Status: SHIPPED | OUTPATIENT
Start: 2025-06-12

## 2025-07-07 RX ORDER — ROSUVASTATIN CALCIUM 20 MG/1
20 TABLET, COATED ORAL NIGHTLY
Qty: 90 TABLET | Refills: 0 | Status: SHIPPED | OUTPATIENT
Start: 2025-07-07

## 2025-07-11 ENCOUNTER — OFFICE VISIT (OUTPATIENT)
Dept: FAMILY MEDICINE CLINIC | Facility: CLINIC | Age: 80
End: 2025-07-11
Payer: MEDICARE

## 2025-07-11 ENCOUNTER — LAB (OUTPATIENT)
Dept: FAMILY MEDICINE CLINIC | Facility: CLINIC | Age: 80
End: 2025-07-11
Payer: MEDICARE

## 2025-07-11 VITALS
OXYGEN SATURATION: 95 % | SYSTOLIC BLOOD PRESSURE: 111 MMHG | HEART RATE: 89 BPM | HEIGHT: 70 IN | TEMPERATURE: 97.8 F | BODY MASS INDEX: 31.81 KG/M2 | DIASTOLIC BLOOD PRESSURE: 74 MMHG | WEIGHT: 222.2 LBS

## 2025-07-11 DIAGNOSIS — C44.310 BASAL CELL CARCINOMA (BCC) OF SKIN OF FACE, UNSPECIFIED PART OF FACE: ICD-10-CM

## 2025-07-11 DIAGNOSIS — E83.42 HYPOMAGNESEMIA: ICD-10-CM

## 2025-07-11 DIAGNOSIS — G47.9 SLEEP DISORDER: ICD-10-CM

## 2025-07-11 DIAGNOSIS — M16.0 BILATERAL HIP JOINT ARTHRITIS: ICD-10-CM

## 2025-07-11 DIAGNOSIS — E11.65 TYPE 2 DIABETES MELLITUS WITH HYPERGLYCEMIA, WITHOUT LONG-TERM CURRENT USE OF INSULIN: ICD-10-CM

## 2025-07-11 DIAGNOSIS — R53.83 FATIGUE, UNSPECIFIED TYPE: ICD-10-CM

## 2025-07-11 DIAGNOSIS — E66.811 CLASS 1 OBESITY DUE TO EXCESS CALORIES WITH SERIOUS COMORBIDITY AND BODY MASS INDEX (BMI) OF 31.0 TO 31.9 IN ADULT: ICD-10-CM

## 2025-07-11 DIAGNOSIS — E78.5 DYSLIPIDEMIA: Chronic | ICD-10-CM

## 2025-07-11 DIAGNOSIS — N40.1 BENIGN PROSTATIC HYPERPLASIA WITH LOWER URINARY TRACT SYMPTOMS, SYMPTOM DETAILS UNSPECIFIED: Primary | Chronic | ICD-10-CM

## 2025-07-11 DIAGNOSIS — R00.0 TACHYCARDIA: ICD-10-CM

## 2025-07-11 DIAGNOSIS — N40.1 BENIGN PROSTATIC HYPERPLASIA WITH LOWER URINARY TRACT SYMPTOMS, SYMPTOM DETAILS UNSPECIFIED: ICD-10-CM

## 2025-07-11 DIAGNOSIS — Z96.652 STATUS POST TOTAL KNEE REPLACEMENT, LEFT: ICD-10-CM

## 2025-07-11 DIAGNOSIS — I10 HYPERTENSION, UNSPECIFIED TYPE: Chronic | ICD-10-CM

## 2025-07-11 DIAGNOSIS — E66.09 CLASS 1 OBESITY DUE TO EXCESS CALORIES WITH SERIOUS COMORBIDITY AND BODY MASS INDEX (BMI) OF 31.0 TO 31.9 IN ADULT: ICD-10-CM

## 2025-07-11 LAB
ALBUMIN SERPL-MCNC: 4.3 G/DL (ref 3.5–5.2)
ALBUMIN/GLOB SERPL: 1.5 G/DL
ALP SERPL-CCNC: 144 U/L (ref 39–117)
ALT SERPL W P-5'-P-CCNC: 20 U/L (ref 1–41)
ANION GAP SERPL CALCULATED.3IONS-SCNC: 13 MMOL/L (ref 5–15)
AST SERPL-CCNC: 21 U/L (ref 1–40)
BASOPHILS # BLD AUTO: 0.03 10*3/MM3 (ref 0–0.2)
BASOPHILS NFR BLD AUTO: 0.4 % (ref 0–1.5)
BILIRUB SERPL-MCNC: 0.4 MG/DL (ref 0–1.2)
BILIRUB UR QL STRIP: NEGATIVE
BUN SERPL-MCNC: 14 MG/DL (ref 8–23)
BUN/CREAT SERPL: 14.7 (ref 7–25)
CALCIUM SPEC-SCNC: 9.2 MG/DL (ref 8.6–10.5)
CHLORIDE SERPL-SCNC: 101 MMOL/L (ref 98–107)
CHOLEST SERPL-MCNC: 109 MG/DL (ref 0–200)
CLARITY UR: CLEAR
CO2 SERPL-SCNC: 23 MMOL/L (ref 22–29)
COLOR UR: YELLOW
CREAT SERPL-MCNC: 0.95 MG/DL (ref 0.76–1.27)
DEPRECATED RDW RBC AUTO: 41.3 FL (ref 37–54)
EGFRCR SERPLBLD CKD-EPI 2021: 81.9 ML/MIN/1.73
EOSINOPHIL # BLD AUTO: 0.15 10*3/MM3 (ref 0–0.4)
EOSINOPHIL NFR BLD AUTO: 2.2 % (ref 0.3–6.2)
ERYTHROCYTE [DISTWIDTH] IN BLOOD BY AUTOMATED COUNT: 12.9 % (ref 12.3–15.4)
GLOBULIN UR ELPH-MCNC: 2.8 GM/DL
GLUCOSE SERPL-MCNC: 106 MG/DL (ref 65–99)
GLUCOSE UR STRIP-MCNC: NEGATIVE MG/DL
HBA1C MFR BLD: 6.2 % (ref 4.8–5.6)
HCT VFR BLD AUTO: 38.1 % (ref 37.5–51)
HDLC SERPL-MCNC: 39 MG/DL (ref 40–60)
HGB BLD-MCNC: 12.7 G/DL (ref 13–17.7)
HGB UR QL STRIP.AUTO: NEGATIVE
HOLD SPECIMEN: NORMAL
IMM GRANULOCYTES # BLD AUTO: 0.02 10*3/MM3 (ref 0–0.05)
IMM GRANULOCYTES NFR BLD AUTO: 0.3 % (ref 0–0.5)
KETONES UR QL STRIP: NEGATIVE
LDLC SERPL CALC-MCNC: 44 MG/DL (ref 0–100)
LDLC/HDLC SERPL: 1.03 {RATIO}
LEUKOCYTE ESTERASE UR QL STRIP.AUTO: NEGATIVE
LYMPHOCYTES # BLD AUTO: 1.8 10*3/MM3 (ref 0.7–3.1)
LYMPHOCYTES NFR BLD AUTO: 26.7 % (ref 19.6–45.3)
MAGNESIUM SERPL-MCNC: 2 MG/DL (ref 1.6–2.4)
MCH RBC QN AUTO: 30 PG (ref 26.6–33)
MCHC RBC AUTO-ENTMCNC: 33.3 G/DL (ref 31.5–35.7)
MCV RBC AUTO: 90.1 FL (ref 79–97)
MONOCYTES # BLD AUTO: 0.56 10*3/MM3 (ref 0.1–0.9)
MONOCYTES NFR BLD AUTO: 8.3 % (ref 5–12)
NEUTROPHILS NFR BLD AUTO: 4.18 10*3/MM3 (ref 1.7–7)
NEUTROPHILS NFR BLD AUTO: 62.1 % (ref 42.7–76)
NITRITE UR QL STRIP: NEGATIVE
NRBC BLD AUTO-RTO: 0 /100 WBC (ref 0–0.2)
PH UR STRIP.AUTO: 6 [PH] (ref 5–8)
PLATELET # BLD AUTO: 195 10*3/MM3 (ref 140–450)
PMV BLD AUTO: 9 FL (ref 6–12)
POTASSIUM SERPL-SCNC: 4 MMOL/L (ref 3.5–5.2)
PROT SERPL-MCNC: 7.1 G/DL (ref 6–8.5)
PROT UR QL STRIP: NEGATIVE
RBC # BLD AUTO: 4.23 10*6/MM3 (ref 4.14–5.8)
SODIUM SERPL-SCNC: 137 MMOL/L (ref 136–145)
SP GR UR STRIP: 1.02 (ref 1–1.03)
TRIGL SERPL-MCNC: 150 MG/DL (ref 0–150)
TSH SERPL DL<=0.05 MIU/L-ACNC: 1.45 UIU/ML (ref 0.27–4.2)
UROBILINOGEN UR QL STRIP: NORMAL
VIT B12 BLD-MCNC: 770 PG/ML (ref 211–946)
VLDLC SERPL-MCNC: 26 MG/DL (ref 5–40)
WBC NRBC COR # BLD AUTO: 6.74 10*3/MM3 (ref 3.4–10.8)

## 2025-07-11 PROCEDURE — 85025 COMPLETE CBC W/AUTO DIFF WBC: CPT | Performed by: PREVENTIVE MEDICINE

## 2025-07-11 PROCEDURE — 83036 HEMOGLOBIN GLYCOSYLATED A1C: CPT | Performed by: PREVENTIVE MEDICINE

## 2025-07-11 PROCEDURE — 80053 COMPREHEN METABOLIC PANEL: CPT | Performed by: PREVENTIVE MEDICINE

## 2025-07-11 PROCEDURE — 82570 ASSAY OF URINE CREATININE: CPT | Performed by: PREVENTIVE MEDICINE

## 2025-07-11 PROCEDURE — 83735 ASSAY OF MAGNESIUM: CPT | Performed by: PREVENTIVE MEDICINE

## 2025-07-11 PROCEDURE — 82607 VITAMIN B-12: CPT | Performed by: PREVENTIVE MEDICINE

## 2025-07-11 PROCEDURE — 84443 ASSAY THYROID STIM HORMONE: CPT | Performed by: PREVENTIVE MEDICINE

## 2025-07-11 PROCEDURE — 36415 COLL VENOUS BLD VENIPUNCTURE: CPT

## 2025-07-11 PROCEDURE — 82043 UR ALBUMIN QUANTITATIVE: CPT | Performed by: PREVENTIVE MEDICINE

## 2025-07-11 PROCEDURE — 80061 LIPID PANEL: CPT | Performed by: PREVENTIVE MEDICINE

## 2025-07-11 PROCEDURE — 81003 URINALYSIS AUTO W/O SCOPE: CPT | Performed by: PREVENTIVE MEDICINE

## 2025-07-11 RX ORDER — CELECOXIB 200 MG/1
200 CAPSULE ORAL DAILY
COMMUNITY

## 2025-07-11 NOTE — PROGRESS NOTES
Subjective   Farheen Gomez is a 79 y.o. male presents for   Chief Complaint   Patient presents with    Primary Care Follow-Up     Pt has hip replaced and pt reports is feeling good. Pt reported having spot removed off of his face, basil cell carcinoma. Getting removed the 17th. Pt is fasting    Hypertension       There are no preventive care reminders to display for this patient.    Primary Care Follow-Up  Hypertension     History of Present Illness  The patient is a 79-year-old male who is here today to follow up on benign prostatic hyperplasia, class I obesity, dyslipidemia, fatigue, hypertension, hypomagnesemia, sleep disorder, tachycardia, type 2 diabetes, basal cell carcinoma of the skin of the face, and hip replacement.  Patient has been erroneously marked as diabetic. Based on the available clinical information, he does not have diabetes and should therefore be excluded from diabetic health maintenance and quality measures for the remainder of the reporting period.     He reports that his blood sugar levels have been stable at home, averaging around 113. He is currently fasting for lab work. He is not on any medication for diabetes.    There have been no changes in his vision since the last visit. He has been under the care of an ophthalmologist due to slight macular degeneration and visits her three times a year.    He reports no difficulty swallowing or indigestion. He also reports no chest pressure or palpitations.    He underwent a right hip replacement surgery eight weeks ago, which has significantly reduced his back pain by 60 to 70 percent. However, he experienced knee hyperextension during the surgery, resulting in severe knee pain. An MRI scan revealed severe arthritis and a tear in the labrum. He is hesitant to undergo another hip replacement surgery this summer and prefers to wait until winter. He has completed his physical therapy sessions. He was prescribed Celebrex and a topical cream, which  "have been effective in managing his pain. He notes that since starting Celebrex, his bowel movements have become regular.    He is scheduled for a Mohs procedure on 07/17/2025.    He has been monitoring his cholesterol levels and limiting his intake of saturated fats.    He reports feeling well overall, with no urinary issues. He had a procedure done for his benign prostatic hyperplasia, which has helped him significantly.    He continues to experience fatigue and believes it may be related to his partner's health condition.    He has been experiencing insomnia, which he attributes to Celebrex, and plans to switch to taking it in the morning.    PAST SURGICAL HISTORY:  Hip replacement (right) - 05/2025  Shoulder replacements  Knee replacements    Vitals:    07/11/25 0919 07/11/25 0920   BP: 115/77 111/74   BP Location: Left arm Right arm   Patient Position: Sitting Sitting   Cuff Size: Large Adult Large Adult   Pulse: 89    Temp: 97.8 °F (36.6 °C)    TempSrc: Infrared    SpO2: 95%    Weight: 101 kg (222 lb 3.2 oz)    Height: 177.8 cm (70\")      Body mass index is 31.88 kg/m².    Current Outpatient Medications on File Prior to Visit   Medication Sig Dispense Refill    Acetaminophen (TYLENOL ARTHRITIS PAIN PO) Take 2 tablets by mouth 2 (Two) Times a Day.      Alcohol Swabs pads Use 90 each Daily. 90 each 3    amLODIPine (NORVASC) 10 MG tablet Take 1 tablet by mouth once daily 90 tablet 0    Blood Glucose Monitoring Suppl (Blood Glucose Monitor System) w/Device kit Use 1 kit Daily. 1 each 0    celecoxib (CeleBREX) 200 MG capsule Take 1 capsule by mouth Daily.      escitalopram (LEXAPRO) 10 MG tablet Take 1 tablet by mouth once daily 90 tablet 0    Flomax 0.4 MG capsule 24 hr capsule       glucose blood test strip 1 each by Other route Daily. Use as instructed 100 each 12    Lancets 30G misc Use 1 each 4 (Four) Times a Day Before Meals & at Bedtime. 100 each 12    lisinopril (PRINIVIL,ZESTRIL) 40 MG tablet Take 1 tablet " by mouth once daily 90 tablet 0    multivitamin with minerals tablet tablet Take 1 tablet by mouth Daily.      Myrbetriq 50 MG tablet sustained-release 24 hour 24 hr tablet Take 1 tablet by mouth once daily 90 tablet 0    rosuvastatin (CRESTOR) 20 MG tablet TAKE 1 TABLET BY MOUTH ONCE DAILY AT NIGHT 90 tablet 0    traZODone (DESYREL) 50 MG tablet TAKE 1 TABLET BY MOUTH ONCE DAILY AT NIGHT 60 tablet 0    vitamin D (ERGOCALCIFEROL) 1.25 MG (82480 UT) capsule capsule Take 1 capsule by mouth 1 (One) Time Per Week. 12 capsule 3     No current facility-administered medications on file prior to visit.       The following portions of the patient's history were reviewed and updated as appropriate: allergies, current medications, past family history, past medical history, past social history, past surgical history, and problem list.    Review of Systems   Musculoskeletal:  Positive for arthralgias, gait problem and joint swelling.   Skin:  Positive for wound.       Objective   Physical Exam  Vitals reviewed.   Constitutional:       General: He is not in acute distress.     Appearance: He is well-developed. He is obese. He is not ill-appearing or toxic-appearing.   HENT:      Head: Normocephalic and atraumatic.      Right Ear: Tympanic membrane, ear canal and external ear normal.      Left Ear: Tympanic membrane, ear canal and external ear normal.      Nose: Nose normal.      Mouth/Throat:      Mouth: Mucous membranes are moist.      Pharynx: No posterior oropharyngeal erythema.   Eyes:      Extraocular Movements: Extraocular movements intact.      Conjunctiva/sclera: Conjunctivae normal.      Pupils: Pupils are equal, round, and reactive to light.   Neck:      Vascular: No carotid bruit.   Cardiovascular:      Rate and Rhythm: Normal rate and regular rhythm.      Heart sounds: Normal heart sounds.   Pulmonary:      Effort: Pulmonary effort is normal.      Breath sounds: Normal breath sounds.   Abdominal:      General: Bowel  sounds are normal. There is no distension.      Palpations: Abdomen is soft. There is no mass.      Tenderness: There is no abdominal tenderness. There is no right CVA tenderness or left CVA tenderness.   Musculoskeletal:         General: Swelling and tenderness present.      Cervical back: Neck supple. No tenderness.      Right lower leg: No edema.      Left lower leg: No edema.   Lymphadenopathy:      Cervical: No cervical adenopathy.   Skin:     General: Skin is warm.   Neurological:      General: No focal deficit present.      Mental Status: He is alert and oriented to person, place, and time.   Psychiatric:         Mood and Affect: Mood normal.         Behavior: Behavior normal.       Physical Exam  Mouth/Throat: Oropharynx clear, no erythema or exudate.  Respiratory: Clear to auscultation, no wheezing, rales or rhonchi  Cardiovascular: Regular rate and rhythm, no murmurs, rubs, or gallops    PHQ-9 Total Score:    Results  Labs   - Blood sugar: 113 mg/dL         Assessment & Plan   Diagnoses and all orders for this visit:    1. Benign prostatic hyperplasia with lower urinary tract symptoms, symptom details unspecified (Primary)  -     Urinalysis With Culture If Indicated - Urine, Clean Catch; Future    2. Class 1 obesity due to excess calories with serious comorbidity and body mass index (BMI) of 31.0 to 31.9 in adult    3. Dyslipidemia  -     Lipid Panel; Future    4. Fatigue, unspecified type  -     CBC Auto Differential; Future  -     TSH Rfx On Abnormal To Free T4; Future  -     Vitamin B12; Future    5. Hypertension, unspecified type    6. Hypomagnesemia  -     Magnesium; Future    7. Sleep disorder    8. Tachycardia    9. Type 2 diabetes mellitus with hyperglycemia, without long-term current use of insulin  -     Comprehensive Metabolic Panel; Future  -     Hemoglobin A1c; Future  -     Microalbumin / Creatinine Urine Ratio - Urine, Clean Catch; Future    10. Basal cell carcinoma (BCC) of skin of face,  unspecified part of face    11. Status post total knee replacement, left    12. Bilateral hip joint arthritis    Other orders  -     COVID-19 ([employer]) 12yrs+ ([facility])      Assessment & Plan  1. Type 2 Diabetes Mellitus.  - Blood glucose levels have been well-controlled at home, with readings around 113.  - Weight has decreased, and blood pressure is within normal range.  - Comprehensive metabolic panel ordered today, including A1c, cholesterol, magnesium, thyroid function, vitamin B12, kidney function, liver function tests, and a urine test to monitor for potential kidney damage due to Celebrex use and previous high blood sugar levels.  - If A1c is 6.4 or below, medication will not be necessary.    2. Benign Prostatic Hyperplasia.  - Reports that a recent procedure has significantly improved urinary symptoms.    3. Basal Cell Carcinoma of the Face.  - Scheduled for a Mohs procedure on 07/17/2025.  - Procedure involves removal and microscopic examination to ensure complete excision.    4. Knee Pain.  - Reports significant knee pain following hyperextension during surgery.  - Currently on Celebrex and a topical cream, which have begun to alleviate symptoms.  - Advised to take Celebrex as infrequently as possible due to potential renal, gastrointestinal, and cardiovascular risks.  - Will start taking Celebrex in the morning to mitigate insomnia.    5. Health Maintenance.  - Advised to receive the COVID-19 vaccine today.  - Regular blood count ordered.    Follow-up  - Follow up in 3 months.    Patient Instructions     Health Maintenance Due   Topic Date Due    COVID-19 Vaccine (7 - 2024-25 season) 07/10/2025           Patient or patient representative verbalized consent for the use of Ambient Listening during the visit with  Penny Hidalgo MD for chart documentation. 7/12/2025  17:20 EDT

## 2025-07-12 ENCOUNTER — RESULTS FOLLOW-UP (OUTPATIENT)
Dept: FAMILY MEDICINE CLINIC | Facility: CLINIC | Age: 80
End: 2025-07-12
Payer: MEDICARE

## 2025-07-12 PROBLEM — E11.65 TYPE 2 DIABETES MELLITUS WITH HYPERGLYCEMIA, WITHOUT LONG-TERM CURRENT USE OF INSULIN: Status: RESOLVED | Noted: 2024-07-08 | Resolved: 2025-07-12

## 2025-07-12 LAB
ALBUMIN UR-MCNC: <1.2 MG/DL
CREAT UR-MCNC: 130.8 MG/DL
MICROALBUMIN/CREAT UR: NORMAL MG/G{CREAT}

## 2025-07-14 NOTE — TELEPHONE ENCOUNTER
Farheen Gomez notified and voiced comprehension and understanding.  Pt is okay with removing type 2 diabetes from his chart. He denies seeing any blood in stools, urine, or anywhere else, will mail hemoccults today.

## 2025-07-25 ENCOUNTER — APPOINTMENT (OUTPATIENT)
Dept: CT IMAGING | Facility: HOSPITAL | Age: 80
End: 2025-07-25
Payer: MEDICARE

## 2025-07-25 ENCOUNTER — HOSPITAL ENCOUNTER (OUTPATIENT)
Facility: HOSPITAL | Age: 80
Setting detail: OBSERVATION
Discharge: HOME | End: 2025-07-26
Attending: EMERGENCY MEDICINE | Admitting: EMERGENCY MEDICINE
Payer: MEDICARE

## 2025-07-25 DIAGNOSIS — N20.1 URETEROLITHIASIS: Primary | ICD-10-CM

## 2025-07-25 DIAGNOSIS — N17.9 AKI (ACUTE KIDNEY INJURY): ICD-10-CM

## 2025-07-25 DIAGNOSIS — R11.2 NAUSEA AND VOMITING, UNSPECIFIED VOMITING TYPE: ICD-10-CM

## 2025-07-25 DIAGNOSIS — R10.31 RIGHT LOWER QUADRANT PAIN: ICD-10-CM

## 2025-07-25 LAB
ALBUMIN SERPL-MCNC: 4.3 G/DL (ref 3.5–5.2)
ALBUMIN/GLOB SERPL: 1.7 G/DL
ALP SERPL-CCNC: 122 U/L (ref 39–117)
ALT SERPL W P-5'-P-CCNC: 20 U/L (ref 1–41)
ANION GAP SERPL CALCULATED.3IONS-SCNC: 13.7 MMOL/L (ref 5–15)
AST SERPL-CCNC: 26 U/L (ref 1–40)
BACTERIA UR QL AUTO: ABNORMAL /HPF
BASOPHILS # BLD AUTO: 0.02 10*3/MM3 (ref 0–0.2)
BASOPHILS NFR BLD AUTO: 0.1 % (ref 0–1.5)
BILIRUB SERPL-MCNC: 0.4 MG/DL (ref 0–1.2)
BILIRUB UR QL STRIP: NEGATIVE
BUN SERPL-MCNC: 21.5 MG/DL (ref 8–23)
BUN/CREAT SERPL: 14.9 (ref 7–25)
CALCIUM SPEC-SCNC: 9.5 MG/DL (ref 8.6–10.5)
CHLORIDE SERPL-SCNC: 100 MMOL/L (ref 98–107)
CLARITY UR: CLEAR
CO2 SERPL-SCNC: 20.3 MMOL/L (ref 22–29)
COLOR UR: YELLOW
CREAT SERPL-MCNC: 1.44 MG/DL (ref 0.76–1.27)
DEPRECATED RDW RBC AUTO: 41.7 FL (ref 37–54)
EGFRCR SERPLBLD CKD-EPI 2021: 49.7 ML/MIN/1.73
EOSINOPHIL # BLD AUTO: 0 10*3/MM3 (ref 0–0.4)
EOSINOPHIL NFR BLD AUTO: 0 % (ref 0.3–6.2)
ERYTHROCYTE [DISTWIDTH] IN BLOOD BY AUTOMATED COUNT: 13.2 % (ref 12.3–15.4)
GLOBULIN UR ELPH-MCNC: 2.6 GM/DL
GLUCOSE SERPL-MCNC: 160 MG/DL (ref 65–99)
GLUCOSE UR STRIP-MCNC: NEGATIVE MG/DL
HCT VFR BLD AUTO: 38.1 % (ref 37.5–51)
HGB BLD-MCNC: 12.6 G/DL (ref 13–17.7)
HGB UR QL STRIP.AUTO: ABNORMAL
HYALINE CASTS UR QL AUTO: ABNORMAL /LPF
IMM GRANULOCYTES # BLD AUTO: 0.05 10*3/MM3 (ref 0–0.05)
IMM GRANULOCYTES NFR BLD AUTO: 0.4 % (ref 0–0.5)
KETONES UR QL STRIP: NEGATIVE
LEUKOCYTE ESTERASE UR QL STRIP.AUTO: NEGATIVE
LIPASE SERPL-CCNC: 89 U/L (ref 13–60)
LYMPHOCYTES # BLD AUTO: 1.16 10*3/MM3 (ref 0.7–3.1)
LYMPHOCYTES NFR BLD AUTO: 8.2 % (ref 19.6–45.3)
MCH RBC QN AUTO: 28.8 PG (ref 26.6–33)
MCHC RBC AUTO-ENTMCNC: 33.1 G/DL (ref 31.5–35.7)
MCV RBC AUTO: 87.2 FL (ref 79–97)
MONOCYTES # BLD AUTO: 0.87 10*3/MM3 (ref 0.1–0.9)
MONOCYTES NFR BLD AUTO: 6.1 % (ref 5–12)
NEUTROPHILS NFR BLD AUTO: 12.11 10*3/MM3 (ref 1.7–7)
NEUTROPHILS NFR BLD AUTO: 85.2 % (ref 42.7–76)
NITRITE UR QL STRIP: NEGATIVE
NRBC BLD AUTO-RTO: 0 /100 WBC (ref 0–0.2)
PH UR STRIP.AUTO: 6.5 [PH] (ref 5–8)
PLATELET # BLD AUTO: 180 10*3/MM3 (ref 140–450)
PMV BLD AUTO: 8.2 FL (ref 6–12)
POTASSIUM SERPL-SCNC: 4.8 MMOL/L (ref 3.5–5.2)
PROT SERPL-MCNC: 6.9 G/DL (ref 6–8.5)
PROT UR QL STRIP: NEGATIVE
RBC # BLD AUTO: 4.37 10*6/MM3 (ref 4.14–5.8)
RBC # UR STRIP: ABNORMAL /HPF
REF LAB TEST METHOD: ABNORMAL
SODIUM SERPL-SCNC: 134 MMOL/L (ref 136–145)
SP GR UR STRIP: 1.03 (ref 1–1.03)
SQUAMOUS #/AREA URNS HPF: ABNORMAL /HPF
UROBILINOGEN UR QL STRIP: ABNORMAL
WBC # UR STRIP: ABNORMAL /HPF
WBC NRBC COR # BLD AUTO: 14.21 10*3/MM3 (ref 3.4–10.8)

## 2025-07-25 PROCEDURE — 25010000002 MORPHINE PER 10 MG

## 2025-07-25 PROCEDURE — 74177 CT ABD & PELVIS W/CONTRAST: CPT

## 2025-07-25 PROCEDURE — 25810000003 SODIUM CHLORIDE 0.9 % SOLUTION

## 2025-07-25 PROCEDURE — 25010000002 ONDANSETRON PER 1 MG

## 2025-07-25 PROCEDURE — 96375 TX/PRO/DX INJ NEW DRUG ADDON: CPT

## 2025-07-25 PROCEDURE — 81001 URINALYSIS AUTO W/SCOPE: CPT

## 2025-07-25 PROCEDURE — 25510000001 IOPAMIDOL PER 1 ML

## 2025-07-25 PROCEDURE — G0378 HOSPITAL OBSERVATION PER HR: HCPCS

## 2025-07-25 PROCEDURE — 99285 EMERGENCY DEPT VISIT HI MDM: CPT

## 2025-07-25 PROCEDURE — 96374 THER/PROPH/DIAG INJ IV PUSH: CPT

## 2025-07-25 PROCEDURE — 25010000002 METOCLOPRAMIDE PER 10 MG

## 2025-07-25 PROCEDURE — 83690 ASSAY OF LIPASE: CPT

## 2025-07-25 PROCEDURE — 85025 COMPLETE CBC W/AUTO DIFF WBC: CPT

## 2025-07-25 PROCEDURE — 25010000002 HYDROMORPHONE PER 4 MG

## 2025-07-25 PROCEDURE — 80053 COMPREHEN METABOLIC PANEL: CPT

## 2025-07-25 RX ORDER — AMLODIPINE BESYLATE 10 MG/1
10 TABLET ORAL NIGHTLY
COMMUNITY

## 2025-07-25 RX ORDER — NALOXONE HCL 0.4 MG/ML
0.4 VIAL (ML) INJECTION
Status: DISCONTINUED | OUTPATIENT
Start: 2025-07-25 | End: 2025-07-26 | Stop reason: HOSPADM

## 2025-07-25 RX ORDER — ENOXAPARIN SODIUM 100 MG/ML
40 INJECTION SUBCUTANEOUS
Status: DISCONTINUED | OUTPATIENT
Start: 2025-07-26 | End: 2025-07-26 | Stop reason: HOSPADM

## 2025-07-25 RX ORDER — ONDANSETRON 4 MG/1
4 TABLET, ORALLY DISINTEGRATING ORAL EVERY 6 HOURS PRN
Status: DISCONTINUED | OUTPATIENT
Start: 2025-07-25 | End: 2025-07-26 | Stop reason: HOSPADM

## 2025-07-25 RX ORDER — AMOXICILLIN 250 MG
2 CAPSULE ORAL 2 TIMES DAILY PRN
Status: DISCONTINUED | OUTPATIENT
Start: 2025-07-25 | End: 2025-07-26 | Stop reason: HOSPADM

## 2025-07-25 RX ORDER — LISINOPRIL 40 MG/1
40 TABLET ORAL NIGHTLY
COMMUNITY

## 2025-07-25 RX ORDER — POLYETHYLENE GLYCOL 3350 17 G/17G
17 POWDER, FOR SOLUTION ORAL DAILY PRN
Status: DISCONTINUED | OUTPATIENT
Start: 2025-07-25 | End: 2025-07-26 | Stop reason: HOSPADM

## 2025-07-25 RX ORDER — SODIUM CHLORIDE 9 MG/ML
40 INJECTION, SOLUTION INTRAVENOUS AS NEEDED
Status: DISCONTINUED | OUTPATIENT
Start: 2025-07-25 | End: 2025-07-26 | Stop reason: HOSPADM

## 2025-07-25 RX ORDER — SODIUM CHLORIDE 0.9 % (FLUSH) 0.9 %
10 SYRINGE (ML) INJECTION EVERY 12 HOURS SCHEDULED
Status: DISCONTINUED | OUTPATIENT
Start: 2025-07-25 | End: 2025-07-26 | Stop reason: HOSPADM

## 2025-07-25 RX ORDER — HYDROMORPHONE HYDROCHLORIDE 1 MG/ML
0.5 INJECTION, SOLUTION INTRAMUSCULAR; INTRAVENOUS; SUBCUTANEOUS
Refills: 0 | Status: DISCONTINUED | OUTPATIENT
Start: 2025-07-25 | End: 2025-07-26 | Stop reason: HOSPADM

## 2025-07-25 RX ORDER — METOCLOPRAMIDE HYDROCHLORIDE 5 MG/ML
10 INJECTION INTRAMUSCULAR; INTRAVENOUS ONCE
Status: COMPLETED | OUTPATIENT
Start: 2025-07-25 | End: 2025-07-25

## 2025-07-25 RX ORDER — ONDANSETRON 2 MG/ML
4 INJECTION INTRAMUSCULAR; INTRAVENOUS ONCE
Status: COMPLETED | OUTPATIENT
Start: 2025-07-25 | End: 2025-07-25

## 2025-07-25 RX ORDER — ONDANSETRON 2 MG/ML
4 INJECTION INTRAMUSCULAR; INTRAVENOUS EVERY 6 HOURS PRN
Status: DISCONTINUED | OUTPATIENT
Start: 2025-07-25 | End: 2025-07-26 | Stop reason: HOSPADM

## 2025-07-25 RX ORDER — SODIUM CHLORIDE 0.9 % (FLUSH) 0.9 %
10 SYRINGE (ML) INJECTION AS NEEDED
Status: DISCONTINUED | OUTPATIENT
Start: 2025-07-25 | End: 2025-07-26 | Stop reason: HOSPADM

## 2025-07-25 RX ORDER — SODIUM CHLORIDE 9 MG/ML
100 INJECTION, SOLUTION INTRAVENOUS CONTINUOUS
Status: DISCONTINUED | OUTPATIENT
Start: 2025-07-25 | End: 2025-07-26 | Stop reason: HOSPADM

## 2025-07-25 RX ORDER — IOPAMIDOL 755 MG/ML
100 INJECTION, SOLUTION INTRAVASCULAR
Status: COMPLETED | OUTPATIENT
Start: 2025-07-25 | End: 2025-07-25

## 2025-07-25 RX ORDER — HYDROMORPHONE HYDROCHLORIDE 1 MG/ML
0.5 INJECTION, SOLUTION INTRAMUSCULAR; INTRAVENOUS; SUBCUTANEOUS ONCE
Refills: 0 | Status: COMPLETED | OUTPATIENT
Start: 2025-07-25 | End: 2025-07-25

## 2025-07-25 RX ORDER — BISACODYL 5 MG/1
5 TABLET, DELAYED RELEASE ORAL DAILY PRN
Status: DISCONTINUED | OUTPATIENT
Start: 2025-07-25 | End: 2025-07-26 | Stop reason: HOSPADM

## 2025-07-25 RX ORDER — NITROGLYCERIN 0.4 MG/1
0.4 TABLET SUBLINGUAL
Status: DISCONTINUED | OUTPATIENT
Start: 2025-07-25 | End: 2025-07-26 | Stop reason: HOSPADM

## 2025-07-25 RX ORDER — ESCITALOPRAM OXALATE 10 MG/1
10 TABLET ORAL NIGHTLY
COMMUNITY

## 2025-07-25 RX ORDER — BISACODYL 10 MG
10 SUPPOSITORY, RECTAL RECTAL DAILY PRN
Status: DISCONTINUED | OUTPATIENT
Start: 2025-07-25 | End: 2025-07-26 | Stop reason: HOSPADM

## 2025-07-25 RX ADMIN — METOCLOPRAMIDE 10 MG: 5 INJECTION, SOLUTION INTRAMUSCULAR; INTRAVENOUS at 22:29

## 2025-07-25 RX ADMIN — HYDROMORPHONE HYDROCHLORIDE 0.5 MG: 1 INJECTION, SOLUTION INTRAMUSCULAR; INTRAVENOUS; SUBCUTANEOUS at 22:28

## 2025-07-25 RX ADMIN — MORPHINE SULFATE 4 MG: 4 INJECTION, SOLUTION INTRAMUSCULAR; INTRAVENOUS at 21:13

## 2025-07-25 RX ADMIN — SODIUM CHLORIDE 1000 ML: 9 INJECTION, SOLUTION INTRAVENOUS at 22:28

## 2025-07-25 RX ADMIN — IOPAMIDOL 100 ML: 755 INJECTION, SOLUTION INTRAVENOUS at 21:37

## 2025-07-25 RX ADMIN — ONDANSETRON 4 MG: 2 INJECTION, SOLUTION INTRAMUSCULAR; INTRAVENOUS at 21:13

## 2025-07-26 ENCOUNTER — APPOINTMENT (OUTPATIENT)
Dept: GENERAL RADIOLOGY | Facility: HOSPITAL | Age: 80
End: 2025-07-26
Payer: MEDICARE

## 2025-07-26 ENCOUNTER — ANESTHESIA (OUTPATIENT)
Dept: PERIOP | Facility: HOSPITAL | Age: 80
End: 2025-07-26
Payer: MEDICARE

## 2025-07-26 ENCOUNTER — ANESTHESIA EVENT (OUTPATIENT)
Dept: PERIOP | Facility: HOSPITAL | Age: 80
End: 2025-07-26
Payer: MEDICARE

## 2025-07-26 VITALS
HEIGHT: 70 IN | BODY MASS INDEX: 32.95 KG/M2 | WEIGHT: 230.16 LBS | SYSTOLIC BLOOD PRESSURE: 132 MMHG | RESPIRATION RATE: 18 BRPM | HEART RATE: 88 BPM | TEMPERATURE: 98 F | OXYGEN SATURATION: 94 % | DIASTOLIC BLOOD PRESSURE: 91 MMHG

## 2025-07-26 LAB
ANION GAP SERPL CALCULATED.3IONS-SCNC: 10.1 MMOL/L (ref 5–15)
BASOPHILS # BLD AUTO: 0.02 10*3/MM3 (ref 0–0.2)
BASOPHILS NFR BLD AUTO: 0.1 % (ref 0–1.5)
BUN SERPL-MCNC: 20.9 MG/DL (ref 8–23)
BUN/CREAT SERPL: 12.3 (ref 7–25)
CALCIUM SPEC-SCNC: 9.1 MG/DL (ref 8.6–10.5)
CHLORIDE SERPL-SCNC: 101 MMOL/L (ref 98–107)
CO2 SERPL-SCNC: 22.9 MMOL/L (ref 22–29)
CREAT SERPL-MCNC: 1.7 MG/DL (ref 0.76–1.27)
DEPRECATED RDW RBC AUTO: 42.5 FL (ref 37–54)
EGFRCR SERPLBLD CKD-EPI 2021: 40.8 ML/MIN/1.73
EOSINOPHIL # BLD AUTO: 0 10*3/MM3 (ref 0–0.4)
EOSINOPHIL NFR BLD AUTO: 0 % (ref 0.3–6.2)
ERYTHROCYTE [DISTWIDTH] IN BLOOD BY AUTOMATED COUNT: 13.2 % (ref 12.3–15.4)
GLUCOSE SERPL-MCNC: 139 MG/DL (ref 65–99)
HCT VFR BLD AUTO: 39.5 % (ref 37.5–51)
HGB BLD-MCNC: 12.7 G/DL (ref 13–17.7)
IMM GRANULOCYTES # BLD AUTO: 0.04 10*3/MM3 (ref 0–0.05)
IMM GRANULOCYTES NFR BLD AUTO: 0.3 % (ref 0–0.5)
LYMPHOCYTES # BLD AUTO: 1.28 10*3/MM3 (ref 0.7–3.1)
LYMPHOCYTES NFR BLD AUTO: 9.2 % (ref 19.6–45.3)
MCH RBC QN AUTO: 28.5 PG (ref 26.6–33)
MCHC RBC AUTO-ENTMCNC: 32.2 G/DL (ref 31.5–35.7)
MCV RBC AUTO: 88.8 FL (ref 79–97)
MONOCYTES # BLD AUTO: 0.87 10*3/MM3 (ref 0.1–0.9)
MONOCYTES NFR BLD AUTO: 6.3 % (ref 5–12)
NEUTROPHILS NFR BLD AUTO: 11.64 10*3/MM3 (ref 1.7–7)
NEUTROPHILS NFR BLD AUTO: 84.1 % (ref 42.7–76)
NRBC BLD AUTO-RTO: 0 /100 WBC (ref 0–0.2)
PLATELET # BLD AUTO: 164 10*3/MM3 (ref 140–450)
PMV BLD AUTO: 8 FL (ref 6–12)
POTASSIUM SERPL-SCNC: 5.4 MMOL/L (ref 3.5–5.2)
POTASSIUM SERPL-SCNC: 5.4 MMOL/L (ref 3.5–5.2)
PROCALCITONIN SERPL-MCNC: 0.14 NG/ML (ref 0–0.25)
RBC # BLD AUTO: 4.45 10*6/MM3 (ref 4.14–5.8)
SODIUM SERPL-SCNC: 134 MMOL/L (ref 136–145)
WBC NRBC COR # BLD AUTO: 13.85 10*3/MM3 (ref 3.4–10.8)

## 2025-07-26 PROCEDURE — 85025 COMPLETE CBC W/AUTO DIFF WBC: CPT

## 2025-07-26 PROCEDURE — 25010000002 ENOXAPARIN PER 10 MG: Performed by: UROLOGY

## 2025-07-26 PROCEDURE — 63710000001 AMLODIPINE 5 MG TABLET: Performed by: UROLOGY

## 2025-07-26 PROCEDURE — 84132 ASSAY OF SERUM POTASSIUM: CPT | Performed by: PHYSICIAN ASSISTANT

## 2025-07-26 PROCEDURE — 25810000003 SODIUM CHLORIDE 0.9 % SOLUTION: Performed by: UROLOGY

## 2025-07-26 PROCEDURE — C2617 STENT, NON-COR, TEM W/O DEL: HCPCS | Performed by: UROLOGY

## 2025-07-26 PROCEDURE — A9270 NON-COVERED ITEM OR SERVICE: HCPCS | Performed by: UROLOGY

## 2025-07-26 PROCEDURE — 84145 PROCALCITONIN (PCT): CPT | Performed by: PHYSICIAN ASSISTANT

## 2025-07-26 PROCEDURE — G0378 HOSPITAL OBSERVATION PER HR: HCPCS

## 2025-07-26 PROCEDURE — A9270 NON-COVERED ITEM OR SERVICE: HCPCS | Performed by: PHYSICIAN ASSISTANT

## 2025-07-26 PROCEDURE — C1769 GUIDE WIRE: HCPCS | Performed by: UROLOGY

## 2025-07-26 PROCEDURE — 25010000002 HYDROMORPHONE PER 4 MG: Performed by: UROLOGY

## 2025-07-26 PROCEDURE — 25010000002 ONDANSETRON PER 1 MG

## 2025-07-26 PROCEDURE — 80048 BASIC METABOLIC PNL TOTAL CA: CPT

## 2025-07-26 PROCEDURE — 25010000002 DEXAMETHASONE PER 1 MG

## 2025-07-26 PROCEDURE — 25810000003 SODIUM CHLORIDE 0.9 % SOLUTION

## 2025-07-26 PROCEDURE — 96376 TX/PRO/DX INJ SAME DRUG ADON: CPT

## 2025-07-26 PROCEDURE — 25010000002 PROPOFOL 10 MG/ML EMULSION

## 2025-07-26 PROCEDURE — C1758 CATHETER, URETERAL: HCPCS | Performed by: UROLOGY

## 2025-07-26 PROCEDURE — 76000 FLUOROSCOPY <1 HR PHYS/QHP: CPT

## 2025-07-26 PROCEDURE — 25010000002 FENTANYL CITRATE (PF) 100 MCG/2ML SOLUTION

## 2025-07-26 PROCEDURE — 25010000002 HYDROMORPHONE PER 4 MG

## 2025-07-26 PROCEDURE — 25010000002 ONDANSETRON PER 1 MG: Performed by: UROLOGY

## 2025-07-26 PROCEDURE — 25010000002 LIDOCAINE PF 1% 1 % SOLUTION

## 2025-07-26 PROCEDURE — 25010000002 LEVOFLOXACIN PER 250 MG: Performed by: UROLOGY

## 2025-07-26 PROCEDURE — 63710000001 SODIUM ZIRCONIUM CYCLOSILICATE 10 G PACK: Performed by: PHYSICIAN ASSISTANT

## 2025-07-26 DEVICE — URETERAL STENT
Type: IMPLANTABLE DEVICE | Site: URETER | Status: FUNCTIONAL
Brand: PERCUFLEX™ PLUS

## 2025-07-26 RX ORDER — TRAZODONE HYDROCHLORIDE 50 MG/1
50 TABLET ORAL NIGHTLY
Status: DISCONTINUED | OUTPATIENT
Start: 2025-07-26 | End: 2025-07-26 | Stop reason: HOSPADM

## 2025-07-26 RX ORDER — TAMSULOSIN HYDROCHLORIDE 0.4 MG/1
0.4 CAPSULE ORAL NIGHTLY
Status: DISCONTINUED | OUTPATIENT
Start: 2025-07-26 | End: 2025-07-26 | Stop reason: HOSPADM

## 2025-07-26 RX ORDER — NALOXONE HCL 0.4 MG/ML
0.4 VIAL (ML) INJECTION AS NEEDED
Status: DISCONTINUED | OUTPATIENT
Start: 2025-07-26 | End: 2025-07-26 | Stop reason: HOSPADM

## 2025-07-26 RX ORDER — LEVOFLOXACIN 5 MG/ML
500 INJECTION, SOLUTION INTRAVENOUS EVERY 24 HOURS
Status: DISCONTINUED | OUTPATIENT
Start: 2025-07-26 | End: 2025-07-26

## 2025-07-26 RX ORDER — AMLODIPINE BESYLATE 5 MG/1
10 TABLET ORAL NIGHTLY
Status: DISCONTINUED | OUTPATIENT
Start: 2025-07-26 | End: 2025-07-26 | Stop reason: HOSPADM

## 2025-07-26 RX ORDER — SODIUM CHLORIDE 0.9 % (FLUSH) 0.9 %
10 SYRINGE (ML) INJECTION EVERY 12 HOURS SCHEDULED
Status: DISCONTINUED | OUTPATIENT
Start: 2025-07-26 | End: 2025-07-26 | Stop reason: HOSPADM

## 2025-07-26 RX ORDER — DEXAMETHASONE SODIUM PHOSPHATE 4 MG/ML
INJECTION, SOLUTION INTRA-ARTICULAR; INTRALESIONAL; INTRAMUSCULAR; INTRAVENOUS; SOFT TISSUE AS NEEDED
Status: DISCONTINUED | OUTPATIENT
Start: 2025-07-26 | End: 2025-07-26 | Stop reason: SURG

## 2025-07-26 RX ORDER — FENTANYL CITRATE 50 UG/ML
25 INJECTION, SOLUTION INTRAMUSCULAR; INTRAVENOUS
Status: DISCONTINUED | OUTPATIENT
Start: 2025-07-26 | End: 2025-07-26 | Stop reason: HOSPADM

## 2025-07-26 RX ORDER — ONDANSETRON 2 MG/ML
4 INJECTION INTRAMUSCULAR; INTRAVENOUS ONCE AS NEEDED
Status: DISCONTINUED | OUTPATIENT
Start: 2025-07-26 | End: 2025-07-26 | Stop reason: HOSPADM

## 2025-07-26 RX ORDER — ONDANSETRON 2 MG/ML
INJECTION INTRAMUSCULAR; INTRAVENOUS AS NEEDED
Status: DISCONTINUED | OUTPATIENT
Start: 2025-07-26 | End: 2025-07-26 | Stop reason: SURG

## 2025-07-26 RX ORDER — SODIUM CHLORIDE, SODIUM LACTATE, POTASSIUM CHLORIDE, CALCIUM CHLORIDE 600; 310; 30; 20 MG/100ML; MG/100ML; MG/100ML; MG/100ML
9 INJECTION, SOLUTION INTRAVENOUS CONTINUOUS PRN
Status: DISCONTINUED | OUTPATIENT
Start: 2025-07-26 | End: 2025-07-26

## 2025-07-26 RX ORDER — FENTANYL CITRATE 50 UG/ML
50 INJECTION, SOLUTION INTRAMUSCULAR; INTRAVENOUS
Status: DISCONTINUED | OUTPATIENT
Start: 2025-07-26 | End: 2025-07-26 | Stop reason: HOSPADM

## 2025-07-26 RX ORDER — HYDROCODONE BITARTRATE AND ACETAMINOPHEN 5; 325 MG/1; MG/1
1 TABLET ORAL EVERY 6 HOURS PRN
Qty: 12 TABLET | Refills: 0 | Status: SHIPPED | OUTPATIENT
Start: 2025-07-26

## 2025-07-26 RX ORDER — PROPOFOL 10 MG/ML
VIAL (ML) INTRAVENOUS AS NEEDED
Status: DISCONTINUED | OUTPATIENT
Start: 2025-07-26 | End: 2025-07-26 | Stop reason: SURG

## 2025-07-26 RX ORDER — ROSUVASTATIN CALCIUM 10 MG/1
20 TABLET, COATED ORAL NIGHTLY
Status: DISCONTINUED | OUTPATIENT
Start: 2025-07-26 | End: 2025-07-26 | Stop reason: HOSPADM

## 2025-07-26 RX ORDER — ESCITALOPRAM OXALATE 10 MG/1
10 TABLET ORAL NIGHTLY
Status: DISCONTINUED | OUTPATIENT
Start: 2025-07-26 | End: 2025-07-26 | Stop reason: HOSPADM

## 2025-07-26 RX ORDER — HYDRALAZINE HYDROCHLORIDE 20 MG/ML
5 INJECTION INTRAMUSCULAR; INTRAVENOUS
Status: DISCONTINUED | OUTPATIENT
Start: 2025-07-26 | End: 2025-07-26 | Stop reason: HOSPADM

## 2025-07-26 RX ORDER — SODIUM CHLORIDE 9 MG/ML
20 INJECTION, SOLUTION INTRAVENOUS ONCE
Status: COMPLETED | OUTPATIENT
Start: 2025-07-26 | End: 2025-07-26

## 2025-07-26 RX ORDER — ALBUTEROL SULFATE 0.83 MG/ML
2.5 SOLUTION RESPIRATORY (INHALATION) ONCE AS NEEDED
Status: DISCONTINUED | OUTPATIENT
Start: 2025-07-26 | End: 2025-07-26 | Stop reason: HOSPADM

## 2025-07-26 RX ORDER — LEVOFLOXACIN 5 MG/ML
500 INJECTION, SOLUTION INTRAVENOUS EVERY 24 HOURS
Status: COMPLETED | OUTPATIENT
Start: 2025-07-26 | End: 2025-07-26

## 2025-07-26 RX ORDER — HYDROCODONE BITARTRATE AND ACETAMINOPHEN 5; 325 MG/1; MG/1
1 TABLET ORAL EVERY 6 HOURS PRN
Qty: 12 TABLET | Refills: 0 | Status: SHIPPED | OUTPATIENT
Start: 2025-07-26 | End: 2025-07-26

## 2025-07-26 RX ORDER — FENTANYL CITRATE 50 UG/ML
INJECTION, SOLUTION INTRAMUSCULAR; INTRAVENOUS AS NEEDED
Status: DISCONTINUED | OUTPATIENT
Start: 2025-07-26 | End: 2025-07-26 | Stop reason: SURG

## 2025-07-26 RX ORDER — DIPHENHYDRAMINE HYDROCHLORIDE 50 MG/ML
12.5 INJECTION, SOLUTION INTRAMUSCULAR; INTRAVENOUS
Status: DISCONTINUED | OUTPATIENT
Start: 2025-07-26 | End: 2025-07-26 | Stop reason: HOSPADM

## 2025-07-26 RX ORDER — LISINOPRIL 20 MG/1
40 TABLET ORAL NIGHTLY
Status: DISCONTINUED | OUTPATIENT
Start: 2025-07-26 | End: 2025-07-26 | Stop reason: HOSPADM

## 2025-07-26 RX ORDER — SODIUM CHLORIDE 0.9 % (FLUSH) 0.9 %
10 SYRINGE (ML) INJECTION AS NEEDED
Status: DISCONTINUED | OUTPATIENT
Start: 2025-07-26 | End: 2025-07-26 | Stop reason: HOSPADM

## 2025-07-26 RX ORDER — SODIUM CHLORIDE 9 MG/ML
INJECTION, SOLUTION INTRAVENOUS CONTINUOUS PRN
Status: DISCONTINUED | OUTPATIENT
Start: 2025-07-26 | End: 2025-07-26 | Stop reason: SURG

## 2025-07-26 RX ORDER — ACETAMINOPHEN 325 MG/1
650 TABLET ORAL ONCE AS NEEDED
Status: DISCONTINUED | OUTPATIENT
Start: 2025-07-26 | End: 2025-07-26 | Stop reason: HOSPADM

## 2025-07-26 RX ORDER — LIDOCAINE HYDROCHLORIDE 10 MG/ML
INJECTION, SOLUTION EPIDURAL; INFILTRATION; INTRACAUDAL; PERINEURAL AS NEEDED
Status: DISCONTINUED | OUTPATIENT
Start: 2025-07-26 | End: 2025-07-26 | Stop reason: SURG

## 2025-07-26 RX ORDER — LABETALOL HYDROCHLORIDE 5 MG/ML
5 INJECTION, SOLUTION INTRAVENOUS
Status: DISCONTINUED | OUTPATIENT
Start: 2025-07-26 | End: 2025-07-26 | Stop reason: HOSPADM

## 2025-07-26 RX ADMIN — SODIUM CHLORIDE 20 ML/HR: 9 INJECTION, SOLUTION INTRAVENOUS at 13:40

## 2025-07-26 RX ADMIN — PROPOFOL 150 MG: 10 INJECTION, EMULSION INTRAVENOUS at 14:06

## 2025-07-26 RX ADMIN — Medication 10 ML: at 08:45

## 2025-07-26 RX ADMIN — HYDROMORPHONE HYDROCHLORIDE 0.5 MG: 1 INJECTION, SOLUTION INTRAMUSCULAR; INTRAVENOUS; SUBCUTANEOUS at 05:08

## 2025-07-26 RX ADMIN — ONDANSETRON 4 MG: 2 INJECTION, SOLUTION INTRAMUSCULAR; INTRAVENOUS at 14:14

## 2025-07-26 RX ADMIN — Medication 10 ML: at 02:58

## 2025-07-26 RX ADMIN — HYDROMORPHONE HYDROCHLORIDE 0.5 MG: 1 INJECTION, SOLUTION INTRAMUSCULAR; INTRAVENOUS; SUBCUTANEOUS at 07:12

## 2025-07-26 RX ADMIN — HYDROMORPHONE HYDROCHLORIDE 0.5 MG: 1 INJECTION, SOLUTION INTRAMUSCULAR; INTRAVENOUS; SUBCUTANEOUS at 10:28

## 2025-07-26 RX ADMIN — AMLODIPINE BESYLATE 10 MG: 5 TABLET ORAL at 08:02

## 2025-07-26 RX ADMIN — LIDOCAINE HYDROCHLORIDE 50 MG: 10 INJECTION, SOLUTION EPIDURAL; INFILTRATION; INTRACAUDAL; PERINEURAL at 14:06

## 2025-07-26 RX ADMIN — ONDANSETRON 4 MG: 2 INJECTION, SOLUTION INTRAMUSCULAR; INTRAVENOUS at 07:12

## 2025-07-26 RX ADMIN — SODIUM CHLORIDE 100 ML/HR: 9 INJECTION, SOLUTION INTRAVENOUS at 02:57

## 2025-07-26 RX ADMIN — SODIUM ZIRCONIUM CYCLOSILICATE 10 G: 10 POWDER, FOR SUSPENSION ORAL at 16:01

## 2025-07-26 RX ADMIN — LEVOFLOXACIN 500 MG: 500 INJECTION, SOLUTION INTRAVENOUS at 13:45

## 2025-07-26 RX ADMIN — HYDROMORPHONE HYDROCHLORIDE 0.5 MG: 1 INJECTION, SOLUTION INTRAMUSCULAR; INTRAVENOUS; SUBCUTANEOUS at 02:06

## 2025-07-26 RX ADMIN — SODIUM CHLORIDE: 9 INJECTION, SOLUTION INTRAVENOUS at 14:04

## 2025-07-26 RX ADMIN — Medication 10 ML: at 07:11

## 2025-07-26 RX ADMIN — DEXAMETHASONE SODIUM PHOSPHATE 4 MG: 4 INJECTION, SOLUTION INTRA-ARTICULAR; INTRALESIONAL; INTRAMUSCULAR; INTRAVENOUS; SOFT TISSUE at 14:09

## 2025-07-26 RX ADMIN — FENTANYL CITRATE 50 MCG: 50 INJECTION, SOLUTION INTRAMUSCULAR; INTRAVENOUS at 14:09

## 2025-07-26 RX ADMIN — ENOXAPARIN SODIUM 40 MG: 100 INJECTION SUBCUTANEOUS at 15:31

## 2025-07-26 RX ADMIN — FENTANYL CITRATE 50 MCG: 50 INJECTION, SOLUTION INTRAMUSCULAR; INTRAVENOUS at 14:05

## 2025-07-26 NOTE — PLAN OF CARE
Goal Outcome Evaluation:              Outcome Evaluation: Pt resting abed wife at bedside. Pt AOx4 [place] [facility] on RA up ad carlos. Staff providing standby assist. IVF per orders. Urinals within reach. No s/s of distress noted

## 2025-07-26 NOTE — POST-PROCEDURE NOTE
Operative Report    [facility] MAIN OR    Patient: Farheen Gomez  Age:      79 y.o.  :     1945  Sex:      male    Medical Record:  [id number]    Date of Operation/Procedure:  2025    Pre-operative Diagnosis:  right ureteral stone    Post-operative Diagnosis:  same    Surgeon:  iker    Name of Operation/Procedure:  cystoscopy right stent insertion    Findings/Complications:  none    Description of procedure: see op note    Estimated Blood Loss: min    Specimens: * No orders in the log *    Fluids/Drains: right stent    Ricardo Sorto MD  2025  12:28 EDT

## 2025-07-26 NOTE — PLAN OF CARE
Problem: Adult Inpatient Plan of Care  Goal: Plan of Care Review  Outcome: Progressing  Flowsheets (Taken 7/26/2025 1543)  Progress: improving  Plan of Care Reviewed With: patient  Goal: Patient-Specific Goal (Individualized)  Outcome: Progressing  Goal: Absence of Hospital-Acquired Illness or Injury  Outcome: Progressing  Intervention: Identify and Manage Fall Risk  Recent Flowsheet Documentation  Taken 7/26/2025 1400 by Asuncion Medellin RN  Safety Promotion/Fall Prevention: patient off unit  Taken 7/26/2025 1311 by Asuncion Medellin RN  Safety Promotion/Fall Prevention: patient off unit  Taken 7/26/2025 1200 by Asuncion Medellin RN  Safety Promotion/Fall Prevention:   assistive device/personal items within reach   clutter free environment maintained   nonskid shoes/slippers when out of bed   room organization consistent   safety round/check completed  Taken 7/26/2025 1000 by Asuncion Medellin RN  Safety Promotion/Fall Prevention:   assistive device/personal items within reach   clutter free environment maintained   nonskid shoes/slippers when out of bed   room organization consistent   safety round/check completed  Taken 7/26/2025 0800 by Asuncion Medellin RN  Safety Promotion/Fall Prevention:   assistive device/personal items within reach   clutter free environment maintained   nonskid shoes/slippers when out of bed   room organization consistent   safety round/check completed  Intervention: Prevent Skin Injury  Recent Flowsheet Documentation  Taken 7/26/2025 1200 by Asuncion Medellin RN  Body Position: position changed independently  Taken 7/26/2025 0800 by Asuncion Medellin RN  Body Position: position changed independently  Intervention: Prevent and Manage VTE (Venous Thromboembolism) Risk  Recent Flowsheet Documentation  Taken 7/26/2025 1200 by Asuncion Medellin RN  VTE Prevention/Management: SCDs (sequential compression devices) off  Taken 7/26/2025 0800 by Asuncion Medellin RN  VTE Prevention/Management: SCDs (sequential  compression devices) off  Intervention: Prevent Infection  Recent Flowsheet Documentation  Taken 7/26/2025 1200 by Asuncion Medellin RN  Infection Prevention:   hand hygiene promoted   single patient room provided  Taken 7/26/2025 1000 by Asuncion Medellin RN  Infection Prevention:   hand hygiene promoted   single patient room provided  Taken 7/26/2025 0800 by Asuncion Medellin RN  Infection Prevention:   hand hygiene promoted   single patient room provided  Goal: Optimal Comfort and Wellbeing  Outcome: Progressing  Intervention: Monitor Pain and Promote Comfort  Recent Flowsheet Documentation  Taken 7/26/2025 1200 by Asuncion Medellin RN  Pain Management Interventions:   pain management plan reviewed with patient/caregiver   pillow support provided   heat applied  Taken 7/26/2025 1100 by Asuncion Medellin RN  Pain Management Interventions:   pain management plan reviewed with patient/caregiver   heat applied  Taken 7/26/2025 1028 by Asuncion Medellin RN  Pain Management Interventions:   pain management plan reviewed with patient/caregiver   pain medication given  Taken 7/26/2025 0800 by Asuncion Medellin RN  Pain Management Interventions:   pillow support provided   pain management plan reviewed with patient/caregiver   heat applied  Taken 7/26/2025 0742 by Asuncion Medellin RN  Pain Management Interventions: pain management plan reviewed with patient/caregiver  Taken 7/26/2025 0712 by Asuncion Medellin RN  Pain Management Interventions: pain medication given  Intervention: Provide Person-Centered Care  Recent Flowsheet Documentation  Taken 7/26/2025 1200 by Asuncion Medellin RN  Trust Relationship/Rapport:   care explained   questions answered   questions encouraged  Taken 7/26/2025 0800 by Asuncion Medellin RN  Trust Relationship/Rapport:   care explained   questions encouraged   questions answered  Goal: Readiness for Transition of Care  Outcome: Progressing     Problem: Pain Acute  Goal: Optimal Pain Control and Function  Outcome:  Progressing  Intervention: Optimize Psychosocial Wellbeing  Recent Flowsheet Documentation  Taken 7/26/2025 1200 by Asuncion Medellin RN  Supportive Measures: active listening utilized  Diversional Activities: smartphone  Taken 7/26/2025 0800 by Asuncion Medellin RN  Supportive Measures: active listening utilized  Diversional Activities: smartphone  Intervention: Develop Pain Management Plan  Recent Flowsheet Documentation  Taken 7/26/2025 1200 by Asuncion Medellin RN  Pain Management Interventions:   pain management plan reviewed with patient/caregiver   pillow support provided   heat applied  Taken 7/26/2025 1100 by Asuncion Medellin RN  Pain Management Interventions:   pain management plan reviewed with patient/caregiver   heat applied  Taken 7/26/2025 1028 by Asuncion Medellin RN  Pain Management Interventions:   pain management plan reviewed with patient/caregiver   pain medication given  Taken 7/26/2025 0800 by Asuncion Medellin RN  Pain Management Interventions:   pillow support provided   pain management plan reviewed with patient/caregiver   heat applied  Taken 7/26/2025 0742 by Asuncion Medellin RN  Pain Management Interventions: pain management plan reviewed with patient/caregiver  Taken 7/26/2025 0712 by Asuncion Medellin RN  Pain Management Interventions: pain medication given  Intervention: Prevent or Manage Pain  Recent Flowsheet Documentation  Taken 7/26/2025 1200 by Asuncion Medellin RN  Medication Review/Management: medications reviewed  Taken 7/26/2025 1000 by Asuncion Medellin RN  Medication Review/Management: medications reviewed  Taken 7/26/2025 0800 by Asuncion Medellin RN  Medication Review/Management: medications reviewed     Problem: Acute Kidney Injury/Impairment  Goal: Fluid and Electrolyte Balance  Outcome: Progressing  Intervention: Monitor and Manage Fluid and Electrolyte Balance  Recent Flowsheet Documentation  Taken 7/26/2025 0800 by Asuncion Medellin RN  Fluid/Electrolyte Management: fluids provided  Goal: Improved  Oral Intake  Outcome: Progressing  Goal: Effective Renal Function  Outcome: Progressing  Intervention: Monitor and Support Renal Function  Recent Flowsheet Documentation  Taken 7/26/2025 1200 by Asuncion Medellin RN  Medication Review/Management: medications reviewed  Taken 7/26/2025 1000 by Asuncion Medellin RN  Medication Review/Management: medications reviewed  Taken 7/26/2025 0800 by Asuncion Medellin RN  Medication Review/Management: medications reviewed     Problem: Comorbidity Management  Goal: Blood Glucose Level Within Target Range  Outcome: Progressing  Intervention: Monitor and Manage Glycemia  Recent Flowsheet Documentation  Taken 7/26/2025 1200 by Asuncion Medellin RN  Medication Review/Management: medications reviewed  Taken 7/26/2025 1000 by Asuncion Medellin RN  Medication Review/Management: medications reviewed  Taken 7/26/2025 0800 by Asuncion Medellin RN  Medication Review/Management: medications reviewed  Goal: Blood Pressure in Desired Range  Outcome: Progressing  Intervention: Maintain Blood Pressure Management  Recent Flowsheet Documentation  Taken 7/26/2025 1200 by Asuncion Medellin RN  Medication Review/Management: medications reviewed  Taken 7/26/2025 1000 by Asuncion Medellin RN  Medication Review/Management: medications reviewed  Taken 7/26/2025 0800 by Asuncion Medellin RN  Medication Review/Management: medications reviewed     Problem: Sepsis/Septic Shock  Goal: Optimal Coping  Outcome: Progressing  Intervention: Support Patient and Family Response  Recent Flowsheet Documentation  Taken 7/26/2025 1200 by Asuncion Medellin RN  Supportive Measures: active listening utilized  Family/Support System Care:   self-care encouraged   support provided  Taken 7/26/2025 0800 by Asuncion Medellin RN  Supportive Measures: active listening utilized  Family/Support System Care:   self-care encouraged   support provided  Goal: Absence of Bleeding  Outcome: Progressing  Goal: Blood Glucose Level Within Target Range  Outcome:  Progressing  Goal: Absence of Infection Signs and Symptoms  Outcome: Progressing  Intervention: Initiate Sepsis Management  Recent Flowsheet Documentation  Taken 7/26/2025 1200 by Asuncion Medellin RN  Infection Prevention:   hand hygiene promoted   single patient room provided  Taken 7/26/2025 1000 by Asuncion Medellin RN  Infection Prevention:   hand hygiene promoted   single patient room provided  Taken 7/26/2025 0800 by Asuncion Medellin RN  Infection Prevention:   hand hygiene promoted   single patient room provided  Intervention: Promote Stabilization  Recent Flowsheet Documentation  Taken 7/26/2025 0800 by Asuncion Medellin RN  Fluid/Electrolyte Management: fluids provided  Intervention: Promote Recovery  Recent Flowsheet Documentation  Taken 7/26/2025 1200 by Asuncion Medellin RN  Activity Management: bedrest  Taken 7/26/2025 0800 by Asuncion Medellin RN  Activity Management: bedrest  Goal: Optimal Nutrition Delivery  Outcome: Progressing   Goal Outcome Evaluation:  Plan of Care Reviewed With: patient        Progress: improving

## 2025-07-26 NOTE — OP NOTE
Operative Report    [facility] MAIN OR    Patient: Farheen Gomez  Age:      79 y.o.  :     1945  Sex:      male    Medical Record:  [id number]    Date of Operation/Procedure:  2025    Pre-operative Diagnosis:  r ureteral stone  Post-operative Diagnosis: same    Surgeon:  iker    Name of Operation/Procedure:  cysto right stent insertion    Findings/Complications:  none    Description of procedure: pt was properly identified brought to the or having received iv levaquin.  Following general anesthesia the pt was placed in the dorsolithotomy position and pressure points padded.  The genitals were prepped and draped sterilely, and a 22 f scope was introduced into the bladder.  The right uo was engaged with a sensor wire over which a 6 f stent was placed under fluor guidance.  The bladder was drained the the scope was removed no immediate complications.    Estimated Blood Loss: min    Specimens: * No orders in the log *    Fluids/Drains: right stent    Ricardo Sorto MD  2025  12:30 EDT

## 2025-07-26 NOTE — DISCHARGE SUMMARY
[facility]    Penny Hidalgo MD    CHIEF COMPLAINT:     Flank pain    HISTORY OF PRESENT ILLNESS:    Obtained from ED provider HPI on 7/25/2025:  Is a 79-year-old male with PMH of HTN presenting to the ED for right lower quadrant abdominal pain worsening since last night.  Patient reports constant 6 out of 10 pain with intermittent sharp 10 out of 10 pain.  He denies trying any medication other than Imodium for his symptoms with no improvement.  He reports feeling hot and cold, nausea, and dry heaves.  Patient reports numerous episodes of nonbloody diarrhea last night.  He denies any fever, vomiting, chest pain, dyspnea, dysuria, pain in his testicles.    07/26/25:  Patient confirms the HPI noted above reporting some nausea over the past 2 days with developing and worsening right flank and back pain.  He has been able to urinate normally and denies any fever, cough, dyspnea or peripheral edema.  No history of nephrolithiasis is noted.  Pain has been adequately controlled following treatment in the ED.            Past Medical History:   Diagnosis Date    Anesthesia complication     had trouble urinating after last anesthesia    Arthritis     BPH (benign prostatic hyperplasia)     CTS (carpal tunnel syndrome)     Depression     History of bilateral knee arthroplasty     Hypertension     pre-Diabetes mellitus     Sleep apnea     uses CPAP     Past Surgical History:   Procedure Laterality Date    BACK SURGERY      HAND SURGERY Bilateral     carpal tunnel repair    JOINT REPLACEMENT      KNEE SURGERY      meniscus repair     LUMBAR DISCECTOMY Bilateral 04/22/2019    Procedure: left and right  L4-5 lumbar decompression with dura repair;  Surgeon: Edson Khan MD;  Location: [facility];  Service: Neurosurgery    ROTATOR CUFF REPAIR Right     SHOULDER SURGERY      TOTAL KNEE ARTHROPLASTY Right 10/23/2019    Procedure: TOTAL KNEE ARTHROPLASTY, WITH LEFT KNEE INJECTION;  Surgeon:  Boby Medina MD;  Location: [facility] MAIN OR;  Service: Orthopedics    TOTAL KNEE ARTHROPLASTY Left 2020    Procedure: TOTAL KNEE ARTHROPLASTY;  Surgeon: Boby Medina MD;  Location: [facility] MAIN OR;  Service: Orthopedics;  Laterality: Left;    TOTAL SHOULDER ARTHROPLASTY W/ DISTAL CLAVICLE EXCISION Right 10/18/2022    Procedure: TOTAL SHOULDER REVERSE ARTHROPLASTY;  Surgeon: Boby Medina MD;  Location: [facility] MAIN OR;  Service: Orthopedics;  Laterality: Right;    TOTAL SHOULDER ARTHROPLASTY W/ DISTAL CLAVICLE EXCISION Left 2024    Procedure: TOTAL SHOULDER REVERSE ARTHROPLASTY;  Surgeon: Devin Mayen MD;  Location: [facility] MAIN OR;  Service: Orthopedics;  Laterality: Left;     Family History   Problem Relation Age of Onset    Stroke Mother     Diabetes Father         I'm    No Known Problems Sister     No Known Problems Brother     [place] Hyperthermia Neg Hx      Social History     Tobacco Use    Smoking status: Former     Current packs/day: 0.00     Average packs/day: 0.3 packs/day for 2.0 years (0.5 ttl pk-yrs)     Types: Cigarettes     Start date: 1993     Quit date: 1995     Years since quittin.5     Passive exposure: Never    Smokeless tobacco: Never   Vaping Use    Vaping status: Never Used   Substance Use Topics    Alcohol use: No    Drug use: No     Medications Prior to Admission   Medication Sig Dispense Refill Last Dose/Taking    Acetaminophen (TYLENOL ARTHRITIS PAIN PO) Take 2 tablets by mouth 2 (Two) Times a Day As Needed.   Taking As Needed    amLODIPine (NORVASC) 10 MG tablet Take 1 tablet by mouth Every Night.   2025 Evening    escitalopram (LEXAPRO) 10 MG tablet Take 1 tablet by mouth Every Night.   2025 Evening    Flomax 0.4 MG capsule 24 hr capsule Take 1 capsule by mouth Every Night.   2025 Evening    lisinopril (PRINIVIL,ZESTRIL) 40 MG tablet Take 1 tablet by mouth Every Night.   2025 Evening    multivitamin with minerals tablet tablet Take 1  tablet by mouth Every Night.   7/24/2025 Evening    rosuvastatin (CRESTOR) 20 MG tablet TAKE 1 TABLET BY MOUTH ONCE DAILY AT NIGHT 90 tablet 0 7/24/2025 Evening    traZODone (DESYREL) 50 MG tablet TAKE 1 TABLET BY MOUTH ONCE DAILY AT NIGHT 60 tablet 0 7/24/2025 Evening    vitamin D (ERGOCALCIFEROL) 1.25 MG (35502 UT) capsule capsule Take 1 capsule by mouth 1 (One) Time Per Week. 12 capsule 3 7/21/2025     Allergies:  Penicillins and Propranolol    Immunization History   Administered Date(s) Administered    ABRYSVO (RSV, 60+ or pregnant women 32-36 wks) 01/03/2024    COVID-19 (MODERNA) 12YRS+ (SPIKEVAX) 01/03/2024    COVID-19 (MODERNA) 1st,2nd,3rd Dose Monovalent 01/19/2021, 02/23/2021    COVID-19 (MODERNA) Monovalent Original Booster 12/06/2021, 07/12/2022    COVID-19 (PFIZER) 12YRS+ (COMIRNATY) 01/10/2025, 07/11/2025    Flu Vaccine Intradermal Quad 18-64YR 10/01/2014    Flu Vaccine Quad PF 6-35MO 10/24/2019    Flu Vaccine Quad PF >36MO 10/24/2019    Fluad Quad 65+ 09/09/2020    Fluzone (or Fluarix & Flulaval for VFC) >6mos 10/24/2019    Fluzone High-Dose 65+YRS 10/08/2015, 10/04/2016, 10/05/2017, 10/18/2018, 01/10/2025    Fluzone High-Dose 65+yrs 11/19/2021, 10/04/2022    Hep A / Hep B 07/16/2018, 08/13/2018, 02/20/2019    Hepatitis A 07/16/2018, 08/13/2018, 02/20/2019    Hepatitis B Adult/Adolescent IM 07/16/2018, 08/13/2018, 02/20/2019    Influenza Seasonal Injectable 10/01/2014    Pneumococcal Conjugate 13-Valent (PCV13) 10/08/2015    Pneumococcal Conjugate 20-Valent (PCV20) 10/04/2022, 10/11/2023    Pneumococcal Polysaccharide (PPSV23) 10/01/2014    Shingrix 02/15/2019, 05/03/2019, 08/03/2019    TD Preservative Free (Tenivac) 08/13/2015    Td (TDVAX) 08/13/2015    Tdap 06/20/2024    Zostavax 09/03/2014, 02/15/2019, 05/03/2019           REVIEW OF SYSTEMS:    Review of Systems   Constitutional: Negative.   HENT: Negative.     Eyes: Negative.    Cardiovascular: Negative.    Respiratory: Negative.     Skin:  Negative.    Musculoskeletal:  Positive for back pain.   Gastrointestinal:  Positive for nausea. Negative for vomiting.   Genitourinary:  Positive for flank pain.   Neurological: Negative.    Psychiatric/Behavioral: Negative.       Vital Signs  Temp:  [97.9 °F (36.6 °C)-98.1 °F (36.7 °C)] 98.1 °F (36.7 °C)  Heart Rate:  [80-95] 88  Resp:  [14-23] 16  BP: (140-167)/(73-92) 140/85          Physical Exam:  Physical Exam  Vitals reviewed.   Constitutional:       General: He is not in acute distress.     Appearance: Normal appearance. He is normal weight. He is not ill-appearing, toxic-appearing or diaphoretic.   HENT:      Head: Normocephalic.      Right Ear: External ear normal.      Left Ear: External ear normal.      Nose: Nose normal.      Mouth/Throat:      Mouth: Mucous membranes are moist.   Eyes:      Extraocular Movements: Extraocular movements intact.   Cardiovascular:      Rate and Rhythm: Normal rate and regular rhythm.      Pulses: Normal pulses.      Heart sounds: Normal heart sounds.   Pulmonary:      Effort: Pulmonary effort is normal.      Breath sounds: Normal breath sounds.   Abdominal:      General: Bowel sounds are normal.      Palpations: Abdomen is soft.      Tenderness: There is no abdominal tenderness.   Musculoskeletal:         General: Normal range of motion.      Cervical back: Normal range of motion.      Right lower leg: No edema.      Left lower leg: No edema.   Skin:     General: Skin is warm and dry.      Capillary Refill: Capillary refill takes less than 2 seconds.   Neurological:      General: No focal deficit present.      Mental Status: He is alert and oriented to person, place, and time.   Psychiatric:         Mood and Affect: Mood normal.         Behavior: Behavior normal.         Thought Content: Thought content normal.         Judgment: Judgment normal.       Emotional Behavior:   Normal   Debilities:  None  Results Review:    I reviewed the patient's new clinical results.  Lab  Results (most recent)       Procedure Component Value Units Date/Time    Basic Metabolic Panel [[id number]]  (Abnormal) Collected: 07/26/25 0505    Specimen: Blood from Arm, Left Updated: 07/26/25 0538     Glucose 139 mg/dL      BUN 20.9 mg/dL      Creatinine 1.70 mg/dL      Sodium 134 mmol/L      Potassium 5.4 mmol/L      Chloride 101 mmol/L      CO2 22.9 mmol/L      Calcium 9.1 mg/dL      BUN/Creatinine Ratio 12.3     Anion Gap 10.1 mmol/L      eGFR 40.8 mL/min/1.73     Narrative:      GFR Categories in Chronic Kidney Disease (CKD)              GFR Category          GFR (mL/min/1.73)    Interpretation  G1                    90 or greater        Normal or high (1)  G2                    60-89                Mild decrease (1)  G3a                   45-59                Mild to moderate decrease  G3b                   30-44                Moderate to severe decrease  G4                    15-29                Severe decrease  G5                    14 or less           Kidney failure    (1)In the absence of evidence of kidney disease, neither GFR category G1 or G2 fulfill the criteria for CKD.    eGFR calculation 2021 CKD-EPI creatinine equation, which does not include race as a factor    CBC Auto Differential [[id number]]  (Abnormal) Collected: 07/26/25 0505    Specimen: Blood from Arm, Left Updated: 07/26/25 0518     WBC 13.85 10*3/mm3      RBC 4.45 10*6/mm3      Hemoglobin 12.7 g/dL      Hematocrit 39.5 %      MCV 88.8 fL      MCH 28.5 pg      MCHC 32.2 g/dL      RDW 13.2 %      RDW-SD 42.5 fl      MPV 8.0 fL      Platelets 164 10*3/mm3      Neutrophil % 84.1 %      Lymphocyte % 9.2 %      Monocyte % 6.3 %      Eosinophil % 0.0 %      Basophil % 0.1 %      Immature Grans % 0.3 %      Neutrophils, Absolute 11.64 10*3/mm3      Lymphocytes, Absolute 1.28 10*3/mm3      Monocytes, Absolute 0.87 10*3/mm3      Eosinophils, Absolute 0.00 10*3/mm3      Basophils, Absolute 0.02 10*3/mm3      Immature Grans, Absolute 0.04  10*3/mm3      nRBC 0.0 /100 WBC     Urinalysis With Culture If Indicated - Urine, Clean Catch [[id number]]  (Abnormal) Collected: 07/25/25 2232    Specimen: Urine, Clean Catch Updated: 07/25/25 2239     Color, UA Yellow     Appearance, UA Clear     pH, UA 6.5     Specific Gravity, UA 1.027     Glucose, UA Negative     Ketones, UA Negative     Bilirubin, UA Negative     Blood, UA Moderate (2+)     Protein, UA Negative     Leuk Esterase, UA Negative     Nitrite, UA Negative     Urobilinogen, UA 0.2 E.U./dL    Narrative:      In absence of clinical symptoms, the presence of pyuria, bacteria, and/or nitrites on the urinalysis result does not correlate with infection.    Urinalysis, Microscopic Only - Urine, Clean Catch [[id number]]  (Abnormal) Collected: 07/25/25 2232    Specimen: Urine, Clean Catch Updated: 07/25/25 2239     RBC, UA 21-50 /HPF      WBC, UA 0-2 /HPF      Comment: Urine culture not indicated.        Bacteria, UA None Seen /HPF      Squamous Epithelial Cells, UA 0-2 /HPF      Hyaline Casts, UA 0-2 /LPF      Methodology Automated Microscopy    Comprehensive Metabolic Panel [[id number]]  (Abnormal) Collected: 07/25/25 2110    Specimen: Blood Updated: 07/25/25 2139     Glucose 160 mg/dL      BUN 21.5 mg/dL      Creatinine 1.44 mg/dL      Sodium 134 mmol/L      Potassium 4.8 mmol/L      Comment: Specimen hemolyzed.  Result may be falsely elevated.        Chloride 100 mmol/L      CO2 20.3 mmol/L      Calcium 9.5 mg/dL      Total Protein 6.9 g/dL      Albumin 4.3 g/dL      ALT (SGPT) 20 U/L      AST (SGOT) 26 U/L      Alkaline Phosphatase 122 U/L      Total Bilirubin 0.4 mg/dL      Globulin 2.6 gm/dL      A/G Ratio 1.7 g/dL      BUN/Creatinine Ratio 14.9     Anion Gap 13.7 mmol/L      eGFR 49.7 mL/min/1.73     Narrative:      GFR Categories in Chronic Kidney Disease (CKD)              GFR Category          GFR (mL/min/1.73)    Interpretation  G1                    90 or greater        Normal or high (1)  G2                     60-89                Mild decrease (1)  G3a                   45-59                Mild to moderate decrease  G3b                   30-44                Moderate to severe decrease  G4                    15-29                Severe decrease  G5                    14 or less           Kidney failure    (1)In the absence of evidence of kidney disease, neither GFR category G1 or G2 fulfill the criteria for CKD.    eGFR calculation 2021 CKD-EPI creatinine equation, which does not include race as a factor    Lipase [[id number]]  (Abnormal) Collected: 07/25/25 2110    Specimen: Blood Updated: 07/25/25 2138     Lipase 89 U/L     CBC & Differential [[id number]]  (Abnormal) Collected: 07/25/25 2110    Specimen: Blood Updated: 07/25/25 2115    Narrative:      The following orders were created for panel order CBC & Differential.  Procedure                               Abnormality         Status                     ---------                               -----------         ------                     CBC Auto Differential[[id number]]        Abnormal            Final result                 Please view results for these tests on the individual orders.    CBC Auto Differential [[id number]]  (Abnormal) Collected: 07/25/25 2110    Specimen: Blood Updated: 07/25/25 2115     WBC 14.21 10*3/mm3      RBC 4.37 10*6/mm3      Hemoglobin 12.6 g/dL      Hematocrit 38.1 %      MCV 87.2 fL      MCH 28.8 pg      MCHC 33.1 g/dL      RDW 13.2 %      RDW-SD 41.7 fl      MPV 8.2 fL      Platelets 180 10*3/mm3      Neutrophil % 85.2 %      Lymphocyte % 8.2 %      Monocyte % 6.1 %      Eosinophil % 0.0 %      Basophil % 0.1 %      Immature Grans % 0.4 %      Neutrophils, Absolute 12.11 10*3/mm3      Lymphocytes, Absolute 1.16 10*3/mm3      Monocytes, Absolute 0.87 10*3/mm3      Eosinophils, Absolute 0.00 10*3/mm3      Basophils, Absolute 0.02 10*3/mm3      Immature Grans, Absolute 0.05 10*3/mm3      nRBC 0.0 /100 WBC             Imaging  Results (Most Recent)       Procedure Component Value Units Date/Time    CT Abdomen Pelvis With Contrast [[id number]] Collected: 07/25/25 2142     Updated: 07/25/25 2148    Narrative:      CT ABDOMEN PELVIS W CONTRAST    Date of Exam: 7/25/2025 9:35 PM EDT    Indication: RLQ abdominal pain.    Comparison: 10/27/2014    Technique: Axial CT images were obtained of the abdomen and pelvis following the uneventful intravenous administration of iodinated contrast. Sagittal and coronal reconstructions were performed.  Automated exposure control and iterative reconstruction   methods were used.    FINDINGS:    Lung bases: No masses. No consolidation. Severe atheromatous disease of the coronary vessels.    Liver:No masses. No intrahepatic biliary ductal dilatation.    Spleen:No masses. No perisplenic hematoma.    Pancreas:No pancreatic masses. No evidence of pancreatitis.    Gallbladder and common bile duct:No evidence of cholelithiasis. No evidence of cholecystitis.    Adrenal glands:No adrenal masses    Kidneys and ureters: Exophytic 5.7 cm x 4.6 cm right renal cyst. 1.6 cm left renal cyst. 0.7 cm right mid ureteric calculus at the L4 vertebral body level.    Urinary bladder:No urinary bladder wall thickening. No bladder masses.    Small bowel:Normal caliber small bowel.    Large bowel: Colonic diverticulosis without evidence of diverticulitis.    Appendix: Normal    GENITOURINARY: Normal prostate    Ascites or pneumoperitoneum:None.    Adenopathy: No adenopathy.    Osseous structures: Right hip replacement. The proximal left femur is intact. The pubic bones are intact. The sacrum and sacroiliac joints are normal. Degenerative changes of the lumbar spine. Lumbar vertebral body height and alignment are normal. No   spondylolysis.    Other findings: Atheromatous disease of the abdominal aorta and visualized branches.      Impression:      1.0.7 cm right mid ureteric calculus at the L4 vertebral body level.  2.Colonic  diverticulosis without evidence of diverticulitis.  3.Severe atheromatous disease of the coronary vessels.        Electronically Signed: Dinesh Baron MD    7/25/2025 9:46 PM EDT    Workstation ID: [id number]          reviewed    ECG/EMG Results (most recent)       Procedure Component Value Units Date/Time    Telemetry Scan [[id number]] Resulted: 07/26/25 0400     Updated: 07/26/25 0439    Telemetry Scan [[id number]] Resulted: 07/26/25 0705     Updated: 07/26/25 0720          not reviewed    Results for orders placed in visit on 11/07/19    Duplex Venous Lower Extremity - Right CAR 11/07/2019  1:01 PM    Interpretation Summary  · Normal right lower extremity venous duplex scan.      No results found for this or any previous visit.      Microbiology Results (last 10 days)       ** No results found for the last 240 hours. **            Assessment & Plan     Ureterolithiasis       Ureterolithiasis  - Creatinine: 1.44 with a BUN of 25 and a BUN/creatinine ratio of 14.9, trended to 1.70, 20.9, 12.3 respectively (baseline creatinine: 0.95)  - Lipase: 89  - WBCs: 14.21 with an increased absolute neutrophil count noted on differential, trended to 13.85  - UA showed 21-50 RBCs  - CT is abdomen and pelvis showed a 7 mm mid ureter calculus with colonic diverticulosis and no evidence of diverticulitis as well as severe erythematous disease of the coronary vessels  - In the ED patient was given Dilaudid, Reglan, morphine, Zofran and a 1 L fluid bolus followed by infusion  - Urology consulted who performed cystoscopy with ureteroscopy and stent placement with plans for outpatient staged procedure    Hypertension  - Moderately controlled   BP Readings from Last 1 Encounters:   07/26/25 140/85   - Continue losartan  - Monitor while admitted    Hyperlipidemia  - Statin    I discussed the patients findings and my recommendations with patient and nursing staff.     Discharge Diagnosis:      Ureterolithiasis      Hospital Course  Patient is  a 79 y.o. male presented with right-sided flank and back pain with an HPI noted above.  Serum creatinine was noted as elevated 1.44 with a BUN of 25 and a BUN to creatinine ratio of 14.9..  Lipase was found to be 89 and mild leukocytosis was noted with WBCs of 14.21 with an increased absolute neutrophil count noted on differential.  UA showed 21-50 RBCs and CT of abdomen and pelvis was obtained which did show a 7 mm mid ureteral calculus.  On the morning following admission creatinine trended to 1.70 with WBCs of 13.85.  Patient was given Dilaudid, Reglan, morphine, Zofran and 1 L fluid bolus in the ED.  Urology was consulted who performed cystoscopy with ureteroscopy and plan for staged procedure on an outpatient basis.  Potassium remained somewhat elevated at 5.4 and he was given IV fluids along with 1 dose of Lokelma and is instructed to ensure adequate hydration and follow-up with his primary care provider within 1 week for reassessment.  At this time patient is felt to be in good condition for discharge with close follow-up with his PCP as well as urology on an outpatient basis.  His full testing/results and plan were discussed with patient along with concerning/alarm symptoms for which to call 911/return to the ED.  All questions were answered and he verbalizes his understanding and agreement.    Past Medical History:     Past Medical History:   Diagnosis Date    Anesthesia complication     had trouble urinating after last anesthesia    Arthritis     BPH (benign prostatic hyperplasia)     CTS (carpal tunnel syndrome)     Depression     History of bilateral knee arthroplasty     Hypertension     pre-Diabetes mellitus     Sleep apnea     uses CPAP       Past Surgical History:     Past Surgical History:   Procedure Laterality Date    BACK SURGERY      HAND SURGERY Bilateral     carpal tunnel repair    JOINT REPLACEMENT      KNEE SURGERY      meniscus repair     LUMBAR DISCECTOMY Bilateral 04/22/2019    Procedure:  left and right  L4-5 lumbar decompression with dura repair;  Surgeon: Edson Khan MD;  Location: [facility] MAIN OR;  Service: Neurosurgery    ROTATOR CUFF REPAIR Right     SHOULDER SURGERY      TOTAL KNEE ARTHROPLASTY Right 10/23/2019    Procedure: TOTAL KNEE ARTHROPLASTY, WITH LEFT KNEE INJECTION;  Surgeon: oBby Medina MD;  Location: [facility] MAIN OR;  Service: Orthopedics    TOTAL KNEE ARTHROPLASTY Left 2020    Procedure: TOTAL KNEE ARTHROPLASTY;  Surgeon: Boby Medina MD;  Location: [facility] MAIN OR;  Service: Orthopedics;  Laterality: Left;    TOTAL SHOULDER ARTHROPLASTY W/ DISTAL CLAVICLE EXCISION Right 10/18/2022    Procedure: TOTAL SHOULDER REVERSE ARTHROPLASTY;  Surgeon: Boby Medina MD;  Location: [facility] MAIN OR;  Service: Orthopedics;  Laterality: Right;    TOTAL SHOULDER ARTHROPLASTY W/ DISTAL CLAVICLE EXCISION Left 2024    Procedure: TOTAL SHOULDER REVERSE ARTHROPLASTY;  Surgeon: Devin Mayen MD;  Location: [facility] MAIN OR;  Service: Orthopedics;  Laterality: Left;       Social History:   Social History     Socioeconomic History    Marital status:    Tobacco Use    Smoking status: Former     Current packs/day: 0.00     Average packs/day: 0.3 packs/day for 2.0 years (0.5 ttl pk-yrs)     Types: Cigarettes     Start date: 1993     Quit date: 1995     Years since quittin.5     Passive exposure: Never    Smokeless tobacco: Never   Vaping Use    Vaping status: Never Used   Substance and Sexual Activity    Alcohol use: No    Drug use: No    Sexual activity: Not Currently     Partners: Female     Birth control/protection: None       Procedures Performed    Procedure(s):  CYSTOSCOPY, RIGHT URETEROSCOPY, RIGHT STENT  -------------------       Consults:   Consults       Date and Time Order Name Status Description    2025 11:11 PM Inpatient Urology Consult              Condition on Discharge:     Stable    Discharge Disposition      Discharge Medications      Discharge Medications        ASK your doctor about these medications        Instructions Start Date   amLODIPine 10 MG tablet  Commonly known as: NORVASC  Ask about: Which instructions should I use?   10 mg, Nightly      escitalopram 10 MG tablet  Commonly known as: LEXAPRO  Ask about: Which instructions should I use?   10 mg, Nightly      Flomax 0.4 MG capsule 24 hr capsule  Generic drug: tamsulosin   Take 1 capsule by mouth Every Night.      lisinopril 40 MG tablet  Commonly known as: PRINIVIL,ZESTRIL  Ask about: Which instructions should I use?   40 mg, Nightly      multivitamin with minerals tablet tablet   1 tablet, Nightly      rosuvastatin 20 MG tablet  Commonly known as: CRESTOR   20 mg, Oral, Nightly      traZODone 50 MG tablet  Commonly known as: DESYREL   50 mg, Oral, Nightly      TYLENOL ARTHRITIS PAIN PO   2 tablets, 2 Times Daily PRN      vitamin D 1.25 MG (95223 UT) capsule capsule  Commonly known as: ERGOCALCIFEROL   50,000 Units, Oral, Weekly               Discharge Diet:     Activity at Discharge:     Follow-up Appointments  Future Appointments   Date Time Provider Department Center   10/17/2025  8:15 AM Penny Hidalgo MD [facility]         Test Results Pending at Discharge  Pending Results       None             Risk for Readmission (LACE) Score: 1 (7/26/2025  6:00 AM)      Greater than 30 minutes spent in discharge activities for this patient    Signature:Electronically signed by Devon Montesinos PA-C, 07/26/25, 3:55 PM EDT.

## 2025-07-26 NOTE — ED PROVIDER NOTES
"Subjective   History of Present Illness  Is a 79-year-old male with PMH of HTN presenting to the ED for right lower quadrant abdominal pain worsening since last night.  Patient reports constant 6 out of 10 pain with intermittent sharp 10 out of 10 pain.  He denies trying any medication other than Imodium for his symptoms with no improvement.  He reports feeling hot and cold, nausea, and dry heaves.  Patient reports numerous episodes of nonbloody diarrhea last night.  He denies any fever, vomiting, chest pain, dyspnea, dysuria, pain in his testicles.        Review of Systems   Constitutional:  Positive for chills. Negative for fever.   Respiratory:  Negative for shortness of breath.    Cardiovascular:  Negative for chest pain.   Gastrointestinal:  Positive for abdominal pain, diarrhea, nausea and vomiting. Negative for constipation.   Genitourinary:  Negative for dysuria and testicular pain.       Past Medical History:   Diagnosis Date    Anesthesia complication     had trouble urinating after last anesthesia    Arthritis     BPH (benign prostatic hyperplasia)     CTS (carpal tunnel syndrome)     Depression     History of bilateral knee arthroplasty     Hypertension     pre-Diabetes mellitus     Sleep apnea     uses CPAP       Allergies   Allergen Reactions    Penicillins Hives and Other (See Comments)     Tongue bled    Annotation - 29Jan2019: \"had reaction as a child - inside of my mouth peeled off and my teeth fell out\"   Tongue bled    Propranolol GI Intolerance     constipation       Past Surgical History:   Procedure Laterality Date    BACK SURGERY      HAND SURGERY Bilateral     carpal tunnel repair    JOINT REPLACEMENT      KNEE SURGERY      meniscus repair     LUMBAR DISCECTOMY Bilateral 04/22/2019    Procedure: left and right  L4-5 lumbar decompression with dura repair;  Surgeon: Edson Khan MD;  Location: [facility];  Service: Neurosurgery    ROTATOR CUFF REPAIR Right     SHOULDER SURGERY      " TOTAL KNEE ARTHROPLASTY Right 10/23/2019    Procedure: TOTAL KNEE ARTHROPLASTY, WITH LEFT KNEE INJECTION;  Surgeon: Boby Medina MD;  Location: [facility] MAIN OR;  Service: Orthopedics    TOTAL KNEE ARTHROPLASTY Left 2020    Procedure: TOTAL KNEE ARTHROPLASTY;  Surgeon: Boby Medina MD;  Location: [facility] MAIN OR;  Service: Orthopedics;  Laterality: Left;    TOTAL SHOULDER ARTHROPLASTY W/ DISTAL CLAVICLE EXCISION Right 10/18/2022    Procedure: TOTAL SHOULDER REVERSE ARTHROPLASTY;  Surgeon: Boby Medina MD;  Location: [facility] MAIN OR;  Service: Orthopedics;  Laterality: Right;    TOTAL SHOULDER ARTHROPLASTY W/ DISTAL CLAVICLE EXCISION Left 2024    Procedure: TOTAL SHOULDER REVERSE ARTHROPLASTY;  Surgeon: Devin Mayen MD;  Location: [facility] MAIN OR;  Service: Orthopedics;  Laterality: Left;       Family History   Problem Relation Age of Onset    Stroke Mother     Diabetes Father         I'm    No Known Problems Sister     No Known Problems Brother     Malig Hyperthermia Neg Hx        Social History     Socioeconomic History    Marital status:    Tobacco Use    Smoking status: Former     Current packs/day: 0.00     Average packs/day: 0.3 packs/day for 2.0 years (0.5 ttl pk-yrs)     Types: Cigarettes     Start date: 1993     Quit date: 1995     Years since quittin.5     Passive exposure: Never    Smokeless tobacco: Never   Vaping Use    Vaping status: Never Used   Substance and Sexual Activity    Alcohol use: No    Drug use: No    Sexual activity: Not Currently     Partners: Female     Birth control/protection: None           Objective   Physical Exam  Constitutional:       General: He is in acute distress.      Appearance: Normal appearance. He is well-developed.   HENT:      Head: Normocephalic and atraumatic.   Eyes:      Extraocular Movements: Extraocular movements intact.   Cardiovascular:      Rate and Rhythm: Normal rate and regular rhythm.      Pulses: Normal pulses.       "Heart sounds: Normal heart sounds.   Pulmonary:      Effort: Pulmonary effort is normal.      Breath sounds: Normal breath sounds.   Abdominal:      General: Abdomen is flat. Bowel sounds are normal.      Palpations: Abdomen is soft.      Tenderness: There is abdominal tenderness in the right lower quadrant.   Musculoskeletal:         General: Normal range of motion.      Cervical back: Normal range of motion and neck supple.      Right lower leg: No edema.      Left lower leg: No edema.   Skin:     General: Skin is warm and dry.      Capillary Refill: Capillary refill takes less than 2 seconds.   Neurological:      General: No focal deficit present.      Mental Status: He is alert and oriented to person, place, and time.   Psychiatric:         Mood and Affect: Mood normal.         Behavior: Behavior normal.         Procedures           ED Course        /83   Pulse 95   Temp 97.9 °F (36.6 °C) (Oral)   Resp 20   Ht 177.8 cm (70\")   Wt 95.7 kg (211 lb)   SpO2 95%   BMI 30.28 kg/m²   Labs Reviewed   COMPREHENSIVE METABOLIC PANEL - Abnormal; Notable for the following components:       Result Value    Glucose 160 (*)     Creatinine 1.44 (*)     Sodium 134 (*)     CO2 20.3 (*)     Alkaline Phosphatase 122 (*)     eGFR 49.7 (*)     All other components within normal limits    Narrative:     GFR Categories in Chronic Kidney Disease (CKD)              GFR Category          GFR (mL/min/1.73)    Interpretation  G1                    90 or greater        Normal or high (1)  G2                    60-89                Mild decrease (1)  G3a                   45-59                Mild to moderate decrease  G3b                   30-44                Moderate to severe decrease  G4                    15-29                Severe decrease  G5                    14 or less           Kidney failure    (1)In the absence of evidence of kidney disease, neither GFR category G1 or G2 fulfill the criteria for CKD.    eGFR " calculation 2021 CKD-EPI creatinine equation, which does not include race as a factor   LIPASE - Abnormal; Notable for the following components:    Lipase 89 (*)     All other components within normal limits   URINALYSIS W/ CULTURE IF INDICATED - Abnormal; Notable for the following components:    Blood, UA Moderate (2+) (*)     All other components within normal limits    Narrative:     In absence of clinical symptoms, the presence of pyuria, bacteria, and/or nitrites on the urinalysis result does not correlate with infection.   CBC WITH AUTO DIFFERENTIAL - Abnormal; Notable for the following components:    WBC 14.21 (*)     Hemoglobin 12.6 (*)     Neutrophil % 85.2 (*)     Lymphocyte % 8.2 (*)     Eosinophil % 0.0 (*)     Neutrophils, Absolute 12.11 (*)     All other components within normal limits   URINALYSIS, MICROSCOPIC ONLY - Abnormal; Notable for the following components:    RBC, UA 21-50 (*)     All other components within normal limits   CBC WITH AUTO DIFFERENTIAL   BASIC METABOLIC PANEL   CBC AND DIFFERENTIAL    Narrative:     The following orders were created for panel order CBC & Differential.  Procedure                               Abnormality         Status                     ---------                               -----------         ------                     CBC Auto Differential[[id number]]        Abnormal            Final result                 Please view results for these tests on the individual orders.     Medications   sodium chloride 0.9 % flush 10 mL (has no administration in time range)   sodium chloride 0.9 % flush 10 mL (has no administration in time range)   sodium chloride 0.9 % infusion 40 mL (has no administration in time range)   Pharmacy to Dose enoxaparin (LOVENOX) (has no administration in time range)   nitroglycerin (NITROSTAT) SL tablet 0.4 mg (has no administration in time range)   sodium chloride 0.9 % infusion (has no administration in time range)   HYDROmorphone (DILAUDID)  injection 0.5 mg (has no administration in time range)     And   naloxone (NARCAN) injection 0.4 mg (has no administration in time range)   sennosides-docusate (PERICOLACE) 8.6-50 MG per tablet 2 tablet (has no administration in time range)     And   polyethylene glycol (MIRALAX) packet 17 g (has no administration in time range)     And   bisacodyl (DULCOLAX) EC tablet 5 mg (has no administration in time range)     And   bisacodyl (DULCOLAX) suppository 10 mg (has no administration in time range)   ondansetron ODT (ZOFRAN-ODT) disintegrating tablet 4 mg (has no administration in time range)     Or   ondansetron (ZOFRAN) injection 4 mg (has no administration in time range)   enoxaparin sodium (LOVENOX) syringe 40 mg (has no administration in time range)   morphine injection 4 mg (4 mg Intravenous Given 7/25/25 2113)   ondansetron (ZOFRAN) injection 4 mg (4 mg Intravenous Given 7/25/25 2113)   iopamidol (ISOVUE-370) 76 % injection 100 mL (100 mL Intravenous Given 7/25/25 2137)   sodium chloride 0.9 % bolus 1,000 mL (1,000 mL Intravenous New Bag 7/25/25 2228)   HYDROmorphone (DILAUDID) injection 0.5 mg (0.5 mg Intravenous Given 7/25/25 2228)   metoclopramide (REGLAN) injection 10 mg (10 mg Intravenous Given 7/25/25 2229)     CT Abdomen Pelvis With Contrast  Result Date: 7/25/2025  1.0.7 cm right mid ureteric calculus at the L4 vertebral body level. 2.Colonic diverticulosis without evidence of diverticulitis. 3.Severe atheromatous disease of the coronary vessels. Electronically Signed: Dinesh Baron MD  7/25/2025 9:46 PM EDT  Workstation ID: [id number]                                                   Medical Decision Making  Patient presented to the ED for the above complaint.    Patient underwent the above exam and evaluation.    While in the ED patient was placed in a gown and IV was established.  CT abdomen pelvis and blood work was obtained to assess for appendicitis, nephrolithiasis, obstruction, abscess,  perforation, electro abnormality, dehydration.  Patient was given IV pain medication and Zofran, reporting continued pain and vomiting, was given pain medication and Reglan.  Upon reevaluation patient resting comfortably, reporting improvement in symptoms, vital stable on room air.  Discussed findings with patient and wife at bedside.  Educated patient we will be placing him in observation for further pain control, IV fluids, and urology evaluation.  Patient voiced understanding, agreeable with dispo plan.    Labs were independently interpreted by myself and deemed remarkable for the following: Leukocytosis of 14.21 without bandemia, hemoglobin stable at 12.6, hyponatremia of 134, no other electrolyte abnormalities, elevated creatinine 1.44, slightly elevated lipase at 89, urinalysis showed moderate blood, no bacteria or pyuria.  CT abdomen pelvis independently interpreted by Dr. Fall and reviewed by myself showin.7 cm right mid ureteral calculus, colonic diverticulosis without diverticulitis.    Appropriate PPE was worn during each patient encounter.    Note Disclaimer: At [facility], we believe that sharing information builds trust and better relationships. You are receiving this note because you are receiving care at [facility] or recently visited. It is possible you will see health information before a provider has talked with you about it. This kind of information can be easy to misunderstand. To help you fully understand what it means for your health, we urge you to discuss this note with your provider.    Discussed this patient with Dr. Fall who agrees with plan.      Problems Addressed:  SID (acute kidney injury): complicated acute illness or injury  Nausea and vomiting, unspecified vomiting type: complicated acute illness or injury  Right lower quadrant pain: complicated acute illness or injury  Ureterolithiasis: complicated acute illness or injury    Amount and/or Complexity of Data  Reviewed  Labs: ordered.  Radiology: ordered.    Risk  OTC drugs.  Prescription drug management.  Decision regarding hospitalization.        Final diagnoses:   Ureterolithiasis   Right lower quadrant pain   Nausea and vomiting, unspecified vomiting type   SID (acute kidney injury)       ED Disposition  ED Disposition       ED Disposition   Decision to Admit    Condition   --    Comment   --               No follow-up provider specified.       Medication List        ASK your doctor about these medications      amLODIPine 10 MG tablet  Commonly known as: NORVASC  Ask about: Which instructions should I use?     escitalopram 10 MG tablet  Commonly known as: LEXAPRO  Ask about: Which instructions should I use?     lisinopril 40 MG tablet  Commonly known as: PRINIVILZESTRIL  Ask about: Which instructions should I use?                 Elly Pimentel PA-C  07/25/25 [id number]

## 2025-07-26 NOTE — ANESTHESIA PROCEDURE NOTES
Airway  Reason: elective    Date/Time: 7/26/2025 2:07 PM  Airway not difficult    General Information and Staff    Patient location during procedure: OR  CRNA/CAA: Gemma Jiménez CRNA    Indications and Patient Condition  Indications for airway management: airway protection    Preoxygenated: yes    Mask difficulty assessment: 0 - not attempted    Final Airway Details    Final airway type: supraglottic airway      Successful airway: I-gel  Size: 4   Number of attempts at approach: 1  Assessment: lips, teeth, and gum same as pre-op and atraumatic intubation

## 2025-07-26 NOTE — ANESTHESIA POSTPROCEDURE EVALUATION
Patient: Farheen Gomez    Procedure Summary       Date: 07/26/25 Room / Location: [facility] OR 14 / [facility] MAIN OR    Anesthesia Start: 1404 Anesthesia Stop: 1420    Procedure: CYSTOSCOPY, RIGHT URETEROSCOPY, RIGHT STENT (Right) Diagnosis:     Surgeons: Ricardo Sorto MD Provider: Da Diaz MD    Anesthesia Type: general ASA Status: 3            Anesthesia Type: general    Vitals  Vitals Value Taken Time   /62 07/26/25 14:29   Temp 98.3 °F (36.8 °C) 07/26/25 14:19   Pulse 100 07/26/25 14:31   Resp 17 07/26/25 14:29   SpO2 90 % 07/26/25 14:31   Vitals shown include unfiled device data.        Post Anesthesia Care and Evaluation    Patient location during evaluation: PACU  Patient participation: complete - patient participated  Level of consciousness: awake  Pain scale: See nurse's notes for pain score.  Pain management: adequate    Airway patency: patent  Anesthetic complications: No anesthetic complications  PONV Status: none  Cardiovascular status: acceptable  Respiratory status: acceptable and spontaneous ventilation  Hydration status: acceptable    Comments: Patient seen and examined postoperatively; vital signs stable; SpO2 greater than or equal to 90%; cardiopulmonary status stable; nausea/vomiting adequately controlled; pain adequately controlled; no apparent anesthesia complications; patient discharged from anesthesia care when discharge criteria were met

## 2025-07-26 NOTE — PLAN OF CARE
Problem: Adult Inpatient Plan of Care  Goal: Plan of Care Review  7/26/2025 1649 by Asuncion Medellin RN  Outcome: Met  Flowsheets (Taken 7/26/2025 1649)  Progress: improving  Plan of Care Reviewed With: patient  7/26/2025 1543 by Asuncion Medellin RN  Outcome: Progressing  Flowsheets (Taken 7/26/2025 1543)  Progress: improving  Plan of Care Reviewed With: patient  Goal: Patient-Specific Goal (Individualized)  7/26/2025 1649 by Asuncion Medellin RN  Outcome: Met  7/26/2025 1543 by Asuncion Medellin RN  Outcome: Progressing  Goal: Absence of Hospital-Acquired Illness or Injury  7/26/2025 1649 by Asuncion Medellin RN  Outcome: Met  7/26/2025 1543 by Asuncion Medellin RN  Outcome: Progressing  Intervention: Identify and Manage Fall Risk  Recent Flowsheet Documentation  Taken 7/26/2025 1600 by Asuncion Medellin RN  Safety Promotion/Fall Prevention:   assistive device/personal items within reach   clutter free environment maintained   nonskid shoes/slippers when out of bed   room organization consistent   safety round/check completed  Taken 7/26/2025 1400 by Asuncion Medellin RN  Safety Promotion/Fall Prevention: patient off unit  Taken 7/26/2025 1311 by Asuncion Medellin RN  Safety Promotion/Fall Prevention: patient off unit  Taken 7/26/2025 1200 by Asuncion Medellin RN  Safety Promotion/Fall Prevention:   assistive device/personal items within reach   clutter free environment maintained   nonskid shoes/slippers when out of bed   room organization consistent   safety round/check completed  Taken 7/26/2025 1000 by Asuncion Medellin RN  Safety Promotion/Fall Prevention:   assistive device/personal items within reach   clutter free environment maintained   nonskid shoes/slippers when out of bed   room organization consistent   safety round/check completed  Taken 7/26/2025 0800 by Asuncion Medellin RN  Safety Promotion/Fall Prevention:   assistive device/personal items within reach   clutter free environment maintained   nonskid shoes/slippers when out of  bed   room organization consistent   safety round/check completed  Intervention: Prevent Skin Injury  Recent Flowsheet Documentation  Taken 7/26/2025 1600 by Asuncion Medellin RN  Body Position: position changed independently  Taken 7/26/2025 1200 by Asuncion Medellin RN  Body Position: position changed independently  Taken 7/26/2025 0800 by Asuncion Medellin RN  Body Position: position changed independently  Intervention: Prevent and Manage VTE (Venous Thromboembolism) Risk  Recent Flowsheet Documentation  Taken 7/26/2025 1600 by Asuncion Medellin RN  VTE Prevention/Management: SCDs (sequential compression devices) off  Taken 7/26/2025 1200 by Asuncion Medellin RN  VTE Prevention/Management: SCDs (sequential compression devices) off  Taken 7/26/2025 0800 by Asuncion Medellin RN  VTE Prevention/Management: SCDs (sequential compression devices) off  Intervention: Prevent Infection  Recent Flowsheet Documentation  Taken 7/26/2025 1600 by Asuncion Medellin RN  Infection Prevention:   hand hygiene promoted   single patient room provided  Taken 7/26/2025 1200 by Asuncion Medellin RN  Infection Prevention:   hand hygiene promoted   single patient room provided  Taken 7/26/2025 1000 by Asuncion Medellin RN  Infection Prevention:   hand hygiene promoted   single patient room provided  Taken 7/26/2025 0800 by Asuncion Medellin RN  Infection Prevention:   hand hygiene promoted   single patient room provided  Goal: Optimal Comfort and Wellbeing  7/26/2025 1649 by Asuncion Medellin RN  Outcome: Met  7/26/2025 1543 by Asuncion Medellin RN  Outcome: Progressing  Intervention: Monitor Pain and Promote Comfort  Recent Flowsheet Documentation  Taken 7/26/2025 1200 by Asuncion Medellin RN  Pain Management Interventions:   pain management plan reviewed with patient/caregiver   pillow support provided   heat applied  Taken 7/26/2025 1100 by Asunicon Medellin RN  Pain Management Interventions:   pain management plan reviewed with patient/caregiver   heat applied  Taken  7/26/2025 1028 by Asuncion Medellin RN  Pain Management Interventions:   pain management plan reviewed with patient/caregiver   pain medication given  Taken 7/26/2025 0800 by Asuncion Medellin RN  Pain Management Interventions:   pillow support provided   pain management plan reviewed with patient/caregiver   heat applied  Taken 7/26/2025 0742 by Asuncion Medellin RN  Pain Management Interventions: pain management plan reviewed with patient/caregiver  Taken 7/26/2025 0712 by Asuncion Medellin RN  Pain Management Interventions: pain medication given  Intervention: Provide Person-Centered Care  Recent Flowsheet Documentation  Taken 7/26/2025 1600 by Asuncion Medellin RN  Trust Relationship/Rapport:   care explained   questions answered   questions encouraged  Taken 7/26/2025 1200 by Asuncion Medellin RN  Trust Relationship/Rapport:   care explained   questions answered   questions encouraged  Taken 7/26/2025 0800 by Asuncion Medellin RN  Trust Relationship/Rapport:   care explained   questions encouraged   questions answered  Goal: Readiness for Transition of Care  7/26/2025 1649 by Asuncion Medellin RN  Outcome: Met  7/26/2025 1543 by Asuncion Medellin RN  Outcome: Progressing     Problem: Pain Acute  Goal: Optimal Pain Control and Function  7/26/2025 1649 by Asuncion Medellin RN  Outcome: Met  7/26/2025 1543 by Asuncion Medellin RN  Outcome: Progressing  Intervention: Optimize Psychosocial Wellbeing  Recent Flowsheet Documentation  Taken 7/26/2025 1600 by Asuncion Medellin RN  Supportive Measures: active listening utilized  Diversional Activities: smartphone  Taken 7/26/2025 1200 by Asuncion Medellin RN  Supportive Measures: active listening utilized  Diversional Activities: smartphone  Taken 7/26/2025 0800 by Asuncion Medellin RN  Supportive Measures: active listening utilized  Diversional Activities: smartphone  Intervention: Develop Pain Management Plan  Recent Flowsheet Documentation  Taken 7/26/2025 1200 by Asuncion Medellin RN  Pain Management  Interventions:   pain management plan reviewed with patient/caregiver   pillow support provided   heat applied  Taken 7/26/2025 1100 by Asuncion Medellin RN  Pain Management Interventions:   pain management plan reviewed with patient/caregiver   heat applied  Taken 7/26/2025 1028 by Asuncion Medellin RN  Pain Management Interventions:   pain management plan reviewed with patient/caregiver   pain medication given  Taken 7/26/2025 0800 by Asuncion Medellin RN  Pain Management Interventions:   pillow support provided   pain management plan reviewed with patient/caregiver   heat applied  Taken 7/26/2025 0742 by Asuncion Medellin RN  Pain Management Interventions: pain management plan reviewed with patient/caregiver  Taken 7/26/2025 0712 by Asuncion Medellin RN  Pain Management Interventions: pain medication given  Intervention: Prevent or Manage Pain  Recent Flowsheet Documentation  Taken 7/26/2025 1600 by Asuncion Medellin RN  Bowel Elimination Promotion: adequate fluid intake promoted  Medication Review/Management: medications reviewed  Taken 7/26/2025 1200 by Asuncion Medellin RN  Medication Review/Management: medications reviewed  Taken 7/26/2025 1000 by Asuncion Medellin RN  Medication Review/Management: medications reviewed  Taken 7/26/2025 0800 by Asuncion Medellin RN  Medication Review/Management: medications reviewed     Problem: Acute Kidney Injury/Impairment  Goal: Fluid and Electrolyte Balance  7/26/2025 1649 by Asuncion Medellin RN  Outcome: Met  7/26/2025 1543 by Asuncion Medellin RN  Outcome: Progressing  Intervention: Monitor and Manage Fluid and Electrolyte Balance  Recent Flowsheet Documentation  Taken 7/26/2025 1600 by Asuncion Medellin RN  Fluid/Electrolyte Management: fluids provided  Taken 7/26/2025 0800 by Asuncion Medellin RN  Fluid/Electrolyte Management: fluids provided  Goal: Improved Oral Intake  7/26/2025 1649 by Asuncion Medellin RN  Outcome: Met  7/26/2025 1543 by Asuncion Medellin RN  Outcome: Progressing  Goal: Effective Renal  Function  7/26/2025 1649 by Asuncion Medellin RN  Outcome: Met  7/26/2025 1543 by Asuncion Medellin RN  Outcome: Progressing  Intervention: Monitor and Support Renal Function  Recent Flowsheet Documentation  Taken 7/26/2025 1600 by Asuncion Medellin RN  Medication Review/Management: medications reviewed  Taken 7/26/2025 1200 by Asuncion Medellin RN  Medication Review/Management: medications reviewed  Taken 7/26/2025 1000 by Asuncion Medellin RN  Medication Review/Management: medications reviewed  Taken 7/26/2025 0800 by Asuncion Medellin RN  Medication Review/Management: medications reviewed     Problem: Comorbidity Management  Goal: Blood Glucose Level Within Target Range  7/26/2025 1649 by Asuncion Medellin RN  Outcome: Met  7/26/2025 1543 by Asuncion Medellin RN  Outcome: Progressing  Intervention: Monitor and Manage Glycemia  Recent Flowsheet Documentation  Taken 7/26/2025 1600 by Asuncion Medellin RN  Medication Review/Management: medications reviewed  Taken 7/26/2025 1200 by Asuncion Medellin RN  Medication Review/Management: medications reviewed  Taken 7/26/2025 1000 by Asuncion Medellin RN  Medication Review/Management: medications reviewed  Taken 7/26/2025 0800 by Asuncion Medellin RN  Medication Review/Management: medications reviewed  Goal: Blood Pressure in Desired Range  7/26/2025 1649 by Asuncion Medellin RN  Outcome: Met  7/26/2025 1543 by Asuncion Medellin RN  Outcome: Progressing  Intervention: Maintain Blood Pressure Management  Recent Flowsheet Documentation  Taken 7/26/2025 1600 by Asuncion Medellin RN  Medication Review/Management: medications reviewed  Taken 7/26/2025 1200 by Asuncion Medellin RN  Medication Review/Management: medications reviewed  Taken 7/26/2025 1000 by Asuncion Medellin RN  Medication Review/Management: medications reviewed  Taken 7/26/2025 0800 by Asuncion Medellin RN  Medication Review/Management: medications reviewed     Problem: Sepsis/Septic Shock  Goal: Optimal Coping  7/26/2025 1649 by Asuncion Medellin RN  Outcome:  Met  7/26/2025 1543 by Asuncion Medellin RN  Outcome: Progressing  Intervention: Support Patient and Family Response  Recent Flowsheet Documentation  Taken 7/26/2025 1600 by Asuncion Medellin RN  Supportive Measures: active listening utilized  Family/Support System Care:   self-care encouraged   support provided  Taken 7/26/2025 1200 by Asuncion Medellin RN  Supportive Measures: active listening utilized  Family/Support System Care:   self-care encouraged   support provided  Taken 7/26/2025 0800 by Asuncion Medellin RN  Supportive Measures: active listening utilized  Family/Support System Care:   self-care encouraged   support provided  Goal: Absence of Bleeding  7/26/2025 1649 by Asuncion Medellin RN  Outcome: Met  7/26/2025 1543 by Asuncion Medellin RN  Outcome: Progressing  Goal: Blood Glucose Level Within Target Range  7/26/2025 1649 by Asuncion Medellin RN  Outcome: Met  7/26/2025 1543 by Asuncion Medellin RN  Outcome: Progressing  Goal: Absence of Infection Signs and Symptoms  7/26/2025 1649 by Asuncion Medellin RN  Outcome: Met  7/26/2025 1543 by Asuncion Medellin RN  Outcome: Progressing  Intervention: Initiate Sepsis Management  Recent Flowsheet Documentation  Taken 7/26/2025 1600 by Asuncion Medellin RN  Infection Prevention:   hand hygiene promoted   single patient room provided  Taken 7/26/2025 1200 by Asuncion Medellin RN  Infection Prevention:   hand hygiene promoted   single patient room provided  Taken 7/26/2025 1000 by Asuncion Medellin RN  Infection Prevention:   hand hygiene promoted   single patient room provided  Taken 7/26/2025 0800 by Asuncion Medellin RN  Infection Prevention:   hand hygiene promoted   single patient room provided  Intervention: Promote Stabilization  Recent Flowsheet Documentation  Taken 7/26/2025 1600 by Asuncion Medellin RN  Fluid/Electrolyte Management: fluids provided  Taken 7/26/2025 0800 by Asuncion Medellin RN  Fluid/Electrolyte Management: fluids provided  Intervention: Promote Recovery  Recent Flowsheet  Documentation  Taken 7/26/2025 1600 by Asuncion Medellin, RN  Activity Management: bedrest  Taken 7/26/2025 1200 by Asuncion Medellin, RN  Activity Management: bedrest  Taken 7/26/2025 0800 by Asuncion Medellin, RN  Activity Management: bedrest  Goal: Optimal Nutrition Delivery  7/26/2025 1649 by Asuncion Medellin, RN  Outcome: Met  7/26/2025 1543 by Asuncion Medellin, RN  Outcome: Progressing   Goal Outcome Evaluation:  Plan of Care Reviewed With: patient        Progress: improving

## 2025-07-26 NOTE — CONSULTS
FIRST UROLOGY CONSULT      Patient Identification:  NAME:  Farheen Gomez  Age:  79 y.o.   Sex:  male   :  1945   MRN:  [id number]     Chief complaint: right lower quadrant pain     History of present illness:      Pt is a 79 y.o. male with a history of BPH s/p PAE, renal cysts, ED, prostatitis who presented to the ED on 2025 with c/o right lower quadrant pain. Urology consulted for evaluation and treatment of right ureteral stone. Patient continues to have right some right flank pain despite pain medications. Denies fevers, chills, difficulties with urination. Has not had a stone previously. Has been NPO. On Flomax.    In hospital:  -AVSS, afebrile, good UOP  -WBC - 13.85 (14.21)  -Creat - 1.70 (1.44)  -UA - Moderate blood, 21-50 RBC, no WBC, no bacteria    -CT images viewed- Approximately 7 mm right mid ureteral calculus, mild right hydronephrosis, bilateral renal cysts. No stones on left.     Past medical history:  Past Medical History:   Diagnosis Date    Anesthesia complication     had trouble urinating after last anesthesia    Arthritis     BPH (benign prostatic hyperplasia)     CTS (carpal tunnel syndrome)     Depression     History of bilateral knee arthroplasty     Hypertension     pre-Diabetes mellitus     Sleep apnea     uses CPAP       Past surgical history:  Past Surgical History:   Procedure Laterality Date    BACK SURGERY      HAND SURGERY Bilateral     carpal tunnel repair    JOINT REPLACEMENT      KNEE SURGERY      meniscus repair     LUMBAR DISCECTOMY Bilateral 2019    Procedure: left and right  L4-5 lumbar decompression with dura repair;  Surgeon: Edson Khan MD;  Location: [facility] OR;  Service: Neurosurgery    ROTATOR CUFF REPAIR Right     SHOULDER SURGERY      TOTAL KNEE ARTHROPLASTY Right 10/23/2019    Procedure: TOTAL KNEE ARTHROPLASTY, WITH LEFT KNEE INJECTION;  Surgeon: Boby Medina MD;  Location: [facility] MAIN OR;  Service: Orthopedics    TOTAL KNEE  ARTHROPLASTY Left 02/05/2020    Procedure: TOTAL KNEE ARTHROPLASTY;  Surgeon: Boby Medina MD;  Location: [facility] MAIN OR;  Service: Orthopedics;  Laterality: Left;    TOTAL SHOULDER ARTHROPLASTY W/ DISTAL CLAVICLE EXCISION Right 10/18/2022    Procedure: TOTAL SHOULDER REVERSE ARTHROPLASTY;  Surgeon: Boby Medina MD;  Location: [facility] MAIN OR;  Service: Orthopedics;  Laterality: Right;    TOTAL SHOULDER ARTHROPLASTY W/ DISTAL CLAVICLE EXCISION Left 9/4/2024    Procedure: TOTAL SHOULDER REVERSE ARTHROPLASTY;  Surgeon: Devin Mayen MD;  Location: [facility] MAIN OR;  Service: Orthopedics;  Laterality: Left;       Allergies:  Penicillins and Propranolol    Home medications:  Medications Prior to Admission   Medication Sig Dispense Refill Last Dose/Taking    Acetaminophen (TYLENOL ARTHRITIS PAIN PO) Take 2 tablets by mouth 2 (Two) Times a Day As Needed.   Taking As Needed    amLODIPine (NORVASC) 10 MG tablet Take 1 tablet by mouth Every Night.   7/24/2025 Evening    escitalopram (LEXAPRO) 10 MG tablet Take 1 tablet by mouth Every Night.   7/24/2025 Evening    Flomax 0.4 MG capsule 24 hr capsule Take 1 capsule by mouth Every Night.   7/24/2025 Evening    lisinopril (PRINIVIL,ZESTRIL) 40 MG tablet Take 1 tablet by mouth Every Night.   7/24/2025 Evening    multivitamin with minerals tablet tablet Take 1 tablet by mouth Every Night.   7/24/2025 Evening    rosuvastatin (CRESTOR) 20 MG tablet TAKE 1 TABLET BY MOUTH ONCE DAILY AT NIGHT 90 tablet 0 7/24/2025 Evening    traZODone (DESYREL) 50 MG tablet TAKE 1 TABLET BY MOUTH ONCE DAILY AT NIGHT 60 tablet 0 7/24/2025 Evening    vitamin D (ERGOCALCIFEROL) 1.25 MG (81539 UT) capsule capsule Take 1 capsule by mouth 1 (One) Time Per Week. 12 capsule 3 7/21/2025        Hospital medications:  amLODIPine, 10 mg, Oral, Nightly  enoxaparin sodium, 40 mg, Subcutaneous, Q24H  escitalopram, 10 mg, Oral, Nightly  [Held by provider] lisinopril, 40 mg, Oral, Nightly  rosuvastatin, 20  mg, Oral, Nightly  sodium chloride, 10 mL, Intravenous, Q12H  tamsulosin, 0.4 mg, Oral, Nightly  traZODone, 50 mg, Oral, Nightly      Pharmacy to Dose enoxaparin (LOVENOX),   sodium chloride, 100 mL/hr, Last Rate: 100 mL/hr (25 0257)        senna-docusate sodium **AND** polyethylene glycol **AND** bisacodyl **AND** bisacodyl    HYDROmorphone **AND** naloxone    nitroglycerin    ondansetron ODT **OR** ondansetron    Pharmacy to Dose enoxaparin (LOVENOX)    sodium chloride    sodium chloride    Family history:  Family History   Problem Relation Age of Onset    Stroke Mother     Diabetes Father         I'm    No Known Problems Sister     No Known Problems Brother     Malnelli Hyperthermia Neg Hx        Social history:  Social History     Tobacco Use    Smoking status: Former     Current packs/day: 0.00     Average packs/day: 0.3 packs/day for 2.0 years (0.5 ttl pk-yrs)     Types: Cigarettes     Start date: 1993     Quit date: 1995     Years since quittin.5     Passive exposure: Never    Smokeless tobacco: Never   Vaping Use    Vaping status: Never Used   Substance Use Topics    Alcohol use: No    Drug use: No       Review of systems:      12 point negative except as in HPI    Objective:  TMax 24 hours:   Temp (24hrs), Av.1 °F (36.7 °C), Min:97.9 °F (36.6 °C), Max:98.1 °F (36.7 °C)      Vitals Ranges:   Temp:  [97.9 °F (36.6 °C)-98.1 °F (36.7 °C)] 98.1 °F (36.7 °C)  Heart Rate:  [80-95] 88  Resp:  [14-23] 16  BP: (140-167)/(73-92) 140/85    Intake/Output Last 3 shifts:  I/O last 3 completed shifts:  In: 1000 [I.V.:1000]  Out: -      Physical Exam:    General Appearance:    Alert, cooperative, NAD   Lungs:     Respirations unlabored, no wheezing   Abdomen:     Soft, NDNT   :    Deferred   Neuro/Psych:   Orientation intact, mood/affect pleasant       Results review:   I reviewed the patient's new clinical results.    Data review:  Lab Results (last 24 hours)       Procedure Component Value Units  Date/Time    Basic Metabolic Panel [[id number]]  (Abnormal) Collected: 07/26/25 0505    Specimen: Blood from Arm, Left Updated: 07/26/25 0538     Glucose 139 mg/dL      BUN 20.9 mg/dL      Creatinine 1.70 mg/dL      Sodium 134 mmol/L      Potassium 5.4 mmol/L      Chloride 101 mmol/L      CO2 22.9 mmol/L      Calcium 9.1 mg/dL      BUN/Creatinine Ratio 12.3     Anion Gap 10.1 mmol/L      eGFR 40.8 mL/min/1.73     Narrative:      GFR Categories in Chronic Kidney Disease (CKD)              GFR Category          GFR (mL/min/1.73)    Interpretation  G1                    90 or greater        Normal or high (1)  G2                    60-89                Mild decrease (1)  G3a                   45-59                Mild to moderate decrease  G3b                   30-44                Moderate to severe decrease  G4                    15-29                Severe decrease  G5                    14 or less           Kidney failure    (1)In the absence of evidence of kidney disease, neither GFR category G1 or G2 fulfill the criteria for CKD.    eGFR calculation 2021 CKD-EPI creatinine equation, which does not include race as a factor    CBC Auto Differential [[id number]]  (Abnormal) Collected: 07/26/25 0505    Specimen: Blood from Arm, Left Updated: 07/26/25 0518     WBC 13.85 10*3/mm3      RBC 4.45 10*6/mm3      Hemoglobin 12.7 g/dL      Hematocrit 39.5 %      MCV 88.8 fL      MCH 28.5 pg      MCHC 32.2 g/dL      RDW 13.2 %      RDW-SD 42.5 fl      MPV 8.0 fL      Platelets 164 10*3/mm3      Neutrophil % 84.1 %      Lymphocyte % 9.2 %      Monocyte % 6.3 %      Eosinophil % 0.0 %      Basophil % 0.1 %      Immature Grans % 0.3 %      Neutrophils, Absolute 11.64 10*3/mm3      Lymphocytes, Absolute 1.28 10*3/mm3      Monocytes, Absolute 0.87 10*3/mm3      Eosinophils, Absolute 0.00 10*3/mm3      Basophils, Absolute 0.02 10*3/mm3      Immature Grans, Absolute 0.04 10*3/mm3      nRBC 0.0 /100 WBC     Urinalysis With Culture If  Indicated - Urine, Clean Catch [[id number]]  (Abnormal) Collected: 07/25/25 2232    Specimen: Urine, Clean Catch Updated: 07/25/25 2239     Color, UA Yellow     Appearance, UA Clear     pH, UA 6.5     Specific Gravity, UA 1.027     Glucose, UA Negative     Ketones, UA Negative     Bilirubin, UA Negative     Blood, UA Moderate (2+)     Protein, UA Negative     Leuk Esterase, UA Negative     Nitrite, UA Negative     Urobilinogen, UA 0.2 E.U./dL    Narrative:      In absence of clinical symptoms, the presence of pyuria, bacteria, and/or nitrites on the urinalysis result does not correlate with infection.    Urinalysis, Microscopic Only - Urine, Clean Catch [[id number]]  (Abnormal) Collected: 07/25/25 2232    Specimen: Urine, Clean Catch Updated: 07/25/25 2239     RBC, UA 21-50 /HPF      WBC, UA 0-2 /HPF      Comment: Urine culture not indicated.        Bacteria, UA None Seen /HPF      Squamous Epithelial Cells, UA 0-2 /HPF      Hyaline Casts, UA 0-2 /LPF      Methodology Automated Microscopy    Comprehensive Metabolic Panel [[id number]]  (Abnormal) Collected: 07/25/25 2110    Specimen: Blood Updated: 07/25/25 2139     Glucose 160 mg/dL      BUN 21.5 mg/dL      Creatinine 1.44 mg/dL      Sodium 134 mmol/L      Potassium 4.8 mmol/L      Comment: Specimen hemolyzed.  Result may be falsely elevated.        Chloride 100 mmol/L      CO2 20.3 mmol/L      Calcium 9.5 mg/dL      Total Protein 6.9 g/dL      Albumin 4.3 g/dL      ALT (SGPT) 20 U/L      AST (SGOT) 26 U/L      Alkaline Phosphatase 122 U/L      Total Bilirubin 0.4 mg/dL      Globulin 2.6 gm/dL      A/G Ratio 1.7 g/dL      BUN/Creatinine Ratio 14.9     Anion Gap 13.7 mmol/L      eGFR 49.7 mL/min/1.73     Narrative:      GFR Categories in Chronic Kidney Disease (CKD)              GFR Category          GFR (mL/min/1.73)    Interpretation  G1                    90 or greater        Normal or high (1)  G2                    60-89                Mild decrease (1)  G3a                    45-59                Mild to moderate decrease  G3b                   30-44                Moderate to severe decrease  G4                    15-29                Severe decrease  G5                    14 or less           Kidney failure    (1)In the absence of evidence of kidney disease, neither GFR category G1 or G2 fulfill the criteria for CKD.    eGFR calculation 2021 CKD-EPI creatinine equation, which does not include race as a factor    Lipase [[id number]]  (Abnormal) Collected: 07/25/25 2110    Specimen: Blood Updated: 07/25/25 2138     Lipase 89 U/L     CBC & Differential [[id number]]  (Abnormal) Collected: 07/25/25 2110    Specimen: Blood Updated: 07/25/25 2115    Narrative:      The following orders were created for panel order CBC & Differential.  Procedure                               Abnormality         Status                     ---------                               -----------         ------                     CBC Auto Differential[[id number]]        Abnormal            Final result                 Please view results for these tests on the individual orders.    CBC Auto Differential [[id number]]  (Abnormal) Collected: 07/25/25 2110    Specimen: Blood Updated: 07/25/25 2115     WBC 14.21 10*3/mm3      RBC 4.37 10*6/mm3      Hemoglobin 12.6 g/dL      Hematocrit 38.1 %      MCV 87.2 fL      MCH 28.8 pg      MCHC 33.1 g/dL      RDW 13.2 %      RDW-SD 41.7 fl      MPV 8.2 fL      Platelets 180 10*3/mm3      Neutrophil % 85.2 %      Lymphocyte % 8.2 %      Monocyte % 6.1 %      Eosinophil % 0.0 %      Basophil % 0.1 %      Immature Grans % 0.4 %      Neutrophils, Absolute 12.11 10*3/mm3      Lymphocytes, Absolute 1.16 10*3/mm3      Monocytes, Absolute 0.87 10*3/mm3      Eosinophils, Absolute 0.00 10*3/mm3      Basophils, Absolute 0.02 10*3/mm3      Immature Grans, Absolute 0.05 10*3/mm3      nRBC 0.0 /100 WBC              Imaging:  Imaging Results (Last 24 Hours)       Procedure Component Value  Units Date/Time    CT Abdomen Pelvis With Contrast [[id number]] Collected: 07/25/25 2142     Updated: 07/25/25 2148    Narrative:      CT ABDOMEN PELVIS W CONTRAST    Date of Exam: 7/25/2025 9:35 PM EDT    Indication: RLQ abdominal pain.    Comparison: 10/27/2014    Technique: Axial CT images were obtained of the abdomen and pelvis following the uneventful intravenous administration of iodinated contrast. Sagittal and coronal reconstructions were performed.  Automated exposure control and iterative reconstruction   methods were used.    FINDINGS:    Lung bases: No masses. No consolidation. Severe atheromatous disease of the coronary vessels.    Liver:No masses. No intrahepatic biliary ductal dilatation.    Spleen:No masses. No perisplenic hematoma.    Pancreas:No pancreatic masses. No evidence of pancreatitis.    Gallbladder and common bile duct:No evidence of cholelithiasis. No evidence of cholecystitis.    Adrenal glands:No adrenal masses    Kidneys and ureters: Exophytic 5.7 cm x 4.6 cm right renal cyst. 1.6 cm left renal cyst. 0.7 cm right mid ureteric calculus at the L4 vertebral body level.    Urinary bladder:No urinary bladder wall thickening. No bladder masses.    Small bowel:Normal caliber small bowel.    Large bowel: Colonic diverticulosis without evidence of diverticulitis.    Appendix: Normal    GENITOURINARY: Normal prostate    Ascites or pneumoperitoneum:None.    Adenopathy: No adenopathy.    Osseous structures: Right hip replacement. The proximal left femur is intact. The pubic bones are intact. The sacrum and sacroiliac joints are normal. Degenerative changes of the lumbar spine. Lumbar vertebral body height and alignment are normal. No   spondylolysis.    Other findings: Atheromatous disease of the abdominal aorta and visualized branches.      Impression:      1.0.7 cm right mid ureteric calculus at the L4 vertebral body level.  2.Colonic diverticulosis without evidence of diverticulitis.  3.Severe  atheromatous disease of the coronary vessels.        Electronically Signed: Dinesh Baron MD    7/25/2025 9:46 PM EDT    Workstation ID: [id number]             Assessment     Right ureteral calculus  2.   SID    Plan:   - Keep NPO. Add on for cystoscopy, ureteroscopy, right stent insertion with Dr. Sorto today. RBO discussed. Patient will need definitive outpatient stone treatment at a later date.    - Obtain informed consent.   - Continue tamsulosin.   - Urology will follow.     ALYSSA Crespo  07/26/25  10:12 EDT    Plan discussion: The pertinent medical findings were discussed in detail with , who actively participated in the review of available data, the direction of additional testing, and formulation of the care plan. This was discussed with the patient in detail.  The patient's questions were addressed, and they expressed understanding of the proposed management.

## 2025-07-26 NOTE — ANESTHESIA PREPROCEDURE EVALUATION
Anesthesia Evaluation     Patient summary reviewed and Nursing notes reviewed   no history of anesthetic complications:   NPO Solid Status: > 8 hours  NPO Liquid Status: > 2 hours           Airway   Dental      Pulmonary    (+) a smoker Former,sleep apnea on CPAP  Cardiovascular     ECG reviewed    (+) hypertension, dysrhythmias Tachycardia, hyperlipidemia      Neuro/Psych  (+) numbness, psychiatric history Depression  GI/Hepatic/Renal/Endo    (+) obesity    Musculoskeletal     (+) back pain, chronic pain  Abdominal    Substance History      OB/GYN          Other   arthritis,   history of cancer    ROS/Med Hx Other: Additional History:  Hyperkalemia, BCC face, paresthesia, fatigue, h/o poliomyelitis, pre-DM    PSH:  ROTATOR CUFF REPAIR HAND SURGERY  LUMBAR DISCECTOMY BACK SURGERY  KNEE SURGERY TOTAL KNEE ARTHROPLASTY  TOTAL KNEE ARTHROPLASTY TOTAL SHOULDER ARTHROPLASTY W/ DISTAL CLAVICLE EXCISION  SHOULDER SURGERY JOINT REPLACEMENT  TOTAL SHOULDER ARTHROPLASTY W/ DISTAL CLAVICLE EXCISION                 Anesthesia Plan    ASA 3     general     (Patient identified; pre-operative vital signs, all relevant labs/studies, complete medical/surgical/anesthetic history, full medication list, full allergy list, and NPO status obtained/reviewed; physical assessment performed; anesthetic options, side effects, potential complications, risks, and benefits discussed; questions answered; verbal and/or written anesthesia consent obtained; patient cleared for procedure; anesthesia machine and equipment checked and functioning)  intravenous induction     Anesthetic plan, risks, benefits, and alternatives have been provided, discussed and informed consent has been obtained with: patient.    Use of blood products discussed with  Consented to blood products.    Plan discussed with CRNA and CAA.    CODE STATUS:    Code Status (Patient has no pulse and is not breathing): CPR (Attempt to Resuscitate)  Medical Interventions (Patient has  pulse or is breathing): Full Support  Level Of Support Discussed With: Patient

## 2025-07-26 NOTE — SIGNIFICANT NOTE
Initially sent pain medication to patient's primary pharmacy in [place] however he is unlikely to be able to  prescription following discharge before that pharmacy closes.  I called the pharmacy and spoke with associate who canceled that prescription and prescription was resent to [facility] in [place]

## 2025-07-28 ENCOUNTER — TRANSITIONAL CARE MANAGEMENT TELEPHONE ENCOUNTER (OUTPATIENT)
Dept: CALL CENTER | Facility: HOSPITAL | Age: 80
End: 2025-07-28
Payer: MEDICARE

## 2025-07-28 ENCOUNTER — READMISSION MANAGEMENT (OUTPATIENT)
Dept: CALL CENTER | Facility: HOSPITAL | Age: 80
End: 2025-07-28
Payer: MEDICARE

## 2025-07-28 NOTE — CASE MANAGEMENT/SOCIAL WORK
Case Management Discharge Note      Final Note: Home         Selected Continued Care - Discharged on 7/26/2025 Admission date: 7/25/2025 - Discharge disposition: Home or Self Care     Transportation Services  Transportation: Private Transportation  Private: Car    Final Discharge Disposition Code: 01 - home or self-care      Maria Elena Ware RN    Phone [phone]  Fax [phone]

## 2025-07-28 NOTE — OUTREACH NOTE
Prep Survey      Flowsheet Row Responses   [facility] patient discharged from? [facility]   Is LACE score < 7 ? Yes   Eligibility [facility]   Date of Admission 07/25/25   Date of Discharge 07/26/25   Discharge Disposition Home or Self Care   Discharge diagnosis Ureterolithiasis, CYSTOSCOPY, RIGHT URETEROSCOPY, RIGHT STENT   Does the patient have one of the following disease processes/diagnoses(primary or secondary)? Other   Does the patient have Home health ordered? No   Is there a DME ordered? No   Prep survey completed? Yes            Nina Newby Registered Nurse

## 2025-07-28 NOTE — OUTREACH NOTE
Call Center TCM Note      Flowsheet Row Responses   [facility] patient discharged from? [facility]   Does the patient have one of the following disease processes/diagnoses(primary or secondary)? Other   TCM attempt successful? Yes   Call start time 1121   Call end time 1124   Discharge diagnosis Ureterolithiasis, CYSTOSCOPY, RIGHT URETEROSCOPY, RIGHT STENT   Person spoke with today (if not patient) and relationship Pippa (wife onn verbal release)   Meds reviewed with patient/caregiver? Yes   Is the patient having any side effects they believe may be caused by any medication additions or changes? No   Does the patient have all medications ordered at discharge? Yes   Is the patient taking all medications as directed (includes completed medication regime)? Yes   Comments Hospital follow up with PCP 7/31   Does the patient have an appointment with their PCP within 7-14 days of discharge? Yes   Psychosocial issues? No   Did the patient receive a copy of their discharge instructions? Yes   Nursing interventions Reviewed instructions with patient   What is the patient's perception of their health status since discharge? Improving   Is the patient/caregiver able to teach back signs and symptoms related to disease process for when to call PCP? Yes   Is the patient/caregiver able to teach back signs and symptoms related to disease process for when to call 911? Yes   Is the patient/caregiver able to teach back the hierarchy of who to call/visit for symptoms/problems? PCP, Specialist, Home health nurse, Urgent Care, ED, 911 Yes   TCM call completed? Yes   Wrap up additional comments Spouse reports patient is doing okay. No questions or concerns at this time.   Call end time 1124   Would this patient benefit from a Referral to Amb Social Work? No   Is the patient interested in additional calls from an ambulatory ? No            TYRA MARTINEZ - Registered Nurse    7/28/2025, 11:25 EDT

## 2025-07-31 ENCOUNTER — OFFICE VISIT (OUTPATIENT)
Dept: FAMILY MEDICINE CLINIC | Facility: CLINIC | Age: 80
End: 2025-07-31
Payer: MEDICARE

## 2025-07-31 VITALS
HEIGHT: 70 IN | BODY MASS INDEX: 32.16 KG/M2 | HEART RATE: 93 BPM | WEIGHT: 224.6 LBS | OXYGEN SATURATION: 96 % | DIASTOLIC BLOOD PRESSURE: 74 MMHG | TEMPERATURE: 99.3 F | SYSTOLIC BLOOD PRESSURE: 109 MMHG

## 2025-07-31 DIAGNOSIS — E66.09 CLASS 1 OBESITY DUE TO EXCESS CALORIES WITH SERIOUS COMORBIDITY AND BODY MASS INDEX (BMI) OF 32.0 TO 32.9 IN ADULT: ICD-10-CM

## 2025-07-31 DIAGNOSIS — R30.0 BURNING WITH URINATION: ICD-10-CM

## 2025-07-31 DIAGNOSIS — E66.811 CLASS 1 OBESITY DUE TO EXCESS CALORIES WITH SERIOUS COMORBIDITY AND BODY MASS INDEX (BMI) OF 32.0 TO 32.9 IN ADULT: ICD-10-CM

## 2025-07-31 DIAGNOSIS — E87.5 HYPERKALEMIA: Primary | ICD-10-CM

## 2025-07-31 PROCEDURE — 87086 URINE CULTURE/COLONY COUNT: CPT | Performed by: PREVENTIVE MEDICINE

## 2025-07-31 RX ORDER — NITROFURANTOIN 25; 75 MG/1; MG/1
100 CAPSULE ORAL 2 TIMES DAILY
Qty: 14 CAPSULE | Refills: 0 | Status: SHIPPED | OUTPATIENT
Start: 2025-07-31

## 2025-07-31 RX ORDER — ONDANSETRON 8 MG/1
8 TABLET, ORALLY DISINTEGRATING ORAL EVERY 8 HOURS PRN
Qty: 20 TABLET | Refills: 0 | Status: SHIPPED | OUTPATIENT
Start: 2025-07-31

## 2025-07-31 NOTE — PROGRESS NOTES
"Subjective   Farheen Gomez is a 79 y.o. male presents for   Chief Complaint   Patient presents with    Hospital Follow Up Visit     Pt has surgery on Monday at [facility].   Pt not feeling well since leaving the hospital.     Hypertension       There are no preventive care reminders to display for this patient.    Hypertension     History of Present Illness  The patient is a 79-year-old male who is here today to follow up on a urinary stent, ureteral stone, and class 2 obesity.    Vitals:    07/31/25 1423 07/31/25 1427   BP: 127/82 109/74   BP Location: Left arm Right arm   Patient Position: Sitting Sitting   Cuff Size: Large Adult Large Adult   Pulse: 95 93   Temp: 99.3 °F (37.4 °C)    TempSrc: Infrared    SpO2: 96%    Weight: 102 kg (224 lb 9.6 oz)    Height: 177.8 cm (70\")      Body mass index is 32.23 kg/m².    Current Outpatient Medications on File Prior to Visit   Medication Sig Dispense Refill    Acetaminophen (TYLENOL ARTHRITIS PAIN PO) Take 2 tablets by mouth 2 (Two) Times a Day As Needed.      amLODIPine (NORVASC) 10 MG tablet Take 1 tablet by mouth Every Night.      escitalopram (LEXAPRO) 10 MG tablet Take 1 tablet by mouth Every Night.      Flomax 0.4 MG capsule 24 hr capsule Take 1 capsule by mouth Every Night.      HYDROcodone-acetaminophen (NORCO) 5-325 MG per tablet Take 1 tablet by mouth Every 6 (Six) Hours As Needed for Moderate Pain. 12 tablet 0    naloxone (NARCAN) 4 MG/0.1ML nasal spray Call 911. Don't prime. [place] in 1 nostril for overdose. Repeat in 2-3 minutes in other nostril if no or minimal breathing/responsiveness. 2 each 0    rosuvastatin (CRESTOR) 20 MG tablet TAKE 1 TABLET BY MOUTH ONCE DAILY AT NIGHT 90 tablet 0    traZODone (DESYREL) 50 MG tablet TAKE 1 TABLET BY MOUTH ONCE DAILY AT NIGHT 60 tablet 0    vitamin D (ERGOCALCIFEROL) 1.25 MG (84616 UT) capsule capsule Take 1 capsule by mouth 1 (One) Time Per Week. 12 capsule 3    [Paused] lisinopril (PRINIVIL,ZESTRIL) 40 MG tablet " Take 1 tablet by mouth Every Night. (Patient not taking: Reported on 7/31/2025)      multivitamin with minerals tablet tablet Take 1 tablet by mouth Every Night. (Patient not taking: Reported on 7/31/2025)       No current facility-administered medications on file prior to visit.       The following portions of the patient's history were reviewed and updated as appropriate: allergies, current medications, past family history, past medical history, past social history, past surgical history, and problem list.    Review of Systems   Constitutional:  Negative for chills, fatigue and fever.   Genitourinary:  Positive for dysuria, flank pain, hematuria and urinary incontinence.       Objective   Physical Exam  Vitals reviewed.   Constitutional:       General: He is not in acute distress.     Appearance: Normal appearance. He is well-developed. He is not ill-appearing or toxic-appearing.   HENT:      Head: Normocephalic and atraumatic.      Nose: Nose normal.   Eyes:      Extraocular Movements: Extraocular movements intact.      Conjunctiva/sclera: Conjunctivae normal.      Pupils: Pupils are equal, round, and reactive to light.   Cardiovascular:      Rate and Rhythm: Normal rate.   Pulmonary:      Effort: Pulmonary effort is normal.   Abdominal:      General: There is no distension.      Palpations: There is no mass.      Tenderness: There is no abdominal tenderness. There is right CVA tenderness. There is no left CVA tenderness or rebound.   Neurological:      Mental Status: He is alert and oriented to person, place, and time.   Psychiatric:         Mood and Affect: Mood normal.         Behavior: Behavior normal.       Physical Exam      PHQ-9 Total Score:    Results           Assessment & Plan   Diagnoses and all orders for this visit:    1. Hyperkalemia (Primary)  -     Potassium; Future    2. Class 1 obesity due to excess calories with serious comorbidity and body mass index (BMI) of 32.0 to 32.9 in adult    3.  Burning with urination  -     Urine Culture - Urine, Urine, Clean Catch; Future  -     Urine Culture - Urine, Urine, Clean Catch    Other orders  -     ondansetron ODT (ZOFRAN-ODT) 8 MG disintegrating tablet; Place 1 tablet on the tongue Every 8 (Eight) Hours As Needed for Nausea or Vomiting.  Dispense: 20 tablet; Refill: 0  -     nitrofurantoin, macrocrystal-monohydrate, (Macrobid) 100 MG capsule; Take 1 capsule by mouth 2 (Two) Times a Day.  Dispense: 14 capsule; Refill: 0      Assessment & Plan      Patient Instructions   There are no preventive care reminders to display for this patient.        Patient or patient representative verbalized consent for the use of Ambient Listening during the visit with  Penny Hidalgo MD for chart documentation. 7/31/2025  17:06 EDT

## 2025-08-01 ENCOUNTER — LAB (OUTPATIENT)
Dept: FAMILY MEDICINE CLINIC | Facility: CLINIC | Age: 80
End: 2025-08-01
Payer: MEDICARE

## 2025-08-01 DIAGNOSIS — E87.5 HYPERKALEMIA: ICD-10-CM

## 2025-08-01 PROCEDURE — 84132 ASSAY OF SERUM POTASSIUM: CPT | Performed by: PREVENTIVE MEDICINE

## 2025-08-01 PROCEDURE — 36415 COLL VENOUS BLD VENIPUNCTURE: CPT

## 2025-08-02 ENCOUNTER — RESULTS FOLLOW-UP (OUTPATIENT)
Dept: FAMILY MEDICINE CLINIC | Facility: CLINIC | Age: 80
End: 2025-08-02
Payer: MEDICARE

## 2025-08-02 LAB
BACTERIA SPEC AEROBE CULT: NO GROWTH
POTASSIUM SERPL-SCNC: 4.5 MMOL/L (ref 3.5–5.2)

## 2025-08-05 LAB
QT INTERVAL: 328 MS
QTC INTERVAL: 398 MS

## 2025-08-12 PROCEDURE — 82365 CALCULUS SPECTROSCOPY: CPT | Performed by: UROLOGY

## 2025-08-13 ENCOUNTER — LAB REQUISITION (OUTPATIENT)
Dept: LAB | Facility: HOSPITAL | Age: 80
End: 2025-08-13
Payer: MEDICARE

## 2025-08-13 DIAGNOSIS — N20.1 CALCULUS OF URETER: ICD-10-CM

## 2025-08-15 ENCOUNTER — TELEPHONE (OUTPATIENT)
Dept: FAMILY MEDICINE CLINIC | Facility: CLINIC | Age: 80
End: 2025-08-15
Payer: MEDICARE

## 2025-08-18 RX ORDER — ESCITALOPRAM OXALATE 10 MG/1
10 TABLET ORAL DAILY
Qty: 90 TABLET | Refills: 0 | Status: SHIPPED | OUTPATIENT
Start: 2025-08-18

## 2025-08-19 ENCOUNTER — HOSPITAL ENCOUNTER (INPATIENT)
Facility: HOSPITAL | Age: 80
LOS: 2 days | Discharge: HOME | End: 2025-08-21
Attending: INTERNAL MEDICINE | Admitting: INTERNAL MEDICINE
Payer: MEDICARE

## 2025-08-19 ENCOUNTER — APPOINTMENT (OUTPATIENT)
Dept: CT IMAGING | Facility: HOSPITAL | Age: 80
End: 2025-08-19
Payer: MEDICARE

## 2025-08-19 ENCOUNTER — APPOINTMENT (OUTPATIENT)
Dept: GENERAL RADIOLOGY | Facility: HOSPITAL | Age: 80
End: 2025-08-19
Payer: MEDICARE

## 2025-08-19 PROBLEM — A41.9 SEPSIS: Status: ACTIVE | Noted: 2025-08-19

## 2025-08-20 ENCOUNTER — ANESTHESIA EVENT (OUTPATIENT)
Dept: PERIOP | Facility: HOSPITAL | Age: 80
End: 2025-08-20
Payer: MEDICARE

## 2025-08-21 ENCOUNTER — ANESTHESIA (OUTPATIENT)
Dept: PERIOP | Facility: HOSPITAL | Age: 80
End: 2025-08-21
Payer: MEDICARE

## 2025-08-21 ENCOUNTER — APPOINTMENT (OUTPATIENT)
Dept: GENERAL RADIOLOGY | Facility: HOSPITAL | Age: 80
End: 2025-08-21
Payer: MEDICARE

## 2025-08-21 ENCOUNTER — READMISSION MANAGEMENT (OUTPATIENT)
Dept: CALL CENTER | Facility: HOSPITAL | Age: 80
End: 2025-08-21
Payer: MEDICARE

## 2025-08-21 PROCEDURE — 25010000002 FENTANYL CITRATE (PF) 100 MCG/2ML SOLUTION: Performed by: NURSE ANESTHETIST, CERTIFIED REGISTERED

## 2025-08-21 PROCEDURE — 25010000002 PROPOFOL 200 MG/20ML EMULSION: Performed by: NURSE ANESTHETIST, CERTIFIED REGISTERED

## 2025-08-21 PROCEDURE — 25010000002 ONDANSETRON PER 1 MG: Performed by: NURSE ANESTHETIST, CERTIFIED REGISTERED

## 2025-08-21 PROCEDURE — 25010000002 LIDOCAINE PF 1% 1 % SOLUTION: Performed by: NURSE ANESTHETIST, CERTIFIED REGISTERED

## 2025-08-21 PROCEDURE — 25810000003 SODIUM CHLORIDE 0.9 % SOLUTION: Performed by: NURSE ANESTHETIST, CERTIFIED REGISTERED

## 2025-08-21 PROCEDURE — 25010000002 DEXAMETHASONE PER 1 MG: Performed by: NURSE ANESTHETIST, CERTIFIED REGISTERED

## 2025-08-21 RX ORDER — PROPOFOL 10 MG/ML
INJECTION, EMULSION INTRAVENOUS AS NEEDED
Status: DISCONTINUED | OUTPATIENT
Start: 2025-08-21 | End: 2025-08-21 | Stop reason: SURG

## 2025-08-21 RX ORDER — ONDANSETRON 2 MG/ML
INJECTION INTRAMUSCULAR; INTRAVENOUS AS NEEDED
Status: DISCONTINUED | OUTPATIENT
Start: 2025-08-21 | End: 2025-08-21 | Stop reason: SURG

## 2025-08-21 RX ORDER — LIDOCAINE HYDROCHLORIDE 10 MG/ML
INJECTION, SOLUTION EPIDURAL; INFILTRATION; INTRACAUDAL; PERINEURAL AS NEEDED
Status: DISCONTINUED | OUTPATIENT
Start: 2025-08-21 | End: 2025-08-21 | Stop reason: SURG

## 2025-08-21 RX ORDER — FENTANYL CITRATE 50 UG/ML
INJECTION, SOLUTION INTRAMUSCULAR; INTRAVENOUS AS NEEDED
Status: DISCONTINUED | OUTPATIENT
Start: 2025-08-21 | End: 2025-08-21 | Stop reason: SURG

## 2025-08-21 RX ORDER — SODIUM CHLORIDE 9 MG/ML
INJECTION, SOLUTION INTRAVENOUS CONTINUOUS PRN
Status: DISCONTINUED | OUTPATIENT
Start: 2025-08-21 | End: 2025-08-21 | Stop reason: SURG

## 2025-08-21 RX ORDER — DEXAMETHASONE SODIUM PHOSPHATE 4 MG/ML
INJECTION, SOLUTION INTRA-ARTICULAR; INTRALESIONAL; INTRAMUSCULAR; INTRAVENOUS; SOFT TISSUE AS NEEDED
Status: DISCONTINUED | OUTPATIENT
Start: 2025-08-21 | End: 2025-08-21 | Stop reason: SURG

## 2025-08-21 RX ADMIN — SODIUM CHLORIDE: 9 INJECTION, SOLUTION INTRAVENOUS at 11:43

## 2025-08-21 RX ADMIN — FENTANYL CITRATE 50 MCG: 50 INJECTION, SOLUTION INTRAMUSCULAR; INTRAVENOUS at 11:49

## 2025-08-21 RX ADMIN — FENTANYL CITRATE 50 MCG: 50 INJECTION, SOLUTION INTRAMUSCULAR; INTRAVENOUS at 11:57

## 2025-08-21 RX ADMIN — PROPOFOL 200 MG: 10 INJECTION, EMULSION INTRAVENOUS at 11:49

## 2025-08-21 RX ADMIN — DEXAMETHASONE SODIUM PHOSPHATE 4 MG: 4 INJECTION, SOLUTION INTRAMUSCULAR; INTRAVENOUS at 11:55

## 2025-08-21 RX ADMIN — ONDANSETRON 4 MG: 2 INJECTION, SOLUTION INTRAMUSCULAR; INTRAVENOUS at 11:55

## 2025-08-21 RX ADMIN — LIDOCAINE HYDROCHLORIDE 50 MG: 10 INJECTION, SOLUTION EPIDURAL; INFILTRATION; INTRACAUDAL; PERINEURAL at 11:49

## 2025-08-22 ENCOUNTER — TRANSITIONAL CARE MANAGEMENT TELEPHONE ENCOUNTER (OUTPATIENT)
Dept: CALL CENTER | Facility: HOSPITAL | Age: 80
End: 2025-08-22
Payer: MEDICARE

## 2025-08-23 RX ORDER — ERGOCALCIFEROL 1.25 MG/1
50000 CAPSULE, LIQUID FILLED ORAL WEEKLY
Qty: 12 CAPSULE | Refills: 0 | Status: SHIPPED | OUTPATIENT
Start: 2025-08-23

## 2025-08-25 LAB
COLOR STONE: NORMAL
COM MFR STONE: 100 %
LABORATORY COMMENT REPORT: NORMAL
SIZE STONE: NORMAL MM
SPEC SOURCE SUBJ: NORMAL
WT STONE: 36 MG

## 2025-08-28 PROBLEM — R91.1 LUNG NODULE: Status: ACTIVE | Noted: 2025-08-28

## 2025-08-29 ENCOUNTER — TELEPHONE (OUTPATIENT)
Dept: FAMILY MEDICINE CLINIC | Facility: CLINIC | Age: 80
End: 2025-08-29
Payer: MEDICARE

## 2025-08-29 DIAGNOSIS — I10 PRIMARY HYPERTENSION: Primary | ICD-10-CM

## 2025-08-29 PROBLEM — R39.198 DIFFICULTY URINATING: Status: ACTIVE | Noted: 2025-08-29

## 2025-08-29 RX ORDER — LISINOPRIL 40 MG/1
40 TABLET ORAL DAILY
Qty: 90 TABLET | Refills: 0 | Status: SHIPPED | OUTPATIENT
Start: 2025-08-29

## (undated) DEVICE — PK NEURO SPINE 40

## (undated) DEVICE — DECANTER: Brand: UNBRANDED

## (undated) DEVICE — GLV SURG TRIUMPH GREEN W/ALOE PF LTX 8 STRL

## (undated) DEVICE — SUT VIC 2/0 CT2 27IN J269H

## (undated) DEVICE — NDL SUT SURG REV CUT 1/2CIR REG LT/G SZ2 2PK 1834-2DG

## (undated) DEVICE — DRAPE,U/ SHT,SPLIT,PLAS,STERIL: Brand: MEDLINE

## (undated) DEVICE — SKIN AFFIX SURG ADHESIVE 72/CS 0.55ML: Brand: MEDLINE

## (undated) DEVICE — CUFF TOURNI 1BLADDER 1PRT 30IN STRL

## (undated) DEVICE — MARKR SKIN W/RULR

## (undated) DEVICE — DRAPE,SHOULDER,BEACH CHAIR,STERILE: Brand: MEDLINE

## (undated) DEVICE — APPL CHLORAPREP HI/LITE TINTED 10.5ML ORNG

## (undated) DEVICE — SCRW FIX LK HEX 4.75X15MM
Type: IMPLANTABLE DEVICE | Site: SHOULDER | Status: NON-FUNCTIONAL
Removed: 2022-10-18

## (undated) DEVICE — TUBING, SUCTION, 1/4" X 12', STRAIGHT: Brand: MEDLINE

## (undated) DEVICE — DUAL CUT SAGITTAL BLADE

## (undated) DEVICE — HEWSON SUTURE RETRIEVER: Brand: HEWSON SUTURE RETRIEVER

## (undated) DEVICE — UNDERGLV SURG BIOGEL INDICAT PI SZ8 BLU

## (undated) DEVICE — ENCORE® LATEX ORTHO SIZE 8.5, STERILE LATEX POWDER-FREE SURGICAL GLOVE: Brand: ENCORE

## (undated) DEVICE — DRSNG SURESITE123 6X8IN

## (undated) DEVICE — CONN TBG Y 5 IN 1 LF STRL

## (undated) DEVICE — ANTIBACTERIAL UNDYED BRAIDED (POLYGLACTIN 910), SYNTHETIC ABSORBABLE SUTURE: Brand: COATED VICRYL

## (undated) DEVICE — UNDYED BRAIDED (POLYGLACTIN 910), SYNTHETIC ABSORBABLE SUTURE: Brand: COATED VICRYL

## (undated) DEVICE — STERILE PATIENT PROTECTIVE PAD FOR IMP® KNEE POSITIONERS & COHESIVE WRAP (10 / CASE): Brand: DE MAYO KNEE POSITIONER®

## (undated) DEVICE — SCRW HEX PERSONA 3.5X3.2X33MM
Type: IMPLANTABLE DEVICE | Status: NON-FUNCTIONAL
Removed: 2020-02-05

## (undated) DEVICE — UNDERGLV SURG BIOGEL/PI PF SYNTH SURG SZ8.5 BLU 50/BX

## (undated) DEVICE — CVR HNDL LT CAM LB54

## (undated) DEVICE — CATH IV INSYTE AUTOGARD SHLD 20G 1.16IN

## (undated) DEVICE — COVER,MAYO STAND,STERILE: Brand: MEDLINE

## (undated) DEVICE — KT SURG TURNOVER 050

## (undated) DEVICE — GLV SURG SENSICARE MICRO PF LF 8 STRL

## (undated) DEVICE — INTEGUSEAL MICROBIAL SEALANT: Brand: AVANOS

## (undated) DEVICE — GLV SURG SENSICARE PI ORTHO SZ8 LF STRL

## (undated) DEVICE — CATH URETRL OPEN END W/CONNECT 5F 70CM

## (undated) DEVICE — BNDG ELAS ELITE V/CLOSE 4IN 5YD LF STRL

## (undated) DEVICE — SOL IRRIG H2O 1000ML STRL

## (undated) DEVICE — 2T9 -0- UNDYED MONODERM 36X36: Brand: 2T9 -0- UNDYED MONODERM 36X36

## (undated) DEVICE — PK TOTL KN 50

## (undated) DEVICE — PAD CAST SPECIST 6IN STRL

## (undated) DEVICE — DRSNG SLVR/ANTIBAC PRIMASEAL POST/OP ADHS 3.5X10IN

## (undated) DEVICE — GLV SURG TRIUMPH GREEN W/ALOE PF LTX 6 STRL

## (undated) DEVICE — PIN DRL NOHEAD TROC 3.2X75MM

## (undated) DEVICE — PENCL HND ROCKRSWTCH HOLSTR EZ CLEAN TP CRD 10FT

## (undated) DEVICE — GLV SURG SIGNATURE ESSENTIAL PF LTX SZ8.5

## (undated) DEVICE — PREP SPRY PVPI 10P 2OZ

## (undated) DEVICE — GLV SURG BIOGEL LTX PF 7 1/2

## (undated) DEVICE — SYR LUERLOK 20CC

## (undated) DEVICE — SLV SCD CALF HEMOFORCE DVT THERP REPROC MD

## (undated) DEVICE — DRSNG TELFA PAD NONADH STR 1S 3X8IN

## (undated) DEVICE — SUT VIC 2/0 CT1 27IN J259H

## (undated) DEVICE — GLV SURG TRIUMPH ORTHO W/ALOE PF LTX 8 STRL

## (undated) DEVICE — 3 BONE CEMENT MIXER: Brand: MIXEVAC

## (undated) DEVICE — GLV SURG TRIUMPH LT PF LTX 8.5 STRL

## (undated) DEVICE — Device

## (undated) DEVICE — T-MAX DISPOSABLE FACE MASK 8 PER BOX

## (undated) DEVICE — DRP MICROSCOPE 4 BINOCULAR CV 54X150IN

## (undated) DEVICE — GLV SURG TRIUMPH ORTHO W/ALOE PF LTX 8.5 STRL

## (undated) DEVICE — SOL IRRIG SOD CHL 0.9PCT 3000ML

## (undated) DEVICE — NDL SPINE 18G 31/2IN PNK

## (undated) DEVICE — GLV SURG TRIUMPH LT PF LTX 6 STRL

## (undated) DEVICE — ZIPPERED TOGA, 2X LARGE: Brand: FLYTE

## (undated) DEVICE — SOLUTION,WATER,IRRIGATION,1000ML,STERILE: Brand: MEDLINE

## (undated) DEVICE — PK PROC TURNOVER

## (undated) DEVICE — SUT MNCRYL 3/0 Y936H

## (undated) DEVICE — GLV SURG SENSICARE PI ORTHO SZ7.5 LF STRL

## (undated) DEVICE — ADHS SKIN PREMIERPRO EXOFIN TOPICAL HI/VISC .5ML

## (undated) DEVICE — GOWN,PREVENTION PLUS,XXLARGE,STERILE: Brand: MEDLINE

## (undated) DEVICE — CELLERATERX SURGICAL HYDROLYZED COLLAGEN 1G TYPE I BOVINE COLLAGEN FOR CHRONIC AND ACUTE WOUNDS, PARTIAL AND FULL THICKNESS WOUNDS, PRESSURE INJURIES I-IV, VENOUS STASIS ULCERS, ARTERIAL ULCERS, DIABETIC ULCERS, TRAUMATIC WOUNDS, FIRST AND SECOND DEGREE BURNS, SUPERFICIAL WOUNDS AND SURGICAL WOUNDS. STERILE UNTIL OPENED.  STORE AT ROOM TEMPERATURE. FOR USE ON MODERATELY TO HEAVILY EXUDATIVE WOUNDS. CLEANSE THE WOUND PER FACILITY PROTOCOL.  APPLY CELLERATERX POWDER OVER THE ENTIRE WOUND BED AND THE EDGES OF THE WOUND.  COVER WITH AN APPROPRIATE DRESSING.  REAPPLY 2-3 TIMES A WEEK, OR WITH EACH DRESSING CHANGE, TAKING CARE NOT TO DISRUPT WOUND SITE. HTTPS://SANARAMEDTECH.COM/SURGICAL/CELLERATERX-SURGICAL/: Brand: CELLERATERX SURGICAL

## (undated) DEVICE — DRSNG GZ CURAD XEROFORM NONADHR OVERWRAP 5X9IN

## (undated) DEVICE — 4.0MM OVAL SOLID CARBIDE BUR MEDIUM

## (undated) DEVICE — BNDG ELAS ELITE V/CLOSE 6IN 5YD LF STRL

## (undated) DEVICE — WRAP SHOULDER COLD THERAPY

## (undated) DEVICE — PAD COLD/THRP KN POLAR/CARE WRAP/ON XL

## (undated) DEVICE — SPNG GZ AVANT 6PLY 4X4IN STRL PK/2

## (undated) DEVICE — DRSNG SURESITE WNDW 4X4.5

## (undated) DEVICE — PK CYSTO 50

## (undated) DEVICE — SMOKE EVACUATION TUBING WITH 7/8 IN TO 1/4 IN REDUCER: Brand: BUFFALO FILTER

## (undated) DEVICE — 3M™ STERI-DRAPE™ U-DRAPE 1015: Brand: STERI-DRAPE™

## (undated) DEVICE — SUT MONOCRYL PLS ANTIB UND 3/0  PS1 27IN

## (undated) DEVICE — THE FLOSEAL MALLEABLE TIP AND TRIMMABLE TIP ARE INTENDED FOR DELIVERY OF FLOSEAL HEMOSTATIC MATRIX.: Brand: FLOSEAL SPECIAL APPLICATOR TIPS

## (undated) DEVICE — NITINOL WIRE WITH HYDROPHILIC TIP: Brand: SENSOR

## (undated) DEVICE — BNDG ELAS CO-FLEX SLF ADHR 4IN5YD LF STRL

## (undated) DEVICE — SOL IRRIG NACL 9PCT 1000ML BTL

## (undated) DEVICE — APPL CHLORAPREP W/TINT 26ML ORNG

## (undated) DEVICE — UNDRGLV SURG BIOGEL PIMICROINDICATOR SYNTH SZ8 LF STRL

## (undated) DEVICE — DRSNG BARR INSUL KN POLAR/CARE S/ADHS LG

## (undated) DEVICE — SYR LUERLOK 30CC

## (undated) DEVICE — SOL IRRIG NACL 1000ML

## (undated) DEVICE — IRRIGATOR BULB ASEPTO 60CC STRL

## (undated) DEVICE — SCRW HEX PERSONA 3.5X3.2X33MM
Type: IMPLANTABLE DEVICE | Site: KNEE | Status: NON-FUNCTIONAL
Removed: 2019-10-23

## (undated) DEVICE — GLV SURG SENSICARE PI MIC PF SZ7.5 LF STRL

## (undated) DEVICE — SUT MNCRYL PLS ANTIB UD 4/0 PS2 18IN

## (undated) DEVICE — PICO 7 10CM X 30CM: Brand: PICO™ 7

## (undated) DEVICE — GLV SURG TRIUMPH GREEN W/ALOE PF LTX 8.5 STRL

## (undated) DEVICE — TOWEL,OR,DSP,ST,WHITE,DLX,4/PK,20PK/CS: Brand: MEDLINE

## (undated) DEVICE — NDL HYPO PRECISIONGLIDE REG 25G 1 1/2

## (undated) DEVICE — SPNG LAP PREWSH SFTPK 18X18IN STRL PK/5

## (undated) DEVICE — SOL IRR NACL 0.9PCT ARTHROMATIC 3000ML

## (undated) DEVICE — DRAPE SHEET ULTRAGARD: Brand: MEDLINE

## (undated) DEVICE — PK TOTL SHLDR 50

## (undated) DEVICE — SHLD ANGIO 2LAYR CIR FEN

## (undated) DEVICE — DRP SLUSH WARMR MACH 52X66IN OM-ORS-301

## (undated) DEVICE — 3M™ STERI-STRIP™ REINFORCED ADHESIVE SKIN CLOSURES, R1547, 1/2 IN X 4 IN (12 MM X 100 MM), 6 STRIPS/ENVELOPE: Brand: 3M™ STERI-STRIP™

## (undated) DEVICE — ANESTH CIRCUIT 60IN 3LTR-LF: Brand: MEDLINE INDUSTRIES, INC.

## (undated) DEVICE — INTENDED TO SUPPORT AND MAINTAIN THE POSITION OF AN ANESTHETIZED PATIENT DURING SURGERY: Brand: ERIN BEACH CHAIR FACE MASK

## (undated) DEVICE — SUT VIC 2/0 CT1 36IN

## (undated) DEVICE — KT HEMOVAC INFCT CONTRL DRN 3/16IN EA/10KT

## (undated) DEVICE — CODMAN® SURGICAL PATTIES 3/4" X 3/4" (1.91CM X 1.91CM): Brand: CODMAN®

## (undated) DEVICE — NDL SUT CATGUT 1/2CIR TPR SZ5 2PK 1824-5D

## (undated) DEVICE — 1010 S-DRAPE TOWEL DRAPE 10/BX: Brand: STERI-DRAPE™

## (undated) DEVICE — 3.0MM PRECISION NEURO (MATCH HEAD)

## (undated) DEVICE — NDL SUT KEITH STR TRIANG PT SZ2 7/8 2PK 1827-2 7/8

## (undated) DEVICE — DISPOSABLE 9450003 ELECTRO TWST PAIR 8CH

## (undated) DEVICE — SYS COLD/THRP POLAR/CARE/CUBE

## (undated) DEVICE — NDL SPINE 20G 3 1/2 YEL STRL 1P/U

## (undated) DEVICE — GLV SURG SENSICARE PI LF PF 8 GRN STRL

## (undated) DEVICE — DRP C/ARM 41X74IN

## (undated) DEVICE — SUT VIC 0 CT1 CR8 18IN J840D

## (undated) DEVICE — SUT VIC 0 CP1 27IN J267H

## (undated) DEVICE — ADHS SKIN DERMABOND TOP ADVANCED